# Patient Record
Sex: MALE | Race: OTHER | HISPANIC OR LATINO | Employment: FULL TIME | URBAN - METROPOLITAN AREA
[De-identification: names, ages, dates, MRNs, and addresses within clinical notes are randomized per-mention and may not be internally consistent; named-entity substitution may affect disease eponyms.]

---

## 2022-03-31 ENCOUNTER — HOSPITAL ENCOUNTER (INPATIENT)
Facility: HOSPITAL | Age: 52
LOS: 4 days | Discharge: HOME | DRG: 192 | End: 2022-04-06
Attending: EMERGENCY MEDICINE | Admitting: INTERNAL MEDICINE
Payer: COMMERCIAL

## 2022-03-31 DIAGNOSIS — J90 RECURRENT LEFT PLEURAL EFFUSION: ICD-10-CM

## 2022-03-31 DIAGNOSIS — R42 DIZZINESS: ICD-10-CM

## 2022-03-31 DIAGNOSIS — I50.20 HFREF (HEART FAILURE WITH REDUCED EJECTION FRACTION) (HCC): ICD-10-CM

## 2022-03-31 DIAGNOSIS — R07.9 CHEST PAIN: Primary | ICD-10-CM

## 2022-03-31 DIAGNOSIS — I50.9 ACUTE CHF (CONGESTIVE HEART FAILURE) (HCC): ICD-10-CM

## 2022-03-31 PROCEDURE — 83036 HEMOGLOBIN GLYCOSYLATED A1C: CPT | Performed by: STUDENT IN AN ORGANIZED HEALTH CARE EDUCATION/TRAINING PROGRAM

## 2022-03-31 PROCEDURE — 80053 COMPREHEN METABOLIC PANEL: CPT | Performed by: EMERGENCY MEDICINE

## 2022-03-31 PROCEDURE — 83880 ASSAY OF NATRIURETIC PEPTIDE: CPT | Performed by: EMERGENCY MEDICINE

## 2022-03-31 PROCEDURE — 84484 ASSAY OF TROPONIN QUANT: CPT | Performed by: EMERGENCY MEDICINE

## 2022-03-31 PROCEDURE — 99285 EMERGENCY DEPT VISIT HI MDM: CPT

## 2022-03-31 PROCEDURE — 85025 COMPLETE CBC W/AUTO DIFF WBC: CPT | Performed by: EMERGENCY MEDICINE

## 2022-03-31 PROCEDURE — 36415 COLL VENOUS BLD VENIPUNCTURE: CPT

## 2022-03-31 PROCEDURE — 80061 LIPID PANEL: CPT | Performed by: STUDENT IN AN ORGANIZED HEALTH CARE EDUCATION/TRAINING PROGRAM

## 2022-03-31 PROCEDURE — 93005 ELECTROCARDIOGRAM TRACING: CPT

## 2022-03-31 PROCEDURE — 76775 US EXAM ABDO BACK WALL LIM: CPT | Performed by: EMERGENCY MEDICINE

## 2022-03-31 PROCEDURE — 76604 US EXAM CHEST: CPT | Performed by: EMERGENCY MEDICINE

## 2022-03-31 PROCEDURE — 85379 FIBRIN DEGRADATION QUANT: CPT | Performed by: EMERGENCY MEDICINE

## 2022-03-31 PROCEDURE — 84443 ASSAY THYROID STIM HORMONE: CPT | Performed by: STUDENT IN AN ORGANIZED HEALTH CARE EDUCATION/TRAINING PROGRAM

## 2022-03-31 PROCEDURE — 99285 EMERGENCY DEPT VISIT HI MDM: CPT | Performed by: EMERGENCY MEDICINE

## 2022-03-31 RX ORDER — ACETAMINOPHEN 325 MG/1
975 TABLET ORAL ONCE
Status: COMPLETED | OUTPATIENT
Start: 2022-04-01 | End: 2022-04-01

## 2022-03-31 RX ORDER — KETOROLAC TROMETHAMINE 30 MG/ML
15 INJECTION, SOLUTION INTRAMUSCULAR; INTRAVENOUS ONCE
Status: COMPLETED | OUTPATIENT
Start: 2022-04-01 | End: 2022-04-01

## 2022-04-01 ENCOUNTER — APPOINTMENT (OUTPATIENT)
Dept: NON INVASIVE DIAGNOSTICS | Facility: HOSPITAL | Age: 52
DRG: 192 | End: 2022-04-01
Payer: COMMERCIAL

## 2022-04-01 ENCOUNTER — APPOINTMENT (EMERGENCY)
Dept: RADIOLOGY | Facility: HOSPITAL | Age: 52
DRG: 192 | End: 2022-04-01
Payer: COMMERCIAL

## 2022-04-01 PROBLEM — R06.00 DYSPNEA: Status: ACTIVE | Noted: 2022-04-01

## 2022-04-01 PROBLEM — R35.0 INCREASED URINARY FREQUENCY: Status: ACTIVE | Noted: 2022-04-01

## 2022-04-01 PROBLEM — I10 HYPERTENSION: Status: ACTIVE | Noted: 2022-04-01

## 2022-04-01 PROBLEM — M79.605 LEFT LEG PAIN: Status: ACTIVE | Noted: 2022-04-01

## 2022-04-01 PROBLEM — R07.89 CHEST DISCOMFORT: Status: ACTIVE | Noted: 2022-04-01

## 2022-04-01 PROBLEM — R79.89 ELEVATED SERUM CREATININE: Status: ACTIVE | Noted: 2022-04-01

## 2022-04-01 PROBLEM — J90 PLEURAL EFFUSION, LEFT: Status: ACTIVE | Noted: 2022-04-01

## 2022-04-01 LAB
2HR DELTA HS TROPONIN: -6 NG/L
4HR DELTA HS TROPONIN: 3 NG/L
ALBUMIN SERPL BCP-MCNC: 3.2 G/DL (ref 3.5–5)
ALP SERPL-CCNC: 145 U/L (ref 46–116)
ALT SERPL W P-5'-P-CCNC: 39 U/L (ref 12–78)
ANION GAP SERPL CALCULATED.3IONS-SCNC: 6 MMOL/L (ref 4–13)
ANION GAP SERPL CALCULATED.3IONS-SCNC: 7 MMOL/L (ref 4–13)
AORTIC ROOT: 2.5 CM
APICAL FOUR CHAMBER EJECTION FRACTION: 32 %
ASCENDING AORTA: 2.9 CM (ref 2.16–3.23)
AST SERPL W P-5'-P-CCNC: 28 U/L (ref 5–45)
ATRIAL RATE: 104 BPM
BACTERIA UR QL AUTO: ABNORMAL /HPF
BASOPHILS # BLD AUTO: 0.06 THOUSANDS/ΜL (ref 0–0.1)
BASOPHILS # BLD AUTO: 0.06 THOUSANDS/ΜL (ref 0–0.1)
BASOPHILS NFR BLD AUTO: 1 % (ref 0–1)
BASOPHILS NFR BLD AUTO: 1 % (ref 0–1)
BILIRUB SERPL-MCNC: 0.53 MG/DL (ref 0.2–1)
BILIRUB UR QL STRIP: NEGATIVE
BUN SERPL-MCNC: 18 MG/DL (ref 5–25)
BUN SERPL-MCNC: 19 MG/DL (ref 5–25)
CALCIUM ALBUM COR SERPL-MCNC: 10 MG/DL (ref 8.3–10.1)
CALCIUM SERPL-MCNC: 8.9 MG/DL (ref 8.3–10.1)
CALCIUM SERPL-MCNC: 9.4 MG/DL (ref 8.3–10.1)
CARDIAC TROPONIN I PNL SERPL HS: 19 NG/L
CARDIAC TROPONIN I PNL SERPL HS: 25 NG/L
CARDIAC TROPONIN I PNL SERPL HS: 28 NG/L
CHLORIDE SERPL-SCNC: 107 MMOL/L (ref 100–108)
CHLORIDE SERPL-SCNC: 108 MMOL/L (ref 100–108)
CHOLEST SERPL-MCNC: 132 MG/DL
CLARITY UR: CLEAR
CO2 SERPL-SCNC: 23 MMOL/L (ref 21–32)
CO2 SERPL-SCNC: 25 MMOL/L (ref 21–32)
COLOR UR: ABNORMAL
CREAT SERPL-MCNC: 1.31 MG/DL (ref 0.6–1.3)
CREAT SERPL-MCNC: 1.36 MG/DL (ref 0.6–1.3)
D DIMER PPP FEU-MCNC: 2.55 UG/ML FEU
E WAVE DECELERATION TIME: 86 MS
EOSINOPHIL # BLD AUTO: 0.25 THOUSAND/ΜL (ref 0–0.61)
EOSINOPHIL # BLD AUTO: 0.28 THOUSAND/ΜL (ref 0–0.61)
EOSINOPHIL NFR BLD AUTO: 3 % (ref 0–6)
EOSINOPHIL NFR BLD AUTO: 4 % (ref 0–6)
ERYTHROCYTE [DISTWIDTH] IN BLOOD BY AUTOMATED COUNT: 14.2 % (ref 11.6–15.1)
ERYTHROCYTE [DISTWIDTH] IN BLOOD BY AUTOMATED COUNT: 14.2 % (ref 11.6–15.1)
EST. AVERAGE GLUCOSE BLD GHB EST-MCNC: 108 MG/DL
FLUAV RNA RESP QL NAA+PROBE: NEGATIVE
FLUBV RNA RESP QL NAA+PROBE: NEGATIVE
FRACTIONAL SHORTENING: 13 % (ref 28–44)
GFR SERPL CREATININE-BSD FRML MDRD: 60 ML/MIN/1.73SQ M
GFR SERPL CREATININE-BSD FRML MDRD: 63 ML/MIN/1.73SQ M
GLUCOSE SERPL-MCNC: 102 MG/DL (ref 65–140)
GLUCOSE SERPL-MCNC: 106 MG/DL (ref 65–140)
GLUCOSE UR STRIP-MCNC: NEGATIVE MG/DL
HBA1C MFR BLD: 5.4 %
HCT VFR BLD AUTO: 44.1 % (ref 36.5–49.3)
HCT VFR BLD AUTO: 46.4 % (ref 36.5–49.3)
HDLC SERPL-MCNC: 44 MG/DL
HGB BLD-MCNC: 14.6 G/DL (ref 12–17)
HGB BLD-MCNC: 14.7 G/DL (ref 12–17)
HGB UR QL STRIP.AUTO: NEGATIVE
IMM GRANULOCYTES # BLD AUTO: 0.04 THOUSAND/UL (ref 0–0.2)
IMM GRANULOCYTES # BLD AUTO: 0.04 THOUSAND/UL (ref 0–0.2)
IMM GRANULOCYTES NFR BLD AUTO: 0 % (ref 0–2)
IMM GRANULOCYTES NFR BLD AUTO: 1 % (ref 0–2)
INTERVENTRICULAR SEPTUM IN DIASTOLE (PARASTERNAL SHORT AXIS VIEW): 0.9 CM
INTERVENTRICULAR SEPTUM: 0.9 CM (ref 0.56–1.05)
KETONES UR STRIP-MCNC: NEGATIVE MG/DL
LAAS-AP2: 21.8 CM2
LAAS-AP4: 19.3 CM2
LDLC SERPL CALC-MCNC: 47 MG/DL (ref 0–100)
LEFT ATRIUM AREA SYSTOLE SINGLE PLANE A4C: 21 CM2
LEFT ATRIUM SIZE: 4.3 CM
LEFT INTERNAL DIMENSION IN SYSTOLE: 4.5 CM (ref 4.15–6.28)
LEFT VENTRICLE DIASTOLIC VOLUME (MOD BIPLANE): 121 ML (ref 110.72–249.35)
LEFT VENTRICLE SYSTOLIC VOLUME (MOD BIPLANE): 77 ML
LEFT VENTRICULAR INTERNAL DIMENSION IN DIASTOLE: 5.2 CM (ref 6.91–10.3)
LEFT VENTRICULAR POSTERIOR WALL IN END DIASTOLE: 0.9 CM (ref 0.55–1.04)
LEFT VENTRICULAR STROKE VOLUME: 36 ML
LEUKOCYTE ESTERASE UR QL STRIP: NEGATIVE
LV EF: 36 %
LVSV (TEICH): 36 ML
LYMPHOCYTES # BLD AUTO: 1.59 THOUSANDS/ΜL (ref 0.6–4.47)
LYMPHOCYTES # BLD AUTO: 1.9 THOUSANDS/ΜL (ref 0.6–4.47)
LYMPHOCYTES NFR BLD AUTO: 17 % (ref 14–44)
LYMPHOCYTES NFR BLD AUTO: 25 % (ref 14–44)
MCH RBC QN AUTO: 29.8 PG (ref 26.8–34.3)
MCH RBC QN AUTO: 29.9 PG (ref 26.8–34.3)
MCHC RBC AUTO-ENTMCNC: 31.7 G/DL (ref 31.4–37.4)
MCHC RBC AUTO-ENTMCNC: 33.1 G/DL (ref 31.4–37.4)
MCV RBC AUTO: 90 FL (ref 82–98)
MCV RBC AUTO: 94 FL (ref 82–98)
MONOCYTES # BLD AUTO: 0.49 THOUSAND/ΜL (ref 0.17–1.22)
MONOCYTES # BLD AUTO: 0.65 THOUSAND/ΜL (ref 0.17–1.22)
MONOCYTES NFR BLD AUTO: 6 % (ref 4–12)
MONOCYTES NFR BLD AUTO: 7 % (ref 4–12)
MUCOUS THREADS UR QL AUTO: ABNORMAL
MV E'TISSUE VEL-SEP: 9 CM/S
MV EROA: 0.1 CM2
MV PEAK A VEL: 0.47 M/S
MV PEAK E VEL: 93 CM/S
MV STENOSIS PRESSURE HALF TIME: 25 MS
MV VALVE AREA P 1/2 METHOD: 8.8 CM2
NEUTROPHILS # BLD AUTO: 4.83 THOUSANDS/ΜL (ref 1.85–7.62)
NEUTROPHILS # BLD AUTO: 6.59 THOUSANDS/ΜL (ref 1.85–7.62)
NEUTS SEG NFR BLD AUTO: 63 % (ref 43–75)
NEUTS SEG NFR BLD AUTO: 72 % (ref 43–75)
NITRITE UR QL STRIP: NEGATIVE
NON-SQ EPI CELLS URNS QL MICRO: ABNORMAL /HPF
NRBC BLD AUTO-RTO: 0 /100 WBCS
NRBC BLD AUTO-RTO: 0 /100 WBCS
NT-PROBNP SERPL-MCNC: 3237 PG/ML
P AXIS: 73 DEGREES
PA SYSTOLIC PRESSURE: 51 MMHG
PH UR STRIP.AUTO: 5 [PH]
PISA MRMAX VEL: 0.36 M/S
PISA RADIUS: 0.5 CM
PLATELET # BLD AUTO: 175 THOUSANDS/UL (ref 149–390)
PLATELET # BLD AUTO: 225 THOUSANDS/UL (ref 149–390)
PMV BLD AUTO: 11.5 FL (ref 8.9–12.7)
PMV BLD AUTO: 11.8 FL (ref 8.9–12.7)
POTASSIUM SERPL-SCNC: 4.1 MMOL/L (ref 3.5–5.3)
POTASSIUM SERPL-SCNC: 4.5 MMOL/L (ref 3.5–5.3)
PR INTERVAL: 156 MS
PROT SERPL-MCNC: 6.8 G/DL (ref 6.4–8.2)
PROT UR STRIP-MCNC: NEGATIVE MG/DL
QRS AXIS: 37 DEGREES
QRSD INTERVAL: 74 MS
QT INTERVAL: 330 MS
QTC INTERVAL: 433 MS
RA PRESSURE ESTIMATED: 5 MMHG
RBC # BLD AUTO: 4.88 MILLION/UL (ref 3.88–5.62)
RBC # BLD AUTO: 4.93 MILLION/UL (ref 3.88–5.62)
RBC #/AREA URNS AUTO: ABNORMAL /HPF
RIGHT ATRIUM AREA SYSTOLE A4C: 18.1 CM2
RIGHT VENTRICLE ID DIMENSION: 3.6 CM
RSV RNA RESP QL NAA+PROBE: NEGATIVE
RV PSP: 51 MMHG
SARS-COV-2 RNA RESP QL NAA+PROBE: NEGATIVE
SL CV LEFT ATRIUM LENGTH A2C: 5.6 CM
SL CV LV DIAS VOL ENDO Z SCORE: -1.7
SL CV LV EF: 30
SL CV PED ECHO LEFT VENTRICLE DIASTOLIC VOLUME (MOD BIPLANE) 2D: 130 ML
SL CV PED ECHO LEFT VENTRICLE SYSTOLIC VOLUME (MOD BIPLANE) 2D: 94 ML
SODIUM SERPL-SCNC: 137 MMOL/L (ref 136–145)
SODIUM SERPL-SCNC: 139 MMOL/L (ref 136–145)
SP GR UR STRIP.AUTO: 1.02 (ref 1–1.03)
T WAVE AXIS: 68 DEGREES
TR MAX PG: 46 MMHG
TR PEAK VELOCITY: 3.4 M/S
TRICUSPID VALVE PEAK REGURGITATION VELOCITY: 3.4 M/S
TRIGL SERPL-MCNC: 205 MG/DL
TSH SERPL DL<=0.05 MIU/L-ACNC: 0.73 UIU/ML (ref 0.36–3.74)
UROBILINOGEN UR STRIP-ACNC: <2 MG/DL
VENTRICULAR RATE: 104 BPM
WBC # BLD AUTO: 7.6 THOUSAND/UL (ref 4.31–10.16)
WBC # BLD AUTO: 9.18 THOUSAND/UL (ref 4.31–10.16)
WBC #/AREA URNS AUTO: ABNORMAL /HPF
Z-SCORE OF ASCENDING AORTA: 0.75
Z-SCORE OF INTERVENTRICULAR SEPTUM IN END DIASTOLE: 0.76
Z-SCORE OF LEFT VENTRICULAR DIMENSION IN END DIASTOLE: -4.84
Z-SCORE OF LEFT VENTRICULAR DIMENSION IN END SYSTOLE: -0.99
Z-SCORE OF LEFT VENTRICULAR POSTERIOR WALL IN END DIASTOLE: 0.87

## 2022-04-01 PROCEDURE — 84484 ASSAY OF TROPONIN QUANT: CPT | Performed by: EMERGENCY MEDICINE

## 2022-04-01 PROCEDURE — 93010 ELECTROCARDIOGRAM REPORT: CPT | Performed by: INTERNAL MEDICINE

## 2022-04-01 PROCEDURE — 74174 CTA ABD&PLVS W/CONTRAST: CPT

## 2022-04-01 PROCEDURE — 99245 OFF/OP CONSLTJ NEW/EST HI 55: CPT | Performed by: INTERNAL MEDICINE

## 2022-04-01 PROCEDURE — 96361 HYDRATE IV INFUSION ADD-ON: CPT

## 2022-04-01 PROCEDURE — 99219 PR INITIAL OBSERVATION CARE/DAY 50 MINUTES: CPT | Performed by: INTERNAL MEDICINE

## 2022-04-01 PROCEDURE — 93306 TTE W/DOPPLER COMPLETE: CPT

## 2022-04-01 PROCEDURE — 80048 BASIC METABOLIC PNL TOTAL CA: CPT | Performed by: STUDENT IN AN ORGANIZED HEALTH CARE EDUCATION/TRAINING PROGRAM

## 2022-04-01 PROCEDURE — 93970 EXTREMITY STUDY: CPT | Performed by: SURGERY

## 2022-04-01 PROCEDURE — 96374 THER/PROPH/DIAG INJ IV PUSH: CPT

## 2022-04-01 PROCEDURE — 93970 EXTREMITY STUDY: CPT

## 2022-04-01 PROCEDURE — 0241U HB NFCT DS VIR RESP RNA 4 TRGT: CPT | Performed by: INTERNAL MEDICINE

## 2022-04-01 PROCEDURE — NC001 PR NO CHARGE: Performed by: INTERNAL MEDICINE

## 2022-04-01 PROCEDURE — 84166 PROTEIN E-PHORESIS/URINE/CSF: CPT | Performed by: INTERNAL MEDICINE

## 2022-04-01 PROCEDURE — 85025 COMPLETE CBC W/AUTO DIFF WBC: CPT | Performed by: STUDENT IN AN ORGANIZED HEALTH CARE EDUCATION/TRAINING PROGRAM

## 2022-04-01 PROCEDURE — 81001 URINALYSIS AUTO W/SCOPE: CPT | Performed by: STUDENT IN AN ORGANIZED HEALTH CARE EDUCATION/TRAINING PROGRAM

## 2022-04-01 PROCEDURE — 71275 CT ANGIOGRAPHY CHEST: CPT

## 2022-04-01 PROCEDURE — 36415 COLL VENOUS BLD VENIPUNCTURE: CPT | Performed by: EMERGENCY MEDICINE

## 2022-04-01 PROCEDURE — 93306 TTE W/DOPPLER COMPLETE: CPT | Performed by: INTERNAL MEDICINE

## 2022-04-01 PROCEDURE — G1004 CDSM NDSC: HCPCS

## 2022-04-01 RX ORDER — HEPARIN SODIUM 5000 [USP'U]/ML
5000 INJECTION, SOLUTION INTRAVENOUS; SUBCUTANEOUS EVERY 8 HOURS SCHEDULED
Status: DISCONTINUED | OUTPATIENT
Start: 2022-04-01 | End: 2022-04-06 | Stop reason: HOSPADM

## 2022-04-01 RX ORDER — ASPIRIN 81 MG/1
81 TABLET ORAL DAILY
Status: DISCONTINUED | OUTPATIENT
Start: 2022-04-01 | End: 2022-04-03

## 2022-04-01 RX ORDER — ASPIRIN 81 MG/1
81 TABLET ORAL DAILY
Status: ON HOLD | COMMUNITY
End: 2022-04-06 | Stop reason: SDUPTHER

## 2022-04-01 RX ORDER — FUROSEMIDE 10 MG/ML
40 INJECTION INTRAMUSCULAR; INTRAVENOUS
Status: DISCONTINUED | OUTPATIENT
Start: 2022-04-01 | End: 2022-04-04

## 2022-04-01 RX ORDER — ACETAMINOPHEN 160 MG/5ML
650 SUSPENSION, ORAL (FINAL DOSE FORM) ORAL EVERY 4 HOURS PRN
Status: DISCONTINUED | OUTPATIENT
Start: 2022-04-01 | End: 2022-04-03

## 2022-04-01 RX ORDER — FUROSEMIDE 10 MG/ML
80 INJECTION INTRAMUSCULAR; INTRAVENOUS ONCE
Status: COMPLETED | OUTPATIENT
Start: 2022-04-01 | End: 2022-04-01

## 2022-04-01 RX ORDER — POTASSIUM CHLORIDE 20 MEQ/1
40 TABLET, EXTENDED RELEASE ORAL ONCE
Status: COMPLETED | OUTPATIENT
Start: 2022-04-01 | End: 2022-04-01

## 2022-04-01 RX ADMIN — HEPARIN SODIUM 5000 UNITS: 5000 INJECTION INTRAVENOUS; SUBCUTANEOUS at 21:33

## 2022-04-01 RX ADMIN — Medication 12.5 MG: at 21:33

## 2022-04-01 RX ADMIN — ACETAMINOPHEN 975 MG: 325 TABLET ORAL at 00:37

## 2022-04-01 RX ADMIN — Medication 12.5 MG: at 10:39

## 2022-04-01 RX ADMIN — FUROSEMIDE 40 MG: 10 INJECTION, SOLUTION INTRAMUSCULAR; INTRAVENOUS at 17:20

## 2022-04-01 RX ADMIN — HEPARIN SODIUM 5000 UNITS: 5000 INJECTION INTRAVENOUS; SUBCUTANEOUS at 13:25

## 2022-04-01 RX ADMIN — FUROSEMIDE 80 MG: 10 INJECTION, SOLUTION INTRAVENOUS at 04:53

## 2022-04-01 RX ADMIN — IOHEXOL 100 ML: 350 INJECTION, SOLUTION INTRAVENOUS at 01:27

## 2022-04-01 RX ADMIN — POTASSIUM CHLORIDE 40 MEQ: 20 TABLET, EXTENDED RELEASE ORAL at 10:39

## 2022-04-01 RX ADMIN — ASPIRIN 81 MG: 81 TABLET, COATED ORAL at 10:39

## 2022-04-01 RX ADMIN — KETOROLAC TROMETHAMINE 15 MG: 30 INJECTION, SOLUTION INTRAMUSCULAR at 00:37

## 2022-04-01 RX ADMIN — SODIUM CHLORIDE 1000 ML: 0.9 INJECTION, SOLUTION INTRAVENOUS at 00:37

## 2022-04-01 NOTE — ASSESSMENT & PLAN NOTE
1 day of acute onset left sided chest pain radiating to the back  Not related to exertion  Exacerbated by cough  EKG showing sinus tachy with nonspecific ST changes  NT proBNP 3237  D dimer 2 55  Trop 25 --> 19  CTA dissection protocol showing only left pleural effusion without aneurysm or PE  Low suspicion for ACS given already downtrending trop  Concern is for acute heart failure (1+ BLE edema, JVD, elevated proBNP, left pleural effusion, SOB) +/- PE  Concern for PE given acuity of pain and constellation of symptoms (tachycardia, cough, chest pain, SOB, LLE swelling and pain) and labs (elevated D dimer)  Discussed with on call radiologist that read CTA dissection and he explained that though the study done was not a CTA PE, it would only miss a very small PE  TTE revealed LVEF 30-35%, moderate to severe MR  Patient is likely to acute CHF  Etiology is unclear  Inpatient consult to Cardiology and input appreciated  · Monitor I/O, daily weight,  · Continue Lasix 40 mg IV b i d   · Increased metoprolol to 25 mg p o  B i d   · Start hydralazine 10 mg p o   Q 8h  · Plan for left heart catheterization Monday  · HbA1C WNL, Iron panel unremarkable, TSH within normal range

## 2022-04-01 NOTE — ED ATTENDING ATTESTATION
Final Diagnosis:  1  Chest pain    2  Dizziness    3  Left pleural effusion      ED Course as of 04/01/22 0428   Fri Apr 01, 2022   0029 POCUS Cardiac:  - Done with resident  - echo; transthoracic echocardiogram was performed at bedside    - Images collected were adequate  - This was a technically difficult study due to lung interference  -  Apical, parasternal, subcostal and suprasternal views were obtained  - Findings: There was no obvious pericardial effusion   EF grossly mildly reduced  IVC was 48% CI  LEFT pleural effusion, small  Aorta appears non-aneurysmal     0053 D-Dimer, Quant(!): 2 55       I, Nessa Lam MD, saw and evaluated the patient  All available labs and X-rays were ordered by me or the resident and have been reviewed by myself  I discussed the patient with the resident / non-physician and agree with the resident's / non-physician practitioner's findings and plan as documented in the resident's / non-physician practicitioner's note, except where noted  At this point, I agree with the current assessment done in the ED  I was present during key portions of all procedures performed unless otherwise stated  Chief Complaint   Patient presents with    Chest Pain     Pt reports 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, pain worsens when pt coughs  Pt also reports lower back pain  This is a 50 y o  male presenting for evaluation of 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  +rhinorrhea  +PND  +cough  Has noted LE edema / swelling and started to take a pill to take water off, written by a doctor  Legs feel sore  Also has high blood pressure that he takes a medication for  No recent travel / long drives  PMH:  He has had evaluations for blood clots in his legs but never found anything     has no past medical history on file  PSH:   has no past surgical history on file      Social:  Social History     Substance and Sexual Activity   Alcohol Use Never     Social History     Tobacco Use   Smoking Status Never Smoker   Smokeless Tobacco Never Used     Social History     Substance and Sexual Activity   Drug Use Never     PE:  Vitals:    03/31/22 2338 04/01/22 0145   BP: 130/72 156/82   BP Location:  Right arm   Pulse: (!) 111 96   Resp: 20 22   Temp: (!) 97 2 °F (36 2 °C)    TempSrc: Oral    SpO2: 95% 94%   General: VSS, NAD, awake, alert  Well-nourished, well-developed  Appears stated age  Head: Normocephalic, atraumatic, nontender  Eyes: PERRL, EOM-I  No diplopia  No hyphema  No subconjunctival hemorrhages  Symmetrical lids  ENTAtraumatic external nose and ears  MMM  No stridor  Normal phonation  No drooling  Base of mouth is soft  No mastoid tenderness  Neck: Symmetric, trachea midline  No JVD  CV: Peripheral pulses +2 throughout  Mild LEFT anterior chest wall tenderness  Lungs:   Unlabored   No retractions  No crepitus  No tachypnea  No paradoxical motion  Abd: +BS, soft, minimal LLQ tenderness   Moderate LUQ tenderness  ND    MSK:   FROM   No lower extremity edema  Back:   No CVAT  Skin: Dry, intact  Neuro: AAOx3, GCS 15, CN II-XII grossly intact  Motor grossly intact  Psychiatric/Behavioral: Appropriate mood and affect   Exam: deferred  A:  - cp  P:  - EKG reviewed with non-specific abnormalities of unclear certainty with tachycardia  No old for comaprison  - Given age / RFs, will do cardiac workup  - Delta trop  - likely DC  - toradol for pain  - POCUS for effusion    - 13 point ROS was performed and all are normal unless stated in the history above  - Nursing note reviewed  Vitals reviewed  - Orders placed by myself and/or advanced practitioner / resident     - Previous chart was reviewed  - No language barrier    - History obtained from patient  - There are no limitations to the history obtained  - Critical care time: Not applicable for this patient       Code Status: Level 1 - Full Code  Advance Directive and Living Will:      Power of :    POLST:      Medications   furosemide (LASIX) injection 80 mg (has no administration in time range)   aspirin (ECOTRIN LOW STRENGTH) EC tablet 81 mg (has no administration in time range)   acetaminophen (TYLENOL) oral suspension 650 mg (has no administration in time range)   heparin (porcine) subcutaneous injection 5,000 Units (has no administration in time range)   sodium chloride 0 9 % bolus 1,000 mL (1,000 mL Intravenous New Bag 4/1/22 0037)   ketorolac (TORADOL) injection 15 mg (15 mg Intravenous Given 4/1/22 0037)   acetaminophen (TYLENOL) tablet 975 mg (975 mg Oral Given 4/1/22 0037)   iohexol (OMNIPAQUE) 350 MG/ML injection (SINGLE-DOSE) 100 mL (100 mL Intravenous Given 4/1/22 0127)     CTA dissection protocol chest abdomen pelvis w wo contrast   Final Result      No evidence of aortic aneurysm or dissection  Small left pleural effusion  No evidence of acute pathology throughout the abdomen or pelvis                    Workstation performed: [id number]         VAS lower limb venous duplex study, complete bilateral    (Results Pending)     Orders Placed This Encounter   Procedures    POC AAA US    POC Cardiac US    COVID/FLU/RSV    CTA dissection protocol chest abdomen pelvis w wo contrast    CBC and differential    Comprehensive metabolic panel    HS Troponin 0hr (reflex protocol)    Moira draw    D-Dimer    NT-BNP PRO    HS Troponin I 2hr    HS Troponin I 4hr    Lipid Panel with Direct LDL reflex    Hemoglobin A1C    TSH, 3rd generation    Basic metabolic panel    CBC and differential    Urinalysis with microscopic    Diet Cardiovascular; Cardiac; Fluid Restriction 1800 ML, Sodium 2 GM    Notify Physician: Increase in chest pain, symptomatic hypotension, or change in cardiac rhythm, or O2 less than 90%    Insert peripheral IV    Continuous cardiac monitoring    Continuous pulse oximetry    48 Hour Telemetry Monitoring    Vital Signs per unit routine    Up and OOB as tolerated    Daily weights    I/O    Insert peripheral IV    Maintain IV access    Apply Sequential Compression Device    Activity as tolerated    Level 1-Full Code: all life saving measures are indicated    Inpatient consult to Case Management    OT eval and treat    PT eval and treat    ECG 12 lead    Echo complete w/ contrast if indicated    Place in Observation     Labs Reviewed   COMPREHENSIVE METABOLIC PANEL - Abnormal       Result Value Ref Range Status    Sodium 137  136 - 145 mmol/L Final    Potassium 4 5  3 5 - 5 3 mmol/L Final    Comment: Slightly Hemolyzed; Results May be Affected    Chloride 108  100 - 108 mmol/L Final    CO2 23  21 - 32 mmol/L Final    ANION GAP 6  4 - 13 mmol/L Final    BUN 19  5 - 25 mg/dL Final    Creatinine 1 36 (*) 0 60 - 1 30 mg/dL Final    Comment: Standardized to IDMS reference method    Glucose 102  65 - 140 mg/dL Final    Comment: If the patient is fasting, the ADA then defines impaired fasting glucose as > 100 mg/dL and diabetes as > or equal to 123 mg/dL  Specimen collection should occur prior to Sulfasalazine administration due to the potential for falsely depressed results  Specimen collection should occur prior to Sulfapyridine administration due to the potential for falsely elevated results  Calcium 9 4  8 3 - 10 1 mg/dL Final    Corrected Calcium 10 0  8 3 - 10 1 mg/dL Final    AST 28  5 - 45 U/L Final    Comment: Slightly Hemolyzed; Results May be Affected  Specimen collection should occur prior to Sulfasalazine administration due to the potential for falsely depressed results  ALT 39  12 - 78 U/L Final    Comment: Specimen collection should occur prior to Sulfasalazine and/or Sulfapyridine administration due to the potential for falsely depressed results       Alkaline Phosphatase 145 (*) 46 - 116 U/L Final    Total Protein 6 8  6 4 - 8 2 g/dL Final    Albumin 3 2 (*) 3 5 - 5 0 g/dL Final    Total Bilirubin 0 53  0 20 - 1 00 mg/dL Final    Comment: Use of this assay is not recommended for patients undergoing treatment with eltrombopag due to the potential for falsely elevated results  eGFR 60  ml/min/1 73sq m Final    Narrative:     [place] guidelines for Chronic Kidney Disease (CKD):     Stage 1 with normal or high GFR (GFR > 90 mL/min/1 73 square meters)    Stage 2 Mild CKD (GFR = 60-89 mL/min/1 73 square meters)    Stage 3A Moderate CKD (GFR = 45-59 mL/min/1 73 square meters)    Stage 3B Moderate CKD (GFR = 30-44 mL/min/1 73 square meters)    Stage 4 Severe CKD (GFR = 15-29 mL/min/1 73 square meters)    Stage 5 End Stage CKD (GFR <15 mL/min/1 73 square meters)  Note: GFR calculation is accurate only with a steady state creatinine   D-DIMER, QUANTITATIVE - Abnormal    D-Dimer, Quant 2 55 (*) <0 50 ug/ml FEU Final    Comment: Reference and upper limits to exclude DVT and PE are the same  Do not use to exclude if clinical symptoms are present  Narrative: In the evaluation for possible pulmonary embolism, in the appropriate (Well's Score of 4 or less) patient, the age adjusted d-dimer cutoff for this patient can be calculated as:    Age x 0 01 (in ug/mL) for Age-adjusted D-dimer exclusion threshold for a patient over 50 years  NT-BNP PRO (BRAIN NATRIURETIC PEPTIDE) - Abnormal    NT-proBNP 3,237 (*) <125 pg/mL Final   HS TROPONIN I 0HR - Normal    hs TnI 0hr 25  "Refer to ACS Flowchart"- see link ng/L Final    Comment:                                              Initial (time 0) result  If >=50 ng/L, Myocardial injury suggested ;  Type of myocardial injury and treatment strategy  to be determined  If 5-49 ng/L, a delta result at 2 hours and or 4 hours will be needed to further evaluate  If <4 ng/L, and chest pain has been >3 hours since onset, patient may qualify for discharge based on the HEART score in the ED    If <5 ng/L and <3hours since onset of chest pain, a delta result at 2 hours will be needed to further evaluate  HS Troponin 99th Percentile URL of a Health Population=12 ng/L with a 95% Confidence Interval of 8-18 ng/L  Second Troponin (time 2 hours)  If calculated delta >= 20 ng/L,  Myocardial injury suggested ; Type of myocardial injury and treatment strategy to be determined  If 5-49 ng/L and the calculated delta is 5-19 ng/L, consult medical service for evaluation  Continue evaluation for ischemia on ecg and other possible etiology and repeat hs troponin at 4 hours  If delta is <5 ng/L at 2 hours, consider discharge based on risk stratification via the HEART score (if in ED), or LUCIANA risk score in IP/Observation  HS Troponin 99th Percentile URL of a Health Population=12 ng/L with a 95% Confidence Interval of 8-18 ng/L    HS TROPONIN I 2HR - Normal    hs TnI 2hr 19  "Refer to ACS Flowchart"- see link ng/L Final    Comment:                                              Initial (time 0) result  If >=50 ng/L, Myocardial injury suggested ;  Type of myocardial injury and treatment strategy  to be determined  If 5-49 ng/L, a delta result at 2 hours and or 4 hours will be needed to further evaluate  If <4 ng/L, and chest pain has been >3 hours since onset, patient may qualify for discharge based on the HEART score in the ED  If <5 ng/L and <3hours since onset of chest pain, a delta result at 2 hours will be needed to further evaluate  HS Troponin 99th Percentile URL of a Health Population=12 ng/L with a 95% Confidence Interval of 8-18 ng/L  Second Troponin (time 2 hours)  If calculated delta >= 20 ng/L,  Myocardial injury suggested ; Type of myocardial injury and treatment strategy to be determined  If 5-49 ng/L and the calculated delta is 5-19 ng/L, consult medical service for evaluation  Continue evaluation for ischemia on ecg and other possible etiology and repeat hs troponin at 4 hours    If delta is <5 ng/L at 2 hours, consider discharge based on risk stratification via the HEART score (if in ED), or LUCIANA risk score in IP/Observation  HS Troponin 99th Percentile URL of a Health Population=12 ng/L with a 95% Confidence Interval of 8-18 ng/L  Delta 2hr hsTnI -6  <20 ng/L Final   COVID19, INFLUENZA A/B, RSV PCR, [facility]   CBC AND DIFFERENTIAL    WBC 9 18  4 31 - 10 16 Thousand/uL Final    RBC 4 93  3 88 - 5 62 Million/uL Final    Hemoglobin 14 7  12 0 - 17 0 g/dL Final    Hematocrit 46 4  36 5 - 49 3 % Final    MCV 94  82 - 98 fL Final    MCH 29 8  26 8 - 34 3 pg Final    MCHC 31 7  31 4 - 37 4 g/dL Final    RDW 14 2  11 6 - 15 1 % Final    MPV 11 8  8 9 - 12 7 fL Final    Platelets 947  291 - 390 Thousands/uL Final    nRBC 0  /100 WBCs Final    Neutrophils Relative 72  43 - 75 % Final    Immat GRANS % 0  0 - 2 % Final    Lymphocytes Relative 17  14 - 44 % Final    Monocytes Relative 7  4 - 12 % Final    Eosinophils Relative 3  0 - 6 % Final    Basophils Relative 1  0 - 1 % Final    Neutrophils Absolute 6 59  1 85 - 7 62 Thousands/µL Final    Immature Grans Absolute 0 04  0 00 - 0 20 Thousand/uL Final    Lymphocytes Absolute 1 59  0 60 - 4 47 Thousands/µL Final    Monocytes Absolute 0 65  0 17 - 1 22 Thousand/µL Final    Eosinophils Absolute 0 25  0 00 - 0 61 Thousand/µL Final    Basophils Absolute 0 06  0 00 - 0 10 Thousands/µL Final   RAINBOW DRAW    Narrative: The following orders were created for panel order [place] draw  Procedure                               Abnormality         Status                     ---------                               -----------         ------                     Oscar Banks Top on EB[[id number]]                           Final result                 Please view results for these tests on the individual orders     HS TROPONIN I 4HR   TSH, 3RD GENERATION   BASIC METABOLIC PANEL   CBC AND DIFFERENTIAL   LIPID PANEL WITH DIRECT LDL REFLEX   HEMOGLOBIN A1C   URINALYSIS WITH MICROSCOPIC LIGHT BLUE TOP     Time reflects when diagnosis was documented in both MDM as applicable and the Disposition within this note     Time User Action Codes Description Comment    4/1/2022 12:31 AM Jennifer Hassan Add [R07 9] Chest pain     4/1/2022 12:31 AM Jennifer Hassan Add [R42] Dizziness     4/1/2022 12:31 AM Jennifer Hassan Add [J90] Recurrent left pleural effusion     4/1/2022 12:31 AM Jennifer Hassan Modify [J90] Left pleural effusion       ED Disposition     ED Disposition Condition Date/Time Comment    Admit Stable Fri Apr 1, 2022  3:00 AM Case was discussed with SOD and the patient's admission status was agreed to be Admission Status: observation status to the service of Dr Roque Sanches   Follow-up Information    None       Patient's Medications   Discharge Prescriptions    No medications on file     No discharge procedures on file  Prior to Admission Medications   Prescriptions Last Dose Informant Patient Reported? Taking?   aspirin (ECOTRIN LOW STRENGTH) 81 mg EC tablet 3/31/2022 at Unknown time  Yes Yes   Sig: Take 81 mg by mouth daily      Facility-Administered Medications: None       Portions of the record may have been created with voice recognition software  Occasional wrong word or "sound a like" substitutions may have occurred due to the inherent limitations of voice recognition software  Read the chart carefully and recognize, using context, where substitutions have occurred      Electronically signed by:  Lennox Kearns

## 2022-04-01 NOTE — ED NOTES
Pt sat at 88% on RA, placed on NC at 2L and sat returned up to 92%        Sandra Edwards RN  04/01/22 1489

## 2022-04-01 NOTE — PROGRESS NOTES
INTERNAL MEDICINE RESIDENCY SENIOR ADMISSION NOTE     Name: Chay Gallego   Age & Sex: 50 y o  male   MRN: [id number]  Unit/Bed#: ED 25   Encounter: [id number]  Primary Care Provider: No primary care provider on file  Admit to team: SOD Team B     Patient seen and examined  Reviewed H&P per Dr Sallye Heimlich   Agree with the assessment and plan with any exception/addition as noted below:    Patient is a 26-year-old male with PMH of hypertension hyperlipidemia presented to [facility] ED for acute onset chest pain and shortness of breath for the last week  Symptoms have been progressively worsening with related symptoms of orthopnea, PND, and left lower extremity swelling and pain  Complaint chest pain is a left-sided (not close to the sternum) and radiates to the posterior shoulder  He also has been experiencing dry cough that has persisted since he experienced nasal congestion and postnasal drip her other same time his chest pain started  ED workup significant for sinus tachycardia, elevated creatinine, elevated BNP, elevated D-dimer, nonspecific ECG findings, and small left pleural effusion on CTA  PE: sinus tachy, JVD, B/L rales in lower lung fields; LLE slightly more firm and discolored with + Enrico's      Principal Problem:    Chest discomfort  Active Problems:    Dyspnea    Left leg pain    Pleural effusion, left    Hypertension    Elevated serum creatinine    Increased urinary frequency    · Chest discomfort likely due to undiagnosed CHF exacerbation:  Troponin was still within normal limits and even down trended after 1st reading; ECG revealed nonspecific ST changes   · Sinus tachy with concern of PE: CTA dissection protocol (not PE study) ruled out PE and I confirmed with reading radiologist Dr Deon Bob that the pulmonary arterial tree was adequately perfused with contrast and would be able to  a sub-segmental PE  · No need to do proper CTA PE study  · Dyspnea/MELENDREZ, Orthopnea, PND, cough: elevated BNP (3000s) and left pleural effusion, bedside echo revealed suppressed LV systolic function  · Will get complete echocardiogram and f/u  · He was admitted late in the night, so will start with IV Lasix 80 mg this a m  and monitor response  · Left leg pain in setting of elevated D-dimer and exam tender, slightly bigger size and mild discoloration: Wells Score 2-->will obtain VAS B/L Venous duplex to rule out a clot and start Heparin subq ppx  · Elevated creatinine: 1 31, unknown baseline, so either could be HEIKE or CKD-->will avoid nephrotoxic agents, NSAIDs, antihypertensive meds for now-->monitor I/O and response of Cr to lasix     Code Status: Level 1 - Full Code  Admission Status: OBSERVATION  Disposition: Patient requires Med/Surg with Telemetry  Expected Length of Stay: 1-2 Midnights

## 2022-04-01 NOTE — CONSULTS
Reason for Consult / Principal Problem:heart failure    Physician Requesting Consult:  Kali Reyes MD    Cardiologist: none  Assessment and Plan      Current Problem List   Principal Problem:    Chest discomfort  Active Problems:    Pleural effusion, left    Hypertension    Left leg pain    Elevated serum creatinine    Dyspnea    Increased urinary frequency    Assessment/Plan:    1  Acute systolic congestive heart failure - NYHA class 3 ACC/aha see  Etiology unknown at this time consideration for possible ischemic cardiomyopathy  Did present with chest pain but no evidence of acute coronary syndrome  BNP elevated  EF 35%  RV systolic function normal   Moderate TR  Moderate to severe MR  PA pressure 51  · Neural hormonal blocker:  Metoprolol tartrate 12 5  · ACE/ARB: price check entresto  · Aldosterone antagonist: none currently  · Diuretic: Start IV lasix 40 BID - received 80 this a m  · SGLT2I: none currently - not on formulary  · I and O  · Daily weights  · Salt and fluid restrict  · Plan: IV lasix 40 bid - give 40 this evening received bolus of 1 L overnight then received 80 IV lasix  Will need ischemic evaluation - [facility] on Monday  Send in for secondary work up as well to include Iron panel, HIV, CHACHA, RF, SPEP/UPEP, serum free light chains  Price check entresto  ·       Subjective     CC: heart failure  HPI: This is a 68-year-old male with a past medical history significant for hypertension who presented to stay with [facility] yesterday secondary to chest pain  Patient was at work caring moving a box and he developed sudden onset left-sided chest pain radiated to his back  He says felt very short of breath this pain which improved after use of inhaler  Is also nauseous and diaphoretic at that time  He reportedly does have a history of frequent swelling in his lower extremities was prescribed water pill for this    On presentation to the emergency department patient was tachycardic with a pulse of 111 blood pressure was 905 systolic  Creatinine was 1 36   HS troponin was negative x3  BNP was 3200  TSH was normal   EKG was notable for QS in the anterior precordial leads, a left atrial enlargement and sinus tachycardia  EF is 30-35% with regional variation  There is elevated PA pressure  He is on metoprolol 12 5, ASA, no I and O documented  The patient reports that he has increasing chest pain shortness a bath for the past week  He does have associated orthopnea and proximal nocturnal dyspnea as well as left lower extremity swelling  The pain is described as left-sided radiating to the posterior shoulder  He also experiencing a dry cough associated with this  He has no alcohol use, drug use  He works in a warehouse  He is a nonsmoker  He reports he does follow with a physician that [facility] although we cannot find any records of it in our system  He reports no history of known heart problems  He does report a strong family history of coronary artery disease in both his father and grandfather  No other known familial heart problems  No previous ischemic workup  Telemetry:  No events    Net fluid balance:  Not documented    Weight:  220 lb            Family History: History reviewed  No pertinent family history  Historical Information   History reviewed  No pertinent past medical history  History reviewed  No pertinent surgical history  Social History   Social History     Substance and Sexual Activity   Alcohol Use Never     Social History     Substance and Sexual Activity   Drug Use Never     Social History     Tobacco Use   Smoking Status Never Smoker   Smokeless Tobacco Never Used     Family History: History reviewed  No pertinent family history  Review of Systems:  Review of Systems   Constitutional: Negative for fever  HENT: Negative for congestion  Respiratory: Positive for shortness of breath  Cardiovascular: Positive for chest pain  Gastrointestinal: Negative for abdominal distention  Psychiatric/Behavioral: Negative for agitation  Scheduled Meds:  Current Facility-Administered Medications   Medication Dose Route Frequency Provider Last Rate    acetaminophen  650 mg Oral Q4H PRN Suha Karimi MD      aspirin  81 mg Oral Daily Suha Karimi MD      heparin (porcine)  5,000 Units Subcutaneous [facility] Suha Karimi MD      metoprolol tartrate  12 5 mg Oral Q12H [address] Lexx Jameson DO       Continuous Infusions:   PRN Meds:   acetaminophen  all current active meds have been reviewed, current meds:   Current Facility-Administered Medications   Medication Dose Route Frequency    acetaminophen (TYLENOL) oral suspension 650 mg  650 mg Oral Q4H PRN    aspirin (ECOTRIN LOW STRENGTH) EC tablet 81 mg  81 mg Oral Daily    heparin (porcine) subcutaneous injection 5,000 Units  5,000 Units Subcutaneous Q8H [address]    metoprolol tartrate (LOPRESSOR) partial tablet 12 5 mg  12 5 mg Oral Q12H [address]    and PTA meds:   Prior to Admission Medications   Prescriptions Last Dose Informant Patient Reported?  Taking?   aspirin (ECOTRIN LOW STRENGTH) 81 mg EC tablet 3/31/2022 at Unknown time  Yes Yes   Sig: Take 81 mg by mouth daily      Facility-Administered Medications: None       No Known Allergies    Objective   Vitals: Temp (24hrs), Av 2 °F (36 2 °C), Min:97 2 °F (36 2 °C), Max:97 2 °F (36 2 °C)  Current: Temperature: (!) 97 2 °F (36 2 °C)  Patient Vitals for the past 24 hrs:   BP Temp Temp src Pulse Resp SpO2 Height Weight   22 0715 128/94 -- -- 93 -- -- 6' (1 829 m) 99 8 kg (220 lb)   22 0655 -- -- -- -- 22 92 % -- --   22 0650 -- -- -- -- (!) 28 (!) 88 % -- --   22 0645 128/94 -- -- 94 20 94 % -- --   22 0450 138/91 -- -- -- -- -- -- --   22 0145 156/82 -- -- 96 22 94 % -- --   22 2338 130/72 (!) 97 2 °F (36 2 °C) Oral (!) 111 20 95 % -- --    Body mass index is 29 84 kg/m²  Orthostatic Blood Pressures      Most Recent Value   Blood Pressure 128/94 filed at 04/01/2022 0715   Patient Position - Orthostatic VS Lying filed at 04/01/2022 0645              Invasive Devices  Report    Peripheral Intravenous Line            Peripheral IV 04/01/22 Right Antecubital <1 day                Physical Exam:    General:  AO x3, no acute distress  Cardiac:  S1-S2 normal  No murmurs, rubs or gallops, JVP:elevated  Lungs:  Clear to auscultation bilaterally  Abdomen:  Soft nontender nondistended, positive bowel sounds  Extremities:  Warm, well perfused    Lab Results:   Results from last 7 days   Lab Units 04/01/22 0453 03/31/22  2343 03/31/22  2343   WBC Thousand/uL 7 60  --  9 18   HEMOGLOBIN g/dL 14 6  --  14 7   HEMATOCRIT % 44 1  --  46 4   PLATELETS Thousands/uL 225  --  175   NEUTROS PCT % 63   < > 72   MONOS PCT % 6  --  7    < > = values in this interval not displayed  Results from last 7 days   Lab Units 04/01/22 0453 03/31/22  2343   SODIUM mmol/L 139 137   POTASSIUM mmol/L 4 1 4 5   CHLORIDE mmol/L 107 108   CO2 mmol/L 25 23   BUN mg/dL 18 19   CREATININE mg/dL 1 31* 1 36*   CALCIUM mg/dL 8 9 9 4   ALK PHOS U/L  --  145*   ALT U/L  --  39   AST U/L  --  28   EGFR ml/min/1 73sq m 63 60                 No results found for: PHART, ANA3LOZ, PO2ART, DTO2DWA, G4HSTRVA, BEART, SOURCE  No components found for: HIV1X2  No results found for: HAV, HEPAIGM, HEPBIGM, HEPBCAB, HBEAG, HEPCAB  No results found for: SPEP, UPEP   Lab Results   Component Value Date    HGBA1C 5 4 03/31/2022     No results found for: CHOL   Lab Results   Component Value Date    HDL 44 03/31/2022      Lab Results   Component Value Date    LDLCALC 47 03/31/2022      Lab Results   Component Value Date    TRIG 205 (H) 03/31/2022     No components found for: PROCAL          Imaging: I have personally reviewed pertinent reports

## 2022-04-01 NOTE — ASSESSMENT & PLAN NOTE
Patient reports polyuria and polydipsia  Glucose on initial chemistry panel is 102    · UA negative  · HB A1c within normal range  · Currently on diuretics  · Continue monitor

## 2022-04-01 NOTE — ASSESSMENT & PLAN NOTE
1-1 5 week history of MELENDREZ, progressively worsening  Satting mid 90s on RA  Small left pleural effusion on CTA dissection study  Elevated proBNP and D dimer  Sounds like may have had viral URI vs allergy symptoms when symptoms first started  Concern is for acute HF +/- PE    · See A&P for "Chest discomfort"

## 2022-04-01 NOTE — ASSESSMENT & PLAN NOTE
Patient reports that he is on combination BP med at home but is unsure the name  May be lisinopril-HCTZ? Blood pressure is slightly high  · Hold on ACEi at this time given elevated Cr  · IV Lasix as above  · Start hydralazine 10 mg p o   Q 8h  · Continue to monitor BP

## 2022-04-01 NOTE — ED PROVIDER NOTES
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, pain worsens when pt coughs  Pt also reports lower back pain  56-year-old male with history of hypertension who presents for evaluation of chest pain  Patient reports that he was at work carrying and moving heavy boxes when he developed sudden onset of left-sided chest pain that radiated to his back  Notes that this was worst at onset and gradually improved  He felt very short of breath with this pain which improved after use an inhaler  He was nauseous and diaphoretic at the time as well  Notes that the pain became so severe that he laid down on the ground and had blurring and blackening of his vision  He notes that he nearly passed out but did not actually lose consciousness  He has never experienced symptoms like this before  He notes that he has frequent swelling in his lower extremities for which she was prescribed a water pill " He is experiencing pain in the left lower extremity at this time as well  He reports that he has had a cough for the past week and a half which was initially accompanied by nasal congestion and postnasal drip which have resolved  He continues with the cough which is nonproductive  He denies vomiting, diarrhea, fevers, abdominal pain  Prior to Admission Medications   Prescriptions Last Dose Informant Patient Reported? Taking?   aspirin (ECOTRIN LOW STRENGTH) 81 mg EC tablet 3/31/2022 at Unknown time  Yes Yes   Sig: Take 81 mg by mouth daily      Facility-Administered Medications: None       History reviewed  No pertinent past medical history  History reviewed  No pertinent surgical history  History reviewed  No pertinent family history  I have reviewed and agree with the history as documented      E-Cigarette/Vaping     E-Cigarette/Vaping Substances     Social History     Tobacco Use    Smoking status: Never Smoker  Smokeless tobacco: Never Used   Substance Use Topics    Alcohol use: Never    Drug use: Never        Review of Systems   Constitutional: Positive for diaphoresis  Negative for chills and fever  HENT: Negative for congestion and sore throat  Eyes: Positive for visual disturbance  Respiratory: Positive for cough and shortness of breath  Negative for chest tightness  Cardiovascular: Positive for leg swelling  Negative for chest pain  Gastrointestinal: Positive for nausea  Negative for abdominal distention, abdominal pain, blood in stool, constipation, diarrhea and vomiting  Genitourinary: Negative for flank pain  Musculoskeletal: Negative for back pain and gait problem  Skin: Negative for pallor and rash  Neurological: Positive for light-headedness  Negative for syncope, weakness and headaches  All other systems reviewed and are negative  Physical Exam  ED Triage Vitals   Temperature Pulse Respirations Blood Pressure SpO2   03/31/22 2338 03/31/22 2338 03/31/22 2338 03/31/22 2338 03/31/22 2338   (!) 97 2 °F (36 2 °C) (!) 111 20 130/72 95 %      Temp Source Heart Rate Source Patient Position - Orthostatic VS BP Location FiO2 (%)   03/31/22 2338 03/31/22 2338 04/01/22 0145 04/01/22 0145 --   Oral Monitor Lying Right arm       Pain Score       --                    Orthostatic Vital Signs  Vitals:    04/01/22 0715 04/01/22 1015 04/01/22 1547 04/01/22 2132   BP: 128/94 (!) 156/104 (!) 156/104 (!) 159/109   Pulse: 93 100 98 (!) 108   Patient Position - Orthostatic VS:           Physical Exam  Vitals and nursing note reviewed  Constitutional:       General: He is not in acute distress  HENT:      Head: Normocephalic and atraumatic  Nose: Nose normal       Mouth/Throat:      Mouth: Mucous membranes are moist    Eyes:      Extraocular Movements: Extraocular movements intact  Pupils: Pupils are equal, round, and reactive to light     Cardiovascular:      Rate and Rhythm: Regular rhythm  Tachycardia present  Heart sounds: No murmur heard  No friction rub  No gallop  Pulmonary:      Effort: Pulmonary effort is normal       Breath sounds: Normal breath sounds  No wheezing, rhonchi or rales  Chest:      Chest wall: Tenderness (Left chest wall) present  Abdominal:      General: There is no distension  Palpations: Abdomen is soft  Tenderness: There is abdominal tenderness in the left upper quadrant and left lower quadrant  There is no right CVA tenderness, left CVA tenderness, guarding or rebound  Musculoskeletal:         General: Normal range of motion  Cervical back: Normal range of motion and neck supple  Comments: Trace pitting edema to bilateral lower extremities  Left-sided calf tenderness  Skin:     General: Skin is warm and dry  Findings: No rash  Neurological:      General: No focal deficit present  Mental Status: He is alert and oriented to person, place, and time     Psychiatric:         Behavior: Behavior normal          ED Medications  Medications   aspirin (ECOTRIN LOW STRENGTH) EC tablet 81 mg (81 mg Oral Given 4/1/22 1039)   acetaminophen (TYLENOL) oral suspension 650 mg (has no administration in time range)   heparin (porcine) subcutaneous injection 5,000 Units (5,000 Units Subcutaneous Given 4/1/22 2133)   metoprolol tartrate (LOPRESSOR) partial tablet 12 5 mg (12 5 mg Oral Given 4/1/22 2133)   furosemide (LASIX) injection 40 mg (40 mg Intravenous Given 4/1/22 1720)   sodium chloride 0 9 % bolus 1,000 mL (1,000 mL Intravenous New Bag 4/1/22 0037)   ketorolac (TORADOL) injection 15 mg (15 mg Intravenous Given 4/1/22 0037)   acetaminophen (TYLENOL) tablet 975 mg (975 mg Oral Given 4/1/22 0037)   iohexol (OMNIPAQUE) 350 MG/ML injection (SINGLE-DOSE) 100 mL (100 mL Intravenous Given 4/1/22 0127)   furosemide (LASIX) injection 80 mg (80 mg Intravenous Given 4/1/22 0453)   potassium chloride (K-DUR,KLOR-CON) CR tablet 40 mEq (40 mEq Oral Given 4/1/22 1039)       Diagnostic Studies  Results Reviewed     Procedure Component Value Units Date/Time    Urinalysis with microscopic [[id number]]  (Abnormal) Collected: 04/01/22 1202    Lab Status: Final result Specimen: Urine, Clean Catch Updated: 04/01/22 1315     Color, UA Light Yellow     Clarity, UA Clear     Specific Gravity, UA 1 018     pH, UA 5 0     Leukocytes, UA Negative     Nitrite, UA Negative     Protein, UA Negative mg/dl      Glucose, UA Negative mg/dl      Ketones, UA Negative mg/dl      Urobilinogen, UA <2 0 mg/dl      Bilirubin, UA Negative     Blood, UA Negative     RBC, UA None Seen /hpf      WBC, UA None Seen /hpf      Epithelial Cells None Seen /hpf      Bacteria, UA None Seen /hpf      MUCUS THREADS Occasional    Lipid Panel with Direct LDL reflex [[id number]]  (Abnormal) Collected: 03/31/22 2343    Lab Status: Final result Specimen: Blood from Arm, Left Updated: 04/01/22 0853     Cholesterol 132 mg/dL      Triglycerides 205 mg/dL      HDL, Direct 44 mg/dL      LDL Calculated 47 mg/dL     TSH, 3rd generation [[id number]]  (Normal) Collected: 03/31/22 2343    Lab Status: Final result Specimen: Blood from Arm, Left Updated: 04/01/22 0853     TSH 3RD GENERATON 0 729 uIU/mL     Narrative:      Patients undergoing fluorescein dye angiography may retain small amounts of fluorescein in the body for 48-72 hours post procedure  Samples containing fluorescein can produce falsely depressed TSH values  If the patient had this procedure,a specimen should be resubmitted post fluorescein clearance        Hemoglobin A1C [[id number]] Collected: 03/31/22 2343    Lab Status: Final result Specimen: Blood from Arm, Left Updated: 04/01/22 0819     Hemoglobin A1C 5 4 %       mg/dl     HS Troponin I 4hr [[id number]]  (Normal) Collected: 04/01/22 0453    Lab Status: Final result Specimen: Blood from Hand, Right Updated: 04/01/22 0610     hs TnI 4hr 28 ng/L      Delta 4hr hsTnI 3 ng/L     Basic metabolic panel [[id number]]  (Abnormal) Collected: 04/01/22 0453    Lab Status: Final result Specimen: Blood from Hand, Right Updated: 04/01/22 0546     Sodium 139 mmol/L      Potassium 4 1 mmol/L      Chloride 107 mmol/L      CO2 25 mmol/L      ANION GAP 7 mmol/L      BUN 18 mg/dL      Creatinine 1 31 mg/dL      Glucose 106 mg/dL      Calcium 8 9 mg/dL      eGFR 63 ml/min/1 73sq m     Narrative:      [place] guidelines for Chronic Kidney Disease (CKD):     Stage 1 with normal or high GFR (GFR > 90 mL/min/1 73 square meters)    Stage 2 Mild CKD (GFR = 60-89 mL/min/1 73 square meters)    Stage 3A Moderate CKD (GFR = 45-59 mL/min/1 73 square meters)    Stage 3B Moderate CKD (GFR = 30-44 mL/min/1 73 square meters)    Stage 4 Severe CKD (GFR = 15-29 mL/min/1 73 square meters)    Stage 5 End Stage CKD (GFR <15 mL/min/1 73 square meters)  Note: GFR calculation is accurate only with a steady state creatinine    CBC and differential [[id number]] Collected: 04/01/22 0453    Lab Status: Final result Specimen: Blood from Hand, Right Updated: 04/01/22 0525     WBC 7 60 Thousand/uL      RBC 4 88 Million/uL      Hemoglobin 14 6 g/dL      Hematocrit 44 1 %      MCV 90 fL      MCH 29 9 pg      MCHC 33 1 g/dL      RDW 14 2 %      MPV 11 5 fL      Platelets 791 Thousands/uL      nRBC 0 /100 WBCs      Neutrophils Relative 63 %      Immat GRANS % 1 %      Lymphocytes Relative 25 %      Monocytes Relative 6 %      Eosinophils Relative 4 %      Basophils Relative 1 %      Neutrophils Absolute 4 83 Thousands/µL      Immature Grans Absolute 0 04 Thousand/uL      Lymphocytes Absolute 1 90 Thousands/µL      Monocytes Absolute 0 49 Thousand/µL      Eosinophils Absolute 0 28 Thousand/µL      Basophils Absolute 0 06 Thousands/µL     COVID/FLU/RSV [[id number]]  (Normal) Collected: 04/01/22 0416    Lab Status: Final result Specimen: Nares from Nose Updated: 04/01/22 0523     SARS-CoV-2 Negative     INFLUENZA A PCR Negative INFLUENZA B PCR Negative     RSV PCR Negative    Narrative:      FOR PEDIATRIC PATIENTS - copy/paste COVID Guidelines URL to browser: [email or web address]    SARS-CoV-2 assay is a Nucleic Acid Amplification assay intended for the  qualitative detection of nucleic acid from SARS-CoV-2 in nasopharyngeal  swabs  Results are for the presumptive identification of SARS-CoV-2 RNA  Positive results are indicative of infection with SARS-CoV-2, the virus  causing COVID-19, but do not rule out bacterial infection or co-infection  with other viruses  Laboratories within the [place] and its  territories are required to report all positive results to the appropriate  public health authorities  Negative results do not preclude SARS-CoV-2  infection and should not be used as the sole basis for treatment or other  patient management decisions  Negative results must be combined with  clinical observations, patient history, and epidemiological information  This test has not been FDA cleared or approved  This test has been authorized by FDA under an Emergency Use Authorization  (EUA)  This test is only authorized for the duration of time the  declaration that circumstances exist justifying the authorization of the  emergency use of an in vitro diagnostic tests for detection of SARS-CoV-2  virus and/or diagnosis of COVID-19 infection under section 564(b)(1) of  the Act, 21 U  S C  817OUB-7(S)(1), unless the authorization is terminated  or revoked sooner  The test has been validated but independent review by FDA  and CLIA is pending  Test performed using [employer] Gene[employer]pert: This RT-PCR assay targets N2,  a region unique to SARS-CoV-2  A conserved region in the E-gene was chosen  for pan-Sarbecovirus detection which includes SARS-CoV-2      HS Troponin I 2hr [[id number]]  (Normal) Collected: 04/01/22 0143    Lab Status: Final result Specimen: Blood from Arm, Right Updated: 04/01/22 0229     hs TnI 2hr 19 ng/L      Delta 2hr hsTnI -6 ng/L     NT-BNP PRO [[id number]]  (Abnormal) Collected: 03/31/22 2343    Lab Status: Final result Specimen: Blood from Arm, Left Updated: 04/01/22 0145     NT-proBNP 3,237 pg/mL     [place] draw [[id number]] Collected: 03/31/22 2347    Lab Status: Final result Specimen: Blood from Arm, Right Updated: 04/01/22 0101    Narrative: The following orders were created for panel order [place] draw  Procedure                               Abnormality         Status                     ---------                               -----------         ------                     Xiomara Anchors Top on ZRWQ[[id number]]                           Final result                 Please view results for these tests on the individual orders  HS Troponin 0hr (reflex protocol) [[id number]]  (Normal) Collected: 03/31/22 2343    Lab Status: Final result Specimen: Blood from Arm, Left Updated: 04/01/22 0041     hs TnI 0hr 25 ng/L     D-Dimer [[id number]]  (Abnormal) Collected: 03/31/22 2347    Lab Status: Final result Specimen: Blood from Arm, Right Updated: 04/01/22 0037     D-Dimer, Quant 2 55 ug/ml FEU     Narrative: In the evaluation for possible pulmonary embolism, in the appropriate (Well's Score of 4 or less) patient, the age adjusted d-dimer cutoff for this patient can be calculated as:    Age x 0 01 (in ug/mL) for Age-adjusted D-dimer exclusion threshold for a patient over 50 years      Comprehensive metabolic panel [[id number]]  (Abnormal) Collected: 03/31/22 2343    Lab Status: Final result Specimen: Blood from Arm, Left Updated: 04/01/22 0024     Sodium 137 mmol/L      Potassium 4 5 mmol/L      Chloride 108 mmol/L      CO2 23 mmol/L      ANION GAP 6 mmol/L      BUN 19 mg/dL      Creatinine 1 36 mg/dL      Glucose 102 mg/dL      Calcium 9 4 mg/dL      Corrected Calcium 10 0 mg/dL      AST 28 U/L      ALT 39 U/L      Alkaline Phosphatase 145 U/L      Total Protein 6 8 g/dL Albumin 3 2 g/dL      Total Bilirubin 0 53 mg/dL      eGFR 60 ml/min/1 73sq m     Narrative:      [employer] Kid[employer] guidelines for Chronic Kidney Disease (CKD):     Stage 1 with normal or high GFR (GFR > 90 mL/min/1 73 square meters)    Stage 2 Mild CKD (GFR = 60-89 mL/min/1 73 square meters)    Stage 3A Moderate CKD (GFR = 45-59 mL/min/1 73 square meters)    Stage 3B Moderate CKD (GFR = 30-44 mL/min/1 73 square meters)    Stage 4 Severe CKD (GFR = 15-29 mL/min/1 73 square meters)    Stage 5 End Stage CKD (GFR <15 mL/min/1 73 square meters)  Note: GFR calculation is accurate only with a steady state creatinine    CBC and differential [[id number]] Collected: 03/31/22 234    Lab Status: Final result Specimen: Blood from Arm, Left Updated: 04/01/22 0007     WBC 9 18 Thousand/uL      RBC 4 93 Million/uL      Hemoglobin 14 7 g/dL      Hematocrit 46 4 %      MCV 94 fL      MCH 29 8 pg      MCHC 31 7 g/dL      RDW 14 2 %      MPV 11 8 fL      Platelets 189 Thousands/uL      nRBC 0 /100 WBCs      Neutrophils Relative 72 %      Immat GRANS % 0 %      Lymphocytes Relative 17 %      Monocytes Relative 7 %      Eosinophils Relative 3 %      Basophils Relative 1 %      Neutrophils Absolute 6 59 Thousands/µL      Immature Grans Absolute 0 04 Thousand/uL      Lymphocytes Absolute 1 59 Thousands/µL      Monocytes Absolute 0 65 Thousand/µL      Eosinophils Absolute 0 25 Thousand/µL      Basophils Absolute 0 06 Thousands/µL                  VAS lower limb venous duplex study, complete bilateral   Final Result by Janes Guzman MD (04/01 4084)      CTA dissection protocol chest abdomen pelvis w wo contrast   Final Result by Radha Gustafson MD (04/01 0140)      No evidence of aortic aneurysm or dissection  Small left pleural effusion  No evidence of acute pathology throughout the abdomen or pelvis                    Workstation performed: [id number]               Procedures  POC AAA US    Date/Time: 4/1/2022 12:43 AM  Performed by: Temo Loja MD  Authorized by: Temo Loja MD     Patient location:  ED  Performing Provider:  Resident  Other Assisting Provider: Yes (comment) (Thien Hagan)    Procedure details:     Exam Type:  Diagnostic    Indications: chest pain      Views Obtained:  Transverse proximal, transverse mid view and sagittal (longitudinal) view    Image quality: limited diagnostic      Image availability:  Images available in PACS  Findings:     Abdominal Aorta Findings: normal      Maximal Aorta diameter (cm):  2 28    Intra-abdominal fluid: not identified    Interpretation:     Aortic ultrasound impression: aorta normal    Comments:      Unable to obtain distal aorta secondary to significant bowel gas    POC Cardiac US    Date/Time: 4/1/2022 12:45 AM  Performed by: Temo Loja MD  Authorized by: Temo Loja MD     Patient location:  ED  Other Assisting Provider: Yes (comment) (Thien Hagan)    Procedure details:     Exam Type:  Diagnostic    Indications: chest pain and dyspnea      Assessment / Evaluation for: cardiac function, pericardial effusion, inferior vena cava for fluid responsiveness and right heart strain (suspected pulmonary embolism)      Exam Type: initial exam      Image quality: diagnostic      Image availability:  Images available in PACS  Patient Details:     Cardiac Rhythm:  Regular    Mechanical ventilation: No    Cardiac findings:     Views obtained: parasternal long axis, parasternal short axis, subcostal and apical      Pericardial effusion: absent      Tamponade physiology: absent      Wall motion: hypodynamic      LV systolic function: depressed      RV dilation: none    Pulmonary findings:     Left Lung Findings: left pleural effusion present    IVC findings:     IVC Inspiratory Collapse: normal    Interpretation:     Fluid Status:  Euvolemic          ED Course  ED Course as of 04/01/22 2136 Fri Apr 01, 2022   0028 Procedure Note: EKG  Date/Time: 03/31/22 11:39 PM Interpreted by: Hema Lara   Indications / Diagnosis: CP  ECG reviewed by me, the ED Provider: yes   The EKG demonstrates:  Rhythm: rate 104, sinus tachycardia  Intervals: normal intervals  Axis: normal axis  QRS/Blocks: normal QRS  ST Changes: Non specific ST elevation in V2 and V3, non specific ST depression in V5 and V6      0041 hs TnI 0hr: 25             HEART Risk Score      Most Recent Value   Heart Score Risk Calculator    History 2 Filed at: 04/01/2022 0057   ECG 1 Filed at: 04/01/2022 0057   Age 1 Filed at: 04/01/2022 0057   Risk Factors 2 Filed at: 04/01/2022 0057   Troponin 1 Filed at: 04/01/2022 0057   HEART Score 7 Filed at: 04/01/2022 0057                      SBIRT 20yo+      Most Recent Value   SBIRT (25 yo +)    In order to provide better care to our patients, we are screening all of our patients for alcohol and drug use  Would it be okay to ask you these screening questions? No Filed at: 03/31/2022 2344                MDM  Number of Diagnoses or Management Options  Chest pain: new and requires workup  Dizziness: new and requires workup  Left pleural effusion: new and requires workup  Diagnosis management comments: 56-year-old female who presents for evaluation of chest pain and shortness of breath  Patient tachycardic on arrival, otherwise stable vital signs  Patient overall well-appearing  Bedside ultrasound showing a left-sided pleural effusion, no pericardial effusion, slightly reduced EF, no AAA noted  Will obtain cardiac workup, D-dimer  Troponin elevated at 25, will obtain Q2 hour repeats  EKG with nonspecific ST depressions and elevations  D-dimer significantly elevated  CTA dissection protocol obtained to evaluate for significant pulmonary embolism versus dissection  Imaging significant for small left-sided pleural effusion but otherwise unremarkable  Discussed with S OD and admitted to their service         Amount and/or Complexity of Data Reviewed  Clinical lab tests: ordered and reviewed  Tests in the radiology section of CPT®: ordered and reviewed  Review and summarize past medical records: yes  Discuss the patient with other providers: yes    Risk of Complications, Morbidity, and/or Mortality  Presenting problems: high  Diagnostic procedures: low  Management options: low    Patient Progress  Patient progress: stable      Disposition  Final diagnoses:   Chest pain   Dizziness   Left pleural effusion     Time reflects when diagnosis was documented in both MDM as applicable and the Disposition within this note     Time User Action Codes Description Comment    4/1/2022 12:31 AM Jaylin Taylor Add [R07 9] Chest pain     4/1/2022 12:31 AM Munir Trevino Add [R42] Dizziness     4/1/2022 12:31 AM Jaylin Taylor Add [J90] Recurrent left pleural effusion     4/1/2022 12:31 AM Munir Trevino Modify [J90] Left pleural effusion     4/1/2022  8:47 AM Paul Chu Add [I50 9] Acute CHF (congestive heart failure) [facility])       ED Disposition     ED Disposition Condition Date/Time Comment    Admit Stable Fri Apr 1, 2022  3:00 AM Case was discussed with SOD and the patient's admission status was agreed to be Admission Status: observation status to the service of Dr Paige Waggoner   Follow-up Information    None         Current Discharge Medication List      CONTINUE these medications which have NOT CHANGED    Details   aspirin (ECOTRIN LOW STRENGTH) 81 mg EC tablet Take 81 mg by mouth daily           No discharge procedures on file  PDMP Review     None           ED Provider  Attending physically available and evaluated Iris Meier I managed the patient along with the ED Attending      Electronically Signed by         Eloise Watson MD  04/01/22 1145

## 2022-04-01 NOTE — ASSESSMENT & PLAN NOTE
Left calf pain and swelling > R  Positive Enrico's  Elevated D dimer    · Venous duplex of lower extremity revealed no DVT  · DVT ppx

## 2022-04-01 NOTE — H&P
INTERNAL MEDICINE RESIDENCY ADMISSION H&P     Name: Kush Hu   Age & Sex: 50 y o  male   MRN: [id number]  Unit/Bed#: ED 25   Encounter: [id number]  Primary Care Provider: No primary care provider on file  Code Status: Level 1 - Full Code  Admission Status: OBSERVATION  Disposition: Patient requires Med/Surg with Telemetry    Admit to team: SOD Team B     ASSESSMENT/PLAN     Principal Problem:    Chest discomfort  Active Problems:    Pleural effusion, left    Hypertension    Left leg pain    Elevated serum creatinine    Dyspnea    Increased urinary frequency      * Chest discomfort  Assessment & Plan  1 day of acute onset left sided chest pain radiating to the back  Not related to exertion  Exacerbated by cough  EKG showing sinus tachy with nonspecific ST changes  NT proBNP 3237  D dimer 2 55  Trop 25 --> 19  CTA dissection protocol showing only left pleural effusion without aneurysm or PE  Low suspicion for ACS given already downtrending trop  Concern is for acute heart failure (1+ BLE edema, JVD, elevated proBNP, left pleural effusion, SOB) +/- PE  Concern for PE given acuity of pain and constellation of symptoms (tachycardia, cough, chest pain, SOB, LLE swelling and pain) and labs (elevated D dimer)  · Discussed with on call radiologist that read CTA dissection and he explained that though the study done was not a CTA PE, it would only miss a very small PE  · Will give IV Lasix 80 mg x1 now and assess response  · Monitor I/O, daily weight, volume status, kidney function  · Ordered echocardiogram  · Check TSH for tachycardia, HTN    Increased urinary frequency  Assessment & Plan  Patient reports polyuria and polydipsia  Glucose on initial chemistry panel is 102  · Check UA  · A1c to r/o undiagnosed DM    Dyspnea  Assessment & Plan  1-1 5 week history of MELENDREZ, progressively worsening  Satting mid 90s on RA  Small left pleural effusion on CTA dissection study  Elevated proBNP and D dimer   Sounds like may have had viral URI vs allergy symptoms when symptoms first started  Concern is for acute HF +/- PE  · See A&P for "Chest discomfort"      Elevated serum creatinine  Assessment & Plan  Cr 1 36  Unclear baseline  Suspect in the setting of poor intravascular volume 2/2 volume overload vs chronic pathology  Patient received contrast for CTA and NSAID for pain in the ED  · IV Lasix as above  Trend creatinine  Avoid nephrotoxins  Trend I/Os, daily weights  Avoid hypotension  Renally dose medications    Left leg pain  Assessment & Plan  Left calf pain and swelling > R  Positive Enrico's  Elevated D dimer  · LE duplex to r/o DVT  · DVT ppx    Hypertension  Assessment & Plan  Patient reports that he is on combination BP med at home but is unsure the name  May be lisinopril-HCTZ? 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, pain worsens when pt coughs  Pt also reports lower back pain  HISTORY OF PRESENT ILLNESS     June Pass is a 50year old male with PMH of HTN and hyperlipidemia who presents for acute onset chest pain with SOB x1 week  He is Filipino-speaking,  used  Patient reports that he has had dry cough, congestion, and MELENDREZ for 1-1 5 weeks  SOB has been progressively worsening  He endorses orthopnea and PND  Finally on day of arrival, he was working (works in packaging) and suddenly developed left sided chest pain, radiating to the back  Pain was constant at first but then became more intermittent  Not related to exertion  Chest pain worsened by coughing  He has also been having left calf pain and bilateral leg swelling  When asked about whether he eats a lot of salty foods, he says "I just eat what my wife makes for me " He polyuria and polydipsia  He only takes baby aspirin daily and a blood pressure medication that is a combination pill, though he does not know the name  On arrival, /72, , RR 20, spO2 95%, afebrile  Labs significant for Cr 1 36 (unclear baseline), glucose 102, Alk Phos 145, albumin 3 2  NT-proBNP 3237, D-dimer 2 55  Troponin 25 --> 19  CTA CAP dissection protocol showed small left pleural effusion  Patient given toradol, tylenol, and 1 L NS in the ED  Upon examining patient, his chest pain had resolved but he was still having SOB at rest     REVIEW OF SYSTEMS     Review of Systems   Constitutional: Negative for chills and fever  HENT: Positive for congestion  Negative for sore throat  Eyes: Negative for visual disturbance  Respiratory: Positive for cough and shortness of breath  Cardiovascular: Positive for chest pain, palpitations and leg swelling  Gastrointestinal: Negative for abdominal pain, diarrhea and nausea  Endocrine: Positive for polydipsia and polyuria  Genitourinary: Positive for frequency  Negative for dysuria  Musculoskeletal:        Calf pain   Neurological: Negative for numbness and headaches  Psychiatric/Behavioral: Negative for behavioral problems  OBJECTIVE     Vitals:    22 2338 22 0145 22 0450   BP: 130/72 156/82 138/91   BP Location:  Right arm    Pulse: (!) 111 96    Resp: 20 22    Temp: (!) 97 2 °F (36 2 °C)     TempSrc: Oral     SpO2: 95% 94%       Temperature:   Temp (24hrs), Av 2 °F (36 2 °C), Min:97 2 °F (36 2 °C), Max:97 2 °F (36 2 °C)    Temperature: (!) 97 2 °F (36 2 °C)  Intake & Output:  I/O     None        Weights:         There is no height or weight on file to calculate BMI  Weight (last 2 days)     None        Physical Exam  Constitutional:       Appearance: He is not toxic-appearing  HENT:      Mouth/Throat:      Mouth: Mucous membranes are moist    Eyes:      Extraocular Movements: Extraocular movements intact  Conjunctiva/sclera: Conjunctivae normal    Neck:      Vascular: JVD present  Cardiovascular:      Rate and Rhythm: Regular rhythm  Tachycardia present  Heart sounds: Normal heart sounds  Pulmonary:      Effort: No respiratory distress  Breath sounds: Examination of the right-lower field reveals rales  Examination of the left-lower field reveals rales  Rales present  Abdominal:      General: Bowel sounds are normal  There is no distension  Palpations: Abdomen is soft  Tenderness: There is no abdominal tenderness  Musculoskeletal:      Cervical back: Neck supple  Right lower le+ Edema present  Left lower le+ Edema present  Comments: LLE > RLE swelling, left calf firm, positive Enrico's sign   Skin:     General: Skin is warm and dry  Neurological:      General: No focal deficit present  Mental Status: He is alert  Psychiatric:         Behavior: Behavior normal  Behavior is cooperative  PAST MEDICAL HISTORY   History reviewed  No pertinent past medical history  PAST SURGICAL HISTORY   History reviewed  No pertinent surgical history  SOCIAL & FAMILY HISTORY     Social History     Substance and Sexual Activity   Alcohol Use Never     Substance and Sexual Activity   Alcohol Use Never        Substance and Sexual Activity   Drug Use Never     Social History     Tobacco Use   Smoking Status Never Smoker   Smokeless Tobacco Never Used     History reviewed  No pertinent family history  LABORATORY DATA     Labs: I have personally reviewed pertinent reports      Results from last 7 days   Lab Units 22  0453 22  2343   WBC Thousand/uL 7 60 9 18   HEMOGLOBIN g/dL 14 6 14 7   HEMATOCRIT % 44 1 46 4   PLATELETS Thousands/uL 225 175   NEUTROS PCT % 63 72   MONOS PCT % 6 7      Results from last 7 days   Lab Units 03/31/22  2343   POTASSIUM mmol/L 4 5   CHLORIDE mmol/L 108   CO2 mmol/L 23   BUN mg/dL 19   CREATININE mg/dL 1 36*   CALCIUM mg/dL 9 4   ALK PHOS U/L 145*   ALT U/L 39   AST U/L 28                          Micro:  No results found for: BLOODCX, URINECX, WOUNDCULT, SPUTUMCULTUR  IMAGING & DIAGNOSTIC TESTS     Imaging: I have personally reviewed pertinent reports  CTA dissection protocol chest abdomen pelvis w wo contrast    Result Date: 4/1/2022  Impression: No evidence of aortic aneurysm or dissection  Small left pleural effusion  No evidence of acute pathology throughout the abdomen or pelvis  Workstation performed: [id number]     EKG, Pathology, and Other Studies: I have personally reviewed pertinent reports  ALLERGIES   No Known Allergies  MEDICATIONS PRIOR TO ARRIVAL     Prior to Admission medications    Medication Sig Start Date End Date Taking?  Authorizing Provider   aspirin (ECOTRIN LOW STRENGTH) 81 mg EC tablet Take 81 mg by mouth daily   Yes Historical Provider, MD     MEDICATIONS ADMINISTERED IN LAST 24 HOURS     Medication Administration - last 24 hours from 03/31/2022 0532 to 04/01/2022 0532       Date/Time Order Dose Route Action Action by     04/01/2022 0037 sodium chloride 0 9 % bolus 1,000 mL 1,000 mL Intravenous [facility] Mitul Watts RN     04/01/2022 0037 ketorolac (TORADOL) injection 15 mg 15 mg Intravenous Given Laurita Polanco RN     04/01/2022 0037 acetaminophen (TYLENOL) tablet 975 mg 975 mg Oral Given Laurita Polanco RN     04/01/2022 0127 iohexol (OMNIPAQUE) 350 MG/ML injection (SINGLE-DOSE) 100 mL 100 mL Intravenous Given Cindy Carrera     04/01/2022 0453 furosemide (LASIX) injection 80 mg 80 mg Intravenous Given Lm Piedra RN        CURRENT MEDICATIONS     Current Facility-Administered Medications   Medication Dose Route Frequency Provider Last Rate    acetaminophen  650 mg Oral Q4H PRN Eleanor Mejia MD      aspirin  81 mg Oral Daily Eleanor Mejia MD      heparin (porcine)  5,000 Units Subcutaneous [facility] Eleanor Mejia MD          acetaminophen, 650 mg, Q4H PRN        Admission Time  I spent 45 minutes admitting the patient  This involved direct patient contact where I performed a full history and physical, reviewing previous records, and reviewing laboratory and other diagnostic studies  Portions of the record may have been created with voice recognition software  Occasional wrong word or "sound a like" substitutions may have occurred due to the inherent limitations of voice recognition software    Read the chart carefully and recognize, using context, where substitutions have occurred     ==  Eleanor Mejia, [address]  Internal Medicine Residency PGY-1

## 2022-04-02 PROBLEM — I50.9 ACUTE CHF (CONGESTIVE HEART FAILURE) (HCC): Status: ACTIVE | Noted: 2022-04-01

## 2022-04-02 LAB
ANION GAP SERPL CALCULATED.3IONS-SCNC: 8 MMOL/L (ref 4–13)
BUN SERPL-MCNC: 17 MG/DL (ref 5–25)
CALCIUM SERPL-MCNC: 9 MG/DL (ref 8.3–10.1)
CHLORIDE SERPL-SCNC: 105 MMOL/L (ref 100–108)
CO2 SERPL-SCNC: 26 MMOL/L (ref 21–32)
CREAT SERPL-MCNC: 1.39 MG/DL (ref 0.6–1.3)
ERYTHROCYTE [DISTWIDTH] IN BLOOD BY AUTOMATED COUNT: 14.2 % (ref 11.6–15.1)
FERRITIN SERPL-MCNC: 52 NG/ML (ref 8–388)
GFR SERPL CREATININE-BSD FRML MDRD: 58 ML/MIN/1.73SQ M
GLUCOSE SERPL-MCNC: 100 MG/DL (ref 65–140)
HCT VFR BLD AUTO: 50.1 % (ref 36.5–49.3)
HGB BLD-MCNC: 15.8 G/DL (ref 12–17)
IRON SATN MFR SERPL: 21 % (ref 20–50)
IRON SERPL-MCNC: 86 UG/DL (ref 65–175)
MCH RBC QN AUTO: 29.4 PG (ref 26.8–34.3)
MCHC RBC AUTO-ENTMCNC: 31.5 G/DL (ref 31.4–37.4)
MCV RBC AUTO: 93 FL (ref 82–98)
PLATELET # BLD AUTO: 253 THOUSANDS/UL (ref 149–390)
PMV BLD AUTO: 11.5 FL (ref 8.9–12.7)
POTASSIUM SERPL-SCNC: 4.2 MMOL/L (ref 3.5–5.3)
RBC # BLD AUTO: 5.37 MILLION/UL (ref 3.88–5.62)
SODIUM SERPL-SCNC: 139 MMOL/L (ref 136–145)
TIBC SERPL-MCNC: 403 UG/DL (ref 250–450)
WBC # BLD AUTO: 8.17 THOUSAND/UL (ref 4.31–10.16)

## 2022-04-02 PROCEDURE — 85027 COMPLETE CBC AUTOMATED: CPT | Performed by: INTERNAL MEDICINE

## 2022-04-02 PROCEDURE — 87389 HIV-1 AG W/HIV-1&-2 AB AG IA: CPT | Performed by: INTERNAL MEDICINE

## 2022-04-02 PROCEDURE — 99232 SBSQ HOSP IP/OBS MODERATE 35: CPT | Performed by: INTERNAL MEDICINE

## 2022-04-02 PROCEDURE — 83521 IG LIGHT CHAINS FREE EACH: CPT | Performed by: INTERNAL MEDICINE

## 2022-04-02 PROCEDURE — 84165 PROTEIN E-PHORESIS SERUM: CPT | Performed by: PATHOLOGY

## 2022-04-02 PROCEDURE — 80048 BASIC METABOLIC PNL TOTAL CA: CPT | Performed by: INTERNAL MEDICINE

## 2022-04-02 PROCEDURE — 86038 ANTINUCLEAR ANTIBODIES: CPT | Performed by: INTERNAL MEDICINE

## 2022-04-02 PROCEDURE — 99232 SBSQ HOSP IP/OBS MODERATE 35: CPT | Performed by: PHYSICIAN ASSISTANT

## 2022-04-02 PROCEDURE — 86430 RHEUMATOID FACTOR TEST QUAL: CPT | Performed by: INTERNAL MEDICINE

## 2022-04-02 PROCEDURE — 82728 ASSAY OF FERRITIN: CPT | Performed by: INTERNAL MEDICINE

## 2022-04-02 PROCEDURE — 83550 IRON BINDING TEST: CPT | Performed by: INTERNAL MEDICINE

## 2022-04-02 PROCEDURE — 83540 ASSAY OF IRON: CPT | Performed by: INTERNAL MEDICINE

## 2022-04-02 PROCEDURE — 84166 PROTEIN E-PHORESIS/URINE/CSF: CPT | Performed by: PATHOLOGY

## 2022-04-02 PROCEDURE — 84165 PROTEIN E-PHORESIS SERUM: CPT | Performed by: INTERNAL MEDICINE

## 2022-04-02 RX ORDER — HYDRALAZINE HYDROCHLORIDE 10 MG/1
10 TABLET, FILM COATED ORAL EVERY 8 HOURS SCHEDULED
Status: DISCONTINUED | OUTPATIENT
Start: 2022-04-02 | End: 2022-04-05

## 2022-04-02 RX ADMIN — ASPIRIN 81 MG: 81 TABLET, COATED ORAL at 09:52

## 2022-04-02 RX ADMIN — FUROSEMIDE 40 MG: 10 INJECTION, SOLUTION INTRAMUSCULAR; INTRAVENOUS at 09:52

## 2022-04-02 RX ADMIN — METOPROLOL TARTRATE 25 MG: 25 TABLET, FILM COATED ORAL at 09:52

## 2022-04-02 RX ADMIN — HEPARIN SODIUM 5000 UNITS: 5000 INJECTION INTRAVENOUS; SUBCUTANEOUS at 06:57

## 2022-04-02 RX ADMIN — FUROSEMIDE 40 MG: 10 INJECTION, SOLUTION INTRAMUSCULAR; INTRAVENOUS at 16:14

## 2022-04-02 RX ADMIN — HEPARIN SODIUM 5000 UNITS: 5000 INJECTION INTRAVENOUS; SUBCUTANEOUS at 22:08

## 2022-04-02 RX ADMIN — HEPARIN SODIUM 5000 UNITS: 5000 INJECTION INTRAVENOUS; SUBCUTANEOUS at 16:14

## 2022-04-02 RX ADMIN — HYDRALAZINE HYDROCHLORIDE 10 MG: 10 TABLET ORAL at 16:14

## 2022-04-02 RX ADMIN — HYDRALAZINE HYDROCHLORIDE 10 MG: 10 TABLET ORAL at 09:52

## 2022-04-02 RX ADMIN — HYDRALAZINE HYDROCHLORIDE 10 MG: 10 TABLET ORAL at 22:08

## 2022-04-02 RX ADMIN — METOPROLOL TARTRATE 25 MG: 25 TABLET, FILM COATED ORAL at 22:08

## 2022-04-02 NOTE — PROGRESS NOTES
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   MRN: [id number]  Unit/Bed#: CW2 211-01; Encounter: [id number]    Assessment:  Principal Problem:    Chest discomfort  Active Problems:    Pleural effusion, left    Hypertension    Left leg pain    Elevated serum creatinine    Dyspnea    Increased urinary frequency      Subjective:   Patient seen and examined  No significant events overnight  Continues with some LE swelling and MELENDREZ  Orthopnea improved somewhat; but did get put on supplemental oxygen overnight  Objective: Intake/ Output: Not accurate  Weight: No weight today (220 lbs on 04/01; unspecified scale)  Telemetry: sinus, rates in 90-110s  Today's Plan:   Continue IV Lasix   Increase metoprolol tartrate to 25 mg q12 hours   Start hydralazine 10 mg q8 hours   Plan for [facility] Monday   Tentative plan to add on ACEi/ARB s/p [facility]   CHACHA, RF, SPEP/UPEP, light chains, and HIV in process   Plan to discuss LifeVest before discharge   Consider outpatient PSG once patient insured  Plan:  Newly diagnosed HFrEF; LVEF 30-35%; LVIDd 5 2 cm; NYHA III; ACC/AHA Stage C   Etiology: unclear  TTE 04/01/2022: LVEF 30-35%  LVIDd 5 2 cm  Normal RV  Moderate to severe MR  PASP 51 mmHg  Neurohormonal Blockade:  --Beta Blocker: metoprolol tartrate 25 mg q12 hours  --ARNi / ACEi / ARB: No    --Aldosterone Antagonist: No    --SGLT2 Inhibitor: No    --SVR Reducing Agents: hydralazine 10 mg q8 hours  --Home Diuretic: None  --Inpatient Diuretic: IV Lasix 40 mg BID  Sudden Cardiac Death Risk Reduction:  --LVEF 30-35%  Plan to discuss LifeVest before discharge  --Repeat limited TTE to reassess LVEF in 07/2022  Electrical Resynchronization:  --Candidacy for BiV device: narrow QRS  Advanced Therapies (if appropriate): Will continue to monitor      Hypertension    Vitals:   Blood pressure 134/94, pulse (!) 106, temperature 97 7 °F (36 5 °C), temperature source Oral, resp  rate 17, height 6' (1 829 m), weight 91 2 kg (201 lb), SpO2 96 %  Body mass index is 27 26 kg/m²  I/O last 3 completed shifts:  In: -   Out: 300 [Urine:300]    Wt Readings from Last 10 Encounters:   22 91 2 kg (201 lb)     Vitals:    22 2132 22 0339 22 0650 22 0833   BP: (!) 159/109 134/83 134/94    BP Location:  Right arm Right arm    Pulse: (!) 108 95 (!) 106    Resp:  19 17    Temp:  97 8 °F (36 6 °C) 97 7 °F (36 5 °C)    TempSrc:  Oral Oral    SpO2: 91% 92% 96%    Weight:    91 2 kg (201 lb)   Height:           Physical Exam  Vitals reviewed  Constitutional:       General: He is awake  He is not in acute distress  Appearance: Normal appearance  He is well-developed and overweight  HENT:      Head: Normocephalic  Nose: Nose normal       Mouth/Throat:      Mouth: Mucous membranes are moist    Eyes:      General: No scleral icterus  Conjunctiva/sclera: Conjunctivae normal    Neck:      Vascular: JVD present  Trachea: No tracheal deviation  Cardiovascular:      Rate and Rhythm: Regular rhythm  Tachycardia present  Pulses: Normal pulses  Heart sounds: Murmur heard  Pulmonary:      Effort: Pulmonary effort is normal  No tachypnea, bradypnea or respiratory distress  Breath sounds: Normal air entry  No decreased air movement  No decreased breath sounds, wheezing or rales  Abdominal:      General: Bowel sounds are normal  There is distension  Palpations: Abdomen is soft  Tenderness: There is no abdominal tenderness  Musculoskeletal:      Cervical back: Neck supple  Right lower le+ Pitting Edema present  Left lower le+ Pitting Edema present  Skin:     General: Skin is warm and dry  Coloration: Skin is not jaundiced or pale  Neurological:      General: No focal deficit present  Mental Status: He is alert and oriented to person, place, and time  Psychiatric:         Attention and Perception: Attention normal          Mood and Affect: Mood and affect normal          Speech: Speech normal          Behavior: Behavior normal  Behavior is cooperative  Thought Content:  Thought content normal      Central Line: No    Martinez Catheter: No     Current Facility-Administered Medications:     acetaminophen (TYLENOL) oral suspension 650 mg, 650 mg, Oral, Q4H PRN, Jeancarlos Erickson MD    aspirin (ECOTRIN LOW STRENGTH) EC tablet 81 mg, 81 mg, Oral, Daily, Jeancarlos Erickson MD, 81 mg at 04/01/22 1039    furosemide (LASIX) injection 40 mg, 40 mg, Intravenous, BID (diuretic), Fifi Kathy, DO, 40 mg at 04/01/22 1720    heparin (porcine) subcutaneous injection 5,000 Units, 5,000 Units, Subcutaneous, Q8H [address], Jeancarlos Erickson MD, 5,000 Units at 04/02/22 6553    hydrALAZINE (APRESOLINE) tablet 10 mg, 10 mg, Oral, Q8H [address], Alfred Tate PA-C    metoprolol tartrate (LOPRESSOR) tablet 25 mg, 25 mg, Oral, Q12H [address], Alfred Tate PA-C    Labs & Results:      Results from last 7 days   Lab Units 04/02/22  0503 04/01/22  0453 03/31/22  2343   WBC Thousand/uL 8 17 7 60 9 18   HEMOGLOBIN g/dL 15 8 14 6 14 7   HEMATOCRIT % 50 1* 44 1 46 4   PLATELETS Thousands/uL 253 225 175         Results from last 7 days   Lab Units 04/02/22  0504 04/01/22  0453 03/31/22  2343   POTASSIUM mmol/L 4 2 4 1 4 5   CHLORIDE mmol/L 105 107 108   CO2 mmol/L 26 25 23   BUN mg/dL 17 18 19   CREATININE mg/dL 1 39* 1 31* 1 36*   CALCIUM mg/dL 9 0 8 9 9 4   ALK PHOS U/L  --   --  145*   ALT U/L  --   --  39   AST U/L  --   --  [address] [facility] JOHN Morel

## 2022-04-02 NOTE — UTILIZATION REVIEW
Initial Clinical Review    Admission: Date/Time/Statement:   Admission Orders (From admission, onward)     Ordered        04/01/22 0301  Place in Observation  Once                      Orders Placed This Encounter   Procedures    Place in Observation     Standing Status:   Standing     Number of Occurrences:   1     Order Specific Question:   Level of Care     Answer:   Med Surg [16]     ED Arrival Information     Expected Arrival Acuity    - 3/31/2022 23:35 Urgent         Means of arrival Escorted by Service Admission type    [facility] Urgent         Arrival complaint    Chest pain        Chief Complaint   Patient presents with    Chest Pain     Pt reports 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, pain worsens when pt coughs  Pt also reports lower back pain  Initial Presentation: 50 y o  male to ED presents with acute onset chest pain with progressively worsening SOB, dry cough and congestion x1 week  + Orthopnea and PND  He was working (works in packaging) and suddenly developed left sided chest pain, radiating to the back  Chest pain worsened by coughing  He has also been having left calf pain and bilateral leg swelling  On baby aspirin daily and 1 BP med, name unknown  In ED given toradol, tylenol, and 1 L NS  Also c/o polyuria and polydipsia  PMH for HTN and HLD  Admit Observation level of care for Chest discomfort, Dyspnea, Left Pleural effusion, Increase urinary frequency, Elevated creat and Left leg pain  EKG with sinus tachy with nonspecific ST changes  NT proBNP 3237  D dimer 2 55  Trop 25 --> 19  CTA dissection protocol showing only left pleural effusion without aneurysm or PE  Low suspicion for ACS given already downtrending trop  Concern is for acute heart failure (1+ BLE edema, JVD, elevated proBNP, left pleural effusion, SOB) +/- PE  Iv Laxis 80 mg x1 given now and assess response  Echo  Check TSH for tachycardia  Check UA   Creat 1 36, unclear baseline  Trend creat  Left calf pain and swelling > R  Positive Enrico's  Elevated D dimer  LE duplex to r/o DVT  Hold on ACEi at this time for elevated creat  On exam; chest pain had resolved but he was still having SOB at rest  + Rales  Tachycardia      4/1   Heart Failure/ Cardiology cons; New HFrEF, Stage C, NYHA II/III  Pt unable to lie flat, hold off on LHC, likely Monday  Likely tomorrow increase metoprolol to 25 mg BID and initiate afterload reduction  Continue diuresis  If LHC negative can do CMR but no insurance         ED Triage Vitals   Temperature Pulse Respirations Blood Pressure SpO2   03/31/22 2338 03/31/22 2338 03/31/22 2338 03/31/22 2338 03/31/22 2338   (!) 97 2 °F (36 2 °C) (!) 111 20 130/72 95 %      Temp Source Heart Rate Source Patient Position - Orthostatic VS BP Location FiO2 (%)   03/31/22 2338 03/31/22 2338 04/01/22 0145 04/01/22 0145 --   Oral Monitor Lying Right arm       Pain Score       04/01/22 2100       No Pain          Wt Readings from Last 1 Encounters:   04/01/22 99 8 kg (220 lb)     Additional Vital Signs:   04/02/22 06:50:02 97 7 °F (36 5 °C) 106 Abnormal  17 134/94 107 96 % 28 2 L/min Nasal cannula Lying   04/02/22 03:39:13 97 8 °F (36 6 °C) 95 19 134/83 100 92 % 28 2 L/min Nasal cannula Lying   04/01/22 21:32:45 -- 108   Abnormal  -- 159/109   Abnormal  126 91 % -- -- -- --   04/01/22 15:47:32 98 6 °F (37 °C) 98 -- 156/104   Abnormal  121 95 % -- -- -- --   04/01/22 10:15:12 -- 100 -- 156/104   Abnormal  121 96 % -- -- -- --   04/01/22 0715 97 9 °F (36 6 °C) 93 -- 128/94 -- -- -- -- -- --   04/01/22 0655 -- -- 22 -- -- 92 % 28 2 L/min Nasal cannula --   04/01/22 0650 -- -- 28 Abnormal  -- -- 88 % Abnormal  -- -- None (Room air) --   04/01/22 0645 -- 94 20 128/94 -- 94 % -- -- None (Room air)          Pertinent Labs/Diagnostic Test Results:   VAS lower limb venous duplex study, complete bilateral   Final Result by Dean Euceda MD (04/01 7904)      CTA dissection protocol chest abdomen pelvis w wo contrast   Final Result by Radha Gustafson MD (04/01 0140)      No evidence of aortic aneurysm or dissection  Small left pleural effusion  No evidence of acute pathology throughout the abdomen or pelvis                    Workstation performed: [id number]           3/31  EKG - Sinus tachycardia    Results from last 7 days   Lab Units 04/01/22  0416   SARS-COV-2  Negative     Results from last 7 days   Lab Units 04/02/22  0503 04/01/22  0453 03/31/22  2343   WBC Thousand/uL 8 17 7 60 9 18   HEMOGLOBIN g/dL 15 8 14 6 14 7   HEMATOCRIT % 50 1* 44 1 46 4   PLATELETS Thousands/uL 253 225 175   NEUTROS ABS Thousands/µL  --  4 83 6 59         Results from last 7 days   Lab Units 04/02/22  0504 04/01/22  0453 03/31/22  2343   SODIUM mmol/L 139 139 137   POTASSIUM mmol/L 4 2 4 1 4 5   CHLORIDE mmol/L 105 107 108   CO2 mmol/L 26 25 23   ANION GAP mmol/L 8 7 6   BUN mg/dL 17 18 19   CREATININE mg/dL 1 39* 1 31* 1 36*   EGFR ml/min/1 73sq m 58 63 60   CALCIUM mg/dL 9 0 8 9 9 4     Results from last 7 days   Lab Units 03/31/22  2343   AST U/L 28   ALT U/L 39   ALK PHOS U/L 145*   TOTAL PROTEIN g/dL 6 8   ALBUMIN g/dL 3 2*   TOTAL BILIRUBIN mg/dL 0 53         Results from last 7 days   Lab Units 04/02/22  0504 04/01/22  0453 03/31/22  2343   GLUCOSE RANDOM mg/dL 100 106 102         Results from last 7 days   Lab Units 03/31/22  2343   HEMOGLOBIN A1C % 5 4   EAG mg/dl 108         Results from last 7 days   Lab Units 04/01/22  0453 04/01/22  0143 03/31/22  2343   HS TNI 0HR ng/L  --   --  25   HS TNI 2HR ng/L  --  19  --    HSTNI D2 ng/L  --  -6  --    HS TNI 4HR ng/L 28  --   --    HSTNI D4 ng/L 3  --   --      Results from last 7 days   Lab Units 03/31/22  2347   D-DIMER QUANTITATIVE ug/ml FEU 2 55*         Results from last 7 days   Lab Units 03/31/22  2343   TSH 3RD GENERATON uIU/mL 0 729                     Results from last 7 days   Lab Units 03/31/22  2343   NT-PRO BNP pg/mL 3,237* Results from last 7 days   Lab Units 04/02/22  0504   FERRITIN ng/mL 52                         Results from last 7 days   Lab Units 04/01/22  1202   CLARITY UA  Clear   COLOR UA  Light Yellow   SPEC GRAV UA  1 018   PH UA  5 0   GLUCOSE UA mg/dl Negative   KETONES UA mg/dl Negative   BLOOD UA  Negative   PROTEIN UA mg/dl Negative   NITRITE UA  Negative   BILIRUBIN UA  Negative   UROBILINOGEN UA (BE) mg/dl <2 0   LEUKOCYTES UA  Negative   WBC UA /hpf None Seen   RBC UA /hpf None Seen   BACTERIA UA /hpf None Seen   EPITHELIAL CELLS WET PREP /hpf None Seen   MUCUS THREADS  Occasional*     Results from last 7 days   Lab Units 04/01/22  0416   INFLUENZA A PCR  Negative   INFLUENZA B PCR  Negative   RSV PCR  Negative       ED Treatment:   Medication Administration from 03/31/2022 2334 to 04/01/2022 1507       Date/Time Order Dose Route Action     04/01/2022 0037 sodium chloride 0 9 % bolus 1,000 mL 1,000 mL Intravenous New Bag     04/01/2022 0037 ketorolac (TORADOL) injection 15 mg 15 mg Intravenous Given     04/01/2022 0037 acetaminophen (TYLENOL) tablet 975 mg 975 mg Oral Given     04/01/2022 0127 iohexol (OMNIPAQUE) 350 MG/ML injection (SINGLE-DOSE) 100 mL 100 mL Intravenous Given     04/01/2022 0453 furosemide (LASIX) injection 80 mg 80 mg Intravenous Given        History reviewed  No pertinent past medical history    Present on Admission:  **None**      Admitting Diagnosis: Dizziness [R42]  Chest pain [R07 9]  Acute CHF (congestive heart failure) (HCC) [I50 9]  Recurrent left pleural effusion [J90]  Age/Sex: 50 y o  male     Admission Orders:  Scheduled Medications:  aspirin, 81 mg, Oral, Daily  furosemide, 40 mg, Intravenous, BID (diuretic)  heparin (porcine), 5,000 Units, Subcutaneous, Q8H [facility]  metoprolol tartrate, 12 5 mg, Oral, Q12H [facility]      Continuous IV Infusions:     PRN Meds:  acetaminophen, 650 mg, Oral, Q4H PRN      Echo  Tele monitoring  IP CONSULT TO CASE MANAGEMENT  IP CONSULT TO CARDIOLOGY    Network Utilization Review Department  ATTENTION: Please call with any questions or concerns to [phone] and carefully listen to the prompts so that you are directed to the right person  All voicemails are confidential   Rosy Inggold all requests for admission clinical reviews, approved or denied determinations and any other requests to dedicated fax number below belonging to the campus where the patient is receiving treatment   List of dedicated fax numbers for the Facilities:  [address] DENIALS (Administrative/Medical Necessity) [phone]   [address] (Maternity/NICU/Pediatrics) [phone]   [address]  [phone] [place] [address] [address] Ashlyn [address] [place] [address] [phone]

## 2022-04-02 NOTE — PROGRESS NOTES
[address] [facility]  Progress Note [place] 1970, 50 y o  male MRN: [id number]  Unit/Bed#: 2 211-01 Encounter: [id number]  Primary Care Provider: No primary care provider on file  Date and time admitted to hospital: 3/31/2022 11:38 PM    Increased urinary frequency  Assessment & Plan  Patient reports polyuria and polydipsia  Glucose on initial chemistry panel is 102  · UA negative  · HB A1c within normal range  · Currently on diuretics  · Continue monitor    Dyspnea  Assessment & Plan  1-1 5 week history of MELENDREZ, progressively worsening  Satting mid 90s on RA  Small left pleural effusion on CTA dissection study  Elevated proBNP and D dimer  Sounds like may have had viral URI vs allergy symptoms when symptoms first started  Concern is for acute HF +/- PE  · See A&P for "Chest discomfort"      Elevated serum creatinine  Assessment & Plan  Cr 1 36  Unclear baseline  Suspect in the setting of poor intravascular volume 2/2 volume overload vs chronic pathology  Patient received contrast for CTA and NSAID for pain in the ED  · IV Lasix as above  Trend creatinine  Avoid nephrotoxins  Trend I/Os, daily weights  Avoid hypotension      Left leg pain  Assessment & Plan  Left calf pain and swelling > R  Positive Enrico's  Elevated D dimer  · Venous duplex of lower extremity revealed no DVT  · DVT ppx    Hypertension  Assessment & Plan  Patient reports that he is on combination BP med at home but is unsure the name  May be lisinopril-HCTZ? Blood pressure is slightly high  · Hold on ACEi at this time given elevated Cr  · IV Lasix as above  · Start hydralazine 10 mg p o  Q 8h  · Continue to monitor BP    Pleural effusion, left  Assessment & Plan  Seen on CTA dissection study  Suspect in the setting of acute heart failure  · Continue Lasix    * Acute CHF (congestive heart failure) ([facility])  Assessment & Plan  1 day of acute onset left sided chest pain radiating to the back   Not related to exertion  Exacerbated by cough  EKG showing sinus tachy with nonspecific ST changes  NT proBNP 3237  D dimer 2 55  Trop 25 --> 19  CTA dissection protocol showing only left pleural effusion without aneurysm or PE  Low suspicion for ACS given already downtrending trop  Concern is for acute heart failure (1+ BLE edema, JVD, elevated proBNP, left pleural effusion, SOB) +/- PE  Concern for PE given acuity of pain and constellation of symptoms (tachycardia, cough, chest pain, SOB, LLE swelling and pain) and labs (elevated D dimer)  Discussed with on call radiologist that read CTA dissection and he explained that though the study done was not a CTA PE, it would only miss a very small PE  TTE revealed LVEF 30-35%, moderate to severe MR  Patient is likely to acute CHF  Etiology is unclear  Inpatient consult to Cardiology and input appreciated  · Monitor I/O, daily weight,  · Continue Lasix 40 mg IV b i d   · Increased metoprolol to 25 mg p o  B i d   · Start hydralazine 10 mg p o  Q 8h  · Plan for left heart catheterization Monday  · HbA1C WNL, Iron panel unremarkable, TSH within normal range          VTE Pharmacologic Prophylaxis:   VTE Score: 2 Moderate Risk (Score 3-4) - Pharmacological DVT Prophylaxis Ordered: Heparin  Mechanical VTE Prophylaxis in Place: Yes    Patient Centered Rounds: I have performed bedside rounds with nursing staff today  Discussions with Specialists or Other Care Team Provider:     Education and Discussions with Family / Patient: Patient    Current Length of Stay: 0 day(s)    Current Patient Status: Observation     Discharge Plan / Estimated Discharge Date: To be determined    Code Status: Level 1 - Full Code      Subjective:   Patient states that shortness breath has improved  Still has little left-sided chest pain  Patient complained of left hip pain started last night    No rash    Objective:     Vitals:   Temp (24hrs), Av 8 °F (36 6 °C), Min:97 2 °F (36 2 °C), Max:98 6 °F (37 °C)    Temp:  [97 2 °F (36 2 °C)-98 6 °F (37 °C)] 97 2 °F (36 2 °C)  HR:  [] 86  Resp:  [17-19] 17  BP: (127-159)/() 127/82  SpO2:  [91 %-97 %] 95 %  Body mass index is 27 26 kg/m²  Input and Output Summary (last 24 hours): Intake/Output Summary (Last 24 hours) at 2022 1151  Last data filed at 2022 0900  Gross per 24 hour   Intake 240 ml   Output 700 ml   Net -460 ml       Physical Exam:     Physical Exam  Constitutional:       Appearance: He is not toxic-appearing  HENT:      Mouth/Throat:      Mouth: Mucous membranes are moist    Eyes:      Extraocular Movements: Extraocular movements intact  Conjunctiva/sclera: Conjunctivae normal    Cardiovascular:      Rate and Rhythm: Regular rhythm  Tachycardia present  Heart sounds: Normal heart sounds  Pulmonary:      Effort: No respiratory distress  Breath sounds: No rales  Abdominal:      General: Bowel sounds are normal  There is no distension  Palpations: Abdomen is soft  Tenderness: There is no abdominal tenderness  Musculoskeletal:      Cervical back: Neck supple  Right lower le+ Edema present  Left lower le+ Edema present  Skin:     General: Skin is warm and dry  Neurological:      General: No focal deficit present  Mental Status: He is alert  Psychiatric:         Behavior: Behavior normal  Behavior is cooperative  Additional Data:     Labs:  Results from last 7 days   Lab Units 22  0503 04/01/22  0453 22  0453   WBC Thousand/uL 8 17   < > 7 60   HEMOGLOBIN g/dL 15 8   < > 14 6   HEMATOCRIT % 50 1*   < > 44 1   PLATELETS Thousands/uL 253   < > 225   NEUTROS PCT %  --   --  63   LYMPHS PCT %  --   --  25   MONOS PCT %  --   --  6   EOS PCT %  --   --  4    < > = values in this interval not displayed       Results from last 7 days   Lab Units 22  0504 22  0453 22  2343   SODIUM mmol/L 139   < > 137   POTASSIUM mmol/L 4 2   < > 4 5 CHLORIDE mmol/L 105   < > 108   CO2 mmol/L 26   < > 23   BUN mg/dL 17   < > 19   CREATININE mg/dL 1 39*   < > 1 36*   ANION GAP mmol/L 8   < > 6   CALCIUM mg/dL 9 0   < > 9 4   ALBUMIN g/dL  --   --  3 2*   TOTAL BILIRUBIN mg/dL  --   --  0 53   ALK PHOS U/L  --   --  145*   ALT U/L  --   --  39   AST U/L  --   --  28   GLUCOSE RANDOM mg/dL 100   < > 102    < > = values in this interval not displayed  Results from last 7 days   Lab Units 03/31/22  2343   HEMOGLOBIN A1C % 5 4           Imaging: No pertinent imaging reviewed  Recent Cultures (last 7 days):           Lines/Drains:  Invasive Devices  Report    Peripheral Intravenous Line            Peripheral IV 04/01/22 Right Antecubital 1 day                Telemetry:   Telemetry Orders (From admission, onward)             48 Hour Telemetry Monitoring  Continuous x 48 hours        References:    Telemetry Guidelines   Question:  Reason for 48 Hour Telemetry  Answer:  Acute MI, chest pain - R/O MI, or unstable angina                    Last 24 Hours Medication List:   Current Facility-Administered Medications   Medication Dose Route Frequency Provider Last Rate    acetaminophen  650 mg Oral Q4H PRN Ivan Kelley MD      aspirin  81 mg Oral Daily Ivan Kelley MD      furosemide  40 mg Intravenous BID (diuretic) Pool Gardiner DO      heparin (porcine)  5,000 Units Subcutaneous [facility] 211 Lele Ornelas MD      hydrALAZINE  10 mg Oral [facility] Agnes Durbin PA-C      metoprolol tartrate  25 mg Oral Q12H [facility] Agnes Durbin PA-C          Today, Patient Was Seen By: Lakhwinder Gee MD    ** Please Note: This note has been constructed using a voice recognition system   **

## 2022-04-02 NOTE — PLAN OF CARE
Problem: Potential for Falls  Goal: Patient will remain free of falls  Description: INTERVENTIONS:  - Educate patient/family on patient safety including physical limitations  - Instruct patient to call for assistance with activity   - Consult OT/PT to assist with strengthening/mobility   - Keep Call bell within reach  - Keep bed low and locked with side rails adjusted as appropriate  - Keep care items and personal belongings within reach  - Initiate and maintain comfort rounds  - Make Fall Risk Sign visible to staff  - Offer Toileting every 2  Hours, in advance of need   - Apply yellow socks and bracelet for high fall risk patients  - Consider moving patient to room near nurses station  Outcome: Progressing     Problem: PAIN - ADULT  Goal: Verbalizes/displays adequate comfort level or baseline comfort level  Description: Interventions:  - Encourage patient to monitor pain and request assistance  - Assess pain using appropriate pain scale  - Administer analgesics based on type and severity of pain and evaluate response  - Implement non-pharmacological measures as appropriate and evaluate response  - Consider cultural and social influences on pain and pain management  - Notify physician/advanced practitioner if interventions unsuccessful or patient reports new pain  Outcome: Progressing     Problem: INFECTION - ADULT  Goal: Absence or prevention of progression during hospitalization  Description: INTERVENTIONS:  - Assess and monitor for signs and symptoms of infection  - Monitor lab/diagnostic results  - Monitor all insertion sites, i e  indwelling lines, tubes, and drains  - Monitor endotracheal if appropriate and nasal secretions for changes in amount and color  - [place] appropriate cooling/warming therapies per order  - Administer medications as ordered  - Instruct and encourage patient and family to use good hand hygiene technique  - Identify and instruct in appropriate isolation precautions for identified infection/condition  Outcome: Progressing  Goal: Absence of fever/infection during neutropenic period  Description: INTERVENTIONS:  - Monitor WBC    Outcome: Progressing     Problem: SAFETY ADULT  Goal: Patient will remain free of falls  Description: INTERVENTIONS:  - Educate patient/family on patient safety including physical limitations  - Instruct patient to call for assistance with activity   - Consult OT/PT to assist with strengthening/mobility   - Keep Call bell within reach  - Keep bed low and locked with side rails adjusted as appropriate  - Keep care items and personal belongings within reach  - Initiate and maintain comfort rounds  - Make Fall Risk Sign visible to staff  - Offer Toileting every 2 Hours, in advance of need  - Initiate/Maintain    - Obtain necessary fall risk management equipment:    - Apply yellow socks and bracelet for high fall risk patients  - Consider moving patient to room near nurses station  Outcome: Progressing  Goal: Maintain or return to baseline ADL function  Description: INTERVENTIONS:  -  Assess patient's ability to carry out ADLs; assess patient's baseline for ADL function and identify physical deficits which impact ability to perform ADLs (bathing, care of mouth/teeth, toileting, grooming, dressing, etc )  - Assess/evaluate cause of self-care deficits   - Assess range of motion  - Assess patient's mobility; develop plan if impaired  - Assess patient's need for assistive devices and provide as appropriate  - Encourage maximum independence but intervene and supervise when necessary  - Involve family in performance of ADLs  - Assess for home care needs following discharge   - Consider OT consult to assist with ADL evaluation and planning for discharge  - Provide patient education as appropriate  Outcome: Progressing  Goal: Maintains/Returns to pre admission functional level  Description: INTERVENTIONS:  - Perform BMAT or MOVE assessment daily    - Set and communicate daily mobility goal to care team and patient/family/caregiver  - Collaborate with rehabilitation services on mobility goals if consulted  - Perform Range of Motion 2 times a day  - Reposition patient every 2 hours  - Dangle patient 2  times a day  - Stand patient 2  times a day  - Ambulate patient 2  times a day  - Out of bed to chair 2  times a day   - Out of bed for meals    Out of bed for toileting  - Record patient progress and toleration of activity level   Outcome: Progressing     Problem: Knowledge Deficit  Goal: Patient/family/caregiver demonstrates understanding of disease process, treatment plan, medications, and discharge instructions  Description: Complete learning assessment and assess knowledge base    Interventions:  - Provide teaching at level of understanding  - Provide teaching via preferred learning methods  Outcome: Progressing     Problem: DISCHARGE PLANNING  Goal: Discharge to home or other facility with appropriate resources  Description: INTERVENTIONS:  - Identify barriers to discharge w/patient and caregiver  - Arrange for needed discharge resources and transportation as appropriate  - Identify discharge learning needs (meds, wound care, etc )  - Arrange for interpretive services to assist at discharge as needed  - Refer to Case Management Department for coordinating discharge planning if the patient needs post-hospital services based on physician/advanced practitioner order or complex needs related to functional status, cognitive ability, or social support system  Outcome: Progressing

## 2022-04-02 NOTE — CASE MANAGEMENT
Case Management Assessment & Discharge Planning Note    Patient name Levon Garsia  Location CW2 211/CW2 211- MRN [id number]  : 1970 Date 2022       Current Admission Date: 3/31/2022  Current Admission Diagnosis:Acute CHF (congestive heart failure) [facility])   Patient Active Problem List    Diagnosis Date Noted    Acute CHF (congestive heart failure) ([facility] [address] ) 2022    Pleural effusion, left 2022    Hypertension 2022    Left leg pain 2022    Elevated serum creatinine 2022    Dyspnea 2022    Increased urinary frequency 2022      LOS (days): 0  Geometric Mean LOS (GMLOS) (days):   Days to GMLOS:     OBJECTIVE:              Current admission status: Inpatient       Preferred Pharmacy:   [address] [place] [address]  Phone: [phone] Fax: [phone]    Primary Care Provider: No primary care provider on file  Primary Insurance:   Secondary Insurance:     ASSESSMENT:  [address] Other   Primary Phone: [phone] (Home)               Advance Directives  Does patient have a [address]?: No  Was patient offered paperwork?: Yes (offered and declined)  Does patient have Advance Directives?: No  Was patient offered paperwork?: Yes (offered and declined)  Primary Contact:  [address]         Readmission Root Cause  30 Day Readmission: No    Patient Information  Admitted from[de-identified] Home  Mental Status: Alert  During Assessment patient was accompanied by: Not accompanied during assessment  Assessment information provided by[de-identified] Patient  Primary Caregiver: Self  Support Systems: Self,Spouse/significant other  [place] of Residence: [address] [place] [address] do you live in?: [address] [facility] entry access options   Select all that apply : Stairs  Number of steps to enter home : 4  Do the steps have railings?: Yes  Type of Current Residence: 2 story home  Upon entering residence, is there a bedroom on the main floor (no further steps)?: No  A bedroom is located on the following floor levels of residence (select all that apply):: 2nd Floor  Upon entering residence, is there a bathroom on the main floor (no further steps)?: No  Indicate which floors of current residence have a bathroom (select all the apply):: 2nd Floor  Number of steps to 2nd floor from main floor: One Flight  In the last 12 months, was there a time when you were not able to pay the mortgage or rent on time?: No  In the last 12 months, how many places have you lived?: 1  In the last 12 months, was there a time when you did not have a steady place to sleep or slept in a shelter (including now)?: No  Homeless/housing insecurity resource given?: N/A  Living Arrangements: Lives w/ Spouse/significant other  Is patient a ?: No    Activities of Daily Living Prior to Admission  Functional Status: Independent  Completes ADLs independently?: Yes  Ambulates independently?: Yes  Does patient use assisted devices?: No  Does patient currently own DME?: No  Does patient have a history of Outpatient Therapy (PT/OT)?: No  Does the patient have a history of Short-Term Rehab?: No  Does patient have a history of HHC?: No  Does [place] currently [place] [address]?: No         Patient Information Continued  Income Source: Employed  Does patient have prescription coverage?: No  Within the past 12 months, you worried that your food would run out before you got the money to buy more : Never true  Within the past 12 months, the food you bought just didnt last and you didnt have money to get more : Never true  Food insecurity resource given?: N/A  Does patient receive dialysis treatments?: No  Does patient have a history of substance abuse?: No  Does patient have a history of Mental Health Diagnosis?: No    PHQ 2/9 Screening   Reviewed PHQ 2/9 Depression Screening Score?: No    Means of Transportation  Means of Transport to Appts[de-identified] Drives Self  In the past 12 months, has lack of transportation kept you from medical appointments or from getting medications?: No  In the past 12 months, has lack of transportation kept you from meetings, work, or from getting things needed for daily living?: No  Was application for public transport provided?: N/A        DISCHARGE DETAILS:    Discharge planning discussed with[de-identified] Patient  Freedom of Choice: No     CM contacted family/caregiver?: No- see comments (offered and declined)  Were Treatment Team discharge recommendations reviewed with patient/caregiver?: No (pending recs)  Did patient/caregiver verbalize understanding of patient care needs?: Yes  Were patient/caregiver advised of the risks associated with not following Treatment Team discharge recommendations?: Yes    Contacts  Patient Contacts: EMELYN Shaikh  Relationship to Patient[de-identified] [address]         Is the patient interested in [place] [address] at discharge?: No    DME Referral Provided  Referral made for DME?: No         Would you like to participate in our [address] service program?  : No - Declined    Treatment Team Recommendation: Other (pending recs)  Discharge Destination Plan[de-identified] Other (pending recs)  Transport at Discharge : Family           Additional Comments: Met with the pt at bedside for CM interview  pt lives with EMELYN benz and is independent with ADL's and ambulation and works fulltime  Pt does not have medical insurancem, denies food insecurity or problems paying his rent/mtg  Pt states he had a drug history  but more than 12 years ago  pt denies mental health issues or inpatient psych stays  Pt has not had VNA or been to STR  Pt will have his family/SO drive him home when DC to home

## 2022-04-03 ENCOUNTER — APPOINTMENT (INPATIENT)
Dept: RADIOLOGY | Facility: HOSPITAL | Age: 52
DRG: 192 | End: 2022-04-03
Payer: COMMERCIAL

## 2022-04-03 PROBLEM — J90 PLEURAL EFFUSION, LEFT: Status: RESOLVED | Noted: 2022-04-01 | Resolved: 2022-04-03

## 2022-04-03 PROBLEM — R06.00 DYSPNEA: Status: RESOLVED | Noted: 2022-04-01 | Resolved: 2022-04-03

## 2022-04-03 PROBLEM — R05.9 COUGH: Status: ACTIVE | Noted: 2022-04-03

## 2022-04-03 PROBLEM — R35.0 INCREASED URINARY FREQUENCY: Status: RESOLVED | Noted: 2022-04-01 | Resolved: 2022-04-03

## 2022-04-03 LAB
ANION GAP SERPL CALCULATED.3IONS-SCNC: 9 MMOL/L (ref 4–13)
BUN SERPL-MCNC: 19 MG/DL (ref 5–25)
CALCIUM SERPL-MCNC: 9.2 MG/DL (ref 8.3–10.1)
CHLORIDE SERPL-SCNC: 103 MMOL/L (ref 100–108)
CO2 SERPL-SCNC: 27 MMOL/L (ref 21–32)
CREAT SERPL-MCNC: 1.36 MG/DL (ref 0.6–1.3)
GFR SERPL CREATININE-BSD FRML MDRD: 60 ML/MIN/1.73SQ M
GLUCOSE SERPL-MCNC: 107 MG/DL (ref 65–140)
HIV 1+2 AB+HIV1 P24 AG SERPL QL IA: NORMAL
POTASSIUM SERPL-SCNC: 3.9 MMOL/L (ref 3.5–5.3)
SODIUM SERPL-SCNC: 139 MMOL/L (ref 136–145)

## 2022-04-03 PROCEDURE — 71045 X-RAY EXAM CHEST 1 VIEW: CPT

## 2022-04-03 PROCEDURE — 80048 BASIC METABOLIC PNL TOTAL CA: CPT | Performed by: PHYSICIAN ASSISTANT

## 2022-04-03 PROCEDURE — 99232 SBSQ HOSP IP/OBS MODERATE 35: CPT | Performed by: INTERNAL MEDICINE

## 2022-04-03 PROCEDURE — 99232 SBSQ HOSP IP/OBS MODERATE 35: CPT | Performed by: PHYSICIAN ASSISTANT

## 2022-04-03 RX ORDER — GUAIFENESIN 600 MG
600 TABLET, EXTENDED RELEASE 12 HR ORAL EVERY 12 HOURS SCHEDULED
Status: DISCONTINUED | OUTPATIENT
Start: 2022-04-03 | End: 2022-04-06 | Stop reason: HOSPADM

## 2022-04-03 RX ORDER — ASPIRIN 81 MG/1
81 TABLET ORAL DAILY
Status: DISCONTINUED | OUTPATIENT
Start: 2022-04-05 | End: 2022-04-06 | Stop reason: HOSPADM

## 2022-04-03 RX ORDER — ASPIRIN 81 MG/1
324 TABLET, CHEWABLE ORAL ONCE
Status: COMPLETED | OUTPATIENT
Start: 2022-04-04 | End: 2022-04-04

## 2022-04-03 RX ORDER — ASPIRIN 81 MG/1
81 TABLET ORAL ONCE
Status: COMPLETED | OUTPATIENT
Start: 2022-04-03 | End: 2022-04-03

## 2022-04-03 RX ORDER — ISOSORBIDE DINITRATE 10 MG/1
10 TABLET ORAL EVERY 8 HOURS SCHEDULED
Status: DISCONTINUED | OUTPATIENT
Start: 2022-04-03 | End: 2022-04-05

## 2022-04-03 RX ORDER — ACETAMINOPHEN 325 MG/1
650 TABLET ORAL EVERY 6 HOURS PRN
Status: DISCONTINUED | OUTPATIENT
Start: 2022-04-03 | End: 2022-04-06 | Stop reason: HOSPADM

## 2022-04-03 RX ORDER — METOPROLOL SUCCINATE 25 MG/1
25 TABLET, EXTENDED RELEASE ORAL EVERY 12 HOURS SCHEDULED
Status: DISCONTINUED | OUTPATIENT
Start: 2022-04-03 | End: 2022-04-05

## 2022-04-03 RX ADMIN — GUAIFENESIN 600 MG: 600 TABLET, EXTENDED RELEASE ORAL at 21:47

## 2022-04-03 RX ADMIN — DICLOFENAC SODIUM 2 G: 10 GEL TOPICAL at 19:51

## 2022-04-03 RX ADMIN — HEPARIN SODIUM 5000 UNITS: 5000 INJECTION INTRAVENOUS; SUBCUTANEOUS at 21:47

## 2022-04-03 RX ADMIN — HYDRALAZINE HYDROCHLORIDE 10 MG: 10 TABLET ORAL at 06:12

## 2022-04-03 RX ADMIN — HYDRALAZINE HYDROCHLORIDE 10 MG: 10 TABLET ORAL at 21:46

## 2022-04-03 RX ADMIN — ISOSORBIDE DINITRATE 10 MG: 10 TABLET ORAL at 15:32

## 2022-04-03 RX ADMIN — ISOSORBIDE DINITRATE 10 MG: 10 TABLET ORAL at 21:46

## 2022-04-03 RX ADMIN — HEPARIN SODIUM 5000 UNITS: 5000 INJECTION INTRAVENOUS; SUBCUTANEOUS at 15:33

## 2022-04-03 RX ADMIN — ASPIRIN 81 MG: 81 TABLET, COATED ORAL at 10:48

## 2022-04-03 RX ADMIN — METOPROLOL SUCCINATE 25 MG: 25 TABLET, FILM COATED, EXTENDED RELEASE ORAL at 10:49

## 2022-04-03 RX ADMIN — HEPARIN SODIUM 5000 UNITS: 5000 INJECTION INTRAVENOUS; SUBCUTANEOUS at 06:12

## 2022-04-03 RX ADMIN — METOPROLOL SUCCINATE 25 MG: 25 TABLET, FILM COATED, EXTENDED RELEASE ORAL at 21:47

## 2022-04-03 RX ADMIN — FUROSEMIDE 40 MG: 10 INJECTION, SOLUTION INTRAMUSCULAR; INTRAVENOUS at 10:48

## 2022-04-03 RX ADMIN — HYDRALAZINE HYDROCHLORIDE 10 MG: 10 TABLET ORAL at 15:32

## 2022-04-03 RX ADMIN — GUAIFENESIN 600 MG: 600 TABLET, EXTENDED RELEASE ORAL at 10:49

## 2022-04-03 RX ADMIN — FUROSEMIDE 40 MG: 10 INJECTION, SOLUTION INTRAMUSCULAR; INTRAVENOUS at 15:33

## 2022-04-03 RX ADMIN — ACETAMINOPHEN 325 MG: 325 TABLET ORAL at 10:48

## 2022-04-03 RX ADMIN — DICLOFENAC SODIUM 2 G: 10 GEL TOPICAL at 21:49

## 2022-04-03 NOTE — ASSESSMENT & PLAN NOTE
Left calf pain and swelling > R  Positive Enrico's  Elevated D dimer  Venous duplex of lower extremity revealed no DVT  · Tylenol p r n  for pain    · DVT ppx

## 2022-04-03 NOTE — PLAN OF CARE
Problem: PAIN - ADULT  Goal: Verbalizes/displays adequate comfort level or baseline comfort level  Description: Interventions:  - Encourage patient to monitor pain and request assistance  - Assess pain using appropriate pain scale  - Administer analgesics based on type and severity of pain and evaluate response  - Implement non-pharmacological measures as appropriate and evaluate response  - Consider cultural and social influences on pain and pain management  - Notify physician/advanced practitioner if interventions unsuccessful or patient reports new pain  Outcome: Progressing     Problem: INFECTION - ADULT  Goal: Absence or prevention of progression during hospitalization  Description: INTERVENTIONS:  - Assess and monitor for signs and symptoms of infection  - Monitor lab/diagnostic results  - Monitor all insertion sites, i e  indwelling lines, tubes, and drains  - Monitor endotracheal if appropriate and nasal secretions for changes in amount and color  - Kamas appropriate cooling/warming therapies per order  - Administer medications as ordered  - Instruct and encourage patient and family to use good hand hygiene technique  - Identify and instruct in appropriate isolation precautions for identified infection/condition  Outcome: Progressing  Goal: Absence of fever/infection during neutropenic period  Description: INTERVENTIONS:  - Monitor WBC    Outcome: Progressing     Problem: SAFETY ADULT  Goal: Patient will remain free of falls  Description: INTERVENTIONS:  - Educate patient/family on patient safety including physical limitations  - Instruct patient to call for assistance with activity   - Consult OT/PT to assist with strengthening/mobility   - Keep Call bell within reach  - Keep bed low and locked with side rails adjusted as appropriate  - Keep care items and personal belongings within reach  - Initiate and maintain comfort rounds  - Make Fall Risk Sign visible to staff  - Offer Toileting every 3  Hours, in advance of need  - Initiate/Maintain    - Obtain necessary fall risk management equipment:   - Apply yellow socks and bracelet for high fall risk patients  - Consider moving patient to room near nurses station  Outcome: Progressing  Goal: Maintain or return to baseline ADL function  Description: INTERVENTIONS:  -  Assess patient's ability to carry out ADLs; assess patient's baseline for ADL function and identify physical deficits which impact ability to perform ADLs (bathing, care of mouth/teeth, toileting, grooming, dressing, etc )  - Assess/evaluate cause of self-care deficits   - Assess range of motion  - Assess patient's mobility; develop plan if impaired  - Assess patient's need for assistive devices and provide as appropriate  - Encourage maximum independence but intervene and supervise when necessary  - Involve family in performance of ADLs  - Assess for home care needs following discharge   - Consider OT consult to assist with ADL evaluation and planning for discharge  - Provide patient education as appropriate  Outcome: Progressing  Goal: Maintains/Returns to pre admission functional level  Description: INTERVENTIONS:  - Perform BMAT or MOVE assessment daily    - Set and communicate daily mobility goal to care team and patient/family/caregiver  - Collaborate with rehabilitation services on mobility goals if consulted  - Perform Range of Motion 3  times a day  - Reposition patient every 3 hours    - Dangle patient 3 times a day  - Stand patient  3 times a day  - Ambulate patient  3 times a day  - Out of bed to chair 3  times a day   - Out of bed for meals  3  times a day  - Out of bed for toileting  - Record patient progress and toleration of activity level   Outcome: Progressing     Problem: DISCHARGE PLANNING  Goal: Discharge to home or other facility with appropriate resources  Description: INTERVENTIONS:  - Identify barriers to discharge w/patient and caregiver  - Arrange for needed discharge resources and transportation as appropriate  - Identify discharge learning needs (meds, wound care, etc )  - Arrange for interpretive services to assist at discharge as needed  - Refer to Case Management Department for coordinating discharge planning if the patient needs post-hospital services based on physician/advanced practitioner order or complex needs related to functional status, cognitive ability, or social support system  Outcome: Progressing

## 2022-04-03 NOTE — UTILIZATION REVIEW
Continued Stay Review    Observation 4/1 @ 0301 and changed to Inpatient on 4/2 @ Olga 52  Pt requiring continued stay for New Acute CHF on Iv Diuretics    Date: 4/2                         Current Patient Class: Inpatient  Current Level of Care: Med Surg    HPI:52 y o  male initially admitted on 4/2    Assessment/Plan:   Per Heart Filure; Continue IV Lasix  Increase metoprolol tartrate to 25 mg q12 hours  Start hydralazine 10 mg q8 hours  Plan for [facility] Monday  Tentative plan to add on ACEi/ARB s/p [facility]  CHACHA, RF, SPEP/UPEP, light chains, and HIV in process  Plan to discuss LifeVest before discharge  Progress notes; Continues on Iv diuretics  Cr 1 36  Unclear baseline  Suspect in the setting of poor intravascular volume 2/2 volume overload vs chronic pathology  Patient received contrast for CTA and NSAID for pain in the ED  Trend creat  Plan [facility] on Monday      Vital Signs:   04/02/22 11:39:15 97 2 °F (36 2 °C) Abnormal  86 17 127/82 97 95 % -- -- -- --   04/02/22 0945 -- 92 -- 133/94 107 97 % -- -- None (Room air) --   04/02/22 0800 -- -- -- -- -- 94 % -- -- None (Room air) --   04/02/22 06:50:02 97 7 °F (36 5 °C) 106   Abnormal  17 134/94 107 96 % 28 2 L/min Nasal cannula Lying   04/02/22 03:39:13 97 8 °F (36 6 °C) 95 19 134/83 100 92 % 28 2 L/min Nasal cannula Lying   04/01/22 21:32:45 -- 108   Abnormal  -- 159/109   Abnormal  126 91 % -- -- -- --   04/01/22 15:47:32 98 6 °F (37 °C) 98 -- 156/104   Abnormal  121 95 % -- -- --        Pertinent Labs/Diagnostic Results:   Results from last 7 days   Lab Units 04/01/22  0416   SARS-COV-2  Negative     Results from last 7 days   Lab Units 04/02/22  0503 04/01/22  0453 03/31/22  2343   WBC Thousand/uL 8 17 7 60 9 18   HEMOGLOBIN g/dL 15 8 14 6 14 7   HEMATOCRIT % 50 1* 44 1 46 4   PLATELETS Thousands/uL 253 225 175   NEUTROS ABS Thousands/µL  --  4 83 6 59         Results from last 7 days   Lab Units 04/03/22  0448 04/02/22  0504 04/01/22  0453 03/31/22  2343   SODIUM mmol/L 139 139 139 137   POTASSIUM mmol/L 3 9 4 2 4 1 4 5   CHLORIDE mmol/L 103 105 107 108   CO2 mmol/L 27 26 25 23   ANION GAP mmol/L 9 8 7 6   BUN mg/dL 19 17 18 19   CREATININE mg/dL 1 36* 1 39* 1 31* 1 36*   EGFR ml/min/1 73sq m 60 58 63 60   CALCIUM mg/dL 9 2 9 0 8 9 9 4     Results from last 7 days   Lab Units 03/31/22  2343   AST U/L 28   ALT U/L 39   ALK PHOS U/L 145*   TOTAL PROTEIN g/dL 6 8   ALBUMIN g/dL 3 2*   TOTAL BILIRUBIN mg/dL 0 53         Results from last 7 days   Lab Units 04/03/22  0448 04/02/22  0504 04/01/22  0453 03/31/22  2343   GLUCOSE RANDOM mg/dL 107 100 106 102         Results from last 7 days   Lab Units 03/31/22  2343   HEMOGLOBIN A1C % 5 4   EAG mg/dl 108       Results from last 7 days   Lab Units 04/01/22  0453 04/01/22  0143 03/31/22  2343   HS TNI 0HR ng/L  --   --  25   HS TNI 2HR ng/L  --  19  --    HSTNI D2 ng/L  --  -6  --    HS TNI 4HR ng/L 28  --   --    HSTNI D4 ng/L 3  --   --      Results from last 7 days   Lab Units 03/31/22  2347   D-DIMER QUANTITATIVE ug/ml FEU 2 55*         Results from last 7 days   Lab Units 03/31/22  2343   TSH 3RD GENERATON uIU/mL 0 729         Results from last 7 days   Lab Units 03/31/22  2343   NT-PRO BNP pg/mL 3,237*     Results from last 7 days   Lab Units 04/02/22  0504   FERRITIN ng/mL 52       Results from last 7 days   Lab Units 04/01/22  1202   CLARITY UA  Clear   COLOR UA  Light Yellow   SPEC GRAV UA  1 018   PH UA  5 0   GLUCOSE UA mg/dl Negative   KETONES UA mg/dl Negative   BLOOD UA  Negative   PROTEIN UA mg/dl Negative   NITRITE UA  Negative   BILIRUBIN UA  Negative   UROBILINOGEN UA (BE) mg/dl <2 0   LEUKOCYTES UA  Negative   WBC UA /hpf None Seen   RBC UA /hpf None Seen   BACTERIA UA /hpf None Seen   EPITHELIAL CELLS WET PREP /hpf None Seen   MUCUS THREADS  Occasional*     Results from last 7 days   Lab Units 04/01/22  0416   INFLUENZA A PCR  Negative   INFLUENZA B PCR  Negative   RSV PCR  Negative       Medications:   Scheduled Medications:  [START ON 4/5/2022] aspirin, 81 mg, Oral, Daily  [START ON 4/4/2022] aspirin, 324 mg, Oral, Once  furosemide, 40 mg, Intravenous, BID (diuretic)  guaiFENesin, 600 mg, Oral, Q12H JONAS  heparin (porcine), 5,000 Units, Subcutaneous, Q8H JONAS  hydrALAZINE, 10 mg, Oral, Q8H JONAS  isosorbide dinitrate, 10 mg, Oral, Q8H JONAS  metoprolol succinate, 25 mg, Oral, Q12H [address]      Continuous IV Infusions:     PRN Meds:  acetaminophen, 650 mg, Oral, Q6H PRN        Discharge Plan: TBD    Network Utilization Review Department  ATTENTION: Please call with any questions or concerns to [phone] and carefully listen to the prompts so that you are directed to the right person  All voicemails are confidential   Katja Saucedo all requests for admission clinical reviews, approved or denied determinations and any other requests to dedicated fax number below belonging to the campus where the patient is receiving treatment   List of dedicated fax numbers for the Facilities:  [address] DENIALS (Administrative/Medical Necessity) [phone]   [address] [place] [address] (Maternity/NICU/Pediatrics) [phone]   [address]  [address] Medical [place] [address] [address] [place] [address] Lacy Mooney [address] [facility] [address] [facility] [phone]

## 2022-04-03 NOTE — PROGRESS NOTES
Advanced Heart Failure / Pulmonary Hypertension Service Progress Note    Shyann Pierson 50 y o  male   MRN: [id number]  Unit/Bed#: CW2 211-01; Encounter: [id number]    Assessment:  Principal Problem:    Acute CHF (congestive heart failure) ([facility])  Active Problems:    Pleural effusion, left    Hypertension    Left leg pain    Elevated serum creatinine    Dyspnea    Increased urinary frequency      Subjective:   Patient seen and examined  No significant events overnight  Primary complaint today of cough  Does report one episode of nausea yesterday  Objective: Intake/ Output: 950 mL / 950 mL (net zero)  Accurate? Weight: 199 lbs (201 lbs on 04/02)  Telemetry: sinus, rates in 90-110s  Today's Plan:   Continue IV Lasix   Switch to succinate   Start isosorbide dinitrate 10 mg q8 hours   Plan for [facility] tomorrow  Orders placed   Tentative plan to add on ACEi/ARB (and d/c iso/hydral) s/p [facility]   CHACHA, RF, SPEP/UPEP, light chains, and HIV in process   Plan to discuss LifeVest before discharge   Consider outpatient PSG once patient insured  Plan:  Newly diagnosed HFrEF; LVEF 30-35%; LVIDd 5 2 cm; NYHA III; ACC/AHA Stage C   Etiology: unclear  TTE 04/01/2022: LVEF 30-35%  LVIDd 5 2 cm  Normal RV  Moderate to severe MR  PASP 51 mmHg  Neurohormonal Blockade:  --Beta Blocker: metoprolol succinate 25 mg q12 hours  --ARNi / ACEi / ARB: No    --Aldosterone Antagonist: No    --SGLT2 Inhibitor: No    --SVR Reducing Agents: hydralazine 10 mg q8 hours; isosorbide dinitrate 10 mg q8 hours  --Home Diuretic: None  --Inpatient Diuretic: IV Lasix 40 mg BID  Sudden Cardiac Death Risk Reduction:  --LVEF 30-35%  Plan to discuss LifeVest before discharge  --Repeat limited TTE to reassess LVEF in 07/2022  Electrical Resynchronization:  --Candidacy for BiV device: narrow QRS  Advanced Therapies (if appropriate): Will continue to monitor      Hypertension    Vitals:   Blood pressure 129/87, pulse 91, temperature 98 2 °F (36 8 °C), resp  rate 18, height 6' (1 829 m), weight 90 3 kg (199 lb), SpO2 95 %  Body mass index is 26 99 kg/m²  I/O last 3 completed shifts: In: 950 [P O :950]  Out: 950 [Urine:950]    Wt Readings from Last 10 Encounters:   22 90 3 kg (199 lb)     Vitals:    22 0308 22 0600 22 0611 22 0611   BP: 128/88  129/87 129/87   BP Location:       Pulse: 87  91 91   Resp: 18      Temp:       TempSrc:       SpO2: 96%  95% 95%   Weight:  91 7 kg (202 lb 1 oz)  90 3 kg (199 lb)   Height:           Physical Exam  Vitals reviewed  Constitutional:       General: He is awake  He is not in acute distress  Appearance: Normal appearance  He is well-developed and overweight  HENT:      Head: Normocephalic  Nose: Nose normal       Mouth/Throat:      Mouth: Mucous membranes are moist    Eyes:      General: No scleral icterus  Conjunctiva/sclera: Conjunctivae normal    Neck:      Vascular: JVD present  Trachea: No tracheal deviation  Cardiovascular:      Rate and Rhythm: Regular rhythm  Tachycardia present  No extrasystoles are present  Pulses: Normal pulses  Heart sounds: Murmur heard  Pulmonary:      Effort: Pulmonary effort is normal  No tachypnea, bradypnea or respiratory distress  Breath sounds: Normal air entry  No decreased air movement  No decreased breath sounds or rhonchi  Abdominal:      General: Bowel sounds are normal  There is distension (mild)  Palpations: Abdomen is soft  Tenderness: There is no abdominal tenderness  Musculoskeletal:      Cervical back: Neck supple  Right lower le+ Edema present  Left lower le+ Edema present  Skin:     General: Skin is warm and dry  Coloration: Skin is not jaundiced or pale  Neurological:      General: No focal deficit present  Mental Status: He is alert and oriented to person, place, and time     Psychiatric: Attention and Perception: Attention normal          Mood and Affect: Mood and affect normal          Speech: Speech normal          Behavior: Behavior normal  Behavior is cooperative  Thought Content:  Thought content normal      Central Line: No    Martinez Catheter: No     Current Facility-Administered Medications:     acetaminophen (TYLENOL) oral suspension 650 mg, 650 mg, Oral, Q4H PRN, MD Radha May  [START ON 4/5/2022] aspirin (ECOTRIN LOW STRENGTH) EC tablet 81 mg, 81 mg, Oral, Daily, Carmelita Gonsales PA-C    aspirin (ECOTRIN LOW STRENGTH) EC tablet 81 mg, 81 mg, Oral, Once, Carmelita Gonsales PA-C    [START ON 4/4/2022] aspirin chewable tablet 324 mg, 324 mg, Oral, Once, Carmelita Gonsales PA-C    furosemide (LASIX) injection 40 mg, 40 mg, Intravenous, BID (diuretic), Titi Bolton DO, 40 mg at 04/02/22 1614    guaiFENesin (MUCINEX) 12 hr tablet 600 mg, 600 mg, Oral, Q12H [facility], Lexx Jameson DO    heparin (porcine) subcutaneous injection 5,000 Units, 5,000 Units, Subcutaneous, Q8H [facility], Melo Badillo MD, 5,000 Units at 04/03/22 0612    hydrALAZINE (APRESOLINE) tablet 10 mg, 10 mg, Oral, Q8H [facility], Carmelita Gonsales PA-C, 10 mg at 04/03/22 6494    isosorbide dinitrate (ISORDIL) tablet 10 mg, 10 mg, Oral, Q8H [facility], Carmelita Gonsales PA-C    metoprolol succinate (TOPROL-XL) 24 hr tablet 25 mg, 25 mg, Oral, Q12H [facility], Carmelita Gonsales PA-C    Labs & Results:      Results from last 7 days   Lab Units 04/02/22  0503 04/01/22  0453 03/31/22  2343   WBC Thousand/uL 8 17 7 60 9 18   HEMOGLOBIN g/dL 15 8 14 6 14 7   HEMATOCRIT % 50 1* 44 1 46 4   PLATELETS Thousands/uL 253 225 175         Results from last 7 days   Lab Units 04/03/22  0448 04/02/22  0504 04/01/22  0453 03/31/22  2343 03/31/22  2343   POTASSIUM mmol/L 3 9 4 2 4 1   < > 4 5   CHLORIDE mmol/L 103 105 107   < > 108   CO2 mmol/L 27 26 25   < > 23   BUN mg/dL 19 17 18   < > 19   CREATININE mg/dL 1 36* 1 39* 1 31*   < > 1 36*   CALCIUM mg/dL 9 2 9 0 8 9   < > 9 4   ALK PHOS U/L  --   --   --   --  145*   ALT U/L  --   --   --   --  39   AST U/L  --   --   --   --  28    < > = values in this interval not displayed           Srini Mccarthy PA-C

## 2022-04-03 NOTE — ASSESSMENT & PLAN NOTE
Cr 1 36  Unclear baseline  Suspect in the setting of poor intravascular volume 2/2 volume overload vs chronic pathology  Patient received contrast for CTA and NSAID for pain in the ED    Creatinine improved to 1 44   · IV Lasix as above  Trend creatinine  Avoid nephrotoxins  Trend I/Os, daily weights  Avoid hypotension

## 2022-04-03 NOTE — ASSESSMENT & PLAN NOTE
Patient reports that he is on combination BP med at home but is unsure the name  May be lisinopril-HCTZ? Blood pressure currently controlled  · Hold on ACEi at this time given elevated Cr  · IV Lasix as above  · Continue hydralazine 10 mg p o   Q 8h  · Continue to monitor BP

## 2022-04-03 NOTE — ASSESSMENT & PLAN NOTE
1 day of acute onset left sided chest pain radiating to the back  Not related to exertion  Exacerbated by cough  EKG showing sinus tachy with nonspecific ST changes  NT proBNP 3237  D dimer 2 55  Trop 25 --> 19  CTA dissection protocol showing only left pleural effusion without aneurysm or PE  Low suspicion for ACS given already downtrending trop  Concern is for acute heart failure (1+ BLE edema, JVD, elevated proBNP, left pleural effusion, SOB) +/- PE  Concern for PE given acuity of pain and constellation of symptoms (tachycardia, cough, chest pain, SOB, LLE swelling and pain) and labs (elevated D dimer)  Discussed with on call radiologist that read CTA dissection and he explained that though the study done was not a CTA PE, it would only miss a very small PE  TTE revealed LVEF 30-35%, moderate to severe MR  Patient is likely to acute CHF  Etiology is unclear  Inpatient consult to Cardiology and input appreciated  Appears euvolemic  · Cardiology consulted, appreciated  · Monitor I/O, daily weight  · Continue Lasix 40 mg IV b i d   · Continue metoprolol to 25 mg p o  B i d   · Continue hydralazine 10 mg p o  Q 8h  · Plan for left heart catheterization Monday, NPO at midnight    · HbA1C WNL, Iron panel unremarkable, TSH within normal range

## 2022-04-03 NOTE — PROGRESS NOTES
INTERNAL MEDICINE RESIDENCY PROGRESS NOTE     Name: Chris Geronimo   Age & Sex: 50 y o  male   MRN: [id number]  Unit/Bed#: CW2 211-01   Encounter: [id number]  Team: SOD Team B     PATIENT INFORMATION     Name: Chris Geronimo   Age & Sex: 50 y o  male   MRN: [id number]  Hospital Stay Days: 1    ASSESSMENT/PLAN     Principal Problem:    Acute CHF (congestive heart failure) ([facility] [address] )  Active Problems:    Hypertension    Left leg pain    Elevated serum creatinine    Cough      Cough  Assessment & Plan  Worsening cough since yesterday  Productive with yellow clear sputum  · Check stat chest x-ray  · Mucinex ordered  · Continue to monitor  Elevated serum creatinine  Assessment & Plan  Cr 1 36  Unclear baseline  Suspect in the setting of poor intravascular volume 2/2 volume overload vs chronic pathology  Patient received contrast for CTA and NSAID for pain in the ED  Creatinine improved to 1 44   · IV Lasix as above  Trend creatinine  Avoid nephrotoxins  Trend I/Os, daily weights  Avoid hypotension      Left leg pain  Assessment & Plan  Left calf pain and swelling > R  Positive Enrico's  Elevated D dimer  Venous duplex of lower extremity revealed no DVT  · Tylenol p r n  for pain  · DVT ppx    Hypertension  Assessment & Plan  Patient reports that he is on combination BP med at home but is unsure the name  May be lisinopril-HCTZ? Blood pressure currently controlled  · Hold on ACEi at this time given elevated Cr  · IV Lasix as above  · Continue hydralazine 10 mg p o  Q 8h  · Continue to monitor BP    * Acute CHF (congestive heart failure) ([facility])  Assessment & Plan  1 day of acute onset left sided chest pain radiating to the back  Not related to exertion  Exacerbated by cough  EKG showing sinus tachy with nonspecific ST changes  NT proBNP 3237  D dimer 2 55  Trop 25 --> 19  CTA dissection protocol showing only left pleural effusion without aneurysm or PE  Low suspicion for ACS given already downtrending trop  Concern is for acute heart failure (1+ BLE edema, JVD, elevated proBNP, left pleural effusion, SOB) +/- PE  Concern for PE given acuity of pain and constellation of symptoms (tachycardia, cough, chest pain, SOB, LLE swelling and pain) and labs (elevated D dimer)  Discussed with on call radiologist that read CTA dissection and he explained that though the study done was not a CTA PE, it would only miss a very small PE  TTE revealed LVEF 30-35%, moderate to severe MR  Patient is likely to acute CHF  Etiology is unclear  Inpatient consult to Cardiology and input appreciated  Appears euvolemic  · Cardiology consulted, appreciated  · Monitor I/O, daily weight  · Continue Lasix 40 mg IV b i d   · Continue metoprolol to 25 mg p o  B i d   · Continue hydralazine 10 mg p o  Q 8h  · Plan for left heart catheterization Monday, NPO at midnight  · HbA1C WNL, Iron panel unremarkable, TSH within normal range      Increased urinary frequency-resolved as of 4/3/2022  Assessment & Plan  Patient reports polyuria and polydipsia  Glucose on initial chemistry panel is 102  · UA negative  · HB A1c within normal range  · Currently on diuretics  · Continue monitor    Dyspnea-resolved as of 4/3/2022  Assessment & Plan  1-1 5 week history of MELENDREZ, progressively worsening  Satting mid 90s on RA  Small left pleural effusion on CTA dissection study  Elevated proBNP and D dimer  Sounds like may have had viral URI vs allergy symptoms when symptoms first started  Concern is for acute HF +/- PE  · See A&P for "Chest discomfort"      Pleural effusion, left-resolved as of 4/3/2022  Assessment & Plan  Seen on CTA dissection study  Suspect in the setting of acute heart failure  · Continue Lasix        Disposition:  Patient to undergo cardiac catheterization tomorrow  Disposition pending results  SUBJECTIVE     Patient seen and examined  No acute events overnight  Continues to cough that is productive with yellow clear sputum    Denies any fever, chills, chest pain, shortness of breath  Also continues to have left lower extremity pain  Tolerating p o  Diet  Rest of ROS was negative  OBJECTIVE     Vitals:    22 0308 22 0600 22 0611 22 0611   BP: 128/88  129/87 129/87   BP Location:       Pulse: 87  91 91   Resp: 18      Temp:       TempSrc:       SpO2: 96%  95% 95%   Weight:  91 7 kg (202 lb 1 oz)  90 3 kg (199 lb)   Height:          Temperature:   Temp (24hrs), Av 7 °F (36 5 °C), Min:97 2 °F (36 2 °C), Max:98 2 °F (36 8 °C)    Temperature: 98 2 °F (36 8 °C)  Intake & Output:  I/O        0701   0700  0701   07 0701   0700    P  O   950     Total Intake(mL/kg)  950 (10 5)     Urine (mL/kg/hr) 300 (0 1) 950 (0 4)     Stool  0     Total Output 300 950     Net -300 0            Unmeasured Stool Occurrence  1 x         Weights:   IBW (Ideal Body Weight): 77 6 kg    Body mass index is 26 99 kg/m²  Weight (last 2 days)     Date/Time Weight    22 06:11:52 90 3 (199)    22 0600 91 7 (202 06)    22 0833 91 2 (201)     22 0715 99 8 (220)    Comments:   Weight: after breakfast at 22 3013       Physical Exam  Vitals and nursing note reviewed  Constitutional:       General: He is not in acute distress  Appearance: Normal appearance  He is well-developed  He is not ill-appearing  HENT:      Head: Normocephalic and atraumatic  Mouth/Throat:      Mouth: Mucous membranes are moist    Eyes:      Extraocular Movements: Extraocular movements intact  Conjunctiva/sclera: Conjunctivae normal       Pupils: Pupils are equal, round, and reactive to light  Cardiovascular:      Rate and Rhythm: Normal rate and regular rhythm  Pulses: Normal pulses  Heart sounds: Normal heart sounds  No murmur heard  No friction rub  No gallop  Pulmonary:      Effort: Pulmonary effort is normal  No respiratory distress  Breath sounds: Normal breath sounds   No wheezing or rales  Abdominal:      General: Abdomen is flat  Bowel sounds are normal  There is no distension  Palpations: Abdomen is soft  Tenderness: There is no abdominal tenderness  There is no guarding  Musculoskeletal:      Cervical back: Neck supple  Right lower leg: No edema  Left lower leg: No edema  Skin:     General: Skin is warm and dry  Neurological:      General: No focal deficit present  Mental Status: He is alert and oriented to person, place, and time  Psychiatric:         Mood and Affect: Mood normal          Behavior: Behavior normal        LABORATORY DATA     Labs: I have personally reviewed pertinent reports  Results from last 7 days   Lab Units 04/02/22 0503 04/01/22 0453 03/31/22  2343   WBC Thousand/uL 8 17 7 60 9 18   HEMOGLOBIN g/dL 15 8 14 6 14 7   HEMATOCRIT % 50 1* 44 1 46 4   PLATELETS Thousands/uL 253 225 175   NEUTROS PCT %  --  63 72   MONOS PCT %  --  6 7      Results from last 7 days   Lab Units 04/03/22 0448 04/02/22  0504 04/01/22 0453 03/31/22  2343 03/31/22  2343   POTASSIUM mmol/L 3 9 4 2 4 1   < > 4 5   CHLORIDE mmol/L 103 105 107   < > 108   CO2 mmol/L 27 26 25   < > 23   BUN mg/dL 19 17 18   < > 19   CREATININE mg/dL 1 36* 1 39* 1 31*   < > 1 36*   CALCIUM mg/dL 9 2 9 0 8 9   < > 9 4   ALK PHOS U/L  --   --   --   --  145*   ALT U/L  --   --   --   --  39   AST U/L  --   --   --   --  28    < > = values in this interval not displayed  IMAGING & DIAGNOSTIC TESTING     Radiology Results: I have personally reviewed pertinent reports  XR chest portable    Result Date: 4/3/2022  Impression: No acute cardiopulmonary disease  Workstation performed: [id number]     CTA dissection protocol chest abdomen pelvis w wo contrast    Result Date: 4/1/2022  Impression: No evidence of aortic aneurysm or dissection  Small left pleural effusion  No evidence of acute pathology throughout the abdomen or pelvis   Workstation performed: [id number]     Other Diagnostic Testing: I have personally reviewed pertinent reports  ACTIVE MEDICATIONS     Current Facility-Administered Medications   Medication Dose Route Frequency    acetaminophen (TYLENOL) tablet 650 mg  650 mg Oral Q6H PRN    [START ON 4/5/2022] aspirin (ECOTRIN LOW STRENGTH) EC tablet 81 mg  81 mg Oral Daily    aspirin (ECOTRIN LOW STRENGTH) EC tablet 81 mg  81 mg Oral Once    [START ON 4/4/2022] aspirin chewable tablet 324 mg  324 mg Oral Once    furosemide (LASIX) injection 40 mg  40 mg Intravenous BID (diuretic)    guaiFENesin (MUCINEX) 12 hr tablet 600 mg  600 mg Oral Q12H JONAS    heparin (porcine) subcutaneous injection 5,000 Units  5,000 Units Subcutaneous Q8H [address]    hydrALAZINE (APRESOLINE) tablet 10 mg  10 mg Oral Q8H [address]    isosorbide dinitrate (ISORDIL) tablet 10 mg  10 mg Oral Q8H [address]    metoprolol succinate (TOPROL-XL) 24 hr tablet 25 mg  25 mg Oral Q12H [address]       VTE Pharmacologic Prophylaxis: Heparin  VTE Mechanical Prophylaxis: sequential compression device    Portions of the record may have been created with voice recognition software  Occasional wrong word or "sound a like" substitutions may have occurred due to the inherent limitations of voice recognition software    Read the chart carefully and recognize, using context, where substitutions have occurred   ==  Early Slight, [facility]  Internal Medicine Residency PGY-3

## 2022-04-03 NOTE — ASSESSMENT & PLAN NOTE
Worsening cough since yesterday  Productive with yellow clear sputum  · Check stat chest x-ray  · Mucinex ordered  · Continue to monitor

## 2022-04-04 ENCOUNTER — APPOINTMENT (INPATIENT)
Dept: RADIOLOGY | Facility: HOSPITAL | Age: 52
DRG: 192 | End: 2022-04-04
Payer: COMMERCIAL

## 2022-04-04 LAB
ALBUMIN SERPL BCP-MCNC: 3.6 G/DL (ref 3.5–5)
ALBUMIN SERPL ELPH-MCNC: 3.71 G/DL (ref 3.5–5)
ALBUMIN SERPL ELPH-MCNC: 56.2 % (ref 52–65)
ALBUMIN UR ELPH-MCNC: 100 %
ALP SERPL-CCNC: 135 U/L (ref 46–116)
ALPHA1 GLOB MFR UR ELPH: 0 %
ALPHA1 GLOB SERPL ELPH-MCNC: 0.37 G/DL (ref 0.1–0.4)
ALPHA1 GLOB SERPL ELPH-MCNC: 5.6 % (ref 2.5–5)
ALPHA2 GLOB MFR UR ELPH: 0 %
ALPHA2 GLOB SERPL ELPH-MCNC: 0.8 G/DL (ref 0.4–1.2)
ALPHA2 GLOB SERPL ELPH-MCNC: 12.1 % (ref 7–13)
ALT SERPL W P-5'-P-CCNC: 33 U/L (ref 12–78)
ANION GAP SERPL CALCULATED.3IONS-SCNC: 7 MMOL/L (ref 4–13)
AST SERPL W P-5'-P-CCNC: 24 U/L (ref 5–45)
B-GLOBULIN MFR UR ELPH: 0 %
BASOPHILS # BLD AUTO: 0.06 THOUSANDS/ΜL (ref 0–0.1)
BASOPHILS NFR BLD AUTO: 1 % (ref 0–1)
BETA GLOB ABNORMAL SERPL ELPH-MCNC: 0.48 G/DL (ref 0.4–0.8)
BETA1 GLOB SERPL ELPH-MCNC: 7.2 % (ref 5–13)
BETA2 GLOB SERPL ELPH-MCNC: 6.4 % (ref 2–8)
BETA2+GAMMA GLOB SERPL ELPH-MCNC: 0.42 G/DL (ref 0.2–0.5)
BILIRUB SERPL-MCNC: 1.01 MG/DL (ref 0.2–1)
BUN SERPL-MCNC: 21 MG/DL (ref 5–25)
CALCIUM SERPL-MCNC: 9.1 MG/DL (ref 8.3–10.1)
CHLORIDE SERPL-SCNC: 105 MMOL/L (ref 100–108)
CO2 SERPL-SCNC: 26 MMOL/L (ref 21–32)
CREAT SERPL-MCNC: 1.48 MG/DL (ref 0.6–1.3)
EOSINOPHIL # BLD AUTO: 0.3 THOUSAND/ΜL (ref 0–0.61)
EOSINOPHIL NFR BLD AUTO: 5 % (ref 0–6)
ERYTHROCYTE [DISTWIDTH] IN BLOOD BY AUTOMATED COUNT: 13.9 % (ref 11.6–15.1)
GAMMA GLOB ABNORMAL SERPL ELPH-MCNC: 0.83 G/DL (ref 0.5–1.6)
GAMMA GLOB MFR UR ELPH: 0 %
GAMMA GLOB SERPL ELPH-MCNC: 12.5 % (ref 12–22)
GFR SERPL CREATININE-BSD FRML MDRD: 54 ML/MIN/1.73SQ M
GLUCOSE SERPL-MCNC: 101 MG/DL (ref 65–140)
HCT VFR BLD AUTO: 50.6 % (ref 36.5–49.3)
HGB BLD-MCNC: 16.6 G/DL (ref 12–17)
IGG/ALB SER: 1.28 {RATIO} (ref 1.1–1.8)
IMM GRANULOCYTES # BLD AUTO: 0.03 THOUSAND/UL (ref 0–0.2)
IMM GRANULOCYTES NFR BLD AUTO: 1 % (ref 0–2)
LYMPHOCYTES # BLD AUTO: 1.9 THOUSANDS/ΜL (ref 0.6–4.47)
LYMPHOCYTES NFR BLD AUTO: 30 % (ref 14–44)
MCH RBC QN AUTO: 29.6 PG (ref 26.8–34.3)
MCHC RBC AUTO-ENTMCNC: 32.8 G/DL (ref 31.4–37.4)
MCV RBC AUTO: 90 FL (ref 82–98)
MONOCYTES # BLD AUTO: 0.62 THOUSAND/ΜL (ref 0.17–1.22)
MONOCYTES NFR BLD AUTO: 10 % (ref 4–12)
NEUTROPHILS # BLD AUTO: 3.48 THOUSANDS/ΜL (ref 1.85–7.62)
NEUTS SEG NFR BLD AUTO: 53 % (ref 43–75)
NRBC BLD AUTO-RTO: 0 /100 WBCS
PLATELET # BLD AUTO: 256 THOUSANDS/UL (ref 149–390)
PMV BLD AUTO: 11 FL (ref 8.9–12.7)
POTASSIUM SERPL-SCNC: 4.2 MMOL/L (ref 3.5–5.3)
PROT PATTERN SERPL ELPH-IMP: ABNORMAL
PROT PATTERN UR ELPH-IMP: NORMAL
PROT SERPL-MCNC: 6.6 G/DL (ref 6.4–8.2)
PROT SERPL-MCNC: 7.6 G/DL (ref 6.4–8.2)
PROT UR-MCNC: 6 MG/DL
RBC # BLD AUTO: 5.61 MILLION/UL (ref 3.88–5.62)
RHEUMATOID FACT SER QL LA: NEGATIVE
RYE IGE QN: NEGATIVE
SODIUM SERPL-SCNC: 138 MMOL/L (ref 136–145)
WBC # BLD AUTO: 6.39 THOUSAND/UL (ref 4.31–10.16)

## 2022-04-04 PROCEDURE — 99232 SBSQ HOSP IP/OBS MODERATE 35: CPT | Performed by: INTERNAL MEDICINE

## 2022-04-04 PROCEDURE — 93458 L HRT ARTERY/VENTRICLE ANGIO: CPT | Performed by: INTERNAL MEDICINE

## 2022-04-04 PROCEDURE — G1004 CDSM NDSC: HCPCS

## 2022-04-04 PROCEDURE — 85025 COMPLETE CBC W/AUTO DIFF WBC: CPT | Performed by: PHYSICIAN ASSISTANT

## 2022-04-04 PROCEDURE — 4A023N7 MEASUREMENT OF CARDIAC SAMPLING AND PRESSURE, LEFT HEART, PERCUTANEOUS APPROACH: ICD-10-PCS | Performed by: INTERNAL MEDICINE

## 2022-04-04 PROCEDURE — 80053 COMPREHEN METABOLIC PANEL: CPT | Performed by: PHYSICIAN ASSISTANT

## 2022-04-04 PROCEDURE — NC001 PR NO CHARGE: Performed by: INTERNAL MEDICINE

## 2022-04-04 PROCEDURE — B2111ZZ FLUOROSCOPY OF MULTIPLE CORONARY ARTERIES USING LOW OSMOLAR CONTRAST: ICD-10-PCS | Performed by: INTERNAL MEDICINE

## 2022-04-04 PROCEDURE — 99153 MOD SED SAME PHYS/QHP EA: CPT | Performed by: INTERNAL MEDICINE

## 2022-04-04 PROCEDURE — 75561 CARDIAC MRI FOR MORPH W/DYE: CPT

## 2022-04-04 PROCEDURE — 99152 MOD SED SAME PHYS/QHP 5/>YRS: CPT | Performed by: INTERNAL MEDICINE

## 2022-04-04 PROCEDURE — C1769 GUIDE WIRE: HCPCS | Performed by: INTERNAL MEDICINE

## 2022-04-04 PROCEDURE — C1894 INTRO/SHEATH, NON-LASER: HCPCS | Performed by: INTERNAL MEDICINE

## 2022-04-04 PROCEDURE — A9585 GADOBUTROL INJECTION: HCPCS | Performed by: INTERNAL MEDICINE

## 2022-04-04 RX ORDER — SODIUM CHLORIDE 9 MG/ML
50 INJECTION, SOLUTION INTRAVENOUS CONTINUOUS
Status: DISCONTINUED | OUTPATIENT
Start: 2022-04-04 | End: 2022-04-04

## 2022-04-04 RX ORDER — FUROSEMIDE 40 MG/1
40 TABLET ORAL DAILY
Status: DISCONTINUED | OUTPATIENT
Start: 2022-04-05 | End: 2022-04-06 | Stop reason: HOSPADM

## 2022-04-04 RX ORDER — VERAPAMIL HCL 2.5 MG/ML
AMPUL (ML) INTRAVENOUS AS NEEDED
Status: DISCONTINUED | OUTPATIENT
Start: 2022-04-04 | End: 2022-04-04 | Stop reason: HOSPADM

## 2022-04-04 RX ORDER — MIDAZOLAM HYDROCHLORIDE 2 MG/2ML
INJECTION, SOLUTION INTRAMUSCULAR; INTRAVENOUS AS NEEDED
Status: DISCONTINUED | OUTPATIENT
Start: 2022-04-04 | End: 2022-04-04 | Stop reason: HOSPADM

## 2022-04-04 RX ORDER — HEPARIN SODIUM 1000 [USP'U]/ML
INJECTION, SOLUTION INTRAVENOUS; SUBCUTANEOUS AS NEEDED
Status: DISCONTINUED | OUTPATIENT
Start: 2022-04-04 | End: 2022-04-04 | Stop reason: HOSPADM

## 2022-04-04 RX ORDER — ASPIRIN 81 MG/1
81 TABLET, CHEWABLE ORAL DAILY
Status: CANCELLED | OUTPATIENT
Start: 2022-04-04

## 2022-04-04 RX ORDER — LIDOCAINE HYDROCHLORIDE 10 MG/ML
INJECTION, SOLUTION EPIDURAL; INFILTRATION; INTRACAUDAL; PERINEURAL AS NEEDED
Status: DISCONTINUED | OUTPATIENT
Start: 2022-04-04 | End: 2022-04-04 | Stop reason: HOSPADM

## 2022-04-04 RX ORDER — ATORVASTATIN CALCIUM 40 MG/1
40 TABLET, FILM COATED ORAL
Status: DISCONTINUED | OUTPATIENT
Start: 2022-04-04 | End: 2022-04-06 | Stop reason: HOSPADM

## 2022-04-04 RX ORDER — FENTANYL CITRATE 50 UG/ML
INJECTION, SOLUTION INTRAMUSCULAR; INTRAVENOUS AS NEEDED
Status: DISCONTINUED | OUTPATIENT
Start: 2022-04-04 | End: 2022-04-04 | Stop reason: HOSPADM

## 2022-04-04 RX ORDER — NITROGLYCERIN 20 MG/100ML
INJECTION INTRAVENOUS AS NEEDED
Status: DISCONTINUED | OUTPATIENT
Start: 2022-04-04 | End: 2022-04-04 | Stop reason: HOSPADM

## 2022-04-04 RX ADMIN — DICLOFENAC SODIUM 2 G: 10 GEL TOPICAL at 22:50

## 2022-04-04 RX ADMIN — ISOSORBIDE DINITRATE 10 MG: 10 TABLET ORAL at 22:54

## 2022-04-04 RX ADMIN — METOPROLOL SUCCINATE 25 MG: 25 TABLET, FILM COATED, EXTENDED RELEASE ORAL at 22:54

## 2022-04-04 RX ADMIN — HEPARIN SODIUM 5000 UNITS: 5000 INJECTION INTRAVENOUS; SUBCUTANEOUS at 22:51

## 2022-04-04 RX ADMIN — HEPARIN SODIUM 5000 UNITS: 5000 INJECTION INTRAVENOUS; SUBCUTANEOUS at 06:17

## 2022-04-04 RX ADMIN — HYDRALAZINE HYDROCHLORIDE 10 MG: 10 TABLET ORAL at 22:54

## 2022-04-04 RX ADMIN — GUAIFENESIN 600 MG: 600 TABLET, EXTENDED RELEASE ORAL at 22:51

## 2022-04-04 RX ADMIN — ASPIRIN 81 MG CHEWABLE TABLET 324 MG: 81 TABLET CHEWABLE at 08:15

## 2022-04-04 RX ADMIN — HYDRALAZINE HYDROCHLORIDE 10 MG: 10 TABLET ORAL at 06:17

## 2022-04-04 RX ADMIN — DICLOFENAC SODIUM 2 G: 10 GEL TOPICAL at 18:51

## 2022-04-04 RX ADMIN — SODIUM CHLORIDE 50 ML/HR: 0.9 INJECTION, SOLUTION INTRAVENOUS at 10:00

## 2022-04-04 RX ADMIN — ISOSORBIDE DINITRATE 10 MG: 10 TABLET ORAL at 15:42

## 2022-04-04 RX ADMIN — HEPARIN SODIUM 5000 UNITS: 5000 INJECTION INTRAVENOUS; SUBCUTANEOUS at 15:42

## 2022-04-04 RX ADMIN — METOPROLOL SUCCINATE 25 MG: 25 TABLET, FILM COATED, EXTENDED RELEASE ORAL at 08:14

## 2022-04-04 RX ADMIN — GADOBUTROL 18 ML: 604.72 INJECTION INTRAVENOUS at 22:48

## 2022-04-04 RX ADMIN — ATORVASTATIN CALCIUM 40 MG: 40 TABLET, FILM COATED ORAL at 15:42

## 2022-04-04 RX ADMIN — ISOSORBIDE DINITRATE 10 MG: 10 TABLET ORAL at 06:17

## 2022-04-04 RX ADMIN — HYDRALAZINE HYDROCHLORIDE 10 MG: 10 TABLET ORAL at 15:42

## 2022-04-04 RX ADMIN — DICLOFENAC SODIUM 2 G: 10 GEL TOPICAL at 12:00

## 2022-04-04 RX ADMIN — GUAIFENESIN 600 MG: 600 TABLET, EXTENDED RELEASE ORAL at 08:15

## 2022-04-04 NOTE — CASE MANAGEMENT
Case Management Progress Note    Patient name Yulia Dixon  Location CL7 244/LP9 211- MRN [id number]  : 1970 Date 2022       LOS (days): 2  Geometric Mean LOS (GMLOS) (days):   Days to GMLOS:        OBJECTIVE:        Current admission status: Inpatient  Preferred Pharmacy:   [address] [place] [address] [facility] [address] [id number]  Phone: [phone] Fax: [phone]    Primary Care Provider: No primary care provider on file  Primary Insurance: PARVIN CABALLERO PENDING  Secondary Insurance:     PROGRESS NOTE:    CM continues to follow this patient; Per provider, pt continues to require an IP hospital stay due to ongoing cardiac interventions and possible Life Vest order upon discharge  Per Catherine Jenkins, OP HF CM, a PATHS referral has been made to assist with PARVIN CABALLERO application  CM will follow for PT/OT recommendations upon medical stability  Pt lacks insurance and will need assistance paying for Life Vest  CM will continue to follow for CM needs

## 2022-04-04 NOTE — PROGRESS NOTES
[facility]  Progress Note Mary Grove 1970, 50 y o  male MRN: [id number]  Unit/Bed#: 2 211-01 Encounter: [id number]  Primary Care Provider: No primary care provider on file  Date and time admitted to hospital: 3/31/2022 11:38 PM    Cough  Assessment & Plan  Worsening cough since yesterday  Productive with yellow clear sputum  · Check stat chest x-ray  · Mucinex ordered  · Continue to monitor  · Improved    Elevated serum creatinine  Assessment & Plan  Cr 1 36  Unclear baseline  Suspect in the setting of poor intravascular volume 2/2 volume overload vs chronic pathology  Patient received contrast for CTA and NSAID for pain in the ED  Creatinine improved to 1 48   · IV Lasix as above, hold this morning  Trend creatinine  Avoid nephrotoxins  Trend I/Os, daily weights  Avoid hypotension      Hypertension  Assessment & Plan  Patient reports that he is on combination BP med at home but is unsure the name  May be lisinopril-HCTZ? Blood pressure currently controlled  · Hold on ACEi at this time given elevated Cr  · IV Lasix as above  · Continue hydralazine 10 mg p o  Q 8h  · Continue isordil 10mg q8H  · Continue to monitor BP    * Acute CHF (congestive heart failure) (HCC)  Assessment & Plan  1 day of acute onset left sided chest pain radiating to the back  Not related to exertion  Exacerbated by cough  EKG showing sinus tachy with nonspecific ST changes  NT proBNP 3237  D dimer 2 55  Trop 25 --> 19  CTA dissection protocol showing only left pleural effusion without aneurysm or PE  Low suspicion for ACS given already downtrending trop  Concern is for acute heart failure (1+ BLE edema, JVD, elevated proBNP, left pleural effusion, SOB) +/- PE  Concern for PE given acuity of pain and constellation of symptoms (tachycardia, cough, chest pain, SOB, LLE swelling and pain) and labs (elevated D dimer)    Discussed with on call radiologist that read CTA dissection and he explained that though the study done was not a CTA PE, it would only miss a very small PE  TTE revealed LVEF 30-35%, moderate to severe MR  Patient is likely to acute CHF  CHACHA, RF, HIV negative  HbA1C WNL, Iron panel unremarkable, TSH within normal range  LHC negative for CAD   Etiology is unclear  Inpatient consult to Cardiology and input appreciated  Appears euvolemic  · Cardiology consulted, appreciated  · Monitor I/O, daily weight  · Continue Lasix 40 mg IV b i d , hold lasix this morning due to slightly elevated Cr and undergo LHC  · Continue metoprolol to 25 mg p o  B i d   · Continue hydralazine 10 mg p o  Q 8h  · Plan for cardiac MRI            VTE Pharmacologic Prophylaxis:   VTE Score: 2 Moderate Risk (Score 3-4) - Pharmacological DVT Prophylaxis Ordered: Heparin  Mechanical VTE Prophylaxis in Place: Yes    Patient Centered Rounds: I have performed bedside rounds with nursing staff today  Current Length of Stay: 2 day(s)    Current Patient Status: Inpatient     Discharge Plan / Estimated Discharge Date: To be determined    Code Status: Level 1 - Full Code      Subjective:   Patient feels okay  No acute complaints    Objective:     Vitals:   Temp (24hrs), Av °F (36 7 °C), Min:97 4 °F (36 3 °C), Max:98 3 °F (36 8 °C)    Temp:  [97 4 °F (36 3 °C)-98 3 °F (36 8 °C)] 98 °F (36 7 °C)  HR:  [78-95] 80  Resp:  [17-18] 18  BP: (107-125)/(64-84) 115/64  SpO2:  [91 %-97 %] 95 %  Body mass index is 26 88 kg/m²  Input and Output Summary (last 24 hours): Intake/Output Summary (Last 24 hours) at 2022 1113  Last data filed at 2022 0630  Gross per 24 hour   Intake 600 ml   Output 1100 ml   Net -500 ml       Physical Exam:     Physical Exam  Constitutional:       Appearance: He is not toxic-appearing  HENT:      Mouth/Throat:      Mouth: Mucous membranes are moist    Eyes:      Extraocular Movements: Extraocular movements intact        Conjunctiva/sclera: Conjunctivae normal  Cardiovascular:      Rate and Rhythm: Normal rate and regular rhythm  Heart sounds: Normal heart sounds  Pulmonary:      Effort: No respiratory distress  Breath sounds: No rales  Abdominal:      General: Bowel sounds are normal  There is no distension  Palpations: Abdomen is soft  Tenderness: There is no abdominal tenderness  Musculoskeletal:      Cervical back: Neck supple  Right lower le+ Edema present  Left lower le+ Edema present  Skin:     General: Skin is warm and dry  Neurological:      General: No focal deficit present  Mental Status: He is alert  Psychiatric:         Behavior: Behavior normal  Behavior is cooperative  Additional Data:     Labs:  Results from last 7 days   Lab Units 22  0619   WBC Thousand/uL 6 39   HEMOGLOBIN g/dL 16 6   HEMATOCRIT % 50 6*   PLATELETS Thousands/uL 256   NEUTROS PCT % 53   LYMPHS PCT % 30   MONOS PCT % 10   EOS PCT % 5     Results from last 7 days   Lab Units 22  0619   SODIUM mmol/L 138   POTASSIUM mmol/L 4 2   CHLORIDE mmol/L 105   CO2 mmol/L 26   BUN mg/dL 21   CREATININE mg/dL 1 48*   ANION GAP mmol/L 7   CALCIUM mg/dL 9 1   ALBUMIN g/dL 3 6   TOTAL BILIRUBIN mg/dL 1 01*   ALK PHOS U/L 135*   ALT U/L 33   AST U/L 24   GLUCOSE RANDOM mg/dL 101             Results from last 7 days   Lab Units 22  2343   HEMOGLOBIN A1C % 5 4           Imaging: Reviewed radiology reports from this admission including: chest xray  No pertinent imaging reviewed      Recent Cultures (last 7 days):           Lines/Drains:  Invasive Devices  Report    Peripheral Intravenous Line            Peripheral IV 22 Right Antecubital 3 days                Telemetry:   Telemetry Orders (From admission, onward)             48 Hour Telemetry Monitoring  Continuous x 48 hours        References:    Telemetry Guidelines   Question:  Reason for 48 Hour Telemetry  Answer:  Acute Decompensated CHF (continuous diuretic infusion or total diuretic dose > 200 mg daily, associated electrolyte derangement, ionotropic drip, history of ventricular arrhythmia, or new EF <35%)                    Last 24 Hours Medication List:   Current Facility-Administered Medications   Medication Dose Route Frequency Provider Last Rate    acetaminophen  650 mg Oral Q6H PRN Lexx Jameson DO      [START ON 4/5/2022] aspirin  81 mg Oral Daily Traci Franklin PA-C      atorvastatin  40 mg Oral Daily With Mc Woodson MD      Diclofenac Sodium  2 g Topical 4x Daily Bro Lemus MD      furosemide  40 mg Intravenous BID (diuretic) Susy Freitas DO      guaiFENesin  600 mg Oral Q12H [facility] Lexx Jameson DO      heparin (porcine)  5,000 Units Subcutaneous [facility] Julisa Holland MD      hydrALAZINE  10 mg Oral [facility] Traci Franklin PA-C      isosorbide dinitrate  10 mg Oral Q8H [facility] Traci Franklin PA-C      metoprolol succinate  25 mg Oral Q12H [facility] Traci Franklin PA-C      sodium chloride  50 mL/hr Intravenous Continuous Malcom Pass, CRNP 50 mL/hr (04/04/22 1000)        Today, Patient Was Seen By: Bronson Han MD    ** Please Note: This note has been constructed using a voice recognition system   **

## 2022-04-04 NOTE — PROGRESS NOTES
Care transferred to [address] [place] [address] at this time  Right Wrist site assessed at the bedside  Site stable, no bleeding or swelling  Patient aware of their bed rest and restrictions  The patient is alert and oriented, denies pain at the moment  Call bell within reach and bed in low position

## 2022-04-04 NOTE — PROGRESS NOTES
Heart Failure/Pulmonary Hypertension Progress Note   Denver Hew 50 y o  male MRN: [id number]  Unit/Bed#: BE CATH LAB ROOM Encounter: [id number]    Hospital Course/Assessment  54yo man with HTN who presented with typical chest pain and SOB for 1 week found to have new HFrEF (30-35%) with RWMA and mod/sev MR and mod TR    Subjective: Today denies CP  For [facility]  Tele unremarkable    Cardiac Imaging  - LHC (4/4/22): widely patent coronary arteries  LVEDP 14  - TTE (4/1/22): EF 30-35%, G2DD, RV normal, mildly dilated LA, mod/sev MR, mod TR with EPASP 51 mmHg  LVIDd 4 5cm    Plan  1  New onset acute HFrEF (30-35%) likely due to ICM  - Entire anterolateral wall hypokinesis on echo  [id number] inferior wall akinetic  - [facility] today  - Aspirin 81mg daily  - Lasix 40mg IV BID  Diuretic was held this morning given Cr slightly elevated 1 48 from 1 36 from 1 39 from 1 31 from 1 36  Seems to be diuresing given his weights  Net negative 580mL past 24 hours  Only 720mL intake recorded  Net neg 880mL this hospitalization recorded  Weight down on standing scale 88 9 from 91 2kg   - SVR reducing agents: hydral 10mg q8H, isordil 10mg q8H  - Metop XL 25mg daily  HR maintaining around 90bpm  Can titrate up his dosage  - Starting high-intensity atorvastatin today given likely has underlying CAD even tho LDL is in good range  TG slightly elevated as well at 205 and benefit of statin far outweighs risks  LFTs normal with mildly elevated     2  Mod/sev MR likely due to restricted motion of the posterior mitral valve leaflet due to ischemia and RWMA  - Central to mildly eccentric jet directed towards the lateral wall consistent with posterior leaflet restricted motion and hypokinesis of the anteriolateral wall    3  Acute kidney injury  - Cr 1 4 slightly up but around what its been since admission  - Ensure adequate I/O recorded please    4  VTE ppx: HSC  Transition to Lovenox 40mg daily    Plan  1  Cardiac MRI ordered  2  Starting atorvastatin 40mg daily  3  Plan to titrate up beta blocker  4  Will ensure I/O were accurate  LVEDP was elevated in cath lab and on echo  Needs adequate diuresis    Basil Pacheco MD  - PGY-6 Cardiology Fellow    ======================================================  Objective  VS: Blood pressure 119/74, pulse 89, temperature 98 °F (36 7 °C), resp  rate 18, height 6' (1 829 m), weight 89 9 kg (198 lb 3 2 oz), SpO2 96 %  Gen: well appearing  Psych: AOx3  Skin: intact  Cardiac: S1, S2, regular rate, no S3 or S4 appreciated  No murmurs  +2 PT, radial pulses  No peripheral edema No carotid bruits  Resp: CTABL  No crackles  MSK: 5/5 strength throughout muscle groups  Neuro: CN grossly intact  Sensory to light touch, pain, proprioception intact BL LE, UE  LN: no cervical LAD  Rheum: no joint deformities in UE or LE  ======================================================  TREADMILL STRESS  No results found for this or any previous visit      ----------------------------------------------------------------------------------------------  NUCLEAR STRESS TEST: No results found for this or any previous visit  No results found for this or any previous visit       --------------------------------------------------------------------------------  CATH:  No results found for this or any previous visit     --------------------------------------------------------------------------------  ECHO:   No results found for this or any previous visit  No results found for this or any previous visit     --------------------------------------------------------------------------------  HOLTER  No results found for this or any previous visit     --------------------------------------------------------------------------------  CAROTIDS  No results found for this or any previous visit         [unfilled]   =====================================================    Active Meds    Current Facility-Administered Medications:    acetaminophen (TYLENOL) tablet 650 mg, 650 mg, Oral, Q6H PRN, Lexx G Gisell, , 325 mg at 04/03/22 1048    [START ON 4/5/2022] aspirin (ECOTRIN LOW STRENGTH) EC tablet 81 mg, 81 mg, Oral, Daily, Karlos Mcleod PA-C    Diclofenac Sodium (VOLTAREN) 1 % topical gel 2 g, 2 g, Topical, 4x Daily, [address], [place], 2 g at 04/03/22 2149    fentanyl citrate (PF) 100 MCG/2ML, , , PRN, Carlo Salter MD, 50 mcg at 04/04/22 0930    furosemide (LASIX) injection 40 mg, 40 mg, Intravenous, BID (diuretic), Kash Navarro DO, 40 mg at 04/03/22 1533    guaiFENesin (MUCINEX) 12 hr tablet 600 mg, 600 mg, Oral, Q12H [address], Lexx G DO Gisell, 600 mg at 04/04/22 0815    heparin (porcine) injection, , , PRN, Carlo Salter MD, 4,000 Units at 04/04/22 0938    heparin (porcine) subcutaneous injection 5,000 Units, 5,000 Units, Subcutaneous, Q8H [address], Vicente Henson MD, 5,000 Units at 04/04/22 0617    hydrALAZINE (APRESOLINE) tablet 10 mg, 10 mg, Oral, Q8H [address], Mary Sharpe PA-C, 10 mg at 04/04/22 0617    isosorbide dinitrate (ISORDIL) tablet 10 mg, 10 mg, Oral, Q8H [address], Mary Sharpe PA-C, 10 mg at 04/04/22 0617    lidocaine (PF) (XYLOCAINE-MPF) 1 % injection, , , PRN, Carlo Salter MD, 1 mL at 04/04/22 0934    metoprolol succinate (TOPROL-XL) 24 hr tablet 25 mg, 25 mg, Oral, Q12H [address], Karlos Mcleod PA-C, 25 mg at 04/04/22 4853    midazolam (VERSED) injection, , , PRN, Carlo Salter MD, 2 mg at 04/04/22 0930    Labs & Results  No results found for: CKTOTAL, CKMB, CKMBINDEX, TROPONINI  Lab Results   Component Value Date    CALCIUM 9 1 04/04/2022    K 4 2 04/04/2022    CO2 26 04/04/2022     04/04/2022    BUN 21 04/04/2022    CREATININE 1 48 (H) 04/04/2022     Lab Results   Component Value Date    WBC 6 39 04/04/2022    HGB 16 6 04/04/2022    HCT 50 6 (H) 04/04/2022    MCV 90 04/04/2022     04/04/2022         No results found for: CHOL  Lab Results   Component Value Date    HDL 44 03/31/2022     Lab Results   Component Value Date    LDLCALC 47 03/31/2022     Lab Results   Component Value Date    TRIG 205 (H) 03/31/2022     Lab Results   Component Value Date    ALT 33 04/04/2022    AST 24 04/04/2022

## 2022-04-04 NOTE — NURSING NOTE
Made Dr CABALLERO aware that patient hct 50, creatine slightly elevated and potassium 4 2  patient continues with dry np cough  mucinex given  Patient now on call to cath  Lasix held per dr CABALLERO  Made cath lab aware of patient labs this am and patient is ready for cath lab

## 2022-04-04 NOTE — CASE MANAGEMENT
Outpatient HF LCSW spoke with Ramona Hammans at [facility] and they have not yet received referral for No Insurance  LCSW spoke with Stacey Appiah in Financial Counseling and she is making referral to [facility]  Spoke with pt during rounds with HF Team  Informed pt that PATHS would see him to assist with application for health insurance  Pt stated that he is from [facility] and works at [employer]  Pt said that he stopped drinking alcohol 12 years ago and used to drink about a case and 1/2 per day  LCSW will be available for emotional support and outpatient resources

## 2022-04-04 NOTE — PLAN OF CARE
Problem: Potential for Falls  Goal: Patient will remain free of falls  Description: INTERVENTIONS:  - Educate patient/family on patient safety including physical limitations  - Instruct patient to call for assistance with activity   - Consult OT/PT to assist with strengthening/mobility   - Keep Call bell within reach  - Keep bed low and locked with side rails adjusted as appropriate  - Keep care items and personal belongings within reach  - Initiate and maintain comfort rounds  - Make Fall Risk Sign visible to staff  - Offer Toileting every 2 Hours, in advance of need  - Initiate/Maintain alarm  - Obtain necessary fall risk management equipment  - Apply yellow socks and bracelet for high fall risk patients  - Consider moving patient to room near nurses station  Outcome: Progressing     Problem: PAIN - ADULT  Goal: Verbalizes/displays adequate comfort level or baseline comfort level  Description: Interventions:  - Encourage patient to monitor pain and request assistance  - Assess pain using appropriate pain scale  - Administer analgesics based on type and severity of pain and evaluate response  - Implement non-pharmacological measures as appropriate and evaluate response  - Consider cultural and social influences on pain and pain management  - Notify physician/advanced practitioner if interventions unsuccessful or patient reports new pain  Outcome: Progressing     Problem: INFECTION - ADULT  Goal: Absence or prevention of progression during hospitalization  Description: INTERVENTIONS:  - Assess and monitor for signs and symptoms of infection  - Monitor lab/diagnostic results  - Monitor all insertion sites, i e  indwelling lines, tubes, and drains  - Monitor endotracheal if appropriate and nasal secretions for changes in amount and color  - Schriever appropriate cooling/warming therapies per order  - Administer medications as ordered  - Instruct and encourage patient and family to use good hand hygiene technique  - Identify and instruct in appropriate isolation precautions for identified infection/condition  Outcome: Progressing  Goal: Absence of fever/infection during neutropenic period  Description: INTERVENTIONS:  - Monitor WBC    Outcome: Progressing     Problem: SAFETY ADULT  Goal: Patient will remain free of falls  Description: INTERVENTIONS:  - Educate patient/family on patient safety including physical limitations  - Instruct patient to call for assistance with activity   - Consult OT/PT to assist with strengthening/mobility   - Keep Call bell within reach  - Keep bed low and locked with side rails adjusted as appropriate  - Keep care items and personal belongings within reach  - Initiate and maintain comfort rounds  - Make Fall Risk Sign visible to staff  - Offer Toileting every 2 Hours, in advance of need  - Initiate/Maintain alarm  - Obtain necessary fall risk management equipment  - Apply yellow socks and bracelet for high fall risk patients  - Consider moving patient to room near nurses station  Outcome: Progressing  Goal: Maintain or return to baseline ADL function  Description: INTERVENTIONS:  -  Assess patient's ability to carry out ADLs; assess patient's baseline for ADL function and identify physical deficits which impact ability to perform ADLs (bathing, care of mouth/teeth, toileting, grooming, dressing, etc )  - Assess/evaluate cause of self-care deficits   - Assess range of motion  - Assess patient's mobility; develop plan if impaired  - Assess patient's need for assistive devices and provide as appropriate  - Encourage maximum independence but intervene and supervise when necessary  - Involve family in performance of ADLs  - Assess for home care needs following discharge   - Consider OT consult to assist with ADL evaluation and planning for discharge  - Provide patient education as appropriate  Outcome: Progressing  Goal: Maintains/Returns to pre admission functional level  Description: INTERVENTIONS:  - Perform BMAT or MOVE assessment daily    - Set and communicate daily mobility goal to care team and patient/family/caregiver  - Collaborate with rehabilitation services on mobility goals if consulted  - Perform Range of Motion 3 times a day  - Reposition patient every 2 hours  - Dangle patient 3 times a day  - Stand patient 3 times a day  - Ambulate patient 3 times a day  - Out of bed to chair 3 times a day   - Out of bed for meals 3 times a day  - Out of bed for toileting  - Record patient progress and toleration of activity level   Outcome: Progressing     Problem: DISCHARGE PLANNING  Goal: Discharge to home or other facility with appropriate resources  Description: INTERVENTIONS:  - Identify barriers to discharge w/patient and caregiver  - Arrange for needed discharge resources and transportation as appropriate  - Identify discharge learning needs (meds, wound care, etc )  - Arrange for interpretive services to assist at discharge as needed  - Refer to Case Management Department for coordinating discharge planning if the patient needs post-hospital services based on physician/advanced practitioner order or complex needs related to functional status, cognitive ability, or social support system  Outcome: Progressing     Problem: Knowledge Deficit  Goal: Patient/family/caregiver demonstrates understanding of disease process, treatment plan, medications, and discharge instructions  Description: Complete learning assessment and assess knowledge base    Interventions:  - Provide teaching at level of understanding  - Provide teaching via preferred learning methods  Outcome: Progressing

## 2022-04-04 NOTE — OCCUPATIONAL THERAPY NOTE
OT CANCEL NOTE:    Orders for OT evaluation received  Pt is currently off the floor at cardiac cath lab  Will continue to follow and complete OT evaluation as appropriate

## 2022-04-04 NOTE — PHYSICAL THERAPY NOTE
Physical Therapy Cancellation Note      PT consult received  Chart reviewed  Patient is currently off floor at cardiac cath lab  PT will continue to follow on caseload and perform initial evaluation as medically appropriate        AFTAB SRINIVASAN PT, DPT

## 2022-04-04 NOTE — ASSESSMENT & PLAN NOTE
Worsening cough since yesterday  Productive with yellow clear sputum  · Check stat chest x-ray  · Mucinex ordered  · Continue to monitor    · Improved

## 2022-04-04 NOTE — ASSESSMENT & PLAN NOTE
1 day of acute onset left sided chest pain radiating to the back  Not related to exertion  Exacerbated by cough  EKG showing sinus tachy with nonspecific ST changes  NT proBNP 3237  D dimer 2 55  Trop 25 --> 19  CTA dissection protocol showing only left pleural effusion without aneurysm or PE  Low suspicion for ACS given already downtrending trop  Concern is for acute heart failure (1+ BLE edema, JVD, elevated proBNP, left pleural effusion, SOB) +/- PE  Concern for PE given acuity of pain and constellation of symptoms (tachycardia, cough, chest pain, SOB, LLE swelling and pain) and labs (elevated D dimer)  Discussed with on call radiologist that read CTA dissection and he explained that though the study done was not a CTA PE, it would only miss a very small PE  TTE revealed LVEF 30-35%, moderate to severe MR  Patient is likely to acute CHF  CHACHA, RF, HIV negative  HbA1C WNL, Iron panel unremarkable, TSH within normal range  LHC negative for CAD   Etiology is unclear  Inpatient consult to Cardiology and input appreciated  Appears euvolemic  · Cardiology consulted, appreciated  · Monitor I/O, daily weight  · Continue Lasix 40 mg IV b i d , hold lasix this morning due to slightly elevated Cr and undergo LHC  · Continue metoprolol to 25 mg p o  B i d   · Continue hydralazine 10 mg p o   Q 8h  · Plan for cardiac MRI

## 2022-04-04 NOTE — ASSESSMENT & PLAN NOTE
Patient reports that he is on combination BP med at home but is unsure the name  May be lisinopril-HCTZ? Blood pressure currently controlled  · Hold on ACEi at this time given elevated Cr  · IV Lasix as above  · Continue hydralazine 10 mg p o   Q 8h  · Continue isordil 10mg q8H  · Continue to monitor BP

## 2022-04-05 LAB
ANION GAP SERPL CALCULATED.3IONS-SCNC: 3 MMOL/L (ref 4–13)
BUN SERPL-MCNC: 22 MG/DL (ref 5–25)
CALCIUM SERPL-MCNC: 8.6 MG/DL (ref 8.3–10.1)
CHLORIDE SERPL-SCNC: 108 MMOL/L (ref 100–108)
CO2 SERPL-SCNC: 26 MMOL/L (ref 21–32)
CREAT SERPL-MCNC: 1.38 MG/DL (ref 0.6–1.3)
ERYTHROCYTE [DISTWIDTH] IN BLOOD BY AUTOMATED COUNT: 14 % (ref 11.6–15.1)
GFR SERPL CREATININE-BSD FRML MDRD: 59 ML/MIN/1.73SQ M
GLUCOSE SERPL-MCNC: 114 MG/DL (ref 65–140)
HCT VFR BLD AUTO: 47.8 % (ref 36.5–49.3)
HGB BLD-MCNC: 15.1 G/DL (ref 12–17)
KAPPA LC FREE SER-MCNC: 23 MG/L (ref 3.3–19.4)
KAPPA LC FREE/LAMBDA FREE SER: 1.12 {RATIO} (ref 0.26–1.65)
LAMBDA LC FREE SERPL-MCNC: 20.5 MG/L (ref 5.7–26.3)
MCH RBC QN AUTO: 29.7 PG (ref 26.8–34.3)
MCHC RBC AUTO-ENTMCNC: 31.6 G/DL (ref 31.4–37.4)
MCV RBC AUTO: 94 FL (ref 82–98)
PLATELET # BLD AUTO: 225 THOUSANDS/UL (ref 149–390)
PMV BLD AUTO: 11.1 FL (ref 8.9–12.7)
POTASSIUM SERPL-SCNC: 4.3 MMOL/L (ref 3.5–5.3)
RBC # BLD AUTO: 5.09 MILLION/UL (ref 3.88–5.62)
SODIUM SERPL-SCNC: 137 MMOL/L (ref 136–145)
WBC # BLD AUTO: 6.71 THOUSAND/UL (ref 4.31–10.16)

## 2022-04-05 PROCEDURE — 99232 SBSQ HOSP IP/OBS MODERATE 35: CPT | Performed by: INTERNAL MEDICINE

## 2022-04-05 PROCEDURE — 97165 OT EVAL LOW COMPLEX 30 MIN: CPT

## 2022-04-05 PROCEDURE — 97162 PT EVAL MOD COMPLEX 30 MIN: CPT

## 2022-04-05 PROCEDURE — 80048 BASIC METABOLIC PNL TOTAL CA: CPT | Performed by: INTERNAL MEDICINE

## 2022-04-05 PROCEDURE — 85027 COMPLETE CBC AUTOMATED: CPT | Performed by: INTERNAL MEDICINE

## 2022-04-05 RX ORDER — LOSARTAN POTASSIUM 25 MG/1
25 TABLET ORAL DAILY
Status: DISCONTINUED | OUTPATIENT
Start: 2022-04-05 | End: 2022-04-06 | Stop reason: HOSPADM

## 2022-04-05 RX ORDER — METOPROLOL SUCCINATE 50 MG/1
50 TABLET, EXTENDED RELEASE ORAL EVERY 12 HOURS SCHEDULED
Status: DISCONTINUED | OUTPATIENT
Start: 2022-04-05 | End: 2022-04-06 | Stop reason: HOSPADM

## 2022-04-05 RX ADMIN — GUAIFENESIN 600 MG: 600 TABLET, EXTENDED RELEASE ORAL at 21:51

## 2022-04-05 RX ADMIN — HYDRALAZINE HYDROCHLORIDE 10 MG: 10 TABLET ORAL at 05:46

## 2022-04-05 RX ADMIN — HEPARIN SODIUM 5000 UNITS: 5000 INJECTION INTRAVENOUS; SUBCUTANEOUS at 21:51

## 2022-04-05 RX ADMIN — METOPROLOL SUCCINATE 50 MG: 50 TABLET, EXTENDED RELEASE ORAL at 21:52

## 2022-04-05 RX ADMIN — HEPARIN SODIUM 5000 UNITS: 5000 INJECTION INTRAVENOUS; SUBCUTANEOUS at 05:46

## 2022-04-05 RX ADMIN — ASPIRIN 81 MG: 81 TABLET, COATED ORAL at 08:44

## 2022-04-05 RX ADMIN — GUAIFENESIN 600 MG: 600 TABLET, EXTENDED RELEASE ORAL at 08:42

## 2022-04-05 RX ADMIN — ATORVASTATIN CALCIUM 40 MG: 40 TABLET, FILM COATED ORAL at 17:44

## 2022-04-05 RX ADMIN — METOPROLOL SUCCINATE 50 MG: 50 TABLET, EXTENDED RELEASE ORAL at 08:42

## 2022-04-05 RX ADMIN — FUROSEMIDE 40 MG: 40 TABLET ORAL at 08:42

## 2022-04-05 RX ADMIN — LOSARTAN POTASSIUM 25 MG: 25 TABLET, FILM COATED ORAL at 12:40

## 2022-04-05 RX ADMIN — ISOSORBIDE DINITRATE 10 MG: 10 TABLET ORAL at 05:46

## 2022-04-05 NOTE — ASSESSMENT & PLAN NOTE
Cr 1 36  Unclear baseline  Suspect in the setting of poor intravascular volume 2/2 volume overload vs chronic pathology  Patient received contrast for CTA and NSAID for pain in the ED  Creatinine improved   · Transition to Lasix 40 mg p o   Daily  Trend creatinine  Avoid nephrotoxins  Trend I/Os, daily weights  Avoid hypotension

## 2022-04-05 NOTE — ASSESSMENT & PLAN NOTE
Patient reports that he is on combination BP med at home but is unsure the name  May be lisinopril-HCTZ? Blood pressure currently controlled  · Continue Lasix and metoprolol  · Continue hydralazine 10 mg p o   Q 8h  · Continue to monitor BP

## 2022-04-05 NOTE — PHYSICAL THERAPY NOTE
PHYSICAL THERAPY EVALUATION     04/05/22 1510   Note Type   Note type Evaluation   Pain Assessment   Pain Assessment Tool 0-10   Pain Score No Pain   Restrictions/Precautions   Weight Bearing Precautions Per Order No   Other Precautions Telemetry   Home Living   Type of [facility] Two level;Bed/bath upstairs  (4 MURRAY)   Home Equipment   (Pt denies any)   Additional Comments Obtained information from CM/OT  Prior Function   Level of [place] Independent with ADLs and functional mobility   Lives With Spouse;Son;Daughter   Receives Help From Family   ADL Assistance Independent   IADLs Independent   Falls in the last 6 months 0   Vocational Full time employment   Comments Obtained information from CM/OT  Cognition   Overall Cognitive Status WFL   Attention Within functional limits   Orientation Level Oriented X4   Memory Within functional limits   Following Commands Follows all commands and directions without difficulty   present throughout entire session for increased communication  Subjective   Subjective "I'm ready to go"   RLE Assessment   RLE Assessment WNL   LLE Assessment   LLE Assessment WNL   Bed Mobility   Additional Comments Unable to assess  Pt sitting EOB and eating upon arrival to session and returning to EOB at end of session      Transfers   Sit to Stand 7  Independent   Stand to Sit 7  Independent   Ambulation/Elevation   Gait pattern WNL   Gait Assistance 7  Independent   Additional items Verbal cues  (To let therapist know if he feels SOB)   Assistive Device None   Distance 80'x2 (stairs between)   Stair Management Assistance 6  Modified independent   Stair Management Technique One rail L;Foreward;Reciprocal   Number of Stairs 10   Ambulation/Elevation Additional Comments SOB with longer distance and stairs   Balance   Static Sitting Good   Dynamic Sitting Good   Static Standing Good   Dynamic Standing Good   Ambulatory Good   Endurance Deficit   Endurance Deficit No Activity Tolerance   Activity Tolerance Patient tolerated treatment well   Medical Staff Made Aware Yes   Nurse Made Aware RN cleared pt for therapy   Assessment   Prognosis Excellent   Problem List   (SOB, decreased activity tolerance)   Assessment Pt is a 50y o  year old male admitted to [facility] with chest pain, SOB, and near syncope due to Acute CHF (congestive heart failure) ([facility] [address] ) on 3/31/2022  PT consulted to assess pt's functional mobility and D/C needs  Order placed for PT eval and tx, w/ up w/ A order  Prior to hospitalization, pt was independent in ADL, iADL, and ambulation without an AD  He was getting SOB with home tasks  Currently pt is limited by decreased activity tolerance, decreased functional mobility tolerance and SOB upon exertion  Upon evaluation, pt demonstrating independence with transfers, ambulation, and stair negotiation  Noted SOB with long distance ambulation and stair negotiation  Pt confirming no questions or concerns upon returning home stating "I'm ready to go"  The following objective measures performed on IE also reveal limitations: The patient's AM-PAC Basic Mobility Inpatient Short Form Raw Score is 24  A standardized score greater than 16 suggests the patient may benefit from discharge to home  Please also refer to the recommendation of the Physical Therapist for safe D/C planning  Pt demonstrating evolving clinical presentation due to SOB with minimal activity, decreased activity/mobility tolerance, and preparation for life-vest receival  Pt would benefit from continued mobility with staff but no longer requiring skilled IP PT  Please re-consult if functional decline occurs  From PT/mobility standpoint, recommendation at time of D/C would be no rehabilitation needs pending progress in order to facilitate return to OF      Barriers to Discharge None   Goals   Patient Goals To go home   Recommendation   PT Discharge Recommendation No rehabilitation needs   AM-PAC Basic Mobility Inpatient   Turning in Bed Without Bedrails 4   Lying on Back to Sitting on Edge of Flat Bed 4   Moving Bed to Chair 4   Standing Up From Chair 4   Walk in Room 4   Climb 3-5 Stairs 4   Basic Mobility Inpatient Raw Score 24   Basic Mobility Standardized Score 57 68   Highest Level Of Mobility   [facility] Goal 8: Walk 250 feet or more   End of Consult   Patient Position at End of Consult Seated edge of bed; All needs within reach  (Returning to position he was in at beginning of session )     Sukhwinder Bernabe, SPT

## 2022-04-05 NOTE — OCCUPATIONAL THERAPY NOTE
Occupational Therapy Evaluation      [place]    4/5/2022    Principal Problem:    Acute CHF (congestive heart failure) ([facility])  Active Problems:    Hypertension    Left leg pain    Elevated serum creatinine    Cough      Past Medical History:   Diagnosis Date    Chronic HFrEF (heart failure with reduced ejection fraction) ([facility] [address] ) 04/01/2022       History reviewed  No pertinent surgical history       04/05/22 0907   OT Last Visit   OT Visit Date 04/05/22   Note Type   Note type Evaluation   Restrictions/Precautions   Weight Bearing Precautions Per Order No   Other Precautions Telemetry   Pain Assessment   Pain Assessment Tool 0-10   Pain Score No Pain   Home Living   Type of Home House   Home Layout Two level;Bed/bath upstairs   Additional Comments denies use of DME at baseline   Prior Function   Level of [place] Independent with ADLs and functional mobility   Lives With Spouse;Son;Daughter   ADL Assistance Independent   IADLs Independent   Falls in the last 6 months 0   Vocational Full time employment   Comments +   Lifestyle   Autonomy fully indepenent and normally active   Reciprocal Relationships supportive family   Psychosocial   Psychosocial (WDL) WDL   Subjective   Subjective "I am fine"   ADL   Eating Assistance 7  Independent   Grooming Assistance 7  Independent   UB Bathing Assistance 7  Independent   LB Bathing Assistance 7  Independent   UB Dressing Assistance 7  Independent   LB Dressing Assistance 7  Independent   Toileting Assistance  7  Independent   Bed Mobility   Rolling R 7  Independent   Rolling L 7  Independent   Supine to Sit 7  Independent   Sit to Supine 7  Independent   Transfers   Sit to Stand 7  Independent   Stand to Sit 7  Independent   Stand pivot 7  Independent   Functional Mobility   Functional Mobility 7  Independent   Balance   Static Sitting Normal   Dynamic Sitting Normal   Static Standing Good   Dynamic Standing Good   Activity Tolerance   Activity Tolerance Patient tolerated treatment well   RUE Assessment   RUE Assessment WFL   LUE Assessment   LUE Assessment WFL   Hand Function   Gross Motor Coordination Functional   Fine Motor Coordination Functional   Sensation   Light Touch No apparent deficits   Cognition   Overall Cognitive Status WFL   Arousal/Participation Alert; Cooperative   Attention Within functional limits   Orientation Level Oriented X4   Memory Within functional limits   Following Commands Follows all commands and directions without difficulty   Assessment   Assessment Pt is a 50 y o  male seen for OT evaluation s/p admit to [facility] on 3/31/2022 w/ Acute CHF (congestive heart failure) ([address] )  Comorbidities affecting pt's functional performance at time of assessment include: HTN and CHF  Personal factors affecting pt at time of IE include:steps to enter environment  Prior to admission, pt was living w his wife, son and daughter in a 2 SH  Pt reports being fully independent w self care, mobility, driving etc  Upon evaluation: Pt demonstrates ability to complete self care by himself, walks in room and hallway self, no loss of balance noted  Educated on simple breathing techniques with good understanding  Based on findings from OT evaluation and functional performance deficits, pt has been identified as a  low complexity evaluation  The patient's raw score on the AM-PAC Daily Activity inpatient short form is 24, standardized score is 57 54, greater than 39 4  Patients at this level are likely to benefit from discharge to home  From OT standpoint, recommendation at time of d/c would be home with no ongoing skilled OT needs  DC OT at this time      Goals   Patient Goals to go home   Plan   OT Frequency Eval only   Recommendation   OT Discharge Recommendation No rehabilitation needs   OT - OK to Discharge Yes   AM-PAC Daily Activity Inpatient   Lower Body Dressing 4   Bathing 4   Toileting 4   Upper Body Dressing 4   Grooming 4   Eating 4   Daily Activity Raw Score 24   Daily Activity Standardized Score (Calc for Raw Score >=11) 57 54

## 2022-04-05 NOTE — PROGRESS NOTES
[facility]  Progress Note Gabby Grove 1970, 50 y o  male MRN: [id number]  Unit/Bed#: 2 211-01 Encounter: [id number]  Primary Care Provider: No primary care provider on file  Date and time admitted to hospital: 3/31/2022 11:38 PM    Elevated serum creatinine  Assessment & Plan  Cr 1 36  Unclear baseline  Suspect in the setting of poor intravascular volume 2/2 volume overload vs chronic pathology  Patient received contrast for CTA and NSAID for pain in the ED  Creatinine improved   · Transition to Lasix 40 mg p o  Daily  Trend creatinine  Avoid nephrotoxins  Trend I/Os, daily weights  Avoid hypotension      Left leg pain  Assessment & Plan  Left calf pain and swelling > R  Positive Enrico's  Elevated D dimer  Venous duplex of lower extremity revealed no DVT  · Tylenol p r n  for pain  · DVT ppx    Hypertension  Assessment & Plan  Patient reports that he is on combination BP med at home but is unsure the name  May be lisinopril-HCTZ? Blood pressure currently controlled  · Continue Lasix and metoprolol  · Continue hydralazine 10 mg p o  Q 8h  · Continue to monitor BP    * Acute CHF (congestive heart failure) (HCC)  Assessment & Plan  1 day of acute onset left sided chest pain radiating to the back  Not related to exertion  Exacerbated by cough  EKG showing sinus tachy with nonspecific ST changes  NT proBNP 3237  D dimer 2 55  Trop 25 --> 19  CTA dissection protocol showing only left pleural effusion without aneurysm or PE  Low suspicion for ACS given already downtrending trop  Concern is for acute heart failure (1+ BLE edema, JVD, elevated proBNP, left pleural effusion, SOB) +/- PE  Concern for PE given acuity of pain and constellation of symptoms (tachycardia, cough, chest pain, SOB, LLE swelling and pain) and labs (elevated D dimer)    Discussed with on call radiologist that read CTA dissection and he explained that though the study done was not a CTA PE, it would only miss a very small PE  TTE revealed LVEF 30-35%, moderate to severe MR  Patient is likely to acute CHF  CHACHA, RF, HIV negative  HbA1C WNL, Iron panel unremarkable, TSH within normal range  LHC negative for CAD  Cardiac MRI revealed Mildly dilated left ventricle with moderately reduced left ventricular systolic function (LVEF 83%)  No evidence of myocardial scarring, inflammation, or infiltrative disease  These findings are most suggestive of an idiopathic non-ischemic cardiomyopathy   Etiology is unclear  Inpatient consult to Cardiology and input appreciated  Appears euvolemic  · Cardiology consulted, appreciated  · Monitor I/O, daily weight  · Transition to lasix 40 mg po qd  · Increased to metoprolol to 50 mg p o  B i d   · Continue hydralazine 10 mg p o  Q 8h  · Continue atorvastatin 40 mg p o  Daily  · Will change patient to ACEI/ARB per cardiology  · Pending insurance application, patient needs LifeVest on discharge          VTE Pharmacologic Prophylaxis:   VTE Score: 2 Moderate Risk (Score 3-4) - Pharmacological DVT Prophylaxis Ordered: Heparin  Mechanical VTE Prophylaxis in Place: Yes    Patient Centered Rounds: I have performed bedside rounds with nursing staff today  Discussions with Specialists or Other Care Team Provider:  Cardiology      Current Length of Stay: 3 day(s)    Current Patient Status: Inpatient     Discharge Plan / Estimated Discharge Date: Pending insurance and life vest    Code Status: Level 1 - Full Code      Subjective:   Patient feels okay, no acute complaints  Cough has improved  Objective:     Vitals:   Temp (24hrs), Av °F (36 7 °C), Min:97 5 °F (36 4 °C), Max:98 3 °F (36 8 °C)    Temp:  [97 5 °F (36 4 °C)-98 3 °F (36 8 °C)] 97 5 °F (36 4 °C)  HR:  [86-98] 89  Resp:  [18] 18  BP: (108-126)/(68-88) 108/68  SpO2:  [95 %-97 %] 95 %  Body mass index is 26 88 kg/m²  Input and Output Summary (last 24 hours):        Intake/Output Summary (Last 24 hours) at 4/5/2022 1122  Last data filed at 4/5/2022 0747  Gross per 24 hour   Intake 1260 ml   Output 1325 ml   Net -65 ml       Physical Exam:     Physical Exam  Constitutional:       Appearance: He is not toxic-appearing  HENT:      Mouth/Throat:      Mouth: Mucous membranes are moist    Eyes:      Extraocular Movements: Extraocular movements intact  Conjunctiva/sclera: Conjunctivae normal    Cardiovascular:      Rate and Rhythm: Normal rate and regular rhythm  Heart sounds: Normal heart sounds  Pulmonary:      Effort: No respiratory distress  Breath sounds: No rales  Abdominal:      General: Bowel sounds are normal  There is no distension  Palpations: Abdomen is soft  Tenderness: There is no abdominal tenderness  Musculoskeletal:      Cervical back: Neck supple  Skin:     General: Skin is warm and dry  Neurological:      General: No focal deficit present  Mental Status: He is alert  Psychiatric:         Behavior: Behavior normal  Behavior is cooperative  Additional Data:     Labs:  Results from last 7 days   Lab Units 04/05/22 0433 04/04/22 0619 04/04/22  0619   WBC Thousand/uL 6 71   < > 6 39   HEMOGLOBIN g/dL 15 1   < > 16 6   HEMATOCRIT % 47 8   < > 50 6*   PLATELETS Thousands/uL 225   < > 256   NEUTROS PCT %  --   --  53   LYMPHS PCT %  --   --  30   MONOS PCT %  --   --  10   EOS PCT %  --   --  5    < > = values in this interval not displayed       Results from last 7 days   Lab Units 04/05/22 0433 04/04/22 0619 04/04/22 0619   SODIUM mmol/L 137   < > 138   POTASSIUM mmol/L 4 3   < > 4 2   CHLORIDE mmol/L 108   < > 105   CO2 mmol/L 26   < > 26   BUN mg/dL 22   < > 21   CREATININE mg/dL 1 38*   < > 1 48*   ANION GAP mmol/L 3*   < > 7   CALCIUM mg/dL 8 6   < > 9 1   ALBUMIN g/dL  --   --  3 6   TOTAL BILIRUBIN mg/dL  --   --  1 01*   ALK PHOS U/L  --   --  135*   ALT U/L  --   --  33   AST U/L  --   --  24   GLUCOSE RANDOM mg/dL 114   < > 101    < > = values in this interval not displayed  Results from last 7 days   Lab Units 22  2343   HEMOGLOBIN A1C % 5 4           Imaging: Reviewed radiology reports from this admission including: Cardiac MRI    Recent Cultures (last 7 days):           Lines/Drains:  Invasive Devices  Report    Peripheral Intravenous Line            Peripheral IV 22 Left;Ventral (anterior) Forearm <1 day                Telemetry:   Telemetry Orders (From admission, onward)             48 Hour Telemetry Monitoring  Continuous x 48 hours           References:    Telemetry Guidelines   Question:  Reason for 48 Hour Telemetry  Answer:  Acute Decompensated CHF (continuous diuretic infusion or total diuretic dose > 200 mg daily, associated electrolyte derangement, ionotropic drip, history of ventricular arrhythmia, or new EF <35%)                    Last 24 Hours Medication List:   Current Facility-Administered Medications   Medication Dose Route Frequency Provider Last Rate    acetaminophen  650 mg Oral Q6H PRN Lexx G Gisell, DO      aspirin  81 mg Oral Daily Richar Vaughan PA-C      atorvastatin  40 mg Oral Daily With Kian Hammond MD      Diclofenac Sodium  2 g Topical 4x Daily Trev Nascimento MD      furosemide  40 mg Oral Daily Sarkis Edmondson MD      guaiFENesin  600 mg Oral Q12H [facility] Lexx G DO Gisell      heparin (porcine)  5,000 Units Subcutaneous [facility] Isaak Ornelas MD      hydrALAZINE  10 mg Oral [facility] Richar Vaughan PA-C      isosorbide dinitrate  10 mg Oral Q8H [facility] Richar Vaughan PA-C      metoprolol succinate  50 mg Oral Q12H Candice Ambrosio MD          Today, Patient Was Seen By: Becky Butts MD    ** Please Note: This note has been constructed using a voice recognition system   **

## 2022-04-05 NOTE — PROGRESS NOTES
Heart Failure/Pulmonary Hypertension Progress Note   Rex Poon 50 y o  male MRN: [id number]  Unit/Bed#: CW2 211-01 Encounter: [id number]    Hospital Course/Assessment  56yo [place] man with HTN who presented with typical chest pain and SOB for 1 week found to have new HFrEF (30-35%) with RWMA and mod/sev MR and mod TR  LHC was normal      Subjective:   Underwent cardiac MR yesterday  In my read appears like some intramyocardial fibrosis (?)  Will discuss with advanced imaging  Discussed with patient about LifeVest and no insurance so can cover for 1 month but has to get insurance in that time period  He is open to wearing one  Final MRI showed EF 34% with idiopathic NICM    Cardiac Imaging  - LHC (4/4/22): widely patent coronary arteries  LVEDP 14  - TTE (4/1/22): EF 30-35%, G2DD, RV normal, mildly dilated LA, mod/sev MR, mod TR with EPASP 51 mmHg  LVIDd 4 5cm    Plan  1  New onset acute HFrEF (34%), idiopathic NICM  - In my read entire anterolateral wall hypokinesis on echo  Focyp-xi-tdj inferior wall akinetic  Cardiac MR with possible intramyocardial fibrosis but will discuss with imaging attending    - Lasix held yesterday and Cr improved  Net negative 425mL (900 in, 1 325L out)  Standing scale 89 9 yesetrday from 91 2kg  No weight today yet  Starting PO Lasix daily today  - Aspirin 81mg, atorvastatin 80mg daily for primary prevention  - /68, 126/88 and HR 80-90s on Metop XL 25mg BID and hydral/isordil 10mg q8H  Increasing Metop XL to 50mg BID  Not much BP room to go up on SVR reducing agents  - Patient is candidate for LifeVest  Filling out paperwork    2  Mod/sev MR    3  Moderate TR    4  HEIKE on CKD: Cr seems to be at baseline of 1 36-1  38  Would benefit from cardiovascular and renal protective perspective with ACE/ARB  Will initiate Losartan 25mg daily and transition off of isordil/hydral    5  VTE ppx: HSC  Transition to Lovenox 40mg daily    Plan  1   Will review cardiac MR with advanced imaging   2  I increased Metop XL 25mg BID to 50mg BID  3  Lasix 40mg PO daily started today  4  Cr seems at baseline  Would like to transition to ACE/ARB/ARNI for mortality benefit over hydral/isordil now that Cr has chi Franklin MD  - PGY-6 Cardiology Fellow    ======================================================  Objective  VS: Blood pressure 108/68, pulse 89, temperature 97 5 °F (36 4 °C), resp  rate 18, height 6' (1 829 m), weight 89 9 kg (198 lb 3 2 oz), SpO2 95 %  Gen: well appearing  Psych: AOx3  Skin: intact  Cardiac: S1, S2, regular rate, no S3 or S4 appreciated  No murmurs  +2 PT, radial pulses  No peripheral edema No carotid bruits  Resp: CTABL  No crackles  MSK: 5/5 strength throughout muscle groups  Neuro: CN grossly intact  Sensory to light touch, pain, proprioception intact BL LE, UE  LN: no cervical LAD  Rheum: no joint deformities in UE or LE  ======================================================  TREADMILL STRESS  No results found for this or any previous visit      ----------------------------------------------------------------------------------------------  NUCLEAR STRESS TEST: No results found for this or any previous visit  No results found for this or any previous visit       --------------------------------------------------------------------------------  CATH:  No results found for this or any previous visit     --------------------------------------------------------------------------------  ECHO:   No results found for this or any previous visit  No results found for this or any previous visit     --------------------------------------------------------------------------------  HOLTER  No results found for this or any previous visit     --------------------------------------------------------------------------------  CAROTIDS  No results found for this or any previous visit         [unfilled]   =====================================================    Active Meds    Current Facility-Administered Medications:     acetaminophen (TYLENOL) tablet 650 mg, 650 mg, Oral, Q6H PRN, Lexx G Sandraayatonny, DO, 325 mg at 04/03/22 1048    aspirin (ECOTRIN LOW STRENGTH) EC tablet 81 mg, 81 mg, Oral, Daily, Diane Moody PA-C    atorvastatin (LIPITOR) tablet 40 mg, 40 mg, Oral, Daily With Tyesha Coleman MD, 40 mg at 04/04/22 1542    Diclofenac Sodium (VOLTAREN) 1 % topical gel 2 g, 2 g, Topical, 4x Daily, Orlando Lechuga MD, 2 g at 04/04/22 2250    furosemide (LASIX) tablet 40 mg, 40 mg, Oral, Daily, Mary Watson MD    [facility]) 12 hr tablet 600 mg, 600 mg, Oral, Q12H [facility], Lexx G DO Gisell, 600 mg at 04/04/22 2251    heparin (porcine) subcutaneous injection 5,000 Units, 5,000 Units, Subcutaneous, Q8H [facility], Ashley Reardon MD, 5,000 Units at 04/05/22 0546    hydrALAZINE (APRESOLINE) tablet 10 mg, 10 mg, Oral, Q8H [facility], Mary Sharpe PA-C, 10 mg at 04/05/22 0546    isosorbide dinitrate (ISORDIL) tablet 10 mg, 10 mg, Oral, Q8H [facility], Mary Sharpe PA-C, 10 mg at 04/05/22 0546    metoprolol succinate (TOPROL-XL) 24 hr tablet 50 mg, 50 mg, Oral, Q12H [facility], Mary Watson MD    Labs & Results  No results found for: CKTOTAL, CKMB, CKMBINDEX, TROPONINI  Lab Results   Component Value Date    CALCIUM 8 6 04/05/2022    K 4 3 04/05/2022    CO2 26 04/05/2022     04/05/2022    BUN 22 04/05/2022    CREATININE 1 38 (H) 04/05/2022     Lab Results   Component Value Date    WBC 6 71 04/05/2022    HGB 15 1 04/05/2022    HCT 47 8 04/05/2022    MCV 94 04/05/2022     04/05/2022         No results found for: CHOL  Lab Results   Component Value Date    HDL 44 03/31/2022     Lab Results   Component Value Date    LDLCALC 47 03/31/2022     Lab Results   Component Value Date    TRIG 205 (H) 03/31/2022     Lab Results   Component Value Date    ALT 33 04/04/2022    AST 24 04/04/2022

## 2022-04-05 NOTE — ASSESSMENT & PLAN NOTE
1 day of acute onset left sided chest pain radiating to the back  Not related to exertion  Exacerbated by cough  EKG showing sinus tachy with nonspecific ST changes  NT proBNP 3237  D dimer 2 55  Trop 25 --> 19  CTA dissection protocol showing only left pleural effusion without aneurysm or PE  Low suspicion for ACS given already downtrending trop  Concern is for acute heart failure (1+ BLE edema, JVD, elevated proBNP, left pleural effusion, SOB) +/- PE  Concern for PE given acuity of pain and constellation of symptoms (tachycardia, cough, chest pain, SOB, LLE swelling and pain) and labs (elevated D dimer)  Discussed with on call radiologist that read CTA dissection and he explained that though the study done was not a CTA PE, it would only miss a very small PE  TTE revealed LVEF 30-35%, moderate to severe MR  Patient is likely to acute CHF  CHACHA, RF, HIV negative  HbA1C WNL, Iron panel unremarkable, TSH within normal range  LHC negative for CAD  Cardiac MRI revealed Mildly dilated left ventricle with moderately reduced left ventricular systolic function (LVEF 82%)  No evidence of myocardial scarring, inflammation, or infiltrative disease  These findings are most suggestive of an idiopathic non-ischemic cardiomyopathy   Etiology is unclear  Inpatient consult to Cardiology and input appreciated  Appears euvolemic  · Cardiology consulted, appreciated  · Monitor I/O, daily weight  · Transition to lasix 40 mg po qd  · Increased to metoprolol to 50 mg p o  B i d   · Continue hydralazine 10 mg p o  Q 8h  · Continue atorvastatin 40 mg p o   Daily  · Will change patient to ACEI/ARB per cardiology  · Pending insurance application, patient needs LifeVest on discharge

## 2022-04-05 NOTE — CASE MANAGEMENT
Case Management Discharge Planning Note    Patient name Denver He  Location 2 /CW2 - MRN [id number]  : 1970 Date 2022       Current Admission Date: 3/31/2022  Current Admission Diagnosis:Acute CHF (congestive heart failure) [facility])   Patient Active Problem List    Diagnosis Date Noted    Cough 2022    Acute CHF (congestive heart failure) ([address] ) 2022    Hypertension 2022    Left leg pain 2022    Elevated serum creatinine 2022      LOS (days): 3  Geometric Mean LOS (GMLOS) (days):   Days to GMLOS:     OBJECTIVE:  Risk of Unplanned Readmission Score: 9         Current admission status: Inpatient   Preferred Pharmacy:   [address] [place] [address]  Phone: [phone] Fax: [phone]    Primary Care Provider: No primary care provider on file  Primary Insurance: PARVIN CABALLERO PENDING  Secondary Insurance:     DISCHARGE DETAILS:    Other Referral/Resources/Interventions Provided:  Interventions: LifeVest  Referral Comments: CM advised patient will need a [employer] Life Vest ordered upon discharge  [employer] form received and all documentation faxed to supplier  Lease form completed by CM Manager CESILIA (3,500 dollars)  YESSICA informed by Flora Penn  @ [employer] that pt will be fitted tomorrow AM  Pt may discharge thereafter

## 2022-04-06 VITALS
RESPIRATION RATE: 18 BRPM | DIASTOLIC BLOOD PRESSURE: 71 MMHG | SYSTOLIC BLOOD PRESSURE: 112 MMHG | BODY MASS INDEX: 26.83 KG/M2 | HEART RATE: 89 BPM | TEMPERATURE: 98 F | OXYGEN SATURATION: 96 % | WEIGHT: 198.1 LBS | HEIGHT: 72 IN

## 2022-04-06 LAB
ANION GAP SERPL CALCULATED.3IONS-SCNC: 6 MMOL/L (ref 4–13)
BUN SERPL-MCNC: 22 MG/DL (ref 5–25)
CALCIUM SERPL-MCNC: 9 MG/DL (ref 8.3–10.1)
CHLORIDE SERPL-SCNC: 108 MMOL/L (ref 100–108)
CO2 SERPL-SCNC: 24 MMOL/L (ref 21–32)
CREAT SERPL-MCNC: 1.23 MG/DL (ref 0.6–1.3)
ERYTHROCYTE [DISTWIDTH] IN BLOOD BY AUTOMATED COUNT: 13.8 % (ref 11.6–15.1)
GFR SERPL CREATININE-BSD FRML MDRD: 68 ML/MIN/1.73SQ M
GLUCOSE SERPL-MCNC: 106 MG/DL (ref 65–140)
HCT VFR BLD AUTO: 48.9 % (ref 36.5–49.3)
HGB BLD-MCNC: 15.4 G/DL (ref 12–17)
MCH RBC QN AUTO: 29.7 PG (ref 26.8–34.3)
MCHC RBC AUTO-ENTMCNC: 31.5 G/DL (ref 31.4–37.4)
MCV RBC AUTO: 94 FL (ref 82–98)
PLATELET # BLD AUTO: 238 THOUSANDS/UL (ref 149–390)
PMV BLD AUTO: 11 FL (ref 8.9–12.7)
POTASSIUM SERPL-SCNC: 4.2 MMOL/L (ref 3.5–5.3)
RBC # BLD AUTO: 5.19 MILLION/UL (ref 3.88–5.62)
SODIUM SERPL-SCNC: 138 MMOL/L (ref 136–145)
WBC # BLD AUTO: 6.15 THOUSAND/UL (ref 4.31–10.16)

## 2022-04-06 PROCEDURE — 85027 COMPLETE CBC AUTOMATED: CPT | Performed by: INTERNAL MEDICINE

## 2022-04-06 PROCEDURE — 99238 HOSP IP/OBS DSCHRG MGMT 30/<: CPT | Performed by: INTERNAL MEDICINE

## 2022-04-06 PROCEDURE — 80048 BASIC METABOLIC PNL TOTAL CA: CPT | Performed by: INTERNAL MEDICINE

## 2022-04-06 PROCEDURE — 99231 SBSQ HOSP IP/OBS SF/LOW 25: CPT | Performed by: INTERNAL MEDICINE

## 2022-04-06 RX ORDER — FUROSEMIDE 40 MG/1
40 TABLET ORAL DAILY
Qty: 30 TABLET | Refills: 0 | Status: SHIPPED | OUTPATIENT
Start: 2022-04-07 | End: 2022-04-27

## 2022-04-06 RX ORDER — LOSARTAN POTASSIUM 25 MG/1
25 TABLET ORAL DAILY
Qty: 30 TABLET | Refills: 0 | Status: SHIPPED | OUTPATIENT
Start: 2022-04-07 | End: 2022-04-27 | Stop reason: SDUPTHER

## 2022-04-06 RX ORDER — METOPROLOL SUCCINATE 50 MG/1
50 TABLET, EXTENDED RELEASE ORAL EVERY 12 HOURS SCHEDULED
Qty: 60 TABLET | Refills: 0 | Status: SHIPPED | OUTPATIENT
Start: 2022-04-06 | End: 2022-04-27 | Stop reason: SDUPTHER

## 2022-04-06 RX ORDER — ATORVASTATIN CALCIUM 40 MG/1
40 TABLET, FILM COATED ORAL
Qty: 30 TABLET | Refills: 0 | Status: SHIPPED | OUTPATIENT
Start: 2022-04-06 | End: 2022-04-27 | Stop reason: SDUPTHER

## 2022-04-06 RX ORDER — ASPIRIN 81 MG/1
81 TABLET ORAL DAILY
Qty: 30 TABLET | Refills: 0 | Status: SHIPPED | OUTPATIENT
Start: 2022-04-06 | End: 2022-05-19

## 2022-04-06 RX ADMIN — GUAIFENESIN 600 MG: 600 TABLET, EXTENDED RELEASE ORAL at 08:49

## 2022-04-06 RX ADMIN — LOSARTAN POTASSIUM 25 MG: 25 TABLET, FILM COATED ORAL at 08:49

## 2022-04-06 RX ADMIN — HEPARIN SODIUM 5000 UNITS: 5000 INJECTION INTRAVENOUS; SUBCUTANEOUS at 06:41

## 2022-04-06 RX ADMIN — ASPIRIN 81 MG: 81 TABLET, COATED ORAL at 08:49

## 2022-04-06 RX ADMIN — FUROSEMIDE 40 MG: 40 TABLET ORAL at 08:49

## 2022-04-06 RX ADMIN — METOPROLOL SUCCINATE 50 MG: 50 TABLET, EXTENDED RELEASE ORAL at 08:49

## 2022-04-06 NOTE — DISCHARGE SUMMARY
[facility]  Discharge- Read [place] 1970, 50 y o  male MRN: [id number]  Unit/Bed#: CW2 211-01 Encounter: [id number]  Primary Care Provider: No primary care provider on file  Date and time admitted to hospital: 3/31/2022 11:38 PM    Cough  Assessment & Plan  Worsening cough since yesterday  Productive with yellow clear sputum  · Check stat chest x-ray  · Mucinex ordered  · Continue to monitor  · Resolved    Elevated serum creatinine  Assessment & Plan  Cr 1 36  Unclear baseline  Suspect in the setting of poor intravascular volume 2/2 volume overload vs chronic pathology  Patient received contrast for CTA and NSAID for pain in the ED  Creatinine improved to 1 2  · Continue Lasix 40 mg p o  Daily  Trend creatinine  Avoid nephrotoxins  Trend I/Os, daily weights  Avoid hypotension      Left leg pain  Assessment & Plan  Left calf pain and swelling > R  Positive Enrico's  Elevated D dimer  Venous duplex of lower extremity revealed no DVT  · Tylenol p r n  for pain  · DVT ppx    Hypertension  Assessment & Plan  Patient reports that he is on combination BP med at home but is unsure the name  May be lisinopril-HCTZ? Blood pressure currently controlled  · Continue Lasix and metoprolol  · Continue Losartan 25 mg po qd  · Continue to monitor BP  · Outpatient follow up with cardiology    * Acute CHF (congestive heart failure) ([facility] [address] )  Assessment & Plan  1 day of acute onset left sided chest pain radiating to the back  Not related to exertion  Exacerbated by cough  EKG showing sinus tachy with nonspecific ST changes  NT proBNP 3237  D dimer 2 55  Trop 25 --> 19  CTA dissection protocol showing only left pleural effusion without aneurysm or PE  Low suspicion for ACS given already downtrending trop  Concern is for acute heart failure (1+ BLE edema, JVD, elevated proBNP, left pleural effusion, SOB) +/- PE   Concern for PE given acuity of pain and constellation of symptoms (tachycardia, cough, chest pain, SOB, LLE swelling and pain) and labs (elevated D dimer)  Discussed with on call radiologist that read CTA dissection and he explained that though the study done was not a CTA PE, it would only miss a very small PE  TTE revealed LVEF 30-35%, moderate to severe MR  Patient is likely to acute CHF  CHACHA, RF, HIV negative  HbA1C WNL, Iron panel unremarkable, TSH within normal range  LHC negative for CAD  Cardiac MRI revealed Mildly dilated left ventricle with moderately reduced left ventricular systolic function (LVEF 33%)  No evidence of myocardial scarring, inflammation, or infiltrative disease  These findings are most suggestive of an idiopathic non-ischemic cardiomyopathy   Etiology is unclear  Inpatient consult to Cardiology and input appreciated  Appears euvolemic  · Cardiology consulted, appreciated  · Monitor I/O, daily weight  · Continue lasix 40 mg po qd  · Continue metoprolol to 50 mg p o  B i d   · Continue atorvastatin 40 mg p o  Daily  · Transition to losartan 25 mg p qd yesterday  Cr stable   ·  LifeVest on discharge  · Outpatient follow up with cardiology          Discharging Resident Physician: Joe Diallo MD  Attending: Peg King MD  PCP: No primary care provider on file  Admission Date: 3/31/2022  Discharge Date: 04/06/22    Disposition:     Home    Reason for Admission:  Chest pain and shortness of breath    Consultations During Hospital Stay:  · Cardiology    Procedures Performed:     · Cardiac catheterization    Significant Findings / Test Results:     MRI cardiac  w wo contrast   Final Result by Nina Garcia MD (04/05 8691)   Impression:   1  Mildly dilated left ventricle with moderately reduced left ventricular systolic function  2  Normal right ventricular size and systolic function  3  No significant valvular abnormalities  4  No evidence of myocardial scarring, inflammation, or infiltrative disease    These findings are most suggestive of an idiopathic non-ischemic cardiomyopathy  Workstation performed: [id number]         XR chest portable   Final Result by Cassie Good MD (04/03 1044)      No acute cardiopulmonary disease  Workstation performed: [id number]         VAS lower limb venous duplex study, complete bilateral   Final Result by Laurita Anand MD (04/01 6274)      CTA dissection protocol chest abdomen pelvis w wo contrast   Final Result by Deon Bob MD (04/01 0140)      No evidence of aortic aneurysm or dissection  Small left pleural effusion  No evidence of acute pathology throughout the abdomen or pelvis  Workstation performed: [id number]         ·   Cardiac catheterization revealed widely potent coronary arteries    TTE:  LVEF 30-35%  Systolic function is severely reduced  There is severe global hypokinesis with regional variation  Moderate to severe mitral valve regurgitation    Cardiac MRI: Mildly dilated left ventricle with moderately reduced left ventricular systolic function  LVEF 34%  No evidence of myocardial scarring, inflammation, or infiltrative disease  These findings are most suggestive of an idiopathic non-ischemic cardiomyopathy    Venous duplex of lower extremities:  Negative for DVT    Incidental Findings:   · None    Test Results Pending at Discharge (will require follow up): · None     Outpatient Tests Requested:  · BMP    Complications:  None    Hospital Course: Chay Gallego is a 50 y o  male patient who originally presented to the hospital on 3/31/2022 due to shortness of breath and chest pain  Troponin was negative on admission  Echo reviewed LVEF 35 %, moderate to severe MR  Patient was found to have acute CHF  Cardiology was consulted and was given IV diuretics  Cardiac catheterization was performed and reviewed negative for CAD  SPEP and UPEP were unremarkable  HIV was negative  Iron profile was okay    Cardiac MRI revealed No evidence of myocardial scarring, inflammation, or infiltrative disease  These findings are most suggestive of an idiopathic non-ischemic cardiomyopathy, other patient has a remote history of alcohol use  Currently patient seems euvolemic  LifeVest has been fit  Patient is stable for discharge  Advised patient to outpatient follow-up with PCP and Cardiology  Advised patient to get insurance  Will discharge patient on aspirin, atorvastatin, metoprolol succinate, losartan and Lasix 40 mg p o  Daily  Condition at Discharge: good     Discharge Day Visit / Exam:     Subjective:  Patient feels okay, no acute complaints  Vitals: Blood Pressure: 112/71 (04/06/22 0842)  Pulse: 89 (04/06/22 0842)  Temperature: 98 °F (36 7 °C) (04/06/22 0842)  Temp Source: Oral (04/03/22 2145)  Respirations: 18 (04/05/22 2340)  Height: 6' (182 9 cm) (04/01/22 0715)  Weight - Scale: 89 9 kg (198 lb 1 6 oz) (04/06/22 0600)  SpO2: 96 % (04/06/22 0842)  Exam:   Physical Exam  Constitutional:       General: He is not in acute distress  Appearance: He is well-developed  He is not diaphoretic  HENT:      Head: Normocephalic  Mouth/Throat:      Pharynx: No oropharyngeal exudate  Eyes:      Pupils: Pupils are equal, round, and reactive to light  Cardiovascular:      Rate and Rhythm: Normal rate and regular rhythm  Heart sounds: No murmur heard  Pulmonary:      Effort: Pulmonary effort is normal  No respiratory distress  Breath sounds: No wheezing or rales  Abdominal:      General: Bowel sounds are normal       Palpations: Abdomen is soft  Tenderness: There is no abdominal tenderness  Musculoskeletal:         General: No tenderness  Normal range of motion  Cervical back: Normal range of motion  Skin:     General: Skin is warm  Neurological:      Mental Status: He is alert and oriented to person, place, and time           Discussion with Family:  With patient    Discharge instructions/Information to patient and family:   See after visit summary for information provided to patient and family  Provisions for Follow-Up Care:  See after visit summary for information related to follow-up care and any pertinent home health orders  Planned Readmission:  No     Discharge Medications:  See after visit summary for reconciled discharge medications provided to patient and family        ** Please Note: This note has been constructed using a voice recognition system **

## 2022-04-06 NOTE — ASSESSMENT & PLAN NOTE
Patient reports that he is on combination BP med at home but is unsure the name  May be lisinopril-HCTZ? Blood pressure currently controlled    · Continue Lasix and metoprolol  · Continue Losartan 25 mg po qd  · Continue to monitor BP  · Outpatient follow up with cardiology

## 2022-04-06 NOTE — ASSESSMENT & PLAN NOTE
Cr 1 36  Unclear baseline  Suspect in the setting of poor intravascular volume 2/2 volume overload vs chronic pathology  Patient received contrast for CTA and NSAID for pain in the ED  Creatinine improved to 1 2  · Continue Lasix 40 mg p o   Daily  Trend creatinine  Avoid nephrotoxins  Trend I/Os, daily weights  Avoid hypotension

## 2022-04-06 NOTE — PROGRESS NOTES
Heart Failure/Pulmonary Hypertension Progress Note   Germania Baldwin 50 y o  male MRN: [id number]  Unit/Bed#: CW2 211-01 Encounter: [id number]    Hospital Course/Assessment  54yo [place] man with HTN who presented with typical chest pain and SOB for 1 week found to have new HFrEF (30-35%) with RWMA and mod/sev MR and mod TR  LHC was normal  CMR showing idiopathic NICM    Subjective: Will get LifeVest today  Has for 1 month and during time period reiterated need to obtain insurance to allow for affordable cost following first month  Cardiac Imaging  - CMR (4/4/22): LVEDd 61mm, LVESd 54mm, EF 35%, aortic root 26mm, LA 50mm, no evidence of myocardial scarring, inflammation, or infiltrative disease  Findings suggestive of idiopathic NICM  - LHC (4/4/22): widely patent coronary arteries  LVEDP 14  - TTE (4/1/22): EF 30-35%, G2DD, RV normal, mildly dilated LA, mod/sev MR, mod TR with EPASP 51 mmHg  LVIDd 4 5cm    Plan  1  New onset acute HFrEF (34%), idiopathic NICM  - LifeVest then can DC  - Cr stable and best since admission at 1 23  - Continue Lasix 40mg daily, aspirin 81mg, atorvastatin 40mg, losartan 25mg Metop XL 50mg BID  - HR likely can tolerate further increasing dose of metoprolol  Can continue to slowly titrate outpatient  - Please ensure patient has meds to bed before discharge  1 week BMP  Will make follow up with cardiology    2  Mod/sev MR    3  Moderate TR    4  HEIKE on CKD, improving    5  VTE ppx: HSC  Transition to Lovenox 40mg daily    Plan  1  Meds to bed before discharge if possible  2  LifeVest then can DC  3  Will make follow up with cardiology and 1 week DIAZ Puente MD  - PGY-6 Cardiology Fellow    ======================================================  Objective  VS: Blood pressure 110/71, pulse 83, temperature 97 6 °F (36 4 °C), resp  rate 18, height 6' (1 829 m), weight 89 9 kg (198 lb 1 6 oz), SpO2 97 %    Gen: well appearing  Psych: AOx3  Skin: intact  Cardiac: S1, S2, regular rate, no S3 or S4 appreciated  No murmurs  +2 PT, radial pulses  No peripheral edema No carotid bruits  Resp: CTABL  No crackles  MSK: 5/5 strength throughout muscle groups  Neuro: CN grossly intact  Sensory to light touch, pain, proprioception intact BL LE, UE  LN: no cervical LAD  Rheum: no joint deformities in UE or LE  ======================================================  TREADMILL STRESS  No results found for this or any previous visit      ----------------------------------------------------------------------------------------------  NUCLEAR STRESS TEST: No results found for this or any previous visit  No results found for this or any previous visit       --------------------------------------------------------------------------------  CATH:  No results found for this or any previous visit     --------------------------------------------------------------------------------  ECHO:   No results found for this or any previous visit  No results found for this or any previous visit     --------------------------------------------------------------------------------  HOLTER  No results found for this or any previous visit     --------------------------------------------------------------------------------  CAROTIDS  No results found for this or any previous visit         [unfilled]   =====================================================    Active Meds    Current Facility-Administered Medications:     acetaminophen (TYLENOL) tablet 650 mg, 650 mg, Oral, Q6H PRN, Lexx Jameson DO, 325 mg at 04/03/22 1048    aspirin (ECOTRIN LOW STRENGTH) EC tablet 81 mg, 81 mg, Oral, Daily, Diane Moody PA-C, 81 mg at 04/05/22 0844    atorvastatin (LIPITOR) tablet 40 mg, 40 mg, Oral, Daily With Tyesha Coleman MD, 40 mg at 04/05/22 1744    Diclofenac Sodium (VOLTAREN) 1 % topical gel 2 g, 2 g, Topical, 4x Daily, Orlando Lechuga MD, 2 g at 04/04/22 2250    furosemide (LASIX) tablet 40 mg, 40 mg, Oral, Daily, David Jefferson MD, 40 mg at 04/05/22 0842    guaiFENesin (MUCINEX) 12 hr tablet 600 mg, 600 mg, Oral, Q12H [facility], Lexx MedellintonnyDO, 600 mg at 04/05/22 2151    heparin (porcine) subcutaneous injection 5,000 Units, 5,000 Units, Subcutaneous, Q8H [facility], Alanna Klinefelter, MD, 5,000 Units at 04/06/22 0641    losartan (COZAAR) tablet 25 mg, 25 mg, Oral, Daily, David Jefferson MD, 25 mg at 04/05/22 1240    metoprolol succinate (TOPROL-XL) 24 hr tablet 50 mg, 50 mg, Oral, Q12H [facility], David Jefferson MD, 50 mg at 04/05/22 2152    Labs & Results  No results found for: CKTOTAL, CKMB, CKMBINDEX, TROPONINI  Lab Results   Component Value Date    CALCIUM 9 0 04/06/2022    K 4 2 04/06/2022    CO2 24 04/06/2022     04/06/2022    BUN 22 04/06/2022    CREATININE 1 23 04/06/2022     Lab Results   Component Value Date    WBC 6 15 04/06/2022    HGB 15 4 04/06/2022    HCT 48 9 04/06/2022    MCV 94 04/06/2022     04/06/2022         No results found for: CHOL  Lab Results   Component Value Date    HDL 44 03/31/2022     Lab Results   Component Value Date    LDLCALC 47 03/31/2022     Lab Results   Component Value Date    TRIG 205 (H) 03/31/2022     Lab Results   Component Value Date    ALT 33 04/04/2022    AST 24 04/04/2022

## 2022-04-06 NOTE — CASE MANAGEMENT
Case Management Discharge Planning Note    Patient name Michele Villagomez  Location CW2 /CW2 - MRN [id number]  : 1970 Date 2022       Current Admission Date: 3/31/2022  Current Admission Diagnosis:Acute CHF (congestive heart failure) [facility])   Patient Active Problem List    Diagnosis Date Noted    Cough 2022    Acute CHF (congestive heart failure) ([address] ) 2022    Hypertension 2022    Left leg pain 2022    Elevated serum creatinine 2022      LOS (days): 4  Geometric Mean LOS (GMLOS) (days):   Days to GMLOS:     OBJECTIVE:  Risk of Unplanned Readmission Score: 8         Current admission status: Inpatient   Preferred Pharmacy:   [address] [place] [address], [address] [place] [address]  [address] [place] [address]  Phone: [phone] Fax: [phone]  [facility], [facility] [address] [facility] [address] [facility] [address] [address]  Phone: [phone] Fax: [phone]    Primary Care Provider: No primary care provider on file  Primary Insurance: PARVIN CABALLERO PENDING  Secondary Insurance:     DISCHARGE DETAILS:       Other Referral/Resources/Interventions Provided:  Interventions: LifeVest,Prescription Price Check  Referral Comments: CM advised by [employer] that pt is being fitted for his Life Vest this morning  Per provider, no VNA is needed for Life Vest follow-up  Pt has a PCP via [facility], per provider  [place] is [place] -- Pt aware he must follow-up on application to ensure his Life Vest remains covered  Pt's medications total 48 72 dollars at [address] -- Medications covered via case management to prevent readmission as pt states he is unable to afford them today  Additional Comments: No further CM needs  CM will continue to follow to d/c

## 2022-04-06 NOTE — DISCHARGE INSTR - AVS FIRST PAGE
Please outpatient follow-up with your primary care doctor or use info link phone number to find a new primary care doctor  Please outpatient follow-up with Cardiology  Please stop taking your home meds for high blood pressure    Please start taking the meds prescribed during this hospitalization

## 2022-04-06 NOTE — DISCHARGE INSTRUCTIONS
Insuficiencia cardíaca   LO QUE NECESITA SABER:   La insuficiencia cardíaca es magali condición que no permite que ordoñez corazón se llene o bombee correctamente  No hay suficiente oxígeno en la breezy que llega a stepan órganos y tejidos  Es posible que el líquido no se mueva adecuadamente a través del cuerpo  El líquido se acumula y causa hinchazón y dificultad para respirar  [place] se conoce marilyn insuficiencia cardíaca congestiva  La insuficiencia cardíaca puede comenzar en el ventrículo merissa o derecho  La insuficiencia cardíaca a menudo es causada por daños o lesiones en el corazón  El daño puede ser causado por otros problemas del corazón, diabetes o presión arterial mateo  El daño también puede magan sido causado por magali infección  La insuficiencia cardíaca es magali afección a jen plazo que tiende a empeorar con el transcurso del [place]  Es importante controlar ordoñez grisel para mejorar ordoñez calidad de atif  INSTRUCCIONES SOBRE EL MATEO HOSPITALARIA:   Llame al número de emergencias local (911 en los [place]) si:  · Tiene alguno de los siguientes signos de un ataque cardíaco:      ? Estrujamiento, presión o tensión en ordoñez pecho    ? Usted también podría presentar alguno de los siguientes:     § Malestar o dolor en ordoñez espalda, tyshawn, mandíbula, abdomen, o brazo    § Falta de [employer] o vómitos    § Desvanecimiento o sudor frío repentino      Llame a ordoñez médico si:  · Ordoñez latido cardíaco es acelerado, lento o irregular todo el tiempo  · Usted tiene síntomas de que la insuficiencia cardíaca está empeorando:     ? Falta de Ash Villarreal en reposo o terrence la noche, o que está empeorando de cualquier forma  ? Usted aumenta más de 3 libras (1 4 kg) en un día, o más de lo que ordoñez médico le indica que podría aumentar    ? Inflamación en exceso en stepan piernas o tobillos    ? Dolor o inflamación abdominal    ? Aumento de la tos    ? Pérdida del apetito    ?  Sensación de cansancio todo el tiempo    · Usted se siente desesperado o deprimido, o ha perdido el interés en las cosas que antes disfrutaba  · Usted se siente preocupado o tiene miedo con frecuencia  · Usted tiene preguntas o inquietudes acerca de ordoñez condición o cuidado  Medicamentos:  · Los medicamentos pueden ser necesarios para ayudar a regular ordoñez ritmo cardíaco  Puede que también necesite medicamentos para bajar la presión arterial y para disminuir el exceso de líquidos  · [place] stepan medicamentos marilyn se le haya indicado  Consulte con ordoñez médico si usted génesis que ordoñez medicamento no le está ayudando o si presenta efectos secundarios  Infórmele si es alérgico a algún medicamento  Mantenga magali lista actualizada de los [employer], las vitaminas y los productos herbales que julio c  Incluya los siguientes datos de los medicamentos: cantidad, frecuencia y motivo de administración  Traiga con usted la lista o los envases de las píldoras a stepan citas de seguimiento  Lleve la lista de los medicamentos con usted en kathryn de magali emergencia  Acuda a la rehabilitación cardíaca según le indicaron: La rehabilitación cardíaca es un programa dirigido por especialistas que le ayudan a fortalecer ordoñez corazón de forma romero  El programa incluye ejercicios, relajación, manejo del estrés y nutrición para un corazón saludable  Controle la hinchazón causada por el líquido acumulado:  · Eleve las piernas por encima del nivel de ordoñez corazón  [place] ayuda con el líquido que se acumula en las piernas o los tobillos  Eleve las piernas lo más frecuente posible terrence el día  Coloque stepan piernas sobre almohadas o cobijas para mantenerlas elevadas cómodamente  Trate de no permanecer de pie terrence largos períodos de [employer]  Muévase para mantener la circulación de la breezy  · Limite el consumo de sodio (sal)  Pregunte cuánto sodio puede consumir cada día  Ordoñez médico podría indicarle un límite, marilyn 2,300 miligramos (mg) al día   Ordoñez médico o un dietista pueden mostrarle cómo leer las etiquetas de los alimentos para determinar la cantidad de mg en un alimento  También puede ayudarlo a encontrar maneras de consumir menos sal  Por ejemplo, si claudio la comida mientras cocina, no añada más en la trujillo  · [address] se le haya indicado  Es posible que usted necesite limitar la cantidad de líquidos que julio c en 24 horas  Ordoñez médico le indicará cuánto líquido debe beber y Camila Leap líquidos son los mejores para usted  Podría indicarle que limite los líquidos a 1 5 a 2 litros al día  Derenda Edith con qué frecuencia beber líquidos terrence el día  · Pésese todas las mañanas  Use la misma báscula en el mismo sitio  Etelvina esto después de ir al baño shruthi antes de consumir cualquier alimento o bebida  Use el mismo tipo de ropa cada vez  Anote ordoñez peso y llame a ordoñez médico si tiene un aumento de peso repentino  Calleen Alger y el aumento de peso son signos de acumulación de líquidos  Controle la insuficiencia cardíaca: Ordoñez calidad de atif puede mejorar con el tratamiento y con lo siguiente:  · No fume  La nicotina y otros químicos contenidos en los cigarrillos y cigarros pueden causar daño a stepan pulmones y el corazón  Pida información a ordoñez médico si usted actualmente fuma y necesita ayuda para dejar de fumar  Los cigarrillos electrónicos o el tabaco sin humo igualmente contienen nicotina  Consulte con ordoñez médico antes de [employer]  · No consuma alcohol ni drogas ilegales  El alcohol y las drogas pueden aumentar ordoñez riesgo de hipertensión, diabetes y arteriopatías coronaria  · Consuma alimentos saludables para el corazón  Los alimentos saludables para el corazón incluyen frutas, verduras, carne magra (marilyn res, joan o cerdo) y productos lácteos bajos en grasa  También el pescado, marilyn el salmón y el atún son saludables para el corazón  Otros alimentos saludables para el corazón incluyen nueces, panes integrales, frijoles y frijoles cocidos  Reemplace la mantequilla y la margarina con aceites saludables para el corazón marilyn el aceite de guidry o el aceite de canola  Ordoñez médico o ordoñez dietista pueden ayudarlo a crear planes de comidas saludables  · Controle cualquier afección médica que tenga  Estas incluyen presión arterial narda, diabetes, obesidad, colesterol alto, síndrome metabólico y EPOC  Tendrá menos síntomas si controla estas afecciones de [place]  Siga las recomendaciones de ordoñez médico y realice un seguimiento de forma regular  · Mantenga un peso saludable  Al tener sobrepeso usted corre un mayor riesgo de sufrir de hipertensión, diabetes y [place] enfermedad de las arterias coronarías  Estas condiciones pueden empeorar stepan síntomas  Consulte con ordoñez médico cuánto debería pesar  [place] que lo ayude a crear un plan para bajar de peso si tiene sobrepeso  · Manténgase activo  La actividad puede ayudarlo a prevenir el empeoramiento de los síntomas  La caminata es un tipo de actividad física que ayuda a mantener ordoñez fortaleza y mejorar ordoñez estado de ánimo  La actividad física también ayuda a controlar ordoñez peso corporal  Colabore con ordoñez médico para desarrollar un plan de ejercicios que sea adecuado para usted  · Vaya a que lo vacunen marilyn se le indique  La gripe y la neumonía pueden ser [facility] condición grave para magali persona que tiene insuficiencia cardíaca  Las vacunas lo protegen de estas infecciones  Vacúnese contra la gripe todos los años tan pronto marilyn se recomiende, generalmente en septiembre u octubre  Es posible que también necesite magali vacuna contra la neumonía  Ordoñez médico puede indicarle si necesita otras vacunas, y cuándo aplicárselas  Acuda a magali consulta de control con ordoñez médico dentro de los 7 días o según le hayan indicaron  Es posible que necesite regresar para que le georgie otros exámenes  Puede que necesite atención médica domiciliaria   Un médico controlará stepan signos vitales y ordoñez peso y se asegurará de que los medicamentos están funcionando  Anote stepan preguntas para que se acuerde de hacerlas terrence stepan visitas  Únase a un jose de apoyo: La insuficiencia cardíaca puede ser difícil de manejar  Puede ser de ayuda hablar con otras personas que tienen insuficiencia cardíaca  Usted podría aprender a controlar mejor ordoñez condición o recibir apoyo emocional  Para más información:  · [address]   Phone: [phone]  Web Address: https://[employer]  [address] [facility] [place] 2022 Information is for End User's use only and may not be sold, redistributed or otherwise used for commercial purposes  All illustrations and images included in CareNotes® are the copyrighted property of [employer] [facility]  or [address] [place] [address] es sólo para uso en educación  Ordoñez intención no es darle un consejo médico sobre enfermedades o tratamientos  Colsulte con ordoñez Zoë Kluver farmacéutico antes de seguir cualquier régimen médico para saber si es seguro y efectivo para usted

## 2022-04-06 NOTE — ASSESSMENT & PLAN NOTE
1 day of acute onset left sided chest pain radiating to the back  Not related to exertion  Exacerbated by cough  EKG showing sinus tachy with nonspecific ST changes  NT proBNP 3237  D dimer 2 55  Trop 25 --> 19  CTA dissection protocol showing only left pleural effusion without aneurysm or PE  Low suspicion for ACS given already downtrending trop  Concern is for acute heart failure (1+ BLE edema, JVD, elevated proBNP, left pleural effusion, SOB) +/- PE  Concern for PE given acuity of pain and constellation of symptoms (tachycardia, cough, chest pain, SOB, LLE swelling and pain) and labs (elevated D dimer)  Discussed with on call radiologist that read CTA dissection and he explained that though the study done was not a CTA PE, it would only miss a very small PE  TTE revealed LVEF 30-35%, moderate to severe MR  Patient is likely to acute CHF  CHACHA, RF, HIV negative  HbA1C WNL, Iron panel unremarkable, TSH within normal range  LHC negative for CAD  Cardiac MRI revealed Mildly dilated left ventricle with moderately reduced left ventricular systolic function (LVEF 23%)  No evidence of myocardial scarring, inflammation, or infiltrative disease  These findings are most suggestive of an idiopathic non-ischemic cardiomyopathy   Etiology is unclear  Inpatient consult to Cardiology and input appreciated  Appears euvolemic  · Cardiology consulted, appreciated  · Monitor I/O, daily weight  · Continue lasix 40 mg po qd  · Continue metoprolol to 50 mg p o  B i d   · Continue atorvastatin 40 mg p o  Daily  · Transition to losartan 25 mg p qd yesterday   Cr stable   ·  LifeVest on discharge  · Outpatient follow up with cardiology

## 2022-04-06 NOTE — ASSESSMENT & PLAN NOTE
Worsening cough since yesterday  Productive with yellow clear sputum  · Check stat chest x-ray  · Mucinex ordered  · Continue to monitor    · Resolved

## 2022-04-12 NOTE — PROGRESS NOTES
Advanced Heart Failure / Pulmonary Hypertension Outpatient Progress Note    Yossi Dominguez 50 y o  male   MRN: [id number]  Encounter: [id number]    Assessment:  Patient Active Problem List    Diagnosis Date Noted    Cough 04/03/2022    Acute CHF (congestive heart failure) ([facility] [address] ) 04/01/2022    Hypertension 04/01/2022    Left leg pain 04/01/2022    Elevated serum creatinine 04/01/2022       Today's Plan:   Increase Lasix to 40 mg BID for 5-7 days   To complete blood work this week   Advised to begin daily weights   Work note provided in office today   Plan to repeat limited TTE to reassess LVEF in 07/2022  [employer] application in process  Plan:  Chronic HFrEF; LVEF 30-35%; LVIDd 5 2 cm; NYHA III; ACC/AHA Stage C              Etiology: nonischemic  TTE 04/01/2022: LVEF 30-35%  LVIDd 5 2 cm  Normal RV  Moderate to severe MR  PASP 51 mmHg  LHC 04/04/2022: normal coronaries  LVEDP 14 mmHg  cMRI 04/04/2022: LVEF 34%  LVIDD 6 1 cm  LV CO 6 5L/min  "Normal RV size and systolic function  No evidence of myocardial scarring, inflammation, or infiltrative disease "   Weight of 198 lbs on 04/06 (day of discharge)  Today, weighs 210 lbs  Most recent BMP from 04/06/2022: sodium 138; potassium 4 2; BUN 22; creatinine 1 23; eGFR 68  Neurohormonal Blockade:  --Beta Blocker: metoprolol succinate 50 mg q12 hours  --ARNi / ACEi / ARB: losartan 25 mg daily  --Aldosterone Antagonist: No    --SGLT2 Inhibitor: No    --Diuretic: Lasix 40 mg daily       Sudden Cardiac Death Risk Reduction:  --LVEF 30-35%  Wearing LifeVest   --Plan to repeat limited TTE to reassess LVEF in 07/2022      Electrical Resynchronization:  --Candidacy for BiV device: narrow QRS     Advanced Therapies (if appropriate): Will continue to monitor      Hypertension   BP of 108/68 mmHg in office today  Continue on medications as above       Hyperlipidemia   Most recent lipid panel from 03/31/2022: cholesterol 132; ; HDL 44; calculated LDL 47    10-year ASCVD risk score of 2%  Continue on atorvastatin 40 mg daily  HPI:   Rex Poon is a 74-year-old man with a PMH as above who presents to the office for hospital follow-up  Admitted to [facility] from 03/31 to 04/06/2022 after presenting with SOB and chest discomfort  TTE with newly reduced LVEF of 35%  Started on IV Lasix  LHC completed on 04/04; normal coronaries  BB and ARB added to regimen  Lost 3 lbs and net negative 1 7 L this admission  04/13/2022: Patient presents for hospital follow-up  Sana Gonsales (IK#[id number]) assisted with interpretation during entire encounter  Patient up 12 lbs today (when compared to hospital discharge weight)  Does endorse MELENDREZ and worsening LE swelling since discharge  Denies PND and orthopnea  Has been taking medications as prescribed; wife assisting with organizing medications  Has not been completing daily weights; does have scale at home  Drinking about 40-50 oz water daily  Wearing LifeVest today; denies any alarms or shocks  In process of obtaining insurance  Was told by employer that he will not be cleared to [facility] with large machinery while wearing LifeVest  Patient 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  Reports mild to moderate left neck pain; states he slept in odd position recently  Pain improved with Tyenol/Advil  Past Medical History:   Diagnosis Date    Chronic HFrEF (heart failure with reduced ejection fraction) ([facility] [address] ) 04/01/2022       Review of Systems   Constitutional: Negative for activity change, appetite change, fatigue and fever  HENT: Negative for congestion, postnasal drip, rhinorrhea, sneezing, sore throat and trouble swallowing  Eyes: Negative  Respiratory: Positive for shortness of breath (with heavier exertion)  Negative for cough and chest tightness  Cardiovascular: Positive for leg swelling   Negative for chest pain and palpitations  Gastrointestinal: Negative for abdominal pain, diarrhea, nausea and vomiting  Endocrine: Negative  Genitourinary: Negative for decreased urine volume, difficulty urinating, dysuria and urgency  Musculoskeletal: Negative  Skin: Negative  Allergic/Immunologic: Negative  Neurological: Negative for dizziness, tremors, syncope, weakness, light-headedness and headaches  Hematological: Negative  Psychiatric/Behavioral: Negative for agitation, confusion and sleep disturbance  The patient is not nervous/anxious  14-point ROS completed and negative except as stated above and/or in the HPI      No Known Allergies    Current Outpatient Medications:     aspirin (ECOTRIN LOW STRENGTH) 81 mg EC tablet, Take 1 tablet (81 mg total) by mouth daily, Disp: 30 tablet, Rfl: 0    atorvastatin (LIPITOR) 40 mg tablet, Take 1 tablet (40 mg total) by mouth daily with dinner, Disp: 30 tablet, Rfl: 0    furosemide (LASIX) 40 mg tablet, Take 1 tablet (40 mg total) by mouth daily, Disp: 30 tablet, Rfl: 0    losartan (COZAAR) 25 mg tablet, Take 1 tablet (25 mg total) by mouth daily, Disp: 30 tablet, Rfl: 0    metoprolol succinate (TOPROL-XL) 50 mg 24 hr tablet, Take 1 tablet (50 mg total) by mouth every 12 (twelve) hours, Disp: 60 tablet, Rfl: 0    Social History     Socioeconomic History    Marital status: Single     Spouse name: Not on file    Number of children: Not on file    Years of education: Not on file    Highest education level: Not on file   Occupational History    Not on file   Tobacco Use    Smoking status: Never Smoker    Smokeless tobacco: Never Used   Vaping Use    Vaping Use: Never used   Substance and Sexual Activity    Alcohol use: Never    Drug use: Never    Sexual activity: Not on file   Other Topics Concern    Not on file   Social History Narrative    Not on file     Social Determinants of Health     Financial Resource Strain: Not on file   Food Insecurity: No Food Insecurity    Worried About Running Out of Food in the Last Year: Never true    Liang of Food in the Last Year: Never true   Transportation Needs: No Transportation Needs    Lack of Transportation (Medical): No    Lack of Transportation (Non-Medical): No   Physical Activity: Not on file   Stress: Not on file   Social Connections: Not on file   Intimate Partner Violence: Not on file   Housing Stability: Low Risk     Unable to Pay for Housing in the Last Year: No    Number of Places Lived in the Last Year: 1    Unstable Housing in the Last Year: No     History reviewed  No pertinent family history  Vitals:   Blood pressure 108/68, pulse 76, resp  rate 20, height 6' (1 829 m), weight 95 6 kg (210 lb 11 2 oz), SpO2 95 %  Body mass index is 28 58 kg/m²  Wt Readings from Last 10 Encounters:   04/13/22 95 6 kg (210 lb 11 2 oz)   04/06/22 89 9 kg (198 lb 1 6 oz)     Vitals:    04/13/22 1040   BP: 108/68   BP Location: Right arm   Patient Position: Sitting   Cuff Size: Large   Pulse: 76   Resp: 20   SpO2: 95%   Weight: 95 6 kg (210 lb 11 2 oz)   Height: 6' (1 829 m)       Physical Exam  Vitals reviewed  Constitutional:       General: He is awake  He is not in acute distress  Appearance: Normal appearance  He is well-developed and overweight  HENT:      Head: Normocephalic  Nose: Nose normal       Mouth/Throat:      Mouth: Mucous membranes are moist    Eyes:      General: No scleral icterus  Conjunctiva/sclera: Conjunctivae normal    Neck:      Vascular: JVD present  Trachea: No tracheal deviation  Cardiovascular:      Rate and Rhythm: Normal rate and regular rhythm  No extrasystoles are present  Pulses: Normal pulses  Heart sounds: Murmur heard  Pulmonary:      Effort: Pulmonary effort is normal  No tachypnea, bradypnea or respiratory distress  Breath sounds: Normal air entry  No decreased air movement  No decreased breath sounds, wheezing or rales     Abdominal: General: Bowel sounds are normal  There is distension  Palpations: Abdomen is soft  Tenderness: There is no abdominal tenderness  Musculoskeletal:      Cervical back: Neck supple  Right lower le+ Pitting Edema present  Left lower le+ Pitting Edema present  Skin:     General: Skin is warm and dry  Coloration: Skin is not jaundiced or pale  Neurological:      General: No focal deficit present  Mental Status: He is alert and oriented to person, place, and time  Psychiatric:         Attention and Perception: Attention normal          Mood and Affect: Mood and affect normal          Speech: Speech normal          Behavior: Behavior normal  Behavior is cooperative  Thought Content:  Thought content normal      Labs & Results:  Lab Results   Component Value Date    WBC 6 15 2022    HGB 15 4 2022    HCT 48 9 2022    MCV 94 2022     2022     Lab Results   Component Value Date    SODIUM 138 2022    K 4 2 2022     2022    CO2 24 2022    BUN 22 2022    CREATININE 1 23 2022    GLUC 106 2022    CALCIUM 9 0 2022     No results found for: INR, PROTIME     Lab Results   Component Value Date    NTBNP 3,237 (H) 2022      Oly Coates PA-C

## 2022-04-13 ENCOUNTER — OFFICE VISIT (OUTPATIENT)
Dept: CARDIOLOGY CLINIC | Facility: CLINIC | Age: 52
End: 2022-04-13

## 2022-04-13 VITALS
OXYGEN SATURATION: 95 % | WEIGHT: 210.7 LBS | HEIGHT: 72 IN | BODY MASS INDEX: 28.54 KG/M2 | RESPIRATION RATE: 20 BRPM | DIASTOLIC BLOOD PRESSURE: 68 MMHG | SYSTOLIC BLOOD PRESSURE: 108 MMHG | HEART RATE: 76 BPM

## 2022-04-13 DIAGNOSIS — I10 HYPERTENSION, UNSPECIFIED TYPE: ICD-10-CM

## 2022-04-13 DIAGNOSIS — I50.22 CHRONIC HFREF (HEART FAILURE WITH REDUCED EJECTION FRACTION) (HCC): Primary | ICD-10-CM

## 2022-04-13 DIAGNOSIS — Z09 HOSPITAL DISCHARGE FOLLOW-UP: ICD-10-CM

## 2022-04-13 DIAGNOSIS — E78.2 MIXED HYPERLIPIDEMIA: ICD-10-CM

## 2022-04-13 DIAGNOSIS — I42.8 NONISCHEMIC CARDIOMYOPATHY (HCC): ICD-10-CM

## 2022-04-13 PROCEDURE — 99214 OFFICE O/P EST MOD 30 MIN: CPT | Performed by: INTERNAL MEDICINE

## 2022-04-13 NOTE — PATIENT INSTRUCTIONS
Análisis de breezy completo en 1-2 semanas  No es necesario ayunar para esto  Aumente Lasix a 40 mg dos veces al día terrence los próximos 5 a 7 días  Está purvi para la segunda dosis temprano en la tarde, alrededor de las 3 o 4 de la tarde  Después de estos 5 a 7 días, reduzca la cantidad de Lasix a 40 mg diarios  Pésese todos los días y [place] un registro detallado de Filemon Robbins  Comuníquese con el programa de insuficiencia cardíaca al [phone] si aumenta 3 libras terrence la noche o 5 libras en 5 a 7 días  Limite la ingesta diaria de sodio/sal a [phone] mg diarios para evitar la retención de líquidos  Evite los alimentos enlatados, la comida rápida/comida [place] y las jessie procesadas (perritos calientes, embutidos, salchichas, etc )  Limite la ingesta de líquidos a 2000 ml o 2 l (alrededor de 60 a 65 onzas) al día  Lleve la lista completa de medicamentos y el registro de pesos diarios a ordoñez lisette de seguimiento

## 2022-04-13 NOTE — LETTER
April 13, 2022     Patient: Essie Fair  YOB: 1970  Date of Visit: 4/13/2022      To Whom it May Concern:    Essie Fair is under my professional care  Analilia Gilman was seen in my office on 4/13/2022  Analilia Gilman may return to work with limitations   Given cardiac diagnoses and ongoing medication adjustments, patient okay to return to work with limitations and breaks as needed (if and when symptomatic)  If you have any questions or concerns, please don't hesitate to call        Sincerely,          Carmelita Gonsales PA-C      CC: No Recipients

## 2022-04-25 NOTE — PROGRESS NOTES
Heart Failure Outpatient Progress Note - Analilia Grove 50 y o  male MRN: [id number]    @ Encounter: [id number]      Assessment/Plan:    Patient Active Problem List    Diagnosis Date Noted    Cough 04/03/2022    Acute CHF (congestive heart failure) ([facility] [address] ) 04/01/2022    Hypertension 04/01/2022    Left leg pain 04/01/2022    Elevated serum creatinine 04/01/2022     Plan:  Acute on chronic HFrEF; LVEF 30-35%; LVIDd 5 2 cm; NYHA III; ACC/AHA Stage C Etiology: nonischemic  History reviewed in detail today  Admits to daily cocaine for many years, quit 2011, daily EtOH use, ie 2 cases of beer and up to 1/5 of rum, quit 1 year ago today  No fam hx of CHF or SCD  Lasix increased to 40 mg BID x 5-7 days at last visit  Still hypervolemic with weight gain  Admits to excessive fluid intake  Did not get lab work due for today  Will continue on BID dosing of Lasix  Not sure if he can afford lab work as he remains uninsured but will check on cost and go if able to afford  Follow up one week for possible IV Lasix if warranted  Needs to reduce fluid intake significantly and monitor Na  Will reach out to our LSW for guidance with insurance/medical assistance issues  Lab Results   Component Value Date    NTBNP 3,237 (H) 03/31/2022      Weight of 198 lbs on 04/06 (day of discharge)  210 lbs, today, 213 lbs     Serology:TSH normal, CHACHA/RF negative, SPEP no monoclonal  bands, iron panel normal, HIV negative, light chains pending             TTE 04/01/2022: LVEF 30-35%  LVIDd 5 2 cm  Normal RV  Moderate to severe MR  PASP 51 mmHg  LHC 04/04/2022: normal coronaries  LVEDP 14 mmHg  cMRI 04/04/2022: LVEF 34%  LVIDD 6 1 cm  LV CO 6 5L/min  "Normal RV size and systolic function  No evidence of myocardial scarring, inflammation, or infiltrative disease "                               Neurohormonal Blockade:  --Beta Blocker: metoprolol succinate 50 mg q12 hours     --ARNi / ACEi / ARB: losartan 25 mg daily    --Aldosterone Antagonist: No    --SGLT2 Inhibitor: No    --Diuretic: Lasix 40 mg twice daily     Sudden Cardiac Death Risk Reduction:  --LVEF 30-35%  Wearing LifeVest   --Plan to repeat limited TTE to reassess LVEF in 07/2022      Electrical Resynchronization:  --Candidacy for BiV device: narrow QRS     Advanced Therapies (if appropriate): Will continue to monitor      Hypertension  Hyperlipidemia Most recent lipid panel from 03/31/2022: cholesterol 132; ; HDL 44; calculated LDL 47  Continue on atorvastatin 40 mg daily       HPI:   Chris Geronimo is a 61-year-old man with a PMH as above who presents to the office for hospital follow-up       Admitted to [facility] from 03/31 to 04/06/2022 after presenting with SOB and chest discomfort  TTE with newly reduced LVEF of 35%  Started on IV Lasix  LHC completed on 04/04; normal coronaries  BB and ARB added to regimen  Lost 3 lbs and net negative 1 7 L this admission       04/13/2022: Patient presents for hospital follow-up  Salud Lazcano (FU#[id number]) assisted with interpretation during entire encounter  Patient up 12 lbs today (when compared to hospital discharge weight)  Does endorse MELENDREZ and worsening LE swelling since discharge  Denies PND and orthopnea  Has been taking medications as prescribed; wife assisting with organizing medications  Has not been completing daily weights; does have scale at home  Drinking about 40-50 oz water daily  Wearing LifeVest today; denies any alarms or shocks  In process of obtaining insurance  Was told by employer that he will not be cleared to [facility] with large machinery while wearing LifeVest  Patient 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  Reports mild to moderate left neck pain; states he slept in odd position recently  Pain improved with Tyenol/Advil  4/27/22  Presents for follow up   assisted with visit   At last appointment, lasix increased from 40 mg daily to BID  Was up 12 lbs  He continues with symptoms of leg swelling, orthopnea, SOB, and weight gain despite increased Lasix dosing  Feels he is urinating a lot but is also consuming well over 70-80 oz of fluid daily  We discussed PMH again and today admits to heavy cocaine and ETOH use in the past  Continues to abstain from both  Tells me he is still uninsured and having difficulty affording things  Worried about co-pays and paying out of pocket for labs  Past Medical History:   Diagnosis Date    Chronic HFrEF (heart failure with reduced ejection fraction) ([facility] [address] ) 04/01/2022       12 point ROS negative other than that stated in HPI    No Known Allergies        Current Outpatient Medications:     aspirin (ECOTRIN LOW STRENGTH) 81 mg EC tablet, Take 1 tablet (81 mg total) by mouth daily, Disp: 30 tablet, Rfl: 0    atorvastatin (LIPITOR) 40 mg tablet, Take 1 tablet (40 mg total) by mouth daily with dinner, Disp: 30 tablet, Rfl: 0    furosemide (LASIX) 40 mg tablet, Take 1 tablet (40 mg total) by mouth daily, Disp: 30 tablet, Rfl: 0    losartan (COZAAR) 25 mg tablet, Take 1 tablet (25 mg total) by mouth daily, Disp: 30 tablet, Rfl: 0    metoprolol succinate (TOPROL-XL) 50 mg 24 hr tablet, Take 1 tablet (50 mg total) by mouth every 12 (twelve) hours, Disp: 60 tablet, Rfl: 0    Social History     Socioeconomic History    Marital status: Single     Spouse name: Not on file    Number of children: Not on file    Years of education: Not on file    Highest education level: Not on file   Occupational History    Not on file   Tobacco Use    Smoking status: Never Smoker    Smokeless tobacco: Never Used   Vaping Use    Vaping Use: Never used   Substance and Sexual Activity    Alcohol use: Never    Drug use: Never    Sexual activity: Not on file   Other Topics Concern    Not on file   Social History Narrative    Not on file     Social Determinants of Health     Financial Resource Strain: Not on file Food Insecurity: No Food Insecurity    Worried About Running Out of Food in the Last Year: Never true    Liang of Food in the Last Year: Never true   Transportation Needs: No Transportation Needs    Lack of Transportation (Medical): No    Lack of Transportation (Non-Medical): No   Physical Activity: Not on file   Stress: Not on file   Social Connections: Not on file   Intimate Partner Violence: Not on file   Housing Stability: Low Risk     Unable to Pay for Housing in the Last Year: No    Number of Places Lived in the Last Year: 1    Unstable Housing in the Last Year: No       No family history on file      Physical Exam:    Vitals: /64 (BP Location: Right arm, Patient Position: Sitting, Cuff Size: Large)   Pulse 73   Ht 6' (1 829 m)   Wt 96 7 kg (213 lb 3 2 oz)   SpO2 96%   BMI 28 92 kg/m²     Wt Readings from Last 3 Encounters:   04/13/22 95 6 kg (210 lb 11 2 oz)   04/06/22 89 9 kg (198 lb 1 6 oz)       GEN: Chay Gallego appears well, alert and oriented x 3, pleasant and cooperative   HEENT: pupils equal, round, and reactive to light; extraocular muscles intact  NECK: supple, no carotid bruits   HEART: regular rhythm, normal S1 and S2, no murmurs, clicks, gallops or rubs, JVP is  up  LUNGS: clear to auscultation bilaterally; no wheezes, rales, or rhonchi   ABDOMEN: normal bowel sounds, firm, no tenderness, + distention  EXTREMITIES: peripheral pulses normal; no clubbing, cyanosis, +1 BLLE edema  NEURO: no focal findings   SKIN: normal without suspicious lesions on exposed skin    Labs & Results:    Chemistry        Component Value Date/Time    K 4 2 04/06/2022 0430     04/06/2022 0430    CO2 24 04/06/2022 0430    BUN 22 04/06/2022 0430    CREATININE 1 23 04/06/2022 0430        Component Value Date/Time    CALCIUM 9 0 04/06/2022 0430    ALKPHOS 135 (H) 04/04/2022 0619    AST 24 04/04/2022 0619    ALT 33 04/04/2022 0619        Lab Results   Component Value Date    WBC 6 15 04/06/2022 HGB 15 4 04/06/2022    HCT 48 9 04/06/2022    MCV 94 04/06/2022     04/06/2022     Lab Results   Component Value Date    NTBNP 3,237 (H) 03/31/2022      Lab Results   Component Value Date    LDLCALC 47 03/31/2022         EKG personally reviewed by TARA Aguero  No results found for this visit on 04/27/22  Counseling / Coordination of Care  Total floor / unit time spent today 40 minutes  Greater than 50% of total time was spent with the patient and / or family counseling and / or coordination of care  A description of the counseling / coordination of care: 20  Thank you for the opportunity to participate in the care of this patient      Chandu Price

## 2022-04-27 ENCOUNTER — TELEPHONE (OUTPATIENT)
Dept: CARDIOLOGY CLINIC | Facility: CLINIC | Age: 52
End: 2022-04-27

## 2022-04-27 ENCOUNTER — OFFICE VISIT (OUTPATIENT)
Dept: CARDIOLOGY CLINIC | Facility: CLINIC | Age: 52
End: 2022-04-27

## 2022-04-27 VITALS
WEIGHT: 213.2 LBS | DIASTOLIC BLOOD PRESSURE: 64 MMHG | HEART RATE: 73 BPM | BODY MASS INDEX: 28.88 KG/M2 | OXYGEN SATURATION: 96 % | HEIGHT: 72 IN | SYSTOLIC BLOOD PRESSURE: 112 MMHG

## 2022-04-27 DIAGNOSIS — I50.9 ACUTE CHF (CONGESTIVE HEART FAILURE) (HCC): ICD-10-CM

## 2022-04-27 PROCEDURE — 99215 OFFICE O/P EST HI 40 MIN: CPT | Performed by: NURSE PRACTITIONER

## 2022-04-27 RX ORDER — FUROSEMIDE 40 MG/1
40 TABLET ORAL 2 TIMES DAILY
Qty: 60 TABLET | Refills: 3 | Status: SHIPPED | OUTPATIENT
Start: 2022-04-27 | End: 2022-05-19

## 2022-04-27 RX ORDER — ATORVASTATIN CALCIUM 40 MG/1
40 TABLET, FILM COATED ORAL
Qty: 30 TABLET | Refills: 3 | Status: SHIPPED | OUTPATIENT
Start: 2022-04-27 | End: 2022-05-27

## 2022-04-27 RX ORDER — LOSARTAN POTASSIUM 25 MG/1
25 TABLET ORAL DAILY
Qty: 30 TABLET | Refills: 3 | Status: SHIPPED | OUTPATIENT
Start: 2022-04-27 | End: 2022-05-27

## 2022-04-27 RX ORDER — METOPROLOL SUCCINATE 50 MG/1
50 TABLET, EXTENDED RELEASE ORAL EVERY 12 HOURS SCHEDULED
Qty: 60 TABLET | Refills: 3 | Status: SHIPPED | OUTPATIENT
Start: 2022-04-27 | End: 2022-05-27

## 2022-04-27 NOTE — PATIENT INSTRUCTIONS
Weight yourself daily  If you gain 3 lbs in one day or 5 lbs in one week, please call the office at [phone] and ask for a nurse or the heart failure nurse  Keep your sodium intake to <2 grams, (2000 mg) per day, and fluids <2 Liters (2000 ml) per day  This is around 6-7, 8 oz glasses of fluid per day    Please get lab work done today  Continue to take Lasix twice daily  Please limit your fluid intake to 64 oz daily  Schedule another appointment for one week

## 2022-04-27 NOTE — TELEPHONE ENCOUNTER
Outpatient HF LCSW received referral from ALESSIA Tavarez that pt was charged $109 for OP CARD appt today and pt said that he cannot afford to get labs  LCSW spoke with Maria Del Rosario Britton at [facility] who reported that pt's PA MA application was submitted on 4/19/22 and they await determination  LCSW spoke with [id number] Counselor and she is mailing pt the magen application in case PA MA does not get approved  She is sending pt the application in both [place] and [address] [place] [address] Officer provided instructions that pt should inform lab that he is PA MA Pending so they do not charge him  LCSW left message for pt to go ahead and get lab work at [facility] but tell them that he is PA MA Pending  Await return call  Updated Dolores Spencer

## 2022-05-05 NOTE — PROGRESS NOTES
Heart Failure Outpatient Progress Note - Zofia Grove 50 y o  male MRN: [id number]    @ Encounter: [id number]      Assessment/Plan:    Patient Active Problem List    Diagnosis Date Noted    Cough 04/03/2022    Acute CHF (congestive heart failure) ([facility] [address] ) 04/01/2022    Hypertension 04/01/2022    Left leg pain 04/01/2022    Elevated serum creatinine 04/01/2022     Plan:  Acute on chronic HFrEF; LVEF 30-35%; LVIDd 5 2 cm; NYHA III; ACC/AHA Stage C Etiology: nonischemic  Admits to daily cocaine for many years, quit 2011, daily EtOH use, ie 2 cases of beer and up to 1/5 of rum, quit 1 year ago today  No fam hx of CHF or SCD  Still on BID Lasix but has not had repeat lab work  Weight is still up  Has JVD and c/o orthopnea    Admits to ongoing excessive fluid intake due to thirst   Will continue on BID dosing of Lasix with further instructions to follow after lab work today  Geovany MELENDREZ MA currently pending per LSW  Geovany Louise Needs to reduce fluid intake significantly and monitor Na  Follow in 2 weeks again for volume check, +/- IV Lasix  Lab Results   Component Value Date    NTBNP 3,237 (H) 03/31/2022      Weight of 198 lbs on 04/06 (day of discharge)  210 lbs, 213, today 212       Serology:TSH normal, CHACHA/RF negative, SPEP no monoclonal  bands, iron panel normal, HIV negative, light chains pending             TTE 04/01/2022: LVEF 30-35%  LVIDd 5 2 cm  Normal RV  Moderate to severe MR  PASP 51 mmHg  LHC 04/04/2022: normal coronaries  LVEDP 14 mmHg  cMRI 04/04/2022: LVEF 34%  LVIDD 6 1 cm  LV CO 6 5L/min  "Normal RV size and systolic function  No evidence of myocardial scarring, inflammation, or infiltrative disease "                               Neurohormonal Blockade:  --Beta Blocker: metoprolol succinate 50 mg q12 hours  --ARNi / ACEi / ARB: losartan 25 mg daily     --Aldosterone Antagonist: No    --SGLT2 Inhibitor: No    --Diuretic: Lasix 40 mg twice daily     Sudden Cardiac Death Risk Reduction:  --LVEF 30-35%  Wearing LifeVest   --Plan to repeat limited TTE to reassess LVEF in 07/2022      Electrical Resynchronization:  --Candidacy for BiV device: narrow QRS     Advanced Therapies (if appropriate): Will continue to monitor      Hypertension  Hyperlipidemia Most recent lipid panel from 03/31/2022: cholesterol 132; ; HDL 44; calculated LDL 47  Continue on atorvastatin 40 mg daily       HPI:   Crystal Pan is a 61-year-old man with a PMH as above who presents to the office for hospital follow-up       Admitted to [facility] from 03/31 to 04/06/2022 after presenting with SOB and chest discomfort  TTE with newly reduced LVEF of 35%  Started on IV Lasix  LHC completed on 04/04; normal coronaries  BB and ARB added to regimen  Lost 3 lbs and net negative 1 7 L this admission       04/13/2022: Patient presents for hospital follow-up  Moy Lucia (#[id number]) assisted with interpretation during entire encounter  Patient up 12 lbs today (when compared to hospital discharge weight)  Does endorse MELENDREZ and worsening LE swelling since discharge  Denies PND and orthopnea  Has been taking medications as prescribed; wife assisting with organizing medications  Has not been completing daily weights; does have scale at home  Drinking about 40-50 oz water daily  Wearing LifeVest today; denies any alarms or shocks  In process of obtaining insurance  Was told by employer that he will not be cleared to [facility] with large machinery while wearing LifeVest  Patient 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  Reports mild to moderate left neck pain; states he slept in odd position recently  Pain improved with Tyenol/Advil  4/27/22  Presents for follow up   assisted with visit  At last appointment, lasix increased from 40 mg daily to BID  Was up 12 lbs   He continues with symptoms of leg swelling, orthopnea, SOB, and weight gain despite increased Lasix dosing  Feels he is urinating a lot but is also consuming well over 70-80 oz of fluid daily  We discussed PMH again and today admits to heavy cocaine and ETOH use in the past  Continues to abstain from both  Tells me he is still uninsured and having difficulty affording things  Worried about co-pays and paying out of pocket for labs  5/6/22  Presents for follow up  Still has not gotten lab work drawn  His PA MA is pending  Continues with orthopnea  Still exceeding fluid intake due to thirst  Our LSW reached out to him after last visit to assist with insurance/MA  This is currently pending approval        Past Medical History:   Diagnosis Date    Chronic HFrEF (heart failure with reduced ejection fraction) ([facility] [address] ) 04/01/2022       12 point ROS negative other than that stated in HPI    No Known Allergies        Current Outpatient Medications:     aspirin (ECOTRIN LOW STRENGTH) 81 mg EC tablet, Take 1 tablet (81 mg total) by mouth daily, Disp: 30 tablet, Rfl: 0    atorvastatin (LIPITOR) 40 mg tablet, Take 1 tablet (40 mg total) by mouth daily with dinner, Disp: 30 tablet, Rfl: 3    furosemide (LASIX) 40 mg tablet, Take 1 tablet (40 mg total) by mouth 2 (two) times a day, Disp: 60 tablet, Rfl: 3    losartan (COZAAR) 25 mg tablet, Take 1 tablet (25 mg total) by mouth daily, Disp: 30 tablet, Rfl: 3    metoprolol succinate (TOPROL-XL) 50 mg 24 hr tablet, Take 1 tablet (50 mg total) by mouth every 12 (twelve) hours, Disp: 60 tablet, Rfl: 3    Social History     Socioeconomic History    Marital status: Single     Spouse name: Not on file    Number of children: Not on file    Years of education: Not on file    Highest education level: Not on file   Occupational History    Not on file   Tobacco Use    Smoking status: Never Smoker    Smokeless tobacco: Never Used   Vaping Use    Vaping Use: Never used   Substance and Sexual Activity    Alcohol use: Never    Drug use: Never    Sexual activity: Not on file   Other Topics Concern    Not on file   Social History Narrative    Not on file     Social Determinants of Health     Financial Resource Strain: Not on file   Food Insecurity: No Food Insecurity    Worried About Running Out of Food in the Last Year: Never true    Liang of Food in the Last Year: Never true   Transportation Needs: No Transportation Needs    Lack of Transportation (Medical): No    Lack of Transportation (Non-Medical): No   Physical Activity: Not on file   Stress: Not on file   Social Connections: Not on file   Intimate Partner Violence: Not on file   Housing Stability: Low Risk     Unable to Pay for Housing in the Last Year: No    Number of Places Lived in the Last Year: 1    Unstable Housing in the Last Year: No       No family history on file      Physical Exam:    Vitals: /68 (BP Location: Right arm, Patient Position: Sitting, Cuff Size: Large)   Pulse 90   Ht 6' (1 829 m)   Wt 96 2 kg (212 lb)   SpO2 94%   BMI 28 75 kg/m²       Wt Readings from Last 3 Encounters:   04/27/22 96 7 kg (213 lb 3 2 oz)   04/13/22 95 6 kg (210 lb 11 2 oz)   04/06/22 89 9 kg (198 lb 1 6 oz)       GEN: Michele Villagomez appears well, alert and oriented x 3, pleasant and cooperative   HEENT: pupils equal, round, and reactive to light; extraocular muscles intact  NECK: supple, no carotid bruits   HEART: regular rhythm, normal S1 and S2, no murmurs, clicks, gallops or rubs, JVP is  up  LUNGS: clear to auscultation bilaterally; no wheezes, rales, or rhonchi   ABDOMEN: normal bowel sounds, firm, no tenderness, + distention  EXTREMITIES: peripheral pulses normal; no clubbing, cyanosis, trace-+1 BLLE edema  NEURO: no focal findings   SKIN: normal without suspicious lesions on exposed skin    Labs & Results:    Chemistry        Component Value Date/Time    K 4 2 04/06/2022 0430     04/06/2022 0430    CO2 24 04/06/2022 0430    BUN 22 04/06/2022 0430    CREATININE 1 23 04/06/2022 0430 Component Value Date/Time    CALCIUM 9 0 04/06/2022 0430    ALKPHOS 135 (H) 04/04/2022 0619    AST 24 04/04/2022 0619    ALT 33 04/04/2022 0619        Lab Results   Component Value Date    WBC 6 15 04/06/2022    HGB 15 4 04/06/2022    HCT 48 9 04/06/2022    MCV 94 04/06/2022     04/06/2022     Lab Results   Component Value Date    NTBNP 3,237 (H) 03/31/2022      Lab Results   Component Value Date    LDLCALC 47 03/31/2022         EKG personally reviewed by TARA White  No results found for this visit on 05/06/22  Counseling / Coordination of Care  Total floor / unit time spent today 40 minutes  Greater than 50% of total time was spent with the patient and / or family counseling and / or coordination of care  A description of the counseling / coordination of care: 20  Thank you for the opportunity to participate in the care of this patient      Sony Rai

## 2022-05-06 ENCOUNTER — OFFICE VISIT (OUTPATIENT)
Dept: CARDIOLOGY CLINIC | Facility: CLINIC | Age: 52
End: 2022-05-06

## 2022-05-06 ENCOUNTER — APPOINTMENT (OUTPATIENT)
Dept: LAB | Facility: CLINIC | Age: 52
End: 2022-05-06

## 2022-05-06 VITALS
SYSTOLIC BLOOD PRESSURE: 118 MMHG | HEIGHT: 72 IN | WEIGHT: 212 LBS | DIASTOLIC BLOOD PRESSURE: 68 MMHG | OXYGEN SATURATION: 94 % | HEART RATE: 90 BPM | BODY MASS INDEX: 28.71 KG/M2

## 2022-05-06 DIAGNOSIS — I42.9 CARDIOMYOPATHY, UNSPECIFIED TYPE (HCC): ICD-10-CM

## 2022-05-06 DIAGNOSIS — I50.20 SYSTOLIC CONGESTIVE HEART FAILURE, UNSPECIFIED HF CHRONICITY (HCC): Primary | ICD-10-CM

## 2022-05-06 DIAGNOSIS — I42.9 CARDIOMYOPATHY, UNSPECIFIED TYPE (HCC): Primary | ICD-10-CM

## 2022-05-06 LAB
ANION GAP SERPL CALCULATED.3IONS-SCNC: 7 MMOL/L (ref 4–13)
BUN SERPL-MCNC: 16 MG/DL (ref 5–25)
CALCIUM SERPL-MCNC: 9.4 MG/DL (ref 8.3–10.1)
CHLORIDE SERPL-SCNC: 107 MMOL/L (ref 100–108)
CO2 SERPL-SCNC: 27 MMOL/L (ref 21–32)
CREAT SERPL-MCNC: 1.35 MG/DL (ref 0.6–1.3)
GFR SERPL CREATININE-BSD FRML MDRD: 60 ML/MIN/1.73SQ M
GLUCOSE SERPL-MCNC: 82 MG/DL (ref 65–140)
NT-PROBNP SERPL-MCNC: 677 PG/ML
POTASSIUM SERPL-SCNC: 4 MMOL/L (ref 3.5–5.3)
SODIUM SERPL-SCNC: 141 MMOL/L (ref 136–145)

## 2022-05-06 PROCEDURE — 80048 BASIC METABOLIC PNL TOTAL CA: CPT | Performed by: NURSE PRACTITIONER

## 2022-05-06 PROCEDURE — 36415 COLL VENOUS BLD VENIPUNCTURE: CPT

## 2022-05-06 PROCEDURE — 99215 OFFICE O/P EST HI 40 MIN: CPT | Performed by: NURSE PRACTITIONER

## 2022-05-06 PROCEDURE — 83880 ASSAY OF NATRIURETIC PEPTIDE: CPT

## 2022-05-06 NOTE — PATIENT INSTRUCTIONS
Weight yourself daily  If you gain 3 lbs in one day or 5 lbs in one week, please call the office at [phone] and ask for a nurse or the heart failure nurse  Keep your sodium intake to <2 grams, (2000 mg) per day, and fluids <2 Liters (2000 ml) per day   This is around 6-7, 8 oz glasses of fluid per day    Get lab work today

## 2022-05-18 NOTE — PROGRESS NOTES
Advanced Heart Failure / Pulmonary Hypertension Outpatient Progress Note    Chris Dawson 50 y o  male   MRN: [id number]  Encounter: [id number]    Assessment:  Patient Active Problem List    Diagnosis Date Noted    Cough 04/03/2022    Acute CHF (congestive heart failure) ([facility] [address] ) 04/01/2022    Hypertension 04/01/2022    Left leg pain 04/01/2022    Elevated serum creatinine 04/01/2022       Today's Plan:   Start spironolactone 25 mg daily  Repeat BMP in 1-2 weeks   Decrease Lasix to 20 mg BID   Plan to repeat limited TTE to reassess LVEF in 07/2022  [employer] Insurance application, pending approval     Plan:  Chronic HFrEF; LVEF 30-35%; LVIDd 5 2 cm; NYHA II; ACC/AHA Stage B              Etiology: nonischemic  TTE 04/01/2022: LVEF 30-35%  LVIDd 5 2 cm  Normal RV  Moderate to severe MR  PASP 51 mmHg  LHC 04/04/2022: normal coronaries  LVEDP 14 mmHg  cMRI 04/04/2022: LVEF 34%  LVIDD 6 1 cm  LV CO 6 5L/min  "Normal RV size and systolic function  No evidence of myocardial scarring, inflammation, or infiltrative disease "   Weight of 212 lbs on 05/06  Today, weighs 216 lbs  Most recent BMP from 05/06/2022: sodium 141; potassium 4 0; BUN 16; creatinine 1 35; eGFR 60  Neurohormonal Blockade:  --Beta Blocker: metoprolol succinate 50 mg q12 hours  --ARNi / ACEi / ARB: losartan 25 mg daily  --Aldosterone Antagonist: spironolactone 25 mg daily  --SGLT2 Inhibitor: No    --Diuretic: Lasix 20 mg BID       Sudden Cardiac Death Risk Reduction:  --LVEF 30-35%  Wearing LifeVest   --Plan to repeat limited TTE to reassess LVEF in 07/2022      Electrical Resynchronization:  --Candidacy for BiV device: narrow QRS     Advanced Therapies (if appropriate): Will continue to monitor      Hypertension   BP of 120/78 mmHg in office today  Continue on medications as above  Hyperlipidemia   Most recent lipid panel from 03/31/2022: cholesterol 132; ; HDL 44; calculated LDL 47     Continue on atorvastatin 40 mg daily  HPI:   Shyann Pierson is a 72-year-old man with a PMH as above who presents to the office for hospital follow-up  Admitted to [facility] from 03/31 to 04/06/2022 after presenting with SOB and chest discomfort  TTE with newly reduced LVEF of 35%  Started on IV Lasix  LHC completed on 04/04; normal coronaries  BB and ARB added to regimen  Lost 3 lbs and net negative 1 7 L this admission  04/13/2022: Patient presents for hospital follow-up  Kyle Slade (UQ#[id number]) assisted with interpretation during entire encounter  Patient up 12 lbs today (when compared to hospital discharge weight)  Does endorse MELENDREZ and worsening LE swelling since discharge  Denies PND and orthopnea  Has been taking medications as prescribed; wife assisting with organizing medications  Has not been completing daily weights; does have scale at home  Drinking about 40-50 oz water daily  Wearing LifeVest today; denies any alarms or shocks  In process of obtaining insurance  Was told by employer that he will not be cleared to [facility] with large machinery while wearing LifeVest  Patient 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  Reports mild to moderate left neck pain; states he slept in odd position recently  Pain improved with Tyenol/Advil      04/27/2022 with TH: "Presents for follow up   assisted with visit  At last appointment, lasix increased from 40 mg daily to BID  Was up 12 lbs  He continues with symptoms of leg swelling, orthopnea, SOB, and weight gain despite increased Lasix dosing  Feels he is urinating a lot but is also consuming well over 70-80 oz of fluid daily  We discussed PMH again and today admits to heavy cocaine and ETOH use in the past  Continues to abstain from both  Tells me he is still uninsured and having difficulty affording things  Worried about co-pays and paying out of pocket for labs " Lasix increased to 40 mg BID  05/06/2022 with TH: "Presents for follow up  Still has not gotten lab work drawn  His PA MA is pending  Continues with orthopnea  Still exceeding fluid intake due to thirst  Our LSW reached out to him after last visit to assist with insurance/MA  This is currently pending approval  "    05/19/2022: Patient presents for follow-up  Celeste Amor (ID# [id number]) assisted with interpretation during entire encounter  Up 4 lbs since last visit  Continues with mild MELENDREZ with stairs; denies PND and orthopnea  Is completing daily weights; noticed ~3 lbs gain over last 2 weeks  Was drinking about 5 bottles water; now 2-3 bottles + glass of apple juice  Wearing LifeVest; denies any alarms or shocks  Concerned about income and is looking into donating plasma  Past Medical History:   Diagnosis Date    Chronic HFrEF (heart failure with reduced ejection fraction) ([facility] [address] ) 04/01/2022       Review of Systems   Constitutional: Negative for activity change, appetite change, fatigue, fever and unexpected weight change  HENT: Negative for congestion, postnasal drip, rhinorrhea, sneezing, sore throat and trouble swallowing  Eyes: Negative  Respiratory: Positive for shortness of breath (with exertion)  Negative for cough and chest tightness  Cardiovascular: Positive for leg swelling  Negative for chest pain and palpitations  Gastrointestinal: Negative for abdominal distention, abdominal pain, diarrhea, nausea and vomiting  Endocrine: Negative  Genitourinary: Negative for decreased urine volume, difficulty urinating, dysuria and urgency  Musculoskeletal: Negative  Skin: Negative  Allergic/Immunologic: Negative  Neurological: Negative for dizziness, tremors, syncope, weakness, light-headedness and headaches  Hematological: Negative  Psychiatric/Behavioral: Negative for agitation, confusion and sleep disturbance  The patient is not nervous/anxious      14-point ROS completed and negative except as stated above and/or in the HPI  No Known Allergies    Current Outpatient Medications:     aspirin (ECOTRIN LOW STRENGTH) 81 mg EC tablet, Take 1 tablet (81 mg total) by mouth daily, Disp: 30 tablet, Rfl: 0    atorvastatin (LIPITOR) 40 mg tablet, Take 1 tablet (40 mg total) by mouth daily with dinner, Disp: 30 tablet, Rfl: 3    furosemide (LASIX) 40 mg tablet, Take 0 5 tablets (20 mg total) by mouth in the morning and 0 5 tablets (20 mg total) in the evening , Disp: 60 tablet, Rfl: 3    losartan (COZAAR) 25 mg tablet, Take 1 tablet (25 mg total) by mouth daily, Disp: 30 tablet, Rfl: 3    metoprolol succinate (TOPROL-XL) 50 mg 24 hr tablet, Take 1 tablet (50 mg total) by mouth every 12 (twelve) hours, Disp: 60 tablet, Rfl: 3    spironolactone (ALDACTONE) 25 mg tablet, Take 1 tablet (25 mg total) by mouth in the morning , Disp: 30 tablet, Rfl: 1    Social History     Socioeconomic History    Marital status: Single     Spouse name: Not on file    Number of children: Not on file    Years of education: Not on file    Highest education level: Not on file   Occupational History    Not on file   Tobacco Use    Smoking status: Never Smoker    Smokeless tobacco: Never Used   Vaping Use    Vaping Use: Never used   Substance and Sexual Activity    Alcohol use: Never    Drug use: Never    Sexual activity: Not on file   Other Topics Concern    Not on file   Social History Narrative    Not on file     Social Determinants of Health     Financial Resource Strain: Not on file   Food Insecurity: No Food Insecurity    Worried About Running Out of Food in the Last Year: Never true    Liang of Food in the Last Year: Never true   Transportation Needs: No Transportation Needs    Lack of Transportation (Medical): No    Lack of Transportation (Non-Medical):  No   Physical Activity: Not on file   Stress: Not on file   Social Connections: Not on file   Intimate Partner Violence: Not on file   Housing Stability: Low Risk     Unable to Pay for Housing in the Last Year: No    Number of Places Lived in the Last Year: 1    Unstable Housing in the Last Year: No     History reviewed  No pertinent family history  Vitals:   Blood pressure 120/78, pulse 88, weight 98 kg (216 lb), SpO2 96 %  Body mass index is 29 29 kg/m²  Wt Readings from Last 10 Encounters:   22 98 kg (216 lb)   22 96 2 kg (212 lb)   22 96 7 kg (213 lb 3 2 oz)   22 95 6 kg (210 lb 11 2 oz)   22 89 9 kg (198 lb 1 6 oz)     Vitals:    22 1048   BP: 120/78   BP Location: Right arm   Patient Position: Sitting   Cuff Size: Standard   Pulse: 88   SpO2: 96%   Weight: 98 kg (216 lb)       Physical Exam  Vitals reviewed  Constitutional:       General: He is awake  He is not in acute distress  Appearance: Normal appearance  He is well-developed and overweight  HENT:      Head: Normocephalic  Nose: Nose normal       Mouth/Throat:      Mouth: Mucous membranes are moist    Eyes:      General: No scleral icterus  Conjunctiva/sclera: Conjunctivae normal    Neck:      Vascular: JVD (mild) present  Trachea: No tracheal deviation  Cardiovascular:      Rate and Rhythm: Normal rate and regular rhythm  No extrasystoles are present  Pulses: Normal pulses  Heart sounds: No murmur heard  Comments: Wearing LifeVest  Pulmonary:      Effort: Pulmonary effort is normal  No tachypnea, bradypnea or respiratory distress  Breath sounds: Normal air entry  No decreased breath sounds, wheezing or rales  Abdominal:      General: Bowel sounds are normal  There is distension  Palpations: Abdomen is soft  Tenderness: There is no abdominal tenderness  Musculoskeletal:      Cervical back: Neck supple  Right lower le+ Edema present  Left lower le+ Edema present  Skin:     General: Skin is dry  Coloration: Skin is not jaundiced or pale  Neurological:      General: No focal deficit present  Mental Status: He is alert and oriented to person, place, and time  Psychiatric:         Attention and Perception: Attention normal          Mood and Affect: Mood and affect normal          Speech: Speech normal          Behavior: Behavior normal  Behavior is cooperative  Thought Content:  Thought content normal      Labs & Results:  Lab Results   Component Value Date    WBC 6 15 04/06/2022    HGB 15 4 04/06/2022    HCT 48 9 04/06/2022    MCV 94 04/06/2022     04/06/2022     Lab Results   Component Value Date    SODIUM 141 05/06/2022    K 4 0 05/06/2022     05/06/2022    CO2 27 05/06/2022    BUN 16 05/06/2022    CREATININE 1 35 (H) 05/06/2022    GLUC 82 05/06/2022    CALCIUM 9 4 05/06/2022     No results found for: INR, PROTIME     Lab Results   Component Value Date    NTBNP 677 (H) 05/06/2022      Lis Mejia PA-C

## 2022-05-19 ENCOUNTER — TELEPHONE (OUTPATIENT)
Dept: CARDIOLOGY CLINIC | Facility: CLINIC | Age: 52
End: 2022-05-19

## 2022-05-19 ENCOUNTER — OFFICE VISIT (OUTPATIENT)
Dept: CARDIOLOGY CLINIC | Facility: CLINIC | Age: 52
End: 2022-05-19

## 2022-05-19 VITALS
SYSTOLIC BLOOD PRESSURE: 120 MMHG | HEART RATE: 88 BPM | OXYGEN SATURATION: 96 % | WEIGHT: 216 LBS | DIASTOLIC BLOOD PRESSURE: 78 MMHG | BODY MASS INDEX: 29.29 KG/M2

## 2022-05-19 DIAGNOSIS — E78.2 MIXED HYPERLIPIDEMIA: ICD-10-CM

## 2022-05-19 DIAGNOSIS — I42.8 NONISCHEMIC CARDIOMYOPATHY (HCC): ICD-10-CM

## 2022-05-19 DIAGNOSIS — I50.22 CHRONIC HFREF (HEART FAILURE WITH REDUCED EJECTION FRACTION) (HCC): Primary | ICD-10-CM

## 2022-05-19 DIAGNOSIS — I10 HYPERTENSION, UNSPECIFIED TYPE: ICD-10-CM

## 2022-05-19 PROCEDURE — 99214 OFFICE O/P EST MOD 30 MIN: CPT | Performed by: PHYSICIAN ASSISTANT

## 2022-05-19 RX ORDER — SPIRONOLACTONE 25 MG/1
25 TABLET ORAL DAILY
Qty: 30 TABLET | Refills: 1 | Status: SHIPPED | OUTPATIENT
Start: 2022-05-19

## 2022-05-19 RX ORDER — FUROSEMIDE 40 MG/1
20 TABLET ORAL 2 TIMES DAILY
Qty: 60 TABLET | Refills: 3
Start: 2022-05-19

## 2022-05-19 NOTE — PATIENT INSTRUCTIONS
Comience a kike 25 mg de spironolactone maglai vez al día  Disminuya Lasix a 20 mg (1/2 tableta) dos veces al día  Análisis de breezy completo en 2 semanas  Pésese todos los días y [place] un registro detallado de Filemon Robbins  Comuníquese con el programa de insuficiencia cardíaca al [phone] si aumenta 3 libras terrence la noche o 5 libras en 5 a 7 días  Limite la ingesta diaria de sodio/sal a [phone] mg diarios para evitar la retención de líquidos  Evite los alimentos enlatados, la comida rápida/comida [place] y las jessie procesadas (perritos calientes, embutidos, salchichas, etc )  Limite la ingesta de líquidos a 2000 mL o 2 L (alrededor de 60 a 65 onzas) al día  Lleve la lista completa de medicamentos y el registro de pesos diarios a orodñez lisette de seguimiento

## 2022-05-20 ENCOUNTER — TELEPHONE (OUTPATIENT)
Dept: CARDIOLOGY CLINIC | Facility: CLINIC | Age: 52
End: 2022-05-20

## 2022-05-20 NOTE — TELEPHONE ENCOUNTER
Sushma Garcia from [facility] reported that they sent an email [place] to patient yesterday explaining that they still need him to send April bank statement  Outpatient HF LCSW left message for patient to request that he please check his email for message about insurance  Patient does understand some [place]  Provided LCSW contact info on message

## 2022-10-11 PROBLEM — R05.9 COUGH: Status: RESOLVED | Noted: 2022-04-03 | Resolved: 2022-10-11

## 2023-01-01 NOTE — ASSESSMENT & PLAN NOTE
Left calf pain and swelling > R  Positive Enrico's  Elevated D dimer  Venous duplex of lower extremity revealed no DVT  · Tylenol p r n  for pain    · DVT ppx (E4) spontaneous

## 2023-08-05 ENCOUNTER — APPOINTMENT (EMERGENCY)
Dept: CT IMAGING | Facility: HOSPITAL | Age: 53
End: 2023-08-05
Payer: COMMERCIAL

## 2023-08-05 ENCOUNTER — HOSPITAL ENCOUNTER (INPATIENT)
Facility: HOSPITAL | Age: 53
LOS: 3 days | Discharge: HOME | End: 2023-08-08
Attending: EMERGENCY MEDICINE | Admitting: INTERNAL MEDICINE
Payer: COMMERCIAL

## 2023-08-05 ENCOUNTER — APPOINTMENT (EMERGENCY)
Dept: RADIOLOGY | Facility: HOSPITAL | Age: 53
End: 2023-08-05
Payer: COMMERCIAL

## 2023-08-05 DIAGNOSIS — R07.9 CHEST PAIN: ICD-10-CM

## 2023-08-05 DIAGNOSIS — K21.9 GERD (GASTROESOPHAGEAL REFLUX DISEASE): ICD-10-CM

## 2023-08-05 DIAGNOSIS — I50.9 CHF EXACERBATION (HCC): Primary | ICD-10-CM

## 2023-08-05 DIAGNOSIS — I50.9 ACUTE CHF (CONGESTIVE HEART FAILURE) (HCC): ICD-10-CM

## 2023-08-05 PROBLEM — R79.89 ELEVATED D-DIMER: Status: ACTIVE | Noted: 2023-08-05

## 2023-08-05 PROBLEM — R93.89 ABNORMAL CT SCAN: Status: ACTIVE | Noted: 2023-08-05

## 2023-08-05 LAB
2HR DELTA HS TROPONIN: 0 NG/L
4HR DELTA HS TROPONIN: 1 NG/L
ALBUMIN SERPL BCP-MCNC: 4 G/DL (ref 3.5–5)
ALP SERPL-CCNC: 98 U/L (ref 34–104)
ALT SERPL W P-5'-P-CCNC: 23 U/L (ref 7–52)
ANION GAP SERPL CALCULATED.3IONS-SCNC: 10 MMOL/L
AST SERPL W P-5'-P-CCNC: 15 U/L (ref 13–39)
ATRIAL RATE: 91 BPM
BASOPHILS # BLD AUTO: 0.05 THOUSANDS/ÂΜL (ref 0–0.1)
BASOPHILS NFR BLD AUTO: 1 % (ref 0–1)
BILIRUB SERPL-MCNC: 0.56 MG/DL (ref 0.2–1)
BNP SERPL-MCNC: 244 PG/ML (ref 0–100)
BUN SERPL-MCNC: 14 MG/DL (ref 5–25)
CALCIUM SERPL-MCNC: 9.1 MG/DL (ref 8.4–10.2)
CARDIAC TROPONIN I PNL SERPL HS: 16 NG/L
CARDIAC TROPONIN I PNL SERPL HS: 16 NG/L
CARDIAC TROPONIN I PNL SERPL HS: 17 NG/L
CHLORIDE SERPL-SCNC: 107 MMOL/L (ref 96–108)
CO2 SERPL-SCNC: 26 MMOL/L (ref 21–32)
CREAT SERPL-MCNC: 1.2 MG/DL (ref 0.6–1.3)
D DIMER PPP FEU-MCNC: 0.91 UG/ML FEU
EOSINOPHIL # BLD AUTO: 0.59 THOUSAND/ÂΜL (ref 0–0.61)
EOSINOPHIL NFR BLD AUTO: 8 % (ref 0–6)
ERYTHROCYTE [DISTWIDTH] IN BLOOD BY AUTOMATED COUNT: 13.2 % (ref 11.6–15.1)
GFR SERPL CREATININE-BSD FRML MDRD: 69 ML/MIN/1.73SQ M
GLUCOSE SERPL-MCNC: 146 MG/DL (ref 65–140)
HCT VFR BLD AUTO: 45.3 % (ref 36.5–49.3)
HGB BLD-MCNC: 14.8 G/DL (ref 12–17)
IMM GRANULOCYTES # BLD AUTO: 0.03 THOUSAND/UL (ref 0–0.2)
IMM GRANULOCYTES NFR BLD AUTO: 0 % (ref 0–2)
LYMPHOCYTES # BLD AUTO: 0.92 THOUSANDS/ÂΜL (ref 0.6–4.47)
LYMPHOCYTES NFR BLD AUTO: 12 % (ref 14–44)
MCH RBC QN AUTO: 31.1 PG (ref 26.8–34.3)
MCHC RBC AUTO-ENTMCNC: 32.7 G/DL (ref 31.4–37.4)
MCV RBC AUTO: 95 FL (ref 82–98)
MONOCYTES # BLD AUTO: 0.42 THOUSAND/ÂΜL (ref 0.17–1.22)
MONOCYTES NFR BLD AUTO: 6 % (ref 4–12)
NEUTROPHILS # BLD AUTO: 5.5 THOUSANDS/ÂΜL (ref 1.85–7.62)
NEUTS SEG NFR BLD AUTO: 73 % (ref 43–75)
NRBC BLD AUTO-RTO: 0 /100 WBCS
P AXIS: 65 DEGREES
PLATELET # BLD AUTO: 232 THOUSANDS/UL (ref 149–390)
PMV BLD AUTO: 11.2 FL (ref 8.9–12.7)
POTASSIUM SERPL-SCNC: 4.5 MMOL/L (ref 3.5–5.3)
PR INTERVAL: 142 MS
PROT SERPL-MCNC: 6.6 G/DL (ref 6.4–8.4)
QRS AXIS: -28 DEGREES
QRSD INTERVAL: 78 MS
QT INTERVAL: 364 MS
QTC INTERVAL: 447 MS
RBC # BLD AUTO: 4.76 MILLION/UL (ref 3.88–5.62)
SODIUM SERPL-SCNC: 143 MMOL/L (ref 135–147)
T WAVE AXIS: 73 DEGREES
VENTRICULAR RATE: 91 BPM
WBC # BLD AUTO: 7.51 THOUSAND/UL (ref 4.31–10.16)

## 2023-08-05 PROCEDURE — G1004 CDSM NDSC: HCPCS

## 2023-08-05 PROCEDURE — 85025 COMPLETE CBC W/AUTO DIFF WBC: CPT | Performed by: EMERGENCY MEDICINE

## 2023-08-05 PROCEDURE — 99222 1ST HOSP IP/OBS MODERATE 55: CPT | Performed by: INTERNAL MEDICINE

## 2023-08-05 PROCEDURE — 93005 ELECTROCARDIOGRAM TRACING: CPT

## 2023-08-05 PROCEDURE — 36415 COLL VENOUS BLD VENIPUNCTURE: CPT | Performed by: EMERGENCY MEDICINE

## 2023-08-05 PROCEDURE — 99285 EMERGENCY DEPT VISIT HI MDM: CPT

## 2023-08-05 PROCEDURE — 86140 C-REACTIVE PROTEIN: CPT | Performed by: INTERNAL MEDICINE

## 2023-08-05 PROCEDURE — 99285 EMERGENCY DEPT VISIT HI MDM: CPT | Performed by: EMERGENCY MEDICINE

## 2023-08-05 PROCEDURE — 71275 CT ANGIOGRAPHY CHEST: CPT

## 2023-08-05 PROCEDURE — 93010 ELECTROCARDIOGRAM REPORT: CPT | Performed by: INTERNAL MEDICINE

## 2023-08-05 PROCEDURE — 80053 COMPREHEN METABOLIC PANEL: CPT | Performed by: EMERGENCY MEDICINE

## 2023-08-05 PROCEDURE — 84484 ASSAY OF TROPONIN QUANT: CPT | Performed by: EMERGENCY MEDICINE

## 2023-08-05 PROCEDURE — 0241U HB NFCT DS VIR RESP RNA 4 TRGT: CPT | Performed by: INTERNAL MEDICINE

## 2023-08-05 PROCEDURE — 96374 THER/PROPH/DIAG INJ IV PUSH: CPT

## 2023-08-05 PROCEDURE — 85379 FIBRIN DEGRADATION QUANT: CPT | Performed by: EMERGENCY MEDICINE

## 2023-08-05 PROCEDURE — 83880 ASSAY OF NATRIURETIC PEPTIDE: CPT | Performed by: EMERGENCY MEDICINE

## 2023-08-05 PROCEDURE — 99223 1ST HOSP IP/OBS HIGH 75: CPT | Performed by: INTERNAL MEDICINE

## 2023-08-05 PROCEDURE — 71045 X-RAY EXAM CHEST 1 VIEW: CPT

## 2023-08-05 RX ORDER — ONDANSETRON 2 MG/ML
4 INJECTION INTRAMUSCULAR; INTRAVENOUS EVERY 6 HOURS PRN
Status: DISCONTINUED | OUTPATIENT
Start: 2023-08-05 | End: 2023-08-08 | Stop reason: HOSPADM

## 2023-08-05 RX ORDER — POLYETHYLENE GLYCOL 3350 17 G/17G
17 POWDER, FOR SOLUTION ORAL DAILY PRN
Status: DISCONTINUED | OUTPATIENT
Start: 2023-08-05 | End: 2023-08-08 | Stop reason: HOSPADM

## 2023-08-05 RX ORDER — HEPARIN SODIUM 5000 [USP'U]/ML
5000 INJECTION, SOLUTION INTRAVENOUS; SUBCUTANEOUS EVERY 8 HOURS SCHEDULED
Status: DISCONTINUED | OUTPATIENT
Start: 2023-08-05 | End: 2023-08-05

## 2023-08-05 RX ORDER — LOSARTAN POTASSIUM 50 MG/1
50 TABLET ORAL DAILY
Status: DISCONTINUED | OUTPATIENT
Start: 2023-08-06 | End: 2023-08-08 | Stop reason: HOSPADM

## 2023-08-05 RX ORDER — ASPIRIN 81 MG/1
324 TABLET, CHEWABLE ORAL ONCE
Status: COMPLETED | OUTPATIENT
Start: 2023-08-05 | End: 2023-08-05

## 2023-08-05 RX ORDER — SIMETHICONE 80 MG
80 TABLET,CHEWABLE ORAL 4 TIMES DAILY PRN
Status: DISCONTINUED | OUTPATIENT
Start: 2023-08-05 | End: 2023-08-08 | Stop reason: HOSPADM

## 2023-08-05 RX ORDER — FUROSEMIDE 10 MG/ML
40 INJECTION INTRAMUSCULAR; INTRAVENOUS 2 TIMES DAILY
Status: DISCONTINUED | OUTPATIENT
Start: 2023-08-06 | End: 2023-08-06

## 2023-08-05 RX ORDER — ACETAMINOPHEN 325 MG/1
650 TABLET ORAL EVERY 6 HOURS PRN
Status: DISCONTINUED | OUTPATIENT
Start: 2023-08-05 | End: 2023-08-08 | Stop reason: HOSPADM

## 2023-08-05 RX ORDER — HEPARIN SODIUM 5000 [USP'U]/ML
5000 INJECTION, SOLUTION INTRAVENOUS; SUBCUTANEOUS EVERY 8 HOURS SCHEDULED
Status: DISCONTINUED | OUTPATIENT
Start: 2023-08-05 | End: 2023-08-08 | Stop reason: HOSPADM

## 2023-08-05 RX ORDER — METOPROLOL SUCCINATE 50 MG/1
50 TABLET, EXTENDED RELEASE ORAL EVERY 12 HOURS SCHEDULED
Status: DISCONTINUED | OUTPATIENT
Start: 2023-08-05 | End: 2023-08-08 | Stop reason: HOSPADM

## 2023-08-05 RX ORDER — FUROSEMIDE 10 MG/ML
20 INJECTION INTRAMUSCULAR; INTRAVENOUS ONCE
Status: COMPLETED | OUTPATIENT
Start: 2023-08-05 | End: 2023-08-05

## 2023-08-05 RX ORDER — PANTOPRAZOLE SODIUM 40 MG/1
40 TABLET, DELAYED RELEASE ORAL
Status: DISCONTINUED | OUTPATIENT
Start: 2023-08-06 | End: 2023-08-08 | Stop reason: HOSPADM

## 2023-08-05 RX ORDER — SPIRONOLACTONE 25 MG/1
25 TABLET ORAL DAILY
Status: DISCONTINUED | OUTPATIENT
Start: 2023-08-06 | End: 2023-08-08 | Stop reason: HOSPADM

## 2023-08-05 RX ORDER — ATORVASTATIN CALCIUM 20 MG/1
40 TABLET, FILM COATED ORAL
Status: DISCONTINUED | OUTPATIENT
Start: 2023-08-05 | End: 2023-08-08 | Stop reason: HOSPADM

## 2023-08-05 RX ORDER — LOSARTAN POTASSIUM 25 MG/1
25 TABLET ORAL DAILY
Status: DISCONTINUED | OUTPATIENT
Start: 2023-08-06 | End: 2023-08-05

## 2023-08-05 RX ORDER — NITROGLYCERIN 0.4 MG/1
0.4 TABLET SUBLINGUAL
Status: DISCONTINUED | OUTPATIENT
Start: 2023-08-05 | End: 2023-08-08 | Stop reason: HOSPADM

## 2023-08-05 RX ORDER — FAMOTIDINE 20 MG/1
20 TABLET, FILM COATED ORAL DAILY
Status: DISCONTINUED | OUTPATIENT
Start: 2023-08-05 | End: 2023-08-06

## 2023-08-05 RX ADMIN — FAMOTIDINE 20 MG: 20 TABLET ORAL at 20:48

## 2023-08-05 RX ADMIN — ATORVASTATIN CALCIUM 40 MG: 20 TABLET, FILM COATED ORAL at 18:10

## 2023-08-05 RX ADMIN — NITROGLYCERIN 0.4 MG: 0.4 TABLET SUBLINGUAL at 15:29

## 2023-08-05 RX ADMIN — IOHEXOL 85 ML: 350 INJECTION, SOLUTION INTRAVENOUS at 16:18

## 2023-08-05 RX ADMIN — HEPARIN SODIUM 5000 UNITS: 5000 INJECTION INTRAVENOUS; SUBCUTANEOUS at 21:02

## 2023-08-05 RX ADMIN — ASPIRIN 81 MG 324 MG: 81 TABLET ORAL at 15:28

## 2023-08-05 RX ADMIN — FUROSEMIDE 20 MG: 10 INJECTION, SOLUTION INTRAMUSCULAR; INTRAVENOUS at 16:05

## 2023-08-05 RX ADMIN — METOPROLOL SUCCINATE 50 MG: 50 TABLET, EXTENDED RELEASE ORAL at 20:51

## 2023-08-05 NOTE — ASSESSMENT & PLAN NOTE
· BP elevated on admission  · Continue prehospital losartan, spironolactone, Toprol  · Currently on IV Lasix 40 mg twice daily

## 2023-08-05 NOTE — ASSESSMENT & PLAN NOTE
Wt Readings from Last 3 Encounters:   08/05/23 96.5 kg (212 lb 12.8 oz)   05/19/22 98 kg (216 lb)   05/06/22 96.2 kg (212 lb)       Presents to ED with shortness of breath, bilateral lower extremity swelling  • ED diuresed with IV Lasix 40 mg once  • Take p.o.  Lasix 20 mg twice daily for diuresis as outpatient   • CXR possible pulmonary congestion, official report pending  •   • EKG ordered  • ECHO revealed LVEF 30% in 2022  o Repeat ECHO ordered  • Continue diuresis with IV Lasix 40 mg twice daily per CHF protocol algorithm   • Daily weights, Strict I & Os  • Cardiac diet, low sodium <2g, fluid restriction <1500

## 2023-08-05 NOTE — ED PROVIDER NOTES
History  Chief Complaint   Patient presents with   • Chest Pain     Chest pain that started 1 and a half hours ago. Worse when he takes a deep breath. Patient also states he is dizzy. 61-year-old male with history of CHF, coronary artery disease presenting the emergency department with chest pain onset 1/2-hour before presentation. Notes worse with deep inspiration. Notes difficulty breathing on exertion as well. Compliant with his medications. Last EF 30% in 2022. Has not seen cardiologist since April 2022. Notes pressure-like chest pain substernally as well as sharp chest pain bilaterally with deep inspiration. Notes lower extremity edema worsened since yesterday. Shortness of breath since last night. Prior to Admission Medications   Prescriptions Last Dose Informant Patient Reported? Taking?   aspirin (ECOTRIN LOW STRENGTH) 81 mg EC tablet 8/5/2023 Self No Yes   Sig: Take 1 tablet (81 mg total) by mouth daily   atorvastatin (LIPITOR) 40 mg tablet Not Taking Self No No   Sig: Take 1 tablet (40 mg total) by mouth daily with dinner   Patient not taking: Reported on 8/5/2023   furosemide (LASIX) 40 mg tablet 8/5/2023  No Yes   Sig: Take 0.5 tablets (20 mg total) by mouth in the morning and 0.5 tablets (20 mg total) in the evening. losartan (COZAAR) 25 mg tablet 8/5/2023 Self No Yes   Sig: Take 1 tablet (25 mg total) by mouth daily   metoprolol succinate (TOPROL-XL) 50 mg 24 hr tablet  Self No No   Sig: Take 1 tablet (50 mg total) by mouth every 12 (twelve) hours   spironolactone (ALDACTONE) 25 mg tablet 8/5/2023  No Yes   Sig: Take 1 tablet (25 mg total) by mouth in the morning.       Facility-Administered Medications: None       Past Medical History:   Diagnosis Date   • Chronic HFrEF (heart failure with reduced ejection fraction) ([address]) 04/01/2022       Past Surgical History:   Procedure Laterality Date   • CARDIAC CATHETERIZATION N/A 4/4/2022    Procedure: CARDIAC CORONARY ANGIOGRAM; Surgeon: Ildefonso Matthew MD;  Location: [facility]; Service: Cardiology   • CARDIAC CATHETERIZATION Left 4/4/2022    Procedure: Cardiac Left Heart Cath;  Surgeon: Ildefonso Matthew MD;  Location: [facility]; Service: Cardiology       History reviewed. No pertinent family history. I have reviewed and agree with the history as documented. E-Cigarette/Vaping   • E-Cigarette Use Never User      E-Cigarette/Vaping Substances   • Nicotine No    • THC No    • CBD No    • Flavoring No    • Other No    • Unknown No      Social History     Tobacco Use   • Smoking status: Never   • Smokeless tobacco: Never   Vaping Use   • Vaping Use: Never used   Substance Use Topics   • Alcohol use: Never   • Drug use: Never       Review of Systems   Constitutional: Negative for chills and fever. HENT: Negative for ear pain and sore throat. Eyes: Negative for pain and visual disturbance. Respiratory: Positive for shortness of breath. Negative for cough. Cardiovascular: Positive for chest pain and leg swelling. Negative for palpitations. Gastrointestinal: Negative for abdominal pain and vomiting. Genitourinary: Negative for dysuria and hematuria. Musculoskeletal: Negative for arthralgias and back pain. Skin: Negative for color change and rash. Neurological: Negative for seizures and syncope. All other systems reviewed and are negative. Physical Exam  Physical Exam  Vitals and nursing note reviewed. Constitutional:       General: He is not in acute distress. Appearance: He is well-developed. HENT:      Head: Normocephalic and atraumatic. Nose: Nose normal.      Mouth/Throat:      Mouth: Mucous membranes are moist.   Eyes:      Conjunctiva/sclera: Conjunctivae normal.   Cardiovascular:      Rate and Rhythm: Normal rate and regular rhythm. Heart sounds: No murmur heard. Pulmonary:      Effort: Pulmonary effort is normal. No respiratory distress.       Breath sounds: Normal breath sounds. Abdominal:      Palpations: Abdomen is soft. Tenderness: There is no abdominal tenderness. Musculoskeletal:         General: No swelling. Cervical back: Neck supple. Right lower leg: No tenderness. Edema present. Left lower leg: No tenderness. Edema present. Skin:     General: Skin is warm and dry. Capillary Refill: Capillary refill takes less than 2 seconds. Neurological:      Mental Status: He is alert.    Psychiatric:         Mood and Affect: Mood normal.         Vital Signs  ED Triage Vitals   Temperature Pulse Respirations Blood Pressure SpO2   08/05/23 1515 08/05/23 1515 08/05/23 1515 08/05/23 1515 08/05/23 1515   99.2 °F (37.3 °C) 96 22 146/83 96 %      Temp Source Heart Rate Source Patient Position - Orthostatic VS BP Location FiO2 (%)   08/05/23 1515 08/05/23 1515 08/05/23 1515 08/05/23 1515 --   Tympanic Monitor Sitting Left arm       Pain Score       08/05/23 1837       5           Vitals:    08/05/23 1600 08/05/23 1750 08/05/23 1823 08/05/23 2050   BP: 152/88 (!) 158/101 (!) 159/106 147/96   Pulse: 88 90 92 96   Patient Position - Orthostatic VS: Lying Lying Lying Lying         Visual Acuity      ED Medications  Medications   nitroglycerin (NITROSTAT) SL tablet 0.4 mg (0.4 mg Sublingual Given 8/5/23 1529)   acetaminophen (TYLENOL) tablet 650 mg (has no administration in time range)   polyethylene glycol (MIRALAX) packet 17 g (has no administration in time range)   ondansetron (ZOFRAN) injection 4 mg (has no administration in time range)   simethicone (MYLICON) chewable tablet 80 mg (has no administration in time range)   heparin (porcine) subcutaneous injection 5,000 Units (5,000 Units Subcutaneous Given 8/5/23 2102)   furosemide (LASIX) injection 40 mg (has no administration in time range)   atorvastatin (LIPITOR) tablet 40 mg (40 mg Oral Given 8/5/23 1810)   metoprolol succinate (TOPROL-XL) 24 hr tablet 50 mg (50 mg Oral Given 8/5/23 2051)   spironolactone (ALDACTONE) tablet 25 mg (has no administration in time range)   pantoprazole (PROTONIX) EC tablet 40 mg (has no administration in time range)   famotidine (PEPCID) tablet 20 mg (20 mg Oral Given 8/5/23 2048)   losartan (COZAAR) tablet 50 mg (has no administration in time range)   aspirin chewable tablet 324 mg (324 mg Oral Given 8/5/23 1528)   furosemide (LASIX) injection 20 mg (20 mg Intravenous Given 8/5/23 1605)   iohexol (OMNIPAQUE) 350 MG/ML injection (SINGLE-DOSE) 85 mL (85 mL Intravenous Given 8/5/23 1618)       Diagnostic Studies  Results Reviewed     Procedure Component Value Units Date/Time    HS Troponin I 4hr [[id number]]  (Normal) Collected: 08/05/23 1924    Lab Status: Final result Specimen: Blood from Arm, Left Updated: 08/05/23 2001     hs TnI 4hr 17 ng/L      Delta 4hr hsTnI 1 ng/L     HS Troponin I 2hr [[id number]]  (Normal) Collected: 08/05/23 1748    Lab Status: Final result Specimen: Blood from Arm, Right Updated: 08/05/23 1817     hs TnI 2hr 16 ng/L      Delta 2hr hsTnI 0 ng/L     B-Type Natriuretic Peptide(BNP) [[id number]]  (Abnormal) Collected: 08/05/23 1523    Lab Status: Final result Specimen: Blood from Arm, Right Updated: 08/05/23 1606      pg/mL     HS Troponin 0hr (reflex protocol) [[id number]]  (Normal) Collected: 08/05/23 1523    Lab Status: Final result Specimen: Blood from Arm, Right Updated: 08/05/23 1558     hs TnI 0hr 16 ng/L     Comprehensive metabolic panel [[id number]]  (Abnormal) Collected: 08/05/23 1523    Lab Status: Final result Specimen: Blood from Arm, Right Updated: 08/05/23 1553     Sodium 143 mmol/L      Potassium 4.5 mmol/L      Chloride 107 mmol/L      CO2 26 mmol/L      ANION GAP 10 mmol/L      BUN 14 mg/dL      Creatinine 1.20 mg/dL      Glucose 146 mg/dL      Calcium 9.1 mg/dL      AST 15 U/L      ALT 23 U/L      Alkaline Phosphatase 98 U/L      Total Protein 6.6 g/dL      Albumin 4.0 g/dL      Total Bilirubin 0.56 mg/dL      eGFR 69 ml/min/1.73sq m Narrative:      [employer] guidelines for Chronic Kidney Disease (CKD):   •  Stage 1 with normal or high GFR (GFR > 90 mL/min/1.73 square meters)  •  Stage 2 Mild CKD (GFR = 60-89 mL/min/1.73 square meters)  •  Stage 3A Moderate CKD (GFR = 45-59 mL/min/1.73 square meters)  •  Stage 3B Moderate CKD (GFR = 30-44 mL/min/1.73 square meters)  •  Stage 4 Severe CKD (GFR = 15-29 mL/min/1.73 square meters)  •  Stage 5 End Stage CKD (GFR <15 mL/min/1.73 square meters)  Note: GFR calculation is accurate only with a steady state creatinine    D-dimer, quantitative [[id number]]  (Abnormal) Collected: 08/05/23 1523    Lab Status: Final result Specimen: Blood from Arm, Right Updated: 08/05/23 1551     D-Dimer, Quant 0.91 ug/ml FEU     Narrative: In the evaluation for possible pulmonary embolism, in the appropriate (Well's Score of 4 or less) patient, the age adjusted d-dimer cutoff for this patient can be calculated as:    Age x 0.01 (in ug/mL) for Age-adjusted D-dimer exclusion threshold for a patient over 50 years.     CBC and differential [[id number]]  (Abnormal) Collected: 08/05/23 1523    Lab Status: Final result Specimen: Blood from Arm, Right Updated: 08/05/23 1534     WBC 7.51 Thousand/uL      RBC 4.76 Million/uL      Hemoglobin 14.8 g/dL      Hematocrit 45.3 %      MCV 95 fL      MCH 31.1 pg      MCHC 32.7 g/dL      RDW 13.2 %      MPV 11.2 fL      Platelets 933 Thousands/uL      nRBC 0 /100 WBCs      Neutrophils Relative 73 %      Immat GRANS % 0 %      Lymphocytes Relative 12 %      Monocytes Relative 6 %      Eosinophils Relative 8 %      Basophils Relative 1 %      Neutrophils Absolute 5.50 Thousands/µL      Immature Grans Absolute 0.03 Thousand/uL      Lymphocytes Absolute 0.92 Thousands/µL      Monocytes Absolute 0.42 Thousand/µL      Eosinophils Absolute 0.59 Thousand/µL      Basophils Absolute 0.05 Thousands/µL                  CTA ED chest PE Study   Final Result by Marisa Garcia MD (08/05 1725)      No CT evidence of pulmonary embolism. Small left pleural effusion again seen. Hazy groundglass densities in the dependent portions of the lower lobes, nonspecific but could be infectious or inflammatory in etiology. Tiny hiatal hernia with mild wall thickening of the mid to distal esophagus. Correlation for mild esophagitis advised. Workstation performed: [id number]         X-ray chest 1 view portable   ED Interpretation by James Buenrostro MD (08/05 1543)   Pulmonary edema                 Procedures  Procedures         ED Course                               SBIRT 20yo+    Flowsheet Row Most Recent Value   Initial Alcohol Screen: US AUDIT-C     1. How often do you have a drink containing alcohol? 0 Filed at: 08/05/2023 1510   2. How many drinks containing alcohol do you have on a typical day you are drinking? 0 Filed at: 08/05/2023 1510   3a. Male UNDER 65: How often do you have five or more drinks on one occasion? 0 Filed at: 08/05/2023 1510   3b. FEMALE Any Age, or MALE 65+: How often do you have 4 or more drinks on one occassion? 0 Filed at: 08/05/2023 1510   Audit-C Score 0 Filed at: 08/05/2023 1510   JOHNNIE: How many times in the past year have you. .. Used an illegal drug or used a prescription medication for non-medical reasons?  Never Filed at: 08/05/2023 1510        LUCIANA Risk Score    Flowsheet Row Most Recent Value   Age >= 72 0 Filed at: 08/05/2023 1749   Known CAD (stenosis >= 50%) 0 Filed at: 08/05/2023 1749   Recent (<=24 hrs) Service Angina 1 Filed at: 08/05/2023 1749   ST Deviation >= 0.5 mm 0 Filed at: 08/05/2023 1749   3+ CAD Risk Factors (FHx, HTN, HLP, DM, Smoker) 0 Filed at: 08/05/2023 1749   Aspirin Use Past 7 Days 1 Filed at: 08/05/2023 1749   Elevated Cardiac Markers 1 Filed at: 08/05/2023 1749   LUCIANA Risk Score (Calculated) 3 Filed at: 08/05/2023 1749                  Medical Decision Making  Old male presenting emerged department with chest pain and shortness of breath. Bilateral lower extremity edema. Differential diagnosis includes ACS, CHF exacerbation, kidney failure. Laboratory evaluation shows elevated BNP with chest x-ray showing pulmonary edema. CT angio of the chest performed to rule out PE as D-dimer is elevated, no PE found. Patient admitted to medicine for acute CHF exacerbation. Amount and/or Complexity of Data Reviewed  Labs: ordered. Radiology: ordered and independent interpretation performed. Risk  OTC drugs. Prescription drug management. Decision regarding hospitalization. Disposition  Final diagnoses:   CHF exacerbation ([address])     Time reflects when diagnosis was documented in both MDM as applicable and the Disposition within this note     Time User Action Codes Description Comment    8/5/2023  5:39 PM Lisandra Kaleigh [I50.9] CHF exacerbation ([address])     8/5/2023  5:48 PM Elizabethprem Matti Robbins [R07.9] Chest pain       ED Disposition     ED Disposition   Admit    Condition   Stable    Date/Time   Sat Aug 5, 2023  5:39 PM    Comment   Case was discussed with Dr. Finesse Villegas and the patient's admission status was agreed to be Admission Status: inpatient status to the service of Dr. Finesse Villegas . Follow-up Information    None         Current Discharge Medication List      CONTINUE these medications which have NOT CHANGED    Details   aspirin (ECOTRIN LOW STRENGTH) 81 mg EC tablet Take 1 tablet (81 mg total) by mouth daily  Qty: 30 tablet, Refills: 0    Associated Diagnoses: Acute CHF (congestive heart failure) ([facility])      furosemide (LASIX) 40 mg tablet Take 0.5 tablets (20 mg total) by mouth in the morning and 0.5 tablets (20 mg total) in the evening.   Qty: 60 tablet, Refills: 3    Associated Diagnoses: Chronic HFrEF (heart failure with reduced ejection fraction) ([facility])      losartan (COZAAR) 25 mg tablet Take 1 tablet (25 mg total) by mouth daily  Qty: 30 tablet, Refills: 3    Associated Diagnoses: Acute CHF (congestive heart failure) ([facility])      spironolactone (ALDACTONE) 25 mg tablet Take 1 tablet (25 mg total) by mouth in the morning. Qty: 30 tablet, Refills: 1    Associated Diagnoses: Chronic HFrEF (heart failure with reduced ejection fraction) ([address]); Nonischemic cardiomyopathy ([facility])      atorvastatin (LIPITOR) 40 mg tablet Take 1 tablet (40 mg total) by mouth daily with dinner  Qty: 30 tablet, Refills: 3    Associated Diagnoses: Acute CHF (congestive heart failure) ([facility])      metoprolol succinate (TOPROL-XL) 50 mg 24 hr tablet Take 1 tablet (50 mg total) by mouth every 12 (twelve) hours  Qty: 60 tablet, Refills: 3    Associated Diagnoses: Acute CHF (congestive heart failure) ([address])             No discharge procedures on file.     PDMP Review     None          ED Provider  Electronically Signed by           Марина Gamboa MD  08/05/23 4758

## 2023-08-05 NOTE — PLAN OF CARE
Problem: PAIN - ADULT  Goal: Verbalizes/displays adequate comfort level or baseline comfort level  Description: Interventions:  - Encourage patient to monitor pain and request assistance  - Assess pain using appropriate pain scale  - Administer analgesics based on type and severity of pain and evaluate response  - Implement non-pharmacological measures as appropriate and evaluate response  - Consider cultural and social influences on pain and pain management  - Notify physician/advanced practitioner if interventions unsuccessful or patient reports new pain  Outcome: Progressing     Problem: SAFETY ADULT  Goal: Patient will remain free of falls  Description: INTERVENTIONS:  - Educate patient/family on patient safety including physical limitations  - Instruct patient to call for assistance with activity   - Keep Call bell within reach  - Keep bed low and locked with side rails adjusted as appropriate  - Keep care items and personal belongings within reach  - Initiate and maintain comfort rounds  - Make Fall Risk Sign visible to staff  - Apply yellow socks and bracelet for high fall risk patients  - Consider moving patient to room near nurses station  Outcome: Progressing     Problem: Knowledge Deficit  Goal: Patient/family/caregiver demonstrates understanding of disease process, treatment plan, medications, and discharge instructions  Description: Complete learning assessment and assess knowledge base.   Interventions:  - Provide teaching at level of understanding  - Provide teaching via preferred learning methods  Outcome: Progressing

## 2023-08-05 NOTE — ASSESSMENT & PLAN NOTE
· CTA PE study showed hazy groundglass densities in the dependent portions of the lower lobes, nonspecific but could be infectious or inflammatory in etiology. · Currently patient is afebrile, no leukocytosis hold off antibiotic for now  · Tiny hiatal hernia with mild wall thickening of the mid to distal esophagus. · Patient currently denies mild esophagitis symptoms.   Started on Protonix empirically

## 2023-08-05 NOTE — ASSESSMENT & PLAN NOTE
· CTA PE study showed small left pleural effusion  · Patient is saturating well on room air  · Currently on IV Lasix 40 mg twice daily  · Continue to monitor

## 2023-08-05 NOTE — CONSULTS
ENCOUNTER DATE: 08/05/23 7:40 PM  PATIENT NAME: Gisela Patino   1970    [id number]  Age: 48 y.o. Sex: male  [facility]  INPATIENT ATTENDING PHYSICIAN: Bharat Panda MD; [facility] PHYSICIAN: No primary care provider on file. DATE OF ADMISSION: 8/5/2023  3:04 PM; LENGTH OF STAY: 0 days  *-*-*-*-*-*-*-*-*-*-*-*-*-*-*-*-*-*-*-*-*-*-*-*-*-*-*-*-*-*-*-*-*-*-*-*-*-*-*-*-*-*-*-*-*-*-*-*-*-*-*-*-*-*-   REASON FOR CONSULTATION:   Decompensated acute on chronic failure    *-*-*-*-*-*-*-*-*-*-*-*-*-*-*-*-*-*-*-*-*-*-*-*-*-*-*-*-*-*-*-*-*-*-*-*-*-*-*-*-*-*-*-*-*-*-*-*-*-*-*-*-*-*-  CARDIAC ASSESSMENT:     1. Chest pain, noncardiac chest pain  2. Suspected chronic heart failure, heart failure with reduced ejection fraction  3. Nonischemic cardiomyopathy  4. Esophagitis       Patient Active Problem List   Diagnosis   • Acute CHF (congestive heart failure) (HCC)   • Pleural effusion, left   • Hypertension   • Left leg pain   • Elevated serum creatinine   • Chest pain, unspecified   • Elevated d-dimer   • Abnormal CT scan        Description of symptoms suggest noncardiac chest pain. Possible related to esophagitis as suggested by esophageal thickening and small hiatal hernia on CAT scan. Cannot definitively exclude pleuritis/pericarditis. Blood pressure noted to be elevated. CARDIAC PLAN:     --Can transition to furosemide 40 mg p.o. once daily tomorrow. -- Increasing the dose of losartan to 50 mg once daily. -- Giving a trial of Pepcid with first dose today. Consider adding sucralfate if symptoms persist.  If inflammatory markers are elevated  if symptoms persist despite these measures will consider adding colchicine plus minus NSAIDs  -- Added CR level to the blood drawn earlier today to assess for inflammation. -- Discontinued aspirin therapy for now. -- We will continue other heart failure medications.   -- If blood pressure is noted to be elevated  Isosorbide dinitrate with hydralazine 10 mg each 3 times daily can be added. -- Request echocardiogram on Monday. -- Will review echocardiogram and provide further recommendations. Please reach out to cardiology team if there is any change in clinical status or if there are any questions or concerns. *-*-*-*-*-*-*-*-*-*-*-*-*-*-*-*-*-*-*-*-*-*-*-*-*-*-*-*-*-*-*-*-*-*-*-*-*-*-*-*-*-*-*-*-*-*-*-*-*-*-*-*-*-*-  HISTORY OF PRESENT ILLNESS     Patient is a 79-year-old gentleman with medical history significant for:  1. Nonischemic cardiomyopathy, initially diagnosed April 2022, most recent EF of 30 to 35%, LVIDD 5.2 cm, AHA stage C heart failure, NYHA class II symptoms  2. Primary hypertension  3. Dyslipidemia  4. Stage III chronic kidney disease    Patient follows with heart failure team at [facility] and was most recently seen by advanced practitioner Escobar boss PA-C on 5/19/2023. His home cardiac regimen included metoprolol succinate 50 mg twice daily losartan 25 mg daily Aldactone 25 mg daily furosemide 20 mg twice daily. At last visit plan was for him to have a repeat echocardiogram to reassess need for ICD. He had been on LifeVest therapy. Patient presented to emergency room this afternoon with complaint of chest pain on taking deep breaths and feeling lightheaded. Patient's symptoms started this afternoon while he was sitting. Recently he has also noticed lower extremity swelling and shortness of breath which became worse last night. He reports being compliant with medications. He does not smoke or drink alcohol. 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 He does report recent increasing shortness of breath with activity. He denies any alcohol use or smoking. He    In the emergency room patient was noted to have slightly elevated blood pressures and he was slightly tachypneic.   His laboratory data indicated stable renal function and electrolytes, negative high-sensitivity troponins x2, elevated BNP level of 244, stable hemoglobin and hematocrit. PE protocol. There was no pulmonary embolism. There was suggestion of small left-sided pleural effusion. There was small hiatal hernia. There was no significant pulmonary vascular congestion reported. There was possible mild esophagitis noted. His ECG showed sinus rhythm with possible biatrial enlargement with nonspecific ST-T wave abnormalities. Subsequent ECG did not identify biatrial enlargement. Currently he reports feeling better. Denies chest pain shortness of breath or dizziness or palpitations. His last echocardiogram was in April 2022. His EF was 30 to 35% with severe global hypokinesis, he had grade 2 diastolic dysfunction, normal RV size and function, moderate to severe mitral valve regurgitation and moderate tricuspid valve regurgitation and moderate pulmonary hypertension. His cardiac catheterization at that time showed angiographically normal coronary arteries. *-*-*-*-*-*-*-*-*-*-*-*-*-*-*-*-*-*-*-*-*-*-*-*-*-*-*-*-*-*-*-*-*-*-*-*-*-*-*-*-*-*-*-*-*-*-*-*-*-*-*-*-*-*  PAST MEDICAL HISTORY:     Past Medical History:   Diagnosis Date   • Chronic HFrEF (heart failure with reduced ejection fraction) ([address]) 04/01/2022    PAST SURGICAL HISTORY     Past Surgical History:   Procedure Laterality Date   • CARDIAC CATHETERIZATION N/A 4/4/2022    Procedure: CARDIAC CORONARY ANGIOGRAM;  Surgeon: Ed Yadav MD;  [facility]; Service: Cardiology   • CARDIAC CATHETERIZATION Left 4/4/2022    Procedure: Cardiac Left Heart Cath;  Surgeon: Ed Yadav MD;  [facility]; Service: Cardiology          FAMILY HISTORY     History reviewed. No pertinent family history.   SOCIAL HISTORY     Social History     Tobacco Use   Smoking Status Never   Smokeless Tobacco Never      Social History Substance and Sexual Activity   Alcohol Use Never     Social History     Substance and Sexual Activity   Drug Use Never    [unfilled]     *-*-*-*-*-*-*-*-*-*-*-*-*-*-*-*-*-*-*-*-*-*-*-*-*-*-*-*-*-*-*-*-*-*-*-*-*-*-*-*-*-*-*-*-*-*-*-*-*-*-*-*-*-*  ALLERGIES     No Known Allergies  CURRENT SCHEDULED MEDICATIONS       Current Facility-Administered Medications:   •  acetaminophen (TYLENOL) tablet 650 mg, 650 mg, Oral, Q6H PRN, Jeanna Guzman MD  •  [START ON 8/6/2023] aspirin (ECOTRIN LOW STRENGTH) EC tablet 81 mg, 81 mg, Oral, Daily, Jeanna Guzman MD  •  atorvastatin (LIPITOR) tablet 40 mg, 40 mg, Oral, Daily With Mary Beauchamp MD, 40 mg at 08/05/23 1810  •  [START ON 8/6/2023] furosemide (LASIX) injection 40 mg, 40 mg, Intravenous, BID, Jeanna Guzman MD  •  heparin (porcine) subcutaneous injection 5,000 Units, 5,000 Units, Subcutaneous, Q8H [address], Jeanna Guzman MD  •  [START ON 8/6/2023] losartan (COZAAR) tablet 25 mg, 25 mg, Oral, Daily, Jeanna Guzman MD  •  metoprolol succinate (TOPROL-XL) 24 hr tablet 50 mg, 50 mg, Oral, Q12H [address], Jeanna Guzman MD  •  nitroglycerin (NITROSTAT) SL tablet 0.4 mg, 0.4 mg, Sublingual, Q5 Min PRN, Deuce Miles MD, 0.4 mg at 08/05/23 1529  •  ondansetron (ZOFRAN) injection 4 mg, 4 mg, Intravenous, Q6H PRN, Jeanna Guzman MD  •  [START ON 8/6/2023] pantoprazole (PROTONIX) EC tablet 40 mg, 40 mg, Oral, Early Morning, Jeanna Guzman MD  •  polyethylene glycol (MIRALAX) packet 17 g, 17 g, Oral, Daily PRN, Jeanna Guzman MD  •  simethicone (MYLICON) chewable tablet 80 mg, 80 mg, Oral, 4x Daily PRN, Jeanna Guzman MD  •  [START ON 8/6/2023] spironolactone (ALDACTONE) tablet 25 mg, 25 mg, Oral, Daily, Jeanna Guzman MD     *-*-*-*-*-*-*-*-*-*-*-*-*-*-*-*-*-*-*-*-*-*-*-*-*-*-*-*-*-*-*-*-*-*-*-*-*-*-*-*-*-*-*-*-*-*-*-*-*-*-*-*-*-*   REVIEW OF SYSTEMS     Positive for: As noted above HPI  Negative for: All remaining as reviewed below and in HPI.  SYSTEM SYMPTOMS REVIEWED:  General--weight change, fever, night sweats  Respiratoryl-- Wheezing, shortness of breath, cough, URI symptoms, sputum, blood  Cardiovascular--chest pain, syncope, dyspnea on exertion, edema, decline in exercise tolerance, claudication   Gastrointestinal--persistent vomiting, diarrhea, abdominal distention, blood in stool   Muscular or skeletal--joint pain or swelling   Neurologic--headaches, syncope, abnormal movement  Hematologic--history of easy bruising and bleeding   Endocrine--thyroid enlargement, heat or cold intolerance, polyuria   Psychiatric--anxiety, depression      *-*-*-*-*-*-*-*-*-*-*-*-*-*-*-*-*-*-*-*-*-*-*-*-*-*-*-*-*-*-*-*-*-*-*-*-*-*-*-*-*-*-*-*-*-*-*-*-*-*-*-*-*-*-   VITAL SIGNS       Vitals:    23 1515 23 1600 23 1750 23 1823   BP: 146/83 152/88 (!) 158/101 (!) 159/106   BP Location: Left arm Left arm Left arm Left arm   Pulse: 96 88 90 92   Resp: 22 (!) 24 20 20   Temp: 99.2 °F (37.3 °C)   (!) 96.8 °F (36 °C)   TempSrc: Tympanic   Temporal   SpO2: 96% 95% 96% 95%   Weight: 96.5 kg (212 lb 12.8 oz)   94 kg (207 lb 3.7 oz)   Height:    6' (1.829 m)     TEMPERATURE HISTORY 24H: Temp (24hrs), Av °F (36.7 °C), Min:96.8 °F (36 °C), Max:99.2 °F (37.3 °C)   . BLOOD PRESSURE HISTORY: Systolic (73AUI), OIK:438 , Min:146 , WBS:577    Diastolic (83LXA), WFN:21, Min:83, Max:106    Weight    23 1515 23 1823   Weight: 96.5 kg (212 lb 12.8 oz) 94 kg (207 lb 3.7 oz)      Body mass index is 28.11 kg/m².  Wt Readings from Last 10 Encounters:   23 94 kg (207 lb 3.7 oz)   22 98 kg (216 lb)   22 96.2 kg (212 lb)   22 96.7 kg (213 lb 3.2 oz)   22 95.6 kg (210 lb 11.2 oz)   22 89.9 kg (198 lb 1.6 oz)    No intake or output data in the 24 hours ending 23 1940     PREVIOUS WEIGHTS:   Wt Readings from Last 25 Encounters:   23 94 kg (207 lb 3.7 oz)   22 98 kg (216 lb)   22 96.2 kg (212 lb)   22 96.7 kg (213 lb 3.2 oz)   22 95.6 kg (210 lb 11.2 oz) 04/06/22 89.9 kg (198 lb 1.6 oz)      I&O: No intake or output data in the 24 hours ending 08/05/23 1958     *-*-*-*-*-*-*-*-*-*-*-*-*-*-*-*-*-*-*-*-*-*-*-*-*-*-*-*-*-*-*-*-*-*-*-*-*-*-*-*-*-*-*-*-*-*-*-*-*-*-*-*-*-*-   PHYSICAL EXAMINATION:     General Appearance:    Alert, cooperative, no distress, appears stated age, slightly overweight   Head, Eyes, ENT:    No obvious abnormality, moist mucous mebranes. Neck:   Supple, no carotid bruit or JVD   Back:     Symmetric, no curvature. Lungs:     Respirations unlabored. Clear to auscultation bilaterally,    Chest wall:    No tenderness or deformity   Heart:    Regular rate and rhythm, S1 and S2 normal, no murmur, rub  or gallop. Abdomen:     Soft, non-tender, No obvious masses, or organomegaly   Extremities:   Extremities warm, no cyanosis, 1+ lower extremity edema   Skin:   Skin color, texture, turgor normal, no rashes or lesions     *-*-*-*-*-*-*-*-*-*-*-*-*-*-*-*-*-*-*-*-*-*-*-*-*-*-*-*-*-*-*-*-*-*-*-*-*-*-*-*-*-*-*-*-*-*-*-*-*-*-*-*-*-*-   LABORATORY DATA:   I have personally reviewed pertinent labs. CMP:  Results from last 7 days   Lab Units 08/05/23  1523   SODIUM mmol/L 143   POTASSIUM mmol/L 4.5   CHLORIDE mmol/L 107   CO2 mmol/L 26   BUN mg/dL 14   CREATININE mg/dL 1.20   CALCIUM mg/dL 9.1   ALK PHOS U/L 98   ALT U/L 23   AST U/L 15               Cardiac Profile:   Results from last 7 days   Lab Units 08/05/23  1748 08/05/23  1523   HS TNI 0HR ng/L  --  16   HS TNI 2HR ng/L 16  --    D-DIMER QUANTITATIVE ug/ml FEU  --  0.91*                    Blood Gas Analysis:             CBC:   Results from last 7 days   Lab Units 08/05/23  1523   WBC Thousand/uL 7.51   HEMOGLOBIN g/dL 14.8   HEMATOCRIT % 45.3   PLATELETS Thousands/uL 232     PT/INR: No results found for: "PT", "INR", Microbiology:            *-*-*-*-*-*-*-*-*-*-*-*-*-*-*-*-*-*-*-*-*-*-*-*-*-*-*-*-*-*-*-*-*-*-*-*-*-*-*-*-*-*-*-*-*-*-*-*-*-*-*-*-*-*-  TELEMETRY, LAST ECG:  Telemetry reviewed. ... Sinus rhythm, possible atrial enlargement, no ectopy     Results for orders placed or performed during the hospital encounter of 08/05/23   ECG 12 lead   Result Value    Ventricular Rate 91    Atrial Rate 91    CA Interval 142    QRSD Interval 78    QT Interval 364    QTC Interval 447    P Axis 65    QRS Axis -28    T Wave Axis 73    Narrative    Normal sinus rhythm  Possible Left atrial enlargement  Nonspecific T wave abnormality  Abnormal ECG  When compared with ECG of 31-MAR-2022 23:39,  QRS axis Shifted left  Nonspecific T wave abnormality, worse in Lateral leads  Confirmed by Danielle Law ([id number]) on 8/5/2023 6:23:22 PM        *-*-*-*-*-*-*-*-*-*-*-*-*-*-*-*-*-*-*-*-*-*-*-*-*-*-*-*-*-*-*-*-*-*-*-*-*-*-*-*-*-*-*-*-*-*-*-*-*-*-*-*-*-*-  IMAGING STUDIES REPORTS: Imaging studies results reviewed. ... No valid procedures specified. XR chest pa & lateral    Result Date: 10/19/2022  Impression IMPRESSION: Normal chest examination. [id number]      *-*-*-*-*-*-*-*-*-*-*-*-*-*-*-*-*-*-*-*-*-*-*-*-*-*-*-*-*-*-*-*-*-*-*-*-*-*-*-*-*-*-*-*-*-*-*-*-*-*-*-*-*-*-  AVAILABLE OLD CARDIAC TESTS REPORTS:   No results found for this or any previous visit. No results found for this or any previous visit. No results found for this or any previous visit. No results found for this or any previous visit. *-*-*-*-*-*-*-*-*-*-*-*-*-*-*-*-*-*-*-*-*-*-*-*-*-*-*-*-*-*-*-*-*-*-*-*-*-*-*-*-*-*-*-*-*-*-*-*-*-*-*-*-*-*-      *-*-*-*-*-*-*-*-*-*-*-*-*-*-*-*-*-*-*-*-*-*-*-*-*-*-*-*-*-*-*-*-*-*-*-*-*-*-*-*-*-*-*-*-*-*-*-*-*-*-*-*-*-*-  SIGNATURES:   @REW@   Abdirizak Villa MD     CC: No primary care provider on file. No ref.  provider found

## 2023-08-05 NOTE — ASSESSMENT & PLAN NOTE
Presents to ED with left-sided sharp stabbing chest pain, need to rule out ACS  Follow EKG to rule out pericarditis  Cardiac cath in 4/22 was normal.  EKG ordered  CXR showed pulmonary congestion, official report pending  LUCIANA score = 3  Troponin 0Hr 16; continue to trend with serial EKGs  Lipid panel,TSH, & Hgb A1c ordered  ASA 325mg on admission; 81mg daily after  Statin 40 mg daily  Continue prehospital beta-blocker  Hold NSAIDS  Pain management with nitroglycerin  Cardiology consult; recommendations appreciated  Telemetry  Cardiac diet; low sodium

## 2023-08-05 NOTE — H&P
[facility]  H&P  Name: Pearl Aguirre 48 y.o. male I MRN: [id number]  Unit/Bed#: RUPERTO I Date of Admission: 8/5/2023   Date of Service: 8/5/2023 I Hospital Day: 0      Assessment/Plan   * Chest pain, unspecified  Assessment & Plan  Presents to ED with left-sided sharp stabbing chest pain, need to rule out ACS  Follow EKG to rule out pericarditis  Cardiac cath in 4/22 was normal.  EKG ordered  CXR showed pulmonary congestion, official report pending  LUCIANA score = 3  Troponin 0Hr 16; continue to trend with serial EKGs  Lipid panel,TSH, & Hgb A1c ordered  ASA 325mg on admission; 81mg daily after  Statin 40 mg daily  Continue prehospital beta-blocker  Hold NSAIDS  Pain management with nitroglycerin  Cardiology consult; recommendations appreciated  Telemetry  Cardiac diet; low sodium         Abnormal CT scan  Assessment & Plan  · CTA PE study showed hazy groundglass densities in the dependent portions of the lower lobes, nonspecific but could be infectious or inflammatory in etiology. · Currently patient is afebrile, no leukocytosis hold off antibiotic for now  · Tiny hiatal hernia with mild wall thickening of the mid to distal esophagus. · Patient currently denies mild esophagitis symptoms.   Started on Protonix empirically    Elevated d-dimer  Assessment & Plan  · CTA PE study was negative for acute PE    Elevated serum creatinine  Assessment & Plan  · Creatinine seems to be around his baseline  · Avoid nephrotoxic agent, hypotension  · Follow BMP    Hypertension  Assessment & Plan  · BP elevated on admission  · Continue prehospital losartan, spironolactone, Toprol  · Currently on IV Lasix 40 mg twice daily    Pleural effusion, left  Assessment & Plan  · CTA PE study showed small left pleural effusion  · Patient is saturating well on room air  · Currently on IV Lasix 40 mg twice daily  · Continue to monitor    Acute CHF (congestive heart failure) ([address])  Assessment & Plan  Wt Readings from Last 3 Encounters:   08/05/23 96.5 kg (212 lb 12.8 oz)   05/19/22 98 kg (216 lb)   05/06/22 96.2 kg (212 lb)       Presents to ED with shortness of breath, bilateral lower extremity swelling  • ED diuresed with IV Lasix 40 mg once  • Take p.o. Lasix 20 mg twice daily for diuresis as outpatient   • CXR possible pulmonary congestion, official report pending  •   • EKG ordered  • ECHO revealed LVEF 30% in 2022  o Repeat ECHO ordered  • Continue diuresis with IV Lasix 40 mg twice daily per CHF protocol algorithm   • Daily weights, Strict I & Os  • Cardiac diet, low sodium <2g, fluid restriction <1500             VTE Prophylaxis: Heparin  / sequential compression device   Code Status: Full code  POLST: There is no POLST form on file for this patient (pre-hospital)  Discussion with family: Discussed with patient. Patient said he will call his wife    Anticipated Length of Stay:  Patient will be admitted on an Inpatient basis with an anticipated length of stay of more than 2 midnights. Justification for Hospital Stay: Chest pain need to rule out ACS, CHF    Total Time for Visit, including Counseling / Coordination of Care: 45 minutes. Greater than 50% of this total time spent on direct patient counseling and coordination of care. Chief Complaint:   Chest pain    History of Present Illness:    Carlo Baird is a 48 y.o. male with PMH of HFrEF 30%, HTN who presents with left-sided chest pain. Patient stated chest pain started 30 minutes before coming to admission. He described the chest pain as sharp stabbing radiating down bilaterally to rib cages. Pain was exacerbated by deep breathing. He also had shortness of breath since last night and difficulty in laying flat. He noticed swelling in his bilateral feet. He was diagnosed with CHF in 2022 and he has been compliant with this medication. Review of Systems:    Review of Systems Patient was seen and examined at bedside.  The patient has shortness of breath, bilateral lower extremity swelling, chest pain but denies any fever, chills, palpitation, N/V, abd pain. Past Medical and Surgical History:     Past Medical History:   Diagnosis Date   • Chronic HFrEF (heart failure with reduced ejection fraction) ([address]) 04/01/2022       Past Surgical History:   Procedure Laterality Date   • CARDIAC CATHETERIZATION N/A 4/4/2022    Procedure: CARDIAC CORONARY ANGIOGRAM;  Surgeon: Ed Yadav MD;  [facility]; Service: Cardiology   • CARDIAC CATHETERIZATION Left 4/4/2022    Procedure: Cardiac Left Heart Cath;  Surgeon: Ed Yadav MD;  [facility]; Service: Cardiology       Meds/Allergies:    Prior to Admission medications    Medication Sig Start Date End Date Taking? Authorizing Provider   aspirin (ECOTRIN LOW STRENGTH) 81 mg EC tablet Take 1 tablet (81 mg total) by mouth daily 4/6/22 5/19/22  Roopa Sim MD   atorvastatin (LIPITOR) 40 mg tablet Take 1 tablet (40 mg total) by mouth daily with dinner 4/27/22 5/27/22  TARA Merrill   furosemide (LASIX) 40 mg tablet Take 0.5 tablets (20 mg total) by mouth in the morning and 0.5 tablets (20 mg total) in the evening. 5/19/22   Braxton Amzecua PA-C   losartan (COZAAR) 25 mg tablet Take 1 tablet (25 mg total) by mouth daily 4/27/22 5/27/22  TARA Case   metoprolol succinate (TOPROL-XL) 50 mg 24 hr tablet Take 1 tablet (50 mg total) by mouth every 12 (twelve) hours 4/27/22 5/27/22  TARA Case   spironolactone (ALDACTONE) 25 mg tablet Take 1 tablet (25 mg total) by mouth in the morning. 5/19/22   Braxton Amezcua PA-C     I have reviewed home medications with patient personally.     Allergies: No Known Allergies    Social History:     Marital Status: Single   Patient Pre-hospital Living Situation: Lives with wife  Patient Pre-hospital Level of Mobility: Independent  Patient Pre-hospital Diet Restrictions: No restrictions  Substance Use History:   Social History     Substance and Sexual Activity   Alcohol Use Never     Social History     Tobacco Use   Smoking Status Never   Smokeless Tobacco Never     Social History     Substance and Sexual Activity   Drug Use Never       Family History:    History reviewed. No pertinent family history. Physical Exam:     Vitals:   Blood Pressure: (!) 158/101 (08/05/23 1750)  Pulse: 90 (08/05/23 1750)  Temperature: 99.2 °F (37.3 °C) (08/05/23 1515)  Temp Source: Tympanic (08/05/23 1515)  Respirations: 20 (08/05/23 1750)  Weight - Scale: 96.5 kg (212 lb 12.8 oz) (08/05/23 1515)  SpO2: 96 % (08/05/23 1750)    Physical Exam  General: no acute distress  HEENT: NC/AT, PERRL, EOM - normal  Neck: Supple  Pulm/Chest: Normal chest wall expansion, clear breath sounds on both side, mild bibasilar crackles  CVS: RRR, normal S1&S2, capillary refill <2s  Abd: soft, non tender, non distended, bowel sounds +  MSK: move all 4 extremities spontaneously, 2+ bilateral pedal edema  Skin: warm  CNS: no acute focal neuro deficit    Additional Data:     Lab Results: I have personally reviewed pertinent reports. Results from last 7 days   Lab Units 08/05/23  1523   WBC Thousand/uL 7.51   HEMOGLOBIN g/dL 14.8   HEMATOCRIT % 45.3   PLATELETS Thousands/uL 232   NEUTROS PCT % 73   LYMPHS PCT % 12*   MONOS PCT % 6   EOS PCT % 8*     Results from last 7 days   Lab Units 08/05/23  1523   SODIUM mmol/L 143   POTASSIUM mmol/L 4.5   CHLORIDE mmol/L 107   CO2 mmol/L 26   BUN mg/dL 14   CREATININE mg/dL 1.20   ANION GAP mmol/L 10   CALCIUM mg/dL 9.1   ALBUMIN g/dL 4.0   TOTAL BILIRUBIN mg/dL 0.56   ALK PHOS U/L 98   ALT U/L 23   AST U/L 15   GLUCOSE RANDOM mg/dL 146*                       Imaging: I have personally reviewed pertinent reports. CTA ED chest PE Study   Final Result by Andrew Pierre MD (08/05 1725)      No CT evidence of pulmonary embolism. Small left pleural effusion again seen.       Hazy groundglass densities in the dependent portions of the lower lobes, nonspecific but could be infectious or inflammatory in etiology. Tiny hiatal hernia with mild wall thickening of the mid to distal esophagus. Correlation for mild esophagitis advised. Workstation performed: [id number]         X-ray chest 1 view portable   ED Interpretation by Tony Feliz MD (08/05 3213)   Pulmonary edema          EKG, Pathology, and Other Studies Reviewed on Admission:   · EKG: Ordered    Allscripts / Epic Records Reviewed: Yes     ** Please Note: This note has been constructed using a voice recognition system.  **

## 2023-08-06 LAB
ALBUMIN SERPL BCP-MCNC: 4.3 G/DL (ref 3.5–5)
ALP SERPL-CCNC: 100 U/L (ref 34–104)
ALT SERPL W P-5'-P-CCNC: 22 U/L (ref 7–52)
ANION GAP SERPL CALCULATED.3IONS-SCNC: 9 MMOL/L
AST SERPL W P-5'-P-CCNC: 15 U/L (ref 13–39)
ATRIAL RATE: 80 BPM
BILIRUB SERPL-MCNC: 0.6 MG/DL (ref 0.2–1)
BUN SERPL-MCNC: 19 MG/DL (ref 5–25)
CALCIUM SERPL-MCNC: 9.2 MG/DL (ref 8.4–10.2)
CHLORIDE SERPL-SCNC: 105 MMOL/L (ref 96–108)
CO2 SERPL-SCNC: 27 MMOL/L (ref 21–32)
CREAT SERPL-MCNC: 1.33 MG/DL (ref 0.6–1.3)
CRP SERPL QL: 11.9 MG/L
ERYTHROCYTE [DISTWIDTH] IN BLOOD BY AUTOMATED COUNT: 13.3 % (ref 11.6–15.1)
FLUAV RNA RESP QL NAA+PROBE: NEGATIVE
FLUBV RNA RESP QL NAA+PROBE: NEGATIVE
GFR SERPL CREATININE-BSD FRML MDRD: 61 ML/MIN/1.73SQ M
GLUCOSE SERPL-MCNC: 126 MG/DL (ref 65–140)
HCT VFR BLD AUTO: 51.8 % (ref 36.5–49.3)
HGB BLD-MCNC: 16.5 G/DL (ref 12–17)
MCH RBC QN AUTO: 30.8 PG (ref 26.8–34.3)
MCHC RBC AUTO-ENTMCNC: 31.9 G/DL (ref 31.4–37.4)
MCV RBC AUTO: 97 FL (ref 82–98)
P AXIS: 40 DEGREES
PLATELET # BLD AUTO: 271 THOUSANDS/UL (ref 149–390)
PMV BLD AUTO: 11.4 FL (ref 8.9–12.7)
POTASSIUM SERPL-SCNC: 4.3 MMOL/L (ref 3.5–5.3)
PR INTERVAL: 144 MS
PROT SERPL-MCNC: 7.1 G/DL (ref 6.4–8.4)
QRS AXIS: -27 DEGREES
QRSD INTERVAL: 76 MS
QT INTERVAL: 380 MS
QTC INTERVAL: 438 MS
RBC # BLD AUTO: 5.36 MILLION/UL (ref 3.88–5.62)
RSV RNA RESP QL NAA+PROBE: NEGATIVE
SARS-COV-2 RNA RESP QL NAA+PROBE: NEGATIVE
SODIUM SERPL-SCNC: 141 MMOL/L (ref 135–147)
T WAVE AXIS: 35 DEGREES
VENTRICULAR RATE: 80 BPM
WBC # BLD AUTO: 11.5 THOUSAND/UL (ref 4.31–10.16)

## 2023-08-06 PROCEDURE — 93010 ELECTROCARDIOGRAM REPORT: CPT | Performed by: INTERNAL MEDICINE

## 2023-08-06 PROCEDURE — 99232 SBSQ HOSP IP/OBS MODERATE 35: CPT | Performed by: INTERNAL MEDICINE

## 2023-08-06 PROCEDURE — 80053 COMPREHEN METABOLIC PANEL: CPT | Performed by: INTERNAL MEDICINE

## 2023-08-06 PROCEDURE — 85027 COMPLETE CBC AUTOMATED: CPT | Performed by: INTERNAL MEDICINE

## 2023-08-06 RX ORDER — SUCRALFATE 1 G/1
1 TABLET ORAL
Status: DISCONTINUED | OUTPATIENT
Start: 2023-08-06 | End: 2023-08-08 | Stop reason: HOSPADM

## 2023-08-06 RX ORDER — FUROSEMIDE 40 MG/1
40 TABLET ORAL DAILY
Status: DISCONTINUED | OUTPATIENT
Start: 2023-08-06 | End: 2023-08-08 | Stop reason: HOSPADM

## 2023-08-06 RX ORDER — HYDRALAZINE HYDROCHLORIDE 25 MG/1
25 TABLET, FILM COATED ORAL EVERY 8 HOURS SCHEDULED
Status: DISCONTINUED | OUTPATIENT
Start: 2023-08-06 | End: 2023-08-08 | Stop reason: HOSPADM

## 2023-08-06 RX ORDER — ISOSORBIDE DINITRATE 10 MG/1
10 TABLET ORAL
Status: DISCONTINUED | OUTPATIENT
Start: 2023-08-06 | End: 2023-08-08 | Stop reason: HOSPADM

## 2023-08-06 RX ADMIN — HEPARIN SODIUM 5000 UNITS: 5000 INJECTION INTRAVENOUS; SUBCUTANEOUS at 05:26

## 2023-08-06 RX ADMIN — SUCRALFATE 1 G: 1 TABLET ORAL at 10:46

## 2023-08-06 RX ADMIN — SPIRONOLACTONE 25 MG: 25 TABLET, FILM COATED ORAL at 08:38

## 2023-08-06 RX ADMIN — ISOSORBIDE DINITRATE 10 MG: 10 TABLET ORAL at 16:45

## 2023-08-06 RX ADMIN — HYDRALAZINE HYDROCHLORIDE 25 MG: 25 TABLET, FILM COATED ORAL at 13:49

## 2023-08-06 RX ADMIN — SUCRALFATE 1 G: 1 TABLET ORAL at 22:06

## 2023-08-06 RX ADMIN — ISOSORBIDE DINITRATE 10 MG: 10 TABLET ORAL at 12:00

## 2023-08-06 RX ADMIN — HYDRALAZINE HYDROCHLORIDE 25 MG: 25 TABLET, FILM COATED ORAL at 22:06

## 2023-08-06 RX ADMIN — SUCRALFATE 1 G: 1 TABLET ORAL at 16:23

## 2023-08-06 RX ADMIN — LOSARTAN POTASSIUM 50 MG: 50 TABLET, FILM COATED ORAL at 08:37

## 2023-08-06 RX ADMIN — FUROSEMIDE 40 MG: 40 TABLET ORAL at 08:38

## 2023-08-06 RX ADMIN — METOPROLOL SUCCINATE 50 MG: 50 TABLET, EXTENDED RELEASE ORAL at 22:06

## 2023-08-06 RX ADMIN — FAMOTIDINE 20 MG: 20 TABLET ORAL at 08:38

## 2023-08-06 RX ADMIN — ATORVASTATIN CALCIUM 40 MG: 20 TABLET, FILM COATED ORAL at 16:23

## 2023-08-06 RX ADMIN — PANTOPRAZOLE SODIUM 40 MG: 40 TABLET, DELAYED RELEASE ORAL at 05:26

## 2023-08-06 RX ADMIN — METOPROLOL SUCCINATE 50 MG: 50 TABLET, EXTENDED RELEASE ORAL at 08:38

## 2023-08-06 RX ADMIN — HEPARIN SODIUM 5000 UNITS: 5000 INJECTION INTRAVENOUS; SUBCUTANEOUS at 22:06

## 2023-08-06 RX ADMIN — HEPARIN SODIUM 5000 UNITS: 5000 INJECTION INTRAVENOUS; SUBCUTANEOUS at 13:49

## 2023-08-06 NOTE — ASSESSMENT & PLAN NOTE
· CTA PE study showed small left pleural effusion  · Patient is currently on 1 L oxygen with SPO2 94%  · Currently p.o.  Lasix daily  · Continue to monitor

## 2023-08-06 NOTE — ASSESSMENT & PLAN NOTE
Presents to ED with left-sided sharp stabbing chest pain, most likely due to esophagitis  Currently patient is chest pain-free  EKG show NSR, no acute ST-T changes, no evidence of pericarditis, troponin flat  Cardiac cath in 4/22 was normal.  CXR showed pulmonary edema   LUCIANA score = 3  Cardiologist on board -no aspirin for now  Statin 40 mg daily  Continue prehospital beta-blocker  cardiology consult; recommendations appreciated  Telemetry  Cardiac diet; low sodium   Currently on IV PPI and Carafate for esophagitis

## 2023-08-06 NOTE — PROGRESS NOTES
[facility]  Progress Note  Name: Lindsay Valadez  MRN: [id number]  Unit/Bed#: 7T [facility] 706-01 I Date of Admission: 8/5/2023   Date of Service: 8/6/2023 I Hospital Day: 1    Assessment/Plan   * Chest pain, unspecified  Assessment & Plan  Presents to ED with left-sided sharp stabbing chest pain, most likely due to esophagitis  Currently patient is chest pain-free  EKG show NSR, no acute ST-T changes, no evidence of pericarditis, troponin flat  Cardiac cath in 4/22 was normal.  CXR showed pulmonary edema   LUCIANA score = 3  Cardiologist on board -no aspirin for now  Statin 40 mg daily  Continue prehospital beta-blocker  cardiology consult; recommendations appreciated  Telemetry  Cardiac diet; low sodium   Currently on IV PPI and Carafate for esophagitis        Elevated d-dimer  Assessment & Plan  · CTA PE study was negative for acute PE    Elevated serum creatinine  Assessment & Plan  · Creatinine seems to be around his baseline  · Avoid nephrotoxic agent, hypotension  · Follow BMP    Hypertension  Assessment & Plan  · BP continues to be elevated, added isosorbide dinitrate and hydralazine 10 mg 3 times a day  · Continue prehospital losartan, spironolactone, Toprol  · Currently on p.o. Lasix 40 mg daily    Pleural effusion, left  Assessment & Plan  · CTA PE study showed small left pleural effusion  · Patient is currently on 1 L oxygen with SPO2 94%  · Currently p.o. Lasix daily  · Continue to monitor    Acute CHF (congestive heart failure) (HCC)  Assessment & Plan  Wt Readings from Last 3 Encounters:   08/06/23 92.6 kg (204 lb 2.3 oz)   05/19/22 98 kg (216 lb)   05/06/22 96.2 kg (212 lb)       Presents to ED with shortness of breath, bilateral lower extremity swelling  • ED diuresed with IV Lasix 40 mg once  • Take p.o.  Lasix 20 mg twice daily for diuresis as outpatient   • CXR possible pulmonary edema  •   • EKG showed NSR, no acute ST-T changes  • ECHO revealed LVEF 30% in   o Repeat ECHO ordered  • Transition to p.o. Lasix 40 mg daily. Increase losartan to 50 mg daily. Discontinue aspirin for now. • Daily weights, Strict I & Os  • Cardiac diet, low sodium <2g, fluid restriction <1500                 VTE Pharmacologic Prophylaxis:   Pharmacologic: Heparin  Mechanical VTE Prophylaxis in Place: Yes    Patient Centered Rounds: I have performed bedside rounds with nursing staff today. Discussions with Specialists or Other Care Team Provider: Discussed with RN    Education and Discussions with Family / Patient: Discussed with patient. Patient declined calling a family member. Time Spent 35 minutes Care: 35 minutes. This time was spent on one or more of the following: performing physical exam; counseling and coordination of care; obtaining or reviewing history; documenting in the medical record; reviewing/ordering tests, medications or procedures; communicating with other healthcare professionals and discussing with patient's family/caregivers. Current Length of Stay: 1 day(s)    Current Patient Status: Inpatient   Certification Statement: The patient will continue to require additional inpatient hospital stay due to Acute CHF exacerbation, chest pain    Discharge Plan / Estimated Discharge Date: Pending clinical course      Code Status: Level 1 - Full Code      Subjective:   Patient was seen and examined at bedside. The patient reported he feels better than last night but has mild shortness of breath. Patient was put on 1 L oxygen via NC. He denies any chest pain, palpitation, N/V, abd pain. Objective:     Vitals:   Temp (24hrs), Av.7 °F (36.5 °C), Min:96.3 °F (35.7 °C), Max:99.2 °F (37.3 °C)    Temp:  [96.3 °F (35.7 °C)-99.2 °F (37.3 °C)] 97.8 °F (36.6 °C)  HR:  [82-96] 82  Resp:  [18-24] 18  BP: (146-159)/() 151/107  SpO2:  [92 %-97 %] 97 %  Body mass index is 27.69 kg/m². Input and Output Summary (last 24 hours):        Intake/Output Summary (Last 24 hours) at 8/6/2023 1047  Last data filed at 8/6/2023 0900  Gross per 24 hour   Intake 240 ml   Output --   Net 240 ml       Physical Exam:     Physical Exam  General: no acute distress, on 1 L oxygen  HEENT: NC/AT, PERRL, EOM - normal  Neck: Supple  Pulm/Chest: Normal chest wall expansion, decreased breath sounds on both side, no crackles  CVS: RRR, normal S1&S2, capillary refill <2s  Abd: soft, non tender, non distended, bowel sounds +  MSK: move all 4 extremities spontaneously  Skin: warm  CNS: no acute focal neuro deficit    Additional Data:     Labs:    Results from last 7 days   Lab Units 08/06/23  0612 08/05/23  1523   WBC Thousand/uL 11.50* 7.51   HEMOGLOBIN g/dL 16.5 14.8   HEMATOCRIT % 51.8* 45.3   PLATELETS Thousands/uL 271 232   NEUTROS PCT %  --  73   LYMPHS PCT %  --  12*   MONOS PCT %  --  6   EOS PCT %  --  8*     Results from last 7 days   Lab Units 08/06/23  0612   POTASSIUM mmol/L 4.3   CHLORIDE mmol/L 105   CO2 mmol/L 27   BUN mg/dL 19   CREATININE mg/dL 1.33*   CALCIUM mg/dL 9.2   ALK PHOS U/L 100   ALT U/L 22   AST U/L 15           * I Have Reviewed All Lab Data Listed Above. * Additional Pertinent Lab Tests Reviewed: [address] Admission Reviewed    Imaging:      I have reviewed pertinent imaging.       Recent Cultures (last 7 days):           Last 24 Hours Medication List:   Current Facility-Administered Medications   Medication Dose Route Frequency Provider Last Rate   • acetaminophen  650 mg Oral Q6H PRN Cristine Iyer MD     • atorvastatin  40 mg Oral Daily With Alexander Goodson MD     • furosemide  40 mg Oral Daily Cristine Iyer MD     • heparin (porcine)  5,000 Units Subcutaneous [facility] Cristine Iyer MD     • hydrALAZINE  25 mg Oral [facility] Cristine Iyer MD     • isosorbide dinitrate  10 mg Oral TID after meals Cristine Iyer MD     • losartan  50 mg Oral Daily Danielle Law MD     • metoprolol succinate  50 mg Oral Q12H [address] Kohli MD     • nitroglycerin  0.4 mg Sublingual Q5 Min PRN Ramona Saenz MD     • ondansetron  4 mg Intravenous Q6H PRN Alexa Berrios MD     • pantoprazole  40 mg Oral Early Morning Aleax Berrios MD     • polyethylene glycol  17 g Oral Daily PRN Alexa Berrios MD     • simethicone  80 mg Oral 4x Daily PRN Alexa Berrios MD     • spironolactone  25 mg Oral Daily Alexa Berrios MD     • sucralfate  1 g Oral 4x Daily ([facility]) Alexa Berrios MD          Today, Patient Was Seen By: Alexa Berrios MD    ** Please Note: Dragon 360 Dictation voice to text software may have been used in the creation of this document.  **

## 2023-08-06 NOTE — PLAN OF CARE
Problem: PAIN - ADULT  Goal: Verbalizes/displays adequate comfort level or baseline comfort level  Description: Interventions:  - Encourage patient to monitor pain and request assistance  - Assess pain using appropriate pain scale  - Administer analgesics based on type and severity of pain and evaluate response  - Implement non-pharmacological measures as appropriate and evaluate response  - Consider cultural and social influences on pain and pain management  - Notify physician/advanced practitioner if interventions unsuccessful or patient reports new pain  Outcome: Progressing     Problem: SAFETY ADULT  Goal: Patient will remain free of falls  Description: INTERVENTIONS:  - Educate patient/family on patient safety including physical limitations  - Instruct patient to call for assistance with activity   - Keep Call bell within reach  - Keep bed low and locked with side rails adjusted as appropriate  - Keep care items and personal belongings within reach  - Initiate and maintain comfort rounds  - Make Fall Risk Sign visible to staff  - Apply yellow socks and bracelet for high fall risk patients  - Consider moving patient to room near nurses station  Outcome: Progressing     Problem: Knowledge Deficit  Goal: Patient/family/caregiver demonstrates understanding of disease process, treatment plan, medications, and discharge instructions  Description: Complete learning assessment and assess knowledge base.   Interventions:  - Provide teaching at level of understanding  - Provide teaching via preferred learning methods  Outcome: Progressing     Problem: DISCHARGE PLANNING  Goal: Discharge to home or other facility with appropriate resources  Description: INTERVENTIONS:  - Identify barriers to discharge w/patient and caregiver  - Arrange for needed discharge resources and transportation as appropriate  - Identify discharge learning needs (meds, wound care, etc.)  - Arrange for interpretive services to assist at discharge as needed  - Refer to Case Management Department for coordinating discharge planning if the patient needs post-hospital services based on physician/advanced practitioner order or complex needs related to functional status, cognitive ability, or social support system  Outcome: Progressing

## 2023-08-06 NOTE — ASSESSMENT & PLAN NOTE
Wt Readings from Last 3 Encounters:   08/06/23 92.6 kg (204 lb 2.3 oz)   05/19/22 98 kg (216 lb)   05/06/22 96.2 kg (212 lb)       Presents to ED with shortness of breath, bilateral lower extremity swelling  • ED diuresed with IV Lasix 40 mg once  • Take p.o. Lasix 20 mg twice daily for diuresis as outpatient   • CXR possible pulmonary edema  •   • EKG showed NSR, no acute ST-T changes  • ECHO revealed LVEF 30% in 2022  o Repeat ECHO ordered  • Transition to p.o. Lasix 40 mg daily. Increase losartan to 50 mg daily. Discontinue aspirin for now.   • Daily weights, Strict I & Os  • Cardiac diet, low sodium <2g, fluid restriction <1500

## 2023-08-06 NOTE — ASSESSMENT & PLAN NOTE
· BP continues to be elevated, added isosorbide dinitrate and hydralazine 10 mg 3 times a day  · Continue prehospital losartan, spironolactone, Toprol  · Currently on p.o.  Lasix 40 mg daily

## 2023-08-07 ENCOUNTER — APPOINTMENT (INPATIENT)
Dept: NON INVASIVE DIAGNOSTICS | Facility: HOSPITAL | Age: 53
End: 2023-08-07
Payer: COMMERCIAL

## 2023-08-07 LAB
ANION GAP SERPL CALCULATED.3IONS-SCNC: 10 MMOL/L
AORTIC ROOT: 2.3 CM
APICAL FOUR CHAMBER EJECTION FRACTION: 38 %
ASCENDING AORTA: 3.1 CM
BASOPHILS # BLD AUTO: 0.08 THOUSANDS/ÂΜL (ref 0–0.1)
BASOPHILS NFR BLD AUTO: 1 % (ref 0–1)
BUN SERPL-MCNC: 18 MG/DL (ref 5–25)
CALCIUM SERPL-MCNC: 8.9 MG/DL (ref 8.4–10.2)
CHLORIDE SERPL-SCNC: 103 MMOL/L (ref 96–108)
CO2 SERPL-SCNC: 27 MMOL/L (ref 21–32)
CREAT SERPL-MCNC: 1.25 MG/DL (ref 0.6–1.3)
E WAVE DECELERATION TIME: 118 MS
EOSINOPHIL # BLD AUTO: 0.86 THOUSAND/ÂΜL (ref 0–0.61)
EOSINOPHIL NFR BLD AUTO: 10 % (ref 0–6)
ERYTHROCYTE [DISTWIDTH] IN BLOOD BY AUTOMATED COUNT: 13.2 % (ref 11.6–15.1)
FRACTIONAL SHORTENING: 18 (ref 28–44)
GFR SERPL CREATININE-BSD FRML MDRD: 66 ML/MIN/1.73SQ M
GLUCOSE SERPL-MCNC: 91 MG/DL (ref 65–140)
HCT VFR BLD AUTO: 48.1 % (ref 36.5–49.3)
HGB BLD-MCNC: 15.7 G/DL (ref 12–17)
IMM GRANULOCYTES # BLD AUTO: 0.05 THOUSAND/UL (ref 0–0.2)
IMM GRANULOCYTES NFR BLD AUTO: 1 % (ref 0–2)
INTERVENTRICULAR SEPTUM IN DIASTOLE (PARASTERNAL SHORT AXIS VIEW): 1 CM
INTERVENTRICULAR SEPTUM: 1 CM (ref 0.6–1.1)
LAAS-AP2: 26.5 CM2
LAAS-AP4: 22.1 CM2
LEFT ATRIUM SIZE: 4.8 CM
LEFT ATRIUM VOLUME (MOD BIPLANE): 83 ML
LEFT INTERNAL DIMENSION IN SYSTOLE: 4.5 CM (ref 2.1–4)
LEFT VENTRICLE DIASTOLIC VOLUME (MOD BIPLANE): 114 ML
LEFT VENTRICLE SYSTOLIC VOLUME (MOD BIPLANE): 68 ML
LEFT VENTRICULAR INTERNAL DIMENSION IN DIASTOLE: 5.5 CM (ref 3.5–6)
LEFT VENTRICULAR POSTERIOR WALL IN END DIASTOLE: 1.1 CM
LEFT VENTRICULAR STROKE VOLUME: 54 ML
LV EF: 41 %
LVSV (TEICH): 54 ML
LYMPHOCYTES # BLD AUTO: 1.92 THOUSANDS/ÂΜL (ref 0.6–4.47)
LYMPHOCYTES NFR BLD AUTO: 21 % (ref 14–44)
MCH RBC QN AUTO: 31 PG (ref 26.8–34.3)
MCHC RBC AUTO-ENTMCNC: 32.6 G/DL (ref 31.4–37.4)
MCV RBC AUTO: 95 FL (ref 82–98)
MITRAL REGURGITATION PEAK VELOCITY: 4.87 M/S
MITRAL VALVE MEAN INFLOW VELOCITY: 3.81 M/S
MITRAL VALVE REGURGITANT PEAK GRADIENT: 95 MMHG
MONOCYTES # BLD AUTO: 0.6 THOUSAND/ÂΜL (ref 0.17–1.22)
MONOCYTES NFR BLD AUTO: 7 % (ref 4–12)
MV E'TISSUE VEL-SEP: 8 CM/S
MV PEAK A VEL: 0.31 M/S
MV PEAK E VEL: 70 CM/S
MV STENOSIS PRESSURE HALF TIME: 34 MS
MV VALVE AREA P 1/2 METHOD: 6.47
NEUTROPHILS # BLD AUTO: 5.45 THOUSANDS/ÂΜL (ref 1.85–7.62)
NEUTS SEG NFR BLD AUTO: 60 % (ref 43–75)
NRBC BLD AUTO-RTO: 0 /100 WBCS
PLATELET # BLD AUTO: 258 THOUSANDS/UL (ref 149–390)
PMV BLD AUTO: 12.2 FL (ref 8.9–12.7)
POTASSIUM SERPL-SCNC: 3.9 MMOL/L (ref 3.5–5.3)
RA PRESSURE ESTIMATED: 3 MMHG
RBC # BLD AUTO: 5.07 MILLION/UL (ref 3.88–5.62)
RIGHT ATRIUM AREA SYSTOLE A4C: 19.4 CM2
RIGHT VENTRICLE ID DIMENSION: 4.5 CM
RV PSP: 35 MMHG
SL CV DOP CALC MV VTI RETROGRADE: 159.4 CM
SL CV LEFT ATRIUM LENGTH A2C: 6.1 CM
SL CV LV EF: 40
SL CV MV MEAN GRADIENT RETROGRADE: 63 MMHG
SL CV PED ECHO LEFT VENTRICLE DIASTOLIC VOLUME (MOD BIPLANE) 2D: 149 ML
SL CV PED ECHO LEFT VENTRICLE SYSTOLIC VOLUME (MOD BIPLANE) 2D: 95 ML
SODIUM SERPL-SCNC: 140 MMOL/L (ref 135–147)
TR MAX PG: 32 MMHG
TR PEAK VELOCITY: 2.8 M/S
TRICUSPID ANNULAR PLANE SYSTOLIC EXCURSION: 2 CM
TRICUSPID VALVE PEAK REGURGITATION VELOCITY: 2.82 M/S
WBC # BLD AUTO: 8.96 THOUSAND/UL (ref 4.31–10.16)

## 2023-08-07 PROCEDURE — 85025 COMPLETE CBC W/AUTO DIFF WBC: CPT | Performed by: INTERNAL MEDICINE

## 2023-08-07 PROCEDURE — 99232 SBSQ HOSP IP/OBS MODERATE 35: CPT | Performed by: INTERNAL MEDICINE

## 2023-08-07 PROCEDURE — 80048 BASIC METABOLIC PNL TOTAL CA: CPT | Performed by: INTERNAL MEDICINE

## 2023-08-07 PROCEDURE — 93306 TTE W/DOPPLER COMPLETE: CPT

## 2023-08-07 RX ORDER — COLCHICINE 0.6 MG/1
0.6 TABLET ORAL DAILY
Status: DISCONTINUED | OUTPATIENT
Start: 2023-08-08 | End: 2023-08-07

## 2023-08-07 RX ORDER — IBUPROFEN 600 MG/1
600 TABLET ORAL EVERY 8 HOURS SCHEDULED
Status: DISCONTINUED | OUTPATIENT
Start: 2023-08-07 | End: 2023-08-07

## 2023-08-07 RX ORDER — COLCHICINE 0.6 MG/1
0.6 TABLET ORAL 2 TIMES DAILY
Status: DISCONTINUED | OUTPATIENT
Start: 2023-08-07 | End: 2023-08-07

## 2023-08-07 RX ADMIN — METOPROLOL SUCCINATE 50 MG: 50 TABLET, EXTENDED RELEASE ORAL at 08:01

## 2023-08-07 RX ADMIN — SUCRALFATE 1 G: 1 TABLET ORAL at 15:45

## 2023-08-07 RX ADMIN — FUROSEMIDE 40 MG: 40 TABLET ORAL at 08:01

## 2023-08-07 RX ADMIN — METOPROLOL SUCCINATE 50 MG: 50 TABLET, EXTENDED RELEASE ORAL at 21:12

## 2023-08-07 RX ADMIN — ISOSORBIDE DINITRATE 10 MG: 10 TABLET ORAL at 17:31

## 2023-08-07 RX ADMIN — ACETAMINOPHEN 650 MG: 325 TABLET ORAL at 08:03

## 2023-08-07 RX ADMIN — SUCRALFATE 1 G: 1 TABLET ORAL at 12:08

## 2023-08-07 RX ADMIN — SPIRONOLACTONE 25 MG: 25 TABLET, FILM COATED ORAL at 08:01

## 2023-08-07 RX ADMIN — SUCRALFATE 1 G: 1 TABLET ORAL at 06:54

## 2023-08-07 RX ADMIN — HYDRALAZINE HYDROCHLORIDE 25 MG: 25 TABLET, FILM COATED ORAL at 06:54

## 2023-08-07 RX ADMIN — ATORVASTATIN CALCIUM 40 MG: 20 TABLET, FILM COATED ORAL at 15:45

## 2023-08-07 RX ADMIN — HYDRALAZINE HYDROCHLORIDE 25 MG: 25 TABLET, FILM COATED ORAL at 14:19

## 2023-08-07 RX ADMIN — LOSARTAN POTASSIUM 50 MG: 50 TABLET, FILM COATED ORAL at 08:01

## 2023-08-07 RX ADMIN — HEPARIN SODIUM 5000 UNITS: 5000 INJECTION INTRAVENOUS; SUBCUTANEOUS at 14:19

## 2023-08-07 RX ADMIN — HEPARIN SODIUM 5000 UNITS: 5000 INJECTION INTRAVENOUS; SUBCUTANEOUS at 21:13

## 2023-08-07 RX ADMIN — PANTOPRAZOLE SODIUM 40 MG: 40 TABLET, DELAYED RELEASE ORAL at 06:54

## 2023-08-07 RX ADMIN — SUCRALFATE 1 G: 1 TABLET ORAL at 21:12

## 2023-08-07 RX ADMIN — HEPARIN SODIUM 5000 UNITS: 5000 INJECTION INTRAVENOUS; SUBCUTANEOUS at 06:54

## 2023-08-07 RX ADMIN — ISOSORBIDE DINITRATE 10 MG: 10 TABLET ORAL at 08:01

## 2023-08-07 RX ADMIN — HYDRALAZINE HYDROCHLORIDE 25 MG: 25 TABLET, FILM COATED ORAL at 21:12

## 2023-08-07 NOTE — ASSESSMENT & PLAN NOTE
Presents to ED with left-sided sharp stabbing chest pain, most likely due to esophagitis  Currently patient is chest pain-free but complaining of discomfort around left rib cage area  On CT scan there is no evidence of rib fracture.   EKG show NSR, no acute ST-T changes, no evidence of pericarditis, troponin flat  Cardiac cath in 4/22 was normal.  CXR showed pulmonary edema   LUCIANA score = 3  Cardiologist on board -no aspirin for now  Statin 40 mg daily  Continue prehospital beta-blocker  cardiology consult; recommendations appreciated  Cardiac diet; low sodium   Currently on IV PPI and Carafate for esophagitis

## 2023-08-07 NOTE — ASSESSMENT & PLAN NOTE
· CTA PE study showed hazy groundglass densities in the dependent portions of the lower lobes, nonspecific but could be infectious or inflammatory in etiology. · Currently patient is afebrile, no leukocytosis hold off antibiotic for now  · Tiny hiatal hernia with mild wall thickening of the mid to distal esophagus. · Patient currently has chest discomfort but no difficulty with swallowing or eating. Felipe Fontana on Protonix empirically

## 2023-08-07 NOTE — CASE MANAGEMENT
Case Management Assessment & Discharge Planning Note    Patient name Loise Mohs  Location [facility] 706-01 MRN [id number]  : 1970 Date 2023       Current Admission Date: 2023  Current Admission Diagnosis:Chest pain, unspecified   Patient Active Problem List    Diagnosis Date Noted   • Chest pain, unspecified 2023   • Elevated d-dimer 2023   • Abnormal CT scan 2023   • Acute CHF (congestive heart failure) ([address]) 2022   • Pleural effusion, left 2022   • Hypertension 2022   • Left leg pain 2022   • Elevated serum creatinine 2022      LOS (days): 2  Geometric Mean LOS (GMLOS) (days): 3.90  Days to GMLOS:2     OBJECTIVE:    Risk of Unplanned Readmission Score: 8.99     Current admission status: Inpatient  Preferred Pharmacy:   [address] [place] [address], [address] [place] [address]  [address] [place] [address]  Phone: [phone] Fax: [address] [facility], [address] [place] [address]  Phone: [phone] Fax: [phone]    Primary Care Provider: No primary care provider on file.     Primary Insurance: PARVIN CABALLERO PENDING  Secondary Insurance:     ASSESSMENT:  [place] [place] [address] Representative - Significant Other   Primary Phone: [phone] (Home)                 Patient Information  Admitted from[de-identified] Home  Mental Status: Alert  During Assessment patient was accompanied by: Not accompanied during assessment  Assessment information provided by[de-identified] Patient  Primary Caregiver: Self  Support Systems: Spouse/significant other, Family members, [address] of Residence: [facility]  Type of Current Residence: Apartment  In the last 12 months, was there a time when you were not able to pay the mortgage or rent on time?: No  In the last 12 months, how many places have you lived?: 1  In the last 12 months, was there a time when you did not have a steady place to sleep or slept in a shelter (including now)?: No  Homeless/housing insecurity resource given?: N/A  Living Arrangements: Lives w/ Spouse/significant other, Lives w/ Daughter    Activities of Daily Living Prior to Admission  Functional Status: Independent  Completes ADLs independently?: Yes  Ambulates independently?: Yes  Does patient use assisted devices?: No  Does patient currently own DME?: No  Does patient have a history of Outpatient Therapy (PT/OT)?: No  Does the patient have a history of Short-Term Rehab?: No  Does patient have a history of HHC?: No  Does patient currently have [facility]?: No    Patient Information Continued  Income Source:  Other (Comment) (unable to work due to CHF)  Within the past 12 months, you worried that your food would run out before you got the money to buy more.: Never true  Within the past 12 months, the food you bought just didn't last and you didn't have money to get more.: Never true  Food insecurity resource given?: Refused  Does patient have a history of substance abuse?: No      Means of Transportation  Means of Transport to Appts[de-identified] Drives Self  In the past 12 months, has lack of transportation kept you from medical appointments or from getting medications?: No  In the past 12 months, has lack of transportation kept you from meetings, work, or from getting things needed for daily living?: No  Was application for public transport provided?: Refused        DISCHARGE DETAILS:    Discharge planning discussed with[de-identified] Patient  Freedom of Choice: Yes     CM contacted family/caregiver?: No- see comments  Were Treatment Team discharge recommendations reviewed with patient/caregiver?: Yes  Did patient/caregiver verbalize understanding of patient care needs?: Yes  Were patient/caregiver advised of the risks associated with not following Treatment Team discharge recommendations?: Yes    Contacts  Patient Contacts: SO Nonda Fritter  Relationship to Patient[de-identified] Family  Contact Method: Phone  Phone Number: [phone]  Reason/Outcome: Continuity of Care    DME Referral Provided  Referral made for DME?: Yes  DME referral completed for the following items[de-identified] Portable Oxygen concentrator, Home Oxygen concentrator  DME Supplier Name[de-identified] [employer]      CM spoke with patient. He is able to go back home with family. Patient is currently requiring oxygen and will get a home O2 study tomorrow 24 hours before discharge. Patient was asking if we could provide a doctors note for the disability office. Cm let patient know he needs to get this from his PCP. Patient referral for [facility] PCP sent. MA application is pending. CM will order home oxygen if needed. Patient states he will be able to get a ride home if it is after 5:30pm. He states he wants to walk home but this CM advised against it because of his shortness of breath. LYFT with portable oxygen tank will be provided if patient needs a ride. Cm to follow through discharge. CM sent email to [facility], hospital financial counselor and insurance verfication. Patient states he may not be able to pay for his medications.

## 2023-08-07 NOTE — UTILIZATION REVIEW
Initial Clinical Review    Admission: Date/Time/Statement:   Admission Orders (From admission, onward)     Ordered        08/05/23 1739  INPATIENT ADMISSION  Once                      Orders Placed This Encounter   Procedures   • INPATIENT ADMISSION     Standing Status:   Standing     Number of Occurrences:   1     Order Specific Question:   Level of Care     Answer:   Med Surg [16]     Order Specific Question:   Estimated length of stay     Answer:   More than 2 Midnights     Order Specific Question:   Certification     Answer:   I certify that inpatient services are medically necessary for this patient for a duration of greater than two midnights. See H&P and MD Progress Notes for additional information about the patient's course of treatment. ED Arrival Information     Expected   -    Arrival   8/5/2023 15:03    Acuity   Emergent            Means of arrival   Walk-In    Escorted by   Self    Service   Hospitalist    Admission type   Emergency            Arrival complaint   Chest pain           Chief Complaint   Patient presents with   • Chest Pain     Chest pain that started 1 and a half hours ago. Worse when he takes a deep breath. Patient also states he is dizzy. Initial Presentation: 48 y.o. male who presented  to [facility] ED. Inpatient admission for evaluation and treatment of aCHF exacerbation, with  Chest pain r/o ACS. PMHx: He  has a past medical history of, HTN, dyslipidemia, CKD stage 3,  Chronic HFrEF (heart failure with reduced ejection fraction), nonischemic cardiomyopathy, DX 4/22, most recent EF 30-35% AHA stage C heart failure. NYHA class II He reports he has been compliant with his medications. Presented to the ED with  C/o left -sided chest pain, described as sharp and  stabbing radiating down bilaterally to his rib cage. His pain is exacerbated with deep inspiration. He also reports SOB since last night with difficulty in laying flat.   He notes b/l foot swelling/ Cardiology Consult - 8/5 -  Consulted due to decompensated  Acute on chronic heart failure. Home regimen: metoprolol succinate 50 mg twice daily losartan 25 mg daily Aldactone 25 mg daily furosemide 20 mg twice daily. He has been in [facility] therapy, needs eval for possible ICD need. He reports he recently notes LE swelling and SOB which worsened last night. IN the ED  He had , NO PE on imaging, but suggestive of small left-sided pleural effusion. And a small hiatial hernia. There was no significant pulmonary vascular congestion reported. ECG showed sinus rhythm with possible biatrial enlargement with nonspecific ST-T wave abnormalities. PLAN; conitue Lasix - transition to Lasix 40 mg po on 8/6, increase losartan to 50 mg qd. Trial Pepcid to consider sucralfate if symptoms persist. If inflammatory markers are elevated and symptoms persist consider adding colchicine plus minus NSAIDs. D/c ASA therapy. If BP is noted to be elevated  Isosorbide dinitrate with hydralazine 10 mg each 3 x daily can be added. Echo on 8/7. Date: 8/6/2023   Day 2:  He reports he feels better than last night but has mild SOB. He is on 1L NC O2.  Currently he is chest pain free. CXR showed pulmonary edema. Currently on PPI and Carafate for esophagitis. Added isosorbide dinitrate and hydralazine 10mg tid, continue pre-hospital  Losartan, spironolactone, Toprol/ Lasix 40 mg po qd.      ED Triage Vitals   Temperature Pulse Respirations Blood Pressure SpO2   08/05/23 1515 08/05/23 1515 08/05/23 1515 08/05/23 1515 08/05/23 1515   99.2 °F (37.3 °C) 96 22 146/83 96 %      Temp Source Heart Rate Source Patient Position - Orthostatic VS BP Location FiO2 (%)   08/05/23 1515 08/05/23 1515 08/05/23 1515 08/05/23 1515 --   Tympanic Monitor Sitting Left arm       Pain Score       08/05/23 1837       5          Wt Readings from Last 1 Encounters:   08/07/23 93.3 kg (205 lb 11 oz)     Additional Vital Signs:     Date/Time Temp Pulse Resp BP MAP (mmHg) SpO2 Calculated FIO2 (%) - Nasal Cannula Nasal Cannula O2 Flow Rate (L/min) O2 Device Patient Position - Orthostatic VS   08/07/23 0732 97 °F (36.1 °C) Abnormal  79 18 121/84 91 91 % -- -- None (Room air) Lying   08/07/23 0654 -- -- -- 134/96 -- -- -- -- -- --   08/06/23 2200 97.1 °F (36.2 °C) Abnormal  76 18 121/80 85 96 % -- -- None (Room air) Lying   08/06/23 1644 -- 86 -- 114/73 -- -- -- -- -- Sitting   08/06/23 1347 97.8 °F (36.6 °C) 84 -- 127/87 -- -- -- -- -- Sitting   08/06/23 1038 97.8 °F (36.6 °C) 82 18 151/107 Abnormal  117 97 % 24 1 L/min Nasal cannula Lying   08/06/23 0836 -- -- -- -- -- 96 % -- -- None (Room air) --   08/06/23 0743 96.3 °F (35.7 °C) Abnormal  87 22 153/105 Abnormal  117 92 % -- -- None (Room air) Sitting   08/06/23 0000 -- -- -- -- -- 96 % 32 3 L/min Nasal cannula  --   O2 Device: pt. complained of SOB at 08/06/23 0000   08/05/23 2344 98.4 °F (36.9 °C) 82 18 155/100 117 93 % -- -- None (Room air) Lying   08/05/23 2050 -- 96 18 147/96 107 96 % -- -- None (Room air) Lying   08/05/23 1823 96.8 °F (36 °C) Abnormal  92 20 159/106 Abnormal  119 95 % -- -- None (Room air) Lying   08/05/23 1750 -- 90 20 158/101 Abnormal  -- 96 % 28 2 L/min Nasal cannula Lying   08/05/23 1600 -- 88 24 Abnormal  152/88 -- 95 % 28 2 L/min Nasal cannula Lying   08/05/23 1534 -- -- -- -- -- -- -- -- Nasal cannula --   08/05/23 1515 99.2 °F (37.3 °C) 96 22 146/83 -- 96 % -- -- None (Room air) Sitting       Pertinent Labs/Diagnostic Test Results:   CTA ED chest PE Study   Final Result by Kiesha Le MD (08/05 1725)      No CT evidence of pulmonary embolism. Small left pleural effusion again seen. Hazy groundglass densities in the dependent portions of the lower lobes, nonspecific but could be infectious or inflammatory in etiology. Tiny hiatal hernia with mild wall thickening of the mid to distal esophagus. Correlation for mild esophagitis advised.                               X-ray chest 1 view portable   ED Interpretation by Tony Feliz MD (71/89 [id number])   Pulmonary edema      Final Result by Giuseppe Saavedra MD (08/05 2220)      Pulmonary edema and small left pleural effusion.                             ECG 8/5 -  NSR, no acute ST-T changes  ECHO 8/5 - pending read    Results from last 7 days   Lab Units 08/05/23  2336   SARS-COV-2  Negative     Results from last 7 days   Lab Units 08/07/23 0449 08/06/23 0612 08/05/23  1523   WBC Thousand/uL 8.96 11.50* 7.51   HEMOGLOBIN g/dL 15.7 16.5 14.8   HEMATOCRIT % 48.1 51.8* 45.3   PLATELETS Thousands/uL 258 271 232   NEUTROS ABS Thousands/µL 5.45  --  5.50         Results from last 7 days   Lab Units 08/07/23  0449 08/06/23  0612 08/05/23  1523   SODIUM mmol/L 140 141 143   POTASSIUM mmol/L 3.9 4.3 4.5   CHLORIDE mmol/L 103 105 107   CO2 mmol/L 27 27 26   ANION GAP mmol/L 10 9 10   BUN mg/dL 18 19 14   CREATININE mg/dL 1.25 1.33* 1.20   EGFR ml/min/1.73sq m 66 61 69   CALCIUM mg/dL 8.9 9.2 9.1     Results from last 7 days   Lab Units 08/06/23  0612 08/05/23  1523   AST U/L 15 15   ALT U/L 22 23   ALK PHOS U/L 100 98   TOTAL PROTEIN g/dL 7.1 6.6   ALBUMIN g/dL 4.3 4.0   TOTAL BILIRUBIN mg/dL 0.60 0.56       Results from last 7 days   Lab Units 08/07/23  0449 08/06/23  0612 08/05/23  1523   GLUCOSE RANDOM mg/dL 91 126 146*       Results from last 7 days   Lab Units 08/05/23  1924 08/05/23  1748 08/05/23  1523   HS TNI 0HR ng/L  --   --  16   HS TNI 2HR ng/L  --  16  --    HSTNI D2 ng/L  --  0  --    HS TNI 4HR ng/L 17  --   --    HSTNI D4 ng/L 1  --   --      Results from last 7 days   Lab Units 08/05/23  1523   D-DIMER QUANTITATIVE ug/ml FEU 0.91*       Results from last 7 days   Lab Units 08/05/23  1523   BNP pg/mL 244*       Results from last 7 days   Lab Units 08/05/23  1523   CRP mg/L 11.9*       Results from last 7 days   Lab Units 08/05/23  2336   INFLUENZA A PCR  Negative   INFLUENZA B PCR  Negative   RSV PCR  Negative       ED Treatment:   Medication Administration from 08/05/2023 1503 to 08/05/2023 1808       Date/Time Order Dose Route Action Comments     08/05/2023 1529 EDT nitroglycerin (NITROSTAT) SL tablet 0.4 mg 0.4 mg Sublingual Given --     08/05/2023 1528 EDT aspirin chewable tablet 324 mg 324 mg Oral Given --     08/05/2023 1605 EDT furosemide (LASIX) injection 20 mg 20 mg Intravenous Given --     08/05/2023 1618 EDT iohexol (OMNIPAQUE) 350 MG/ML injection (SINGLE-DOSE) 85 mL 85 mL Intravenous Given --        Past Medical History:   Diagnosis Date   • Chronic HFrEF (heart failure with reduced ejection fraction) ([address]) 04/01/2022     Present on Admission:  • Acute CHF (congestive heart failure) (HCC)  • Hypertension  • Elevated serum creatinine  • Pleural effusion, left      Admitting Diagnosis: Chest pain [R07.9]  CHF exacerbation (HCC) [I50.9]  Age/Sex: 48 y.o. male         Admission Orders:  Scheduled Medications:  atorvastatin, 40 mg, Oral, Daily With Dinner  furosemide, 40 mg, Oral, Daily- started 8/6  heparin (porcine), 5,000 Units, Subcutaneous, Q8H JONAS  hydrALAZINE, 25 mg, Oral, Q8H JONAS  isosorbide dinitrate, 10 mg, Oral, TID after meals  losartan, 50 mg, Oral, Daily  metoprolol succinate, 50 mg, Oral, Q12H JONAS  pantoprazole, 40 mg, Oral, Early Morning  spironolactone, 25 mg, Oral, Daily  sucralfate, 1 g, Oral, 4x Daily (AC & HS)      Continuous IV Infusions:     PRN Meds:  acetaminophen, 650 mg, Oral, Q6H PRN- 8/7 x 1   nitroglycerin, 0.4 mg, Sublingual, Q5 Min PRN  ondansetron, 4 mg, Intravenous, Q6H PRN  polyethylene glycol, 17 g, Oral, Daily PRN  simethicone, 80 mg, Oral, 4x Daily PRN      Nursing Orders - VS - daily weights - SCD's to le's - Strict I/O's  q shift - up - OOB as tolerated - Diet cardiac - 2 GM NA, no caffeine, Fluid restriction 1500 ml    Network Utilization Review Department  ATTENTION: Please call with any questions or concerns to [phone] and carefully listen to the prompts so that you are directed to the right person. All voicemails are confidential.  Ramy Ortega all requests for admission clinical reviews, approved or denied determinations and any other requests to dedicated fax number below belonging to the campus where the patient is receiving treatment.  List of dedicated fax numbers for the Facilities:  [facility] (Administrative/Medical Necessity) [phone] [address] [facility] (Maternity/NICU/Pediatrics) [phone]   [address] [place] [address] [phone]   [facility] [phone]   [address] [place] [address] [phone]   [address] [address] [phone]

## 2023-08-07 NOTE — ASSESSMENT & PLAN NOTE
· CTA PE study showed small left pleural effusion  · Patient is currently on RA with SPO2 94%  · Currently p.o.  Lasix daily  · Continue to monitor

## 2023-08-07 NOTE — ASSESSMENT & PLAN NOTE
Wt Readings from Last 3 Encounters:   08/07/23 93.3 kg (205 lb 11 oz)   05/19/22 98 kg (216 lb)   05/06/22 96.2 kg (212 lb)       Presents to ED with shortness of breath, bilateral lower extremity swelling  • ED diuresed with IV Lasix 40 mg once  • Take p.o. Lasix 20 mg twice daily for diuresis as outpatient   • CXR possible pulmonary edema  •   • EKG showed NSR, no acute ST-T changes  • ECHO revealed LVEF 30% in 2022  o Repeat ECHO ordered  • Transition to p.o. Lasix 40 mg daily. Increase losartan to 50 mg daily. Discontinue aspirin for now.   • Daily weights, Strict I & Os  • Cardiac diet, low sodium <2g, fluid restriction <1500

## 2023-08-07 NOTE — PROGRESS NOTES
[address] [facility]  Progress Note  Name: Edward Jones  MRN: [id number]  Unit/Bed#: 7T [facility] 706-01 I Date of Admission: 8/5/2023   Date of Service: 8/7/2023 I Hospital Day: 2    Assessment/Plan   * Chest pain, unspecified  Assessment & Plan  Presents to ED with left-sided sharp stabbing chest pain, most likely due to esophagitis  Currently patient is chest pain-free but complaining of discomfort around left rib cage area  On CT scan there is no evidence of rib fracture. EKG show NSR, no acute ST-T changes, no evidence of pericarditis, troponin flat  Cardiac cath in 4/22 was normal.  CXR showed pulmonary edema   LUCIANA score = 3  Cardiologist on board -no aspirin for now  Statin 40 mg daily  Continue prehospital beta-blocker  cardiology consult; recommendations appreciated  Cardiac diet; low sodium   Currently on IV PPI and Carafate for esophagitis        Abnormal CT scan  Assessment & Plan  · CTA PE study showed hazy groundglass densities in the dependent portions of the lower lobes, nonspecific but could be infectious or inflammatory in etiology. · Currently patient is afebrile, no leukocytosis hold off antibiotic for now  · Tiny hiatal hernia with mild wall thickening of the mid to distal esophagus. · Patient currently has chest discomfort but no difficulty with swallowing or eating. .  Started on Protonix empirically    Elevated d-dimer  Assessment & Plan  · CTA PE study was negative for acute PE    Elevated serum creatinine  Assessment & Plan  · Creatinine seems to be around his baseline  · Avoid nephrotoxic agent, hypotension  · Follow BMP    Hypertension  Assessment & Plan  · BP continues to be elevated, added isosorbide dinitrate and hydralazine 10 mg 3 times a day  · Continue prehospital losartan, spironolactone, Toprol  · Currently on p.o.  Lasix 40 mg daily    Pleural effusion, left  Assessment & Plan  · CTA PE study showed small left pleural effusion  · Patient is currently on RA with SPO2 94%  · Currently p.o. Lasix daily  · Continue to monitor    Acute CHF (congestive heart failure) (HCC)  Assessment & Plan  Wt Readings from Last 3 Encounters:   08/07/23 93.3 kg (205 lb 11 oz)   05/19/22 98 kg (216 lb)   05/06/22 96.2 kg (212 lb)       Presents to ED with shortness of breath, bilateral lower extremity swelling  • ED diuresed with IV Lasix 40 mg once  • Take p.o. Lasix 20 mg twice daily for diuresis as outpatient   • CXR possible pulmonary edema  •   • EKG showed NSR, no acute ST-T changes  • ECHO revealed LVEF 30% in 2022  o Repeat ECHO ordered  • Transition to p.o. Lasix 40 mg daily. Increase losartan to 50 mg daily. Discontinue aspirin for now. • Daily weights, Strict I & Os  • Cardiac diet, low sodium <2g, fluid restriction <1500             VTE Pharmacologic Prophylaxis:   Pharmacologic: Heparin  Mechanical VTE Prophylaxis in Place: Yes    Patient Centered Rounds: I have performed bedside rounds with nursing staff today. Discussions with Specialists or Other Care Team Provider: Discussed with cardiologist    Education and Discussions with Family / Patient: Discussed with patient. Patient declined calling a family member    Time Spent for Care: 35 minutes. This time was spent on one or more of the following: performing physical exam; counseling and coordination of care; obtaining or reviewing history; documenting in the medical record; reviewing/ordering tests, medications or procedures; communicating with other healthcare professionals and discussing with patient's family/caregivers. Current Length of Stay: 2 day(s)    Current Patient Status: Inpatient   Certification Statement: The patient will continue to require additional inpatient hospital stay due to CHF exacerbation, chest pain    Discharge Plan / Estimated Discharge Date: Pending clinical course      Code Status: Level 1 - Full Code      Subjective:   Patient was seen and examined at bedside.  The patient has intermittent pain at lower left rib cage area, seems to be better compared to admission time. CT does not show any evidence of rib fracture. Patient denies any shortness of breath, palpitation, nausea, vomiting. No acute overnight changes. Objective:     Vitals:   Temp (24hrs), Av.3 °F (36.3 °C), Min:97 °F (36.1 °C), Max:97.8 °F (36.6 °C)    Temp:  [97 °F (36.1 °C)-97.8 °F (36.6 °C)] 97 °F (36.1 °C)  HR:  [76-86] 79  Resp:  [18] 18  BP: (114-134)/(73-96) 121/84  SpO2:  [91 %-96 %] 91 %  Body mass index is 27.9 kg/m². Input and Output Summary (last 24 hours): Intake/Output Summary (Last 24 hours) at 2023 1102  Last data filed at 2023 0855  Gross per 24 hour   Intake 720 ml   Output --   Net 720 ml       Physical Exam:     Physical Exam  General: no acute distress  HEENT: NC/AT, PERRL, EOM - normal  Neck: Supple  Pulm/Chest: Normal chest wall expansion, clear breath sounds on both side  CVS: RRR, normal S1&S2, capillary refill <2s  Abd: soft, non tender, non distended, bowel sounds +  MSK: move all 4 extremities spontaneously  Skin: warm  CNS: no acute focal neuro deficit      Additional Data:     Labs:    Results from last 7 days   Lab Units 23  0449   WBC Thousand/uL 8.96   HEMOGLOBIN g/dL 15.7   HEMATOCRIT % 48.1   PLATELETS Thousands/uL 258   NEUTROS PCT % 60   LYMPHS PCT % 21   MONOS PCT % 7   EOS PCT % 10*     Results from last 7 days   Lab Units 23  0449 23  0612   POTASSIUM mmol/L 3.9 4.3   CHLORIDE mmol/L 103 105   CO2 mmol/L 27 27   BUN mg/dL 18 19   CREATININE mg/dL 1.25 1.33*   CALCIUM mg/dL 8.9 9.2   ALK PHOS U/L  --  100   ALT U/L  --  22   AST U/L  --  15           * I Have Reviewed All Lab Data Listed Above. * Additional Pertinent Lab Tests Reviewed: [address] Admission Reviewed    Imaging:      I have reviewed pertinent imaging.       Recent Cultures (last 7 days):           Last 24 Hours Medication List:   Current Facility-Administered Medications   Medication Dose Route Frequency Provider Last Rate   • acetaminophen  650 mg Oral Q6H PRN Alonzo Castaneda MD     • atorvastatin  40 mg Oral Daily With Martin Small MD     • furosemide  40 mg Oral Daily Alonzo Castaneda MD     • heparin (porcine)  5,000 Units Subcutaneous [facility] [employer], MD     • hydrALAZINE  25 mg Oral [facility] Alonzo Castaneda MD     • isosorbide dinitrate  10 mg Oral TID after meals [employer], MD     • losartan  50 mg Oral Daily Danielle Law MD     • metoprolol succinate  50 mg Oral Q12H [facility] Alonzo Castaneda MD     • nitroglycerin  0.4 mg Sublingual Q5 Min PRN Avinash Beltran MD     • ondansetron  4 mg Intravenous Q6H PRN Alonzo Castaneda MD     • pantoprazole  40 mg Oral Early Morning Alonzo Castaneda MD     • polyethylene glycol  17 g Oral Daily PRN Alonzo Castaneda MD     • simethicone  80 mg Oral 4x Daily PRN Alonzo Castaneda MD     • spironolactone  25 mg Oral Daily Alonzo Castaneda MD     • sucralfate  1 g Oral 4x Daily ([facility]) Alonzo Castaneda MD          Today, Patient Was Seen By: Alonzo Castaneda MD    ** Please Note: Dragon 360 Dictation voice to text software may have been used in the creation of this document.  **

## 2023-08-07 NOTE — QUICK NOTE
Reviewed echo    Compared to prior echocardiogram on file the LV function appears to be slightly improved and the pulmonary artery pressure has also improved    Blood pressure is now controlled, patient is on p.o. diuretics. Continue current medication regimen.  Will follow prn

## 2023-08-08 ENCOUNTER — TELEPHONE (OUTPATIENT)
Dept: FAMILY MEDICINE CLINIC | Facility: CLINIC | Age: 53
End: 2023-08-08

## 2023-08-08 VITALS
HEIGHT: 72 IN | OXYGEN SATURATION: 94 % | DIASTOLIC BLOOD PRESSURE: 72 MMHG | TEMPERATURE: 96.2 F | WEIGHT: 204.59 LBS | HEART RATE: 75 BPM | SYSTOLIC BLOOD PRESSURE: 104 MMHG | RESPIRATION RATE: 18 BRPM | BODY MASS INDEX: 27.71 KG/M2

## 2023-08-08 PROCEDURE — 99239 HOSP IP/OBS DSCHRG MGMT >30: CPT | Performed by: INTERNAL MEDICINE

## 2023-08-08 RX ORDER — ATORVASTATIN CALCIUM 40 MG/1
40 TABLET, FILM COATED ORAL
Qty: 30 TABLET | Refills: 0 | Status: SHIPPED | OUTPATIENT
Start: 2023-08-08 | End: 2023-08-29 | Stop reason: SDUPTHER

## 2023-08-08 RX ORDER — LOSARTAN POTASSIUM 50 MG/1
50 TABLET ORAL DAILY
Qty: 30 TABLET | Refills: 0 | Status: SHIPPED | OUTPATIENT
Start: 2023-08-09 | End: 2023-08-14 | Stop reason: SDUPTHER

## 2023-08-08 RX ORDER — PANTOPRAZOLE SODIUM 40 MG/1
40 TABLET, DELAYED RELEASE ORAL
Qty: 30 TABLET | Refills: 0 | Status: SHIPPED | OUTPATIENT
Start: 2023-08-09

## 2023-08-08 RX ORDER — SUCRALFATE 1 G/1
1 TABLET ORAL
Qty: 120 TABLET | Refills: 0 | Status: SHIPPED | OUTPATIENT
Start: 2023-08-08

## 2023-08-08 RX ORDER — FUROSEMIDE 40 MG/1
40 TABLET ORAL DAILY
Qty: 30 TABLET | Refills: 0 | Status: SHIPPED | OUTPATIENT
Start: 2023-08-09 | End: 2023-08-29 | Stop reason: SDUPTHER

## 2023-08-08 RX ORDER — BENZONATATE 100 MG/1
100 CAPSULE ORAL 3 TIMES DAILY PRN
Status: DISCONTINUED | OUTPATIENT
Start: 2023-08-08 | End: 2023-08-08 | Stop reason: HOSPADM

## 2023-08-08 RX ORDER — HYDRALAZINE HYDROCHLORIDE 25 MG/1
25 TABLET, FILM COATED ORAL EVERY 8 HOURS SCHEDULED
Qty: 90 TABLET | Refills: 0 | Status: SHIPPED | OUTPATIENT
Start: 2023-08-08 | End: 2023-08-29

## 2023-08-08 RX ORDER — ISOSORBIDE DINITRATE 10 MG/1
10 TABLET ORAL
Qty: 90 TABLET | Refills: 0 | Status: SHIPPED | OUTPATIENT
Start: 2023-08-08 | End: 2023-08-29

## 2023-08-08 RX ADMIN — SUCRALFATE 1 G: 1 TABLET ORAL at 11:39

## 2023-08-08 RX ADMIN — PANTOPRAZOLE SODIUM 40 MG: 40 TABLET, DELAYED RELEASE ORAL at 06:08

## 2023-08-08 RX ADMIN — ISOSORBIDE DINITRATE 10 MG: 10 TABLET ORAL at 08:12

## 2023-08-08 RX ADMIN — SPIRONOLACTONE 25 MG: 25 TABLET, FILM COATED ORAL at 08:12

## 2023-08-08 RX ADMIN — SUCRALFATE 1 G: 1 TABLET ORAL at 06:08

## 2023-08-08 RX ADMIN — LOSARTAN POTASSIUM 50 MG: 50 TABLET, FILM COATED ORAL at 08:12

## 2023-08-08 RX ADMIN — BENZONATATE 100 MG: 100 CAPSULE ORAL at 01:18

## 2023-08-08 RX ADMIN — ISOSORBIDE DINITRATE 10 MG: 10 TABLET ORAL at 11:39

## 2023-08-08 RX ADMIN — FUROSEMIDE 40 MG: 40 TABLET ORAL at 08:12

## 2023-08-08 RX ADMIN — HEPARIN SODIUM 5000 UNITS: 5000 INJECTION INTRAVENOUS; SUBCUTANEOUS at 06:08

## 2023-08-08 RX ADMIN — HYDRALAZINE HYDROCHLORIDE 25 MG: 25 TABLET, FILM COATED ORAL at 06:08

## 2023-08-08 RX ADMIN — METOPROLOL SUCCINATE 50 MG: 50 TABLET, EXTENDED RELEASE ORAL at 08:12

## 2023-08-08 NOTE — TELEPHONE ENCOUNTER
----- Message from Panfilo Fernandez, [address] sent at 8/8/2023 11:31 AM EDT -----  Please contact pt for a new pt visit (40min) thank you   ----- Message -----  From: Luís Ford RN  Sent: 8/7/2023   8:46 AM EDT  To: [facility] Amelie Clinical    Good morning,    This patient will need a new PCP. Please evaluate.  Thank you :)

## 2023-08-08 NOTE — NURSING NOTE
Went over discharge instructions with patient using the . Pt will  meds at community pharmacy. Went over medication education, CHF education, and chest pain education. Pt has belongings. IV d/c'd, pt is requesting to speak to CM before discharge.

## 2023-08-09 NOTE — DISCHARGE SUMMARY
Discharge Summary - [facility]  Patient: Reshma Huerta 48 y.o. male   MRN: [id number]  PCP: No primary care provider on file. Unit/Bed#: 7T [facility] 706-01 Encounter: [id number]            Discharging Physician / Practitioner: Rosalina Aguilera MD  PCP: No primary care provider on file. Admission Date:   Admission Orders (From admission, onward)     Ordered        08/05/23 1739  INPATIENT ADMISSION  Once                      Discharge Date: 08/08/23      Reason for Admission:  Chest discomfort      Discharge Diagnoses:     Principal Problem:    Acute on chronic systolic CHF    Active Problems:    Left pleural effusion    Essential hypertension    Chronic kidney disease stage 3    GERD      Consultations During Hospital Stay:  · Cardiology      Hospital Course: Reshma Huerta is a 48 y.o. male patient who originally presented to the hospital on 8/5/2023 due to complaints of progressive chest discomfort and intermittent shortness of breath. He was found to be in acute on chronic systolic CHF, initially treated with IV diuretics, with subsequent transition to an oral Lasix regimen, in addition to continuation of Aldactone/Cozaar, and addition of Isordil/Hydralazine by cardiology. With progressive improvement in his condition, he will be discharged home, with instructions of outpatient follow-up. He has been counseled on a low-sodium diet and medication compliance. Condition at Discharge: fair       Discharge Day Visit / Exam:     Vitals:  Blood Pressure: 104/72 (08/08/23 1345)  Pulse: 75 (08/08/23 1119)  Temperature: (!) 96.2 °F (35.7 °C) (08/08/23 0725)  Temp Source: Temporal (08/08/23 0725)  Respirations: 18 (08/08/23 0725)  Height: 6' (182.9 cm) (08/07/23 1100)  Weight - Scale: 92.8 kg (204 lb 9.4 oz) (08/08/23 0600)  SpO2: 94 % (08/08/23 1037)      Physical exam:  I had a face-to-face encounter with the patient on day of discharge.       Discussion with Patient and/or Family: The patient has been advised to return to the ER immediately if any symptoms recur or worsen. Discharge instructions/Information to Patient and/or Family:   See after visit summary for information provided to patient and/or family. Provisions for Follow-Up Care:  See after visit summary for information related to follow-up care and any pertinent home health orders. Disposition:   Home      Discharge Medications:  See after visit summary for reconciled discharge medications provided to patient and/or family. Discharge Statement:  I spent 38 minutes discharging the patient. This time was spent on the day of discharge. I had direct contact with the patient on the day of discharge. Greater than 50% of the total time was spent examining patient, answering all patient questions, arranging and discussing plan of care with patient as well as directly providing post-discharge instructions. Additional time then spent on discharge activities. ** Please Note: This note is constructed using a voice recognition dictation system. An occasional wrong word/phrase or “sound-a-like” substitution may have been picked up by dictation device due to the inherent limitations of voice recognition software. Read the chart carefully and recognize, using reasonable context, where substitutions may have occurred. **

## 2023-08-14 ENCOUNTER — TELEPHONE (OUTPATIENT)
Dept: FAMILY MEDICINE CLINIC | Facility: CLINIC | Age: 53
End: 2023-08-14

## 2023-08-14 ENCOUNTER — OFFICE VISIT (OUTPATIENT)
Dept: FAMILY MEDICINE CLINIC | Facility: CLINIC | Age: 53
End: 2023-08-14

## 2023-08-14 VITALS
TEMPERATURE: 98.6 F | WEIGHT: 206 LBS | BODY MASS INDEX: 27.9 KG/M2 | RESPIRATION RATE: 16 BRPM | HEIGHT: 72 IN | OXYGEN SATURATION: 99 % | SYSTOLIC BLOOD PRESSURE: 150 MMHG | HEART RATE: 96 BPM | DIASTOLIC BLOOD PRESSURE: 72 MMHG

## 2023-08-14 DIAGNOSIS — Z59.89 DOES NOT HAVE HEALTH INSURANCE: ICD-10-CM

## 2023-08-14 DIAGNOSIS — Z76.89 ENCOUNTER TO ESTABLISH CARE: Primary | ICD-10-CM

## 2023-08-14 DIAGNOSIS — I10 PRIMARY HYPERTENSION: ICD-10-CM

## 2023-08-14 DIAGNOSIS — I50.42 CHRONIC COMBINED SYSTOLIC AND DIASTOLIC CONGESTIVE HEART FAILURE (HCC): ICD-10-CM

## 2023-08-14 PROBLEM — Z59.71 DOES NOT HAVE HEALTH INSURANCE: Status: ACTIVE | Noted: 2023-08-14

## 2023-08-14 PROBLEM — I50.41 ACUTE COMBINED SYSTOLIC AND DIASTOLIC CONGESTIVE HEART FAILURE (HCC): Status: ACTIVE | Noted: 2022-04-01

## 2023-08-14 PROCEDURE — 99203 OFFICE O/P NEW LOW 30 MIN: CPT | Performed by: INTERNAL MEDICINE

## 2023-08-14 RX ORDER — LOSARTAN POTASSIUM 50 MG/1
50 TABLET ORAL DAILY
Qty: 30 TABLET | Refills: 2 | Status: SHIPPED | OUTPATIENT
Start: 2023-08-14

## 2023-08-14 SDOH — ECONOMIC STABILITY - INCOME SECURITY: OTHER PROBLEMS RELATED TO HOUSING AND ECONOMIC CIRCUMSTANCES: Z59.89

## 2023-08-14 NOTE — PROGRESS NOTES
Name: Shamika Yoo      : 1970      MRN: [id number]  Encounter Provider: Javier Walsh MD  Encounter Date: 2023   Encounter department: [address] [facility]     1. Encounter to establish care  Assessment & Plan:  BP slightly elevated and edema of LEs. Have reviewed pt history and sent message to referral team for help with cardio appointment. Once medication has been optimize will review need for colonoscopy and hep c screenings. 2. Primary hypertension  Assessment & Plan:  BP Readings from Last 3 Encounters:   23 150/72   23 104/72   22 120/78     BP elevated today likely to walking into clinic from home. Have reviewed medications with pt and he will bring in medications from home so I can subhash them to help him with differentiating dosaging. 3. Chronic combined systolic and diastolic congestive heart failure [facility])  Assessment & Plan:  Wt Readings from Last 3 Encounters:   23 93.4 kg (206 lb)   23 92.8 kg (204 lb 9.4 oz)   22 98 kg (216 lb)     Base weight appears to be ~200-205lbs. Currently taking medication as directed. Have reached out to referral team for assistance in scheduling cardio follow up. No SOB present today though still having some pitting edema in bilateral LEs.    - Continue Losartan 50mg daily, spironolactone 25mg, metoprolol 50mg BID, idordil 10mg TID, hydralazine 25mg TID (recommended regimen per cardio note on 2023)  - follow up with cardiology  - reviewed recent echo 23  - Counseled on continuing daily weight checks and low Na diet. Orders:  -     losartan (COZAAR) 50 mg tablet; Take 1 tablet (50 mg total) by mouth daily    4. Does not have health insurance  Assessment & Plan:  Significant barrier to maintaining appropriate care. Currently speaking with financial services for assistance.            Subjective      54yo male presenting to establish care after recent hospitalization. Pt was dx with acute on chronic CHF and was evaluated by cardiology with changes being done to previous medications. Today pt expresses improvement in breathing and lower extremity swelling. He does state he has some trouble with medications as there are quite a few and occasionally hard to keep track of. Has not made appointment for cardiology follow up. Review of Systems   Constitutional: Negative for chills and fever. Respiratory: Negative for cough and shortness of breath. Cardiovascular: Negative for chest pain. Gastrointestinal: Negative for abdominal pain, blood in stool, constipation, diarrhea, nausea and vomiting. Genitourinary: Negative for dysuria and hematuria. Neurological: Negative for dizziness, seizures, syncope and headaches. Current Outpatient Medications on File Prior to Visit   Medication Sig   • atorvastatin (LIPITOR) 40 mg tablet Take 1 tablet (40 mg total) by mouth daily with dinner   • furosemide (LASIX) 40 mg tablet Take 1 tablet (40 mg total) by mouth daily Do not start before August 9, 2023. • hydrALAZINE (APRESOLINE) 25 mg tablet Take 1 tablet (25 mg total) by mouth every 8 (eight) hours   • isosorbide dinitrate (ISORDIL) 10 mg tablet Take 1 tablet (10 mg total) by mouth 3 (three) times daily after meals   • metoprolol succinate (TOPROL-XL) 50 mg 24 hr tablet Take 1 tablet (50 mg total) by mouth every 12 (twelve) hours   • pantoprazole (PROTONIX) 40 mg tablet Take 1 tablet (40 mg total) by mouth daily in the early morning Do not start before August 9, 2023. • spironolactone (ALDACTONE) 25 mg tablet Take 1 tablet (25 mg total) by mouth in the morning. • sucralfate (CARAFATE) 1 g tablet Take 1 tablet (1 g total) by mouth 4 (four) times a day (before meals and at bedtime)   • [DISCONTINUED] losartan (COZAAR) 50 mg tablet Take 1 tablet (50 mg total) by mouth daily Do not start before August 9, 2023.        Objective     /72 (BP Location: Right arm, Patient Position: Sitting, Cuff Size: Standard)   Pulse 96   Temp 98.6 °F (37 °C) (Temporal)   Resp 16   Ht 6' (1.829 m)   Wt 93.4 kg (206 lb)   SpO2 99%   BMI 27.94 kg/m²     Physical Exam  Vitals and nursing note reviewed. Constitutional:       General: He is not in acute distress. Appearance: Normal appearance. He is not ill-appearing, toxic-appearing or diaphoretic. HENT:      Head: Normocephalic and atraumatic. Nose: Nose normal.   Eyes:      General: No scleral icterus. Right eye: No discharge. Left eye: No discharge. Conjunctiva/sclera: Conjunctivae normal.   Cardiovascular:      Rate and Rhythm: Normal rate and regular rhythm. Pulses: Normal pulses. Heart sounds: Normal heart sounds. No murmur heard. No friction rub. No gallop. Pulmonary:      Effort: Pulmonary effort is normal. No respiratory distress. Breath sounds: Normal breath sounds. No stridor. No wheezing, rhonchi or rales. Chest:      Chest wall: No tenderness. Abdominal:      General: There is no distension. Palpations: Abdomen is soft. Tenderness: There is no abdominal tenderness. There is no right CVA tenderness, left CVA tenderness, guarding or rebound. Musculoskeletal:         General: Normal range of motion. Cervical back: Normal range of motion. Right lower le+ Pitting Edema present. Left lower le+ Pitting Edema present. Skin:     General: Skin is warm and dry. Coloration: Skin is not jaundiced or pale. Findings: No bruising, erythema, lesion or rash. Neurological:      General: No focal deficit present. Mental Status: He is alert. Sensory: No sensory deficit. Motor: No weakness.    Psychiatric:         Mood and Affect: Mood normal.         Behavior: Behavior normal.       Oscar Owen MD

## 2023-08-14 NOTE — ASSESSMENT & PLAN NOTE
Significant barrier to maintaining appropriate care. Currently speaking with financial services for assistance.

## 2023-08-14 NOTE — PATIENT INSTRUCTIONS
Insuficiencia cardíaca   LO QUE NECESITA SABER:   La insuficiencia cardíaca es magali condición que no permite que louis corazón se llene o bombee correctamente. No hay suficiente oxígeno en la breezy que llega a stepan órganos y tejidos. Es posible que el líquido no se mueva adecuadamente a través del cuerpo. El líquido se puede acumular y causar hinchazón y dificultad para respirar. [place] se conoce marilyn insuficiencia cardíaca congestiva. La insuficiencia cardíaca puede comenzar en el ventrículo merissa o derecho. La insuficiencia cardíaca a menudo es causada por daños o lesiones en el corazón. El daño puede ser causado por otros problemas del corazón, diabetes o presión arterial mateo. El daño también puede magan sido causado por magali infección. La insuficiencia cardíaca es magali afección a jen plazo que tiende a empeorar con el transcurso del Jj. Es importante controlar louis grisel para mejorar louis calidad de atif. INSTRUCCIONES SOBRE EL MATEO HOSPITALARIA:   Llame al Ganga Rosie de emergencias local (911 en los [place]) si:  Earna Maxine de los siguientes signos de un ataque cardíaco:      Estrujamiento, presión o tensión en louis pecho    Usted también podría presentar alguno de los siguientes:     Malestar o dolor en louis espalda, tyshawn, mandíbula, abdomen, o brazo    Falta de [employer] o vómitos    Desvanecimiento o sudor frío repentino      Regrese a la claudio de emergencias si:  Louis latido cardíaco es acelerado, lento o irregular todo el tiempo. Llame a louis médico si:  Usted tiene síntomas de que la insuficiencia cardíaca está empeorando:     Falta de Aleman Villarreal en reposo o terrence la noche, o que está empeorando de cualquier forma.     Usted aumenta más de 3 libras (1.4 kg) en un día, o más de lo que louis médico le indica que podría aumentar    Inflamación en exceso en stepan piernas o tobillos    Dolor o inflamación abdominal    Aumento de la tos    Pérdida del apetito    Sensación de cansancio todo el tiempo    Usted se siente deprimido, o ha perdido el interés en las cosas que antes disfrutaba. Usted se siente preocupado o tiene miedo con frecuencia. Usted tiene preguntas o inquietudes acerca de ordoñez condición o cuidado. Medicamentos:  Los medicamentos pueden ser necesarios para ayudar a regular ordoñez ritmo cardíaco. Puede que también necesite medicamentos para bajar la presión arterial y para disminuir el exceso de líquidos. [place] stepan medicamentos marilyn se le haya indicado. Consulte con ordoñez médico si usted génesis que ordoñez medicamento no le está ayudando o si presenta efectos secundarios. Infórmele al médico si usted es alérgico a algún medicamento. Mantenga magali lista actualizada de los [place], las vitaminas y los productos herbales que julio c. Incluya los siguientes datos de los medicamentos: cantidad, frecuencia y motivo de administración. Traiga con usted la lista o los envases de las píldoras a stepan citas de seguimiento. Lleve la lista de los medicamentos con usted en kathryn de magali emergencia. Acuda a la rehabilitación cardíaca según le indicaron: La rehabilitación cardíaca es un programa dirigido por especialistas que le ayudan a fortalecer ordoñez corazón de forma romero. El programa incluye ejercicios, relajación, manejo del estrés y nutrición para un corazón saludable. Controle la hinchazón causada por el líquido acumulado:  Eleve las piernas por encima del nivel de ordoñez corazón. [place] ayuda con el líquido que se acumula en las piernas o los tobillos. Eleve las piernas lo más frecuente posible terrence el día. Coloque stepan piernas sobre almohadas o cobijas para mantenerlas elevadas cómodamente. Trate de no permanecer de pie terrence largos períodos de [employer]. Muévase para mantener la circulación de la breezy. Limite el consumo de sodio (sal). Pregunte cuánto sodio puede consumir cada día. Ordoñez médico podría indicarle un límite, marilyn 2,300 miligramos (mg) al día.  Ordoñez médico o un dietista pueden mostrarle cómo leer las etiquetas de los alimentos para determinar la cantidad de mg en un alimento. También puede ayudarlo a encontrar maneras de consumir menos sal. Por ejemplo, si claudio la comida mientras cocina, no añada más en la trujillo. [address] líquidos marilyn se le haya indicado. Es posible que usted necesite limitar la cantidad de líquidos que julio c en 24 horas. Louis médico le indicará cuánto líquido debe beber y [place] Lilly líquidos son los mejores para usted. Podría indicarle que limite los líquidos a 1.5 a 2 litros al día. [place] Barban con qué frecuencia beber líquidos terrence el día. Pésese todas las mañanas. Use la misma báscula en el mismo sitio. Etelvina esto después de ir al baño shruthi antes de consumir cualquier alimento o bebida. Use el mismo tipo de ropa cada vez. Anote louis peso y llame a louis médico si tiene un aumento de peso repentino. Wyona Barrs y el aumento de peso son signos de acumulación de líquidos. Controle la insuficiencia cardíaca: Louis calidad de atif puede mejorar con el tratamiento y con lo siguiente:  No fume. La nicotina y otros químicos contenidos en los cigarrillos y cigarros pueden causar daño a stepan pulmones y el corazón. Pida información a louis médico si usted actualmente fuma y necesita ayuda para dejar de fumar. Los cigarrillos electrónicos o el tabaco sin humo igualmente contienen nicotina. Consulte con louis médico antes de [employer]. No consuma alcohol ni drogas ilegales. El alcohol y las drogas pueden aumentar louis riesgo de hipertensión, diabetes y arteriopatías coronaria. Consuma alimentos saludables para el corazón. Los alimentos saludables para el corazón incluyen frutas, verduras, carne magra (marilyn res, joan o cerdo) y productos lácteos bajos en grasa. También el pescado, marilyn el salmón y el atún son saludables para el corazón. Otros alimentos saludables para el corazón incluyen nueces, panes integrales y legumbres, marilyn frijoles pintos.  Reemplace la mantequilla y la margarina con aceites saludables para el corazón marilyn el aceite de guidry o el aceite de canola. Ordoñez médico o ordoñez dietista pueden ayudarlo a crear planes de comidas saludables. Controle cualquier afección médica que tenga. Estas incluyen presión arterial narda, diabetes, obesidad, colesterol alto, síndrome metabólico y EPOC. Tendrá menos síntomas si controla estas afecciones de [place]. Siga las recomendaciones de ordoñez médico y realice un seguimiento de forma regular. Mantenga un peso saludable. Al tener sobrepeso usted corre un mayor riesgo de sufrir de hipertensión, diabetes y [place] enfermedad de las arterias coronarías. Estas condiciones pueden empeorar stepan síntomas. Pregúntele a ordoñez médico cuál es el peso ideal para usted. [place] que lo ayude a crear un plan para perder peso, si lo necesita    Manténgase activo. La actividad puede ayudarlo a prevenir el empeoramiento de los síntomas. La caminata es un tipo de actividad física que ayuda a mantener ordoñez fortaleza y mejorar ordoñez estado de ánimo. La actividad física también ayuda a controlar ordoñez peso corporal. Colabore con ordoñez médico para desarrollar un plan de ejercicios que sea adecuado para usted. Vaya a que lo vacunen marilyn se le indique. La gripe, la COVID-19 y la neumonía pueden ser [place] condición grave para magali persona que tiene insuficiencia cardíaca. Las vacunas lo protegen de estas infecciones. Vacúnese contra la gripe todos los años tan pronto marilyn se recomiende, generalmente en septiembre u octubre. Reciba la vacuna contra la COVID-19 y las dosis de refuerzo según las indicaciones. Es posible que también necesite magali vacuna contra la neumonía. Ordoñez médico puede indicarle si necesita otras vacunas, y cuándo aplicárselas. Acuda a magali consulta de control con ordoñez médico dentro de los 7 días o según le hayan indicaron. Es posible que necesite regresar para que le georgie otros exámenes. Puede que necesite atención médica domiciliaria.  Un médico controlará stepan signos vitales y ordoñez peso y se asegurará de que los medicamentos están funcionando. Anote stepan preguntas para que se acuerde de hacerlas terrence stepan visitas. Para obtener apoyo y [place] información: La insuficiencia cardíaca puede ser difícil de manejar. Puede ser de ayuda hablar con otras personas que tienen insuficiencia cardíaca. Usted podría aprender a controlar mejor ordoñez condición o recibir apoyo emocional. Para más información:  [address]   Phone: [phone]  Web Address: https://[employer]/. org     © Copyright [facility] 2022 Information is for End User's use only and may not be sold, redistributed or otherwise used for commercial purposes. Esta información es sólo para uso en educación. Ordoñez intención no es darle un consejo médico sobre enfermedades o tratamientos. Colsulte con ordoñez Nuris Rubinstein farmacéutico antes de seguir cualquier régimen médico para saber si es seguro y efectivo para usted.

## 2023-08-14 NOTE — ASSESSMENT & PLAN NOTE
Wt Readings from Last 3 Encounters:   08/14/23 93.4 kg (206 lb)   08/08/23 92.8 kg (204 lb 9.4 oz)   05/19/22 98 kg (216 lb)     Base weight appears to be ~200-205lbs. Currently taking medication as directed. Have reached out to referral team for assistance in scheduling cardio follow up. No SOB present today though still having some pitting edema in bilateral LEs.    - Continue Losartan 50mg daily, spironolactone 25mg, metoprolol 50mg BID, idordil 10mg TID, hydralazine 25mg TID (recommended regimen per cardio note on 8/5/2023)  - follow up with cardiology  - reviewed recent echo 8/7/23  - Counseled on continuing daily weight checks and low Na diet.

## 2023-08-14 NOTE — ASSESSMENT & PLAN NOTE
BP Readings from Last 3 Encounters:   08/14/23 150/72   08/08/23 104/72   05/19/22 120/78     BP elevated today likely to walking into clinic from home. Have reviewed medications with pt and he will bring in medications from home so I can subhash them to help him with differentiating dosaging.

## 2023-08-14 NOTE — ASSESSMENT & PLAN NOTE
BP slightly elevated and edema of LEs. Have reviewed pt history and sent message to referral team for help with cardio appointment. Once medication has been optimize will review need for colonoscopy and hep c screenings.

## 2023-08-15 DIAGNOSIS — I50.42 CHRONIC COMBINED SYSTOLIC AND DIASTOLIC CONGESTIVE HEART FAILURE (HCC): Primary | ICD-10-CM

## 2023-08-16 ENCOUNTER — TELEPHONE (OUTPATIENT)
Dept: CARDIOLOGY CLINIC | Facility: CLINIC | Age: 53
End: 2023-08-16

## 2023-08-16 NOTE — TELEPHONE ENCOUNTER
Patient is being referred to Cardiology after a hospital stay. Can you please schedule a follow up for him? Patient should be seen by Dr. Rufino Higginbotham if possible.     Thank you,    Sommer Walsh

## 2023-08-22 NOTE — PROGRESS NOTES
Heart Failure Outpatient Progress Note - [place]o [place] 48 y.o. male MRN: [id number]    @ Encounter: [id number]      Assessment/Plan:    Patient Active Problem List    Diagnosis Date Noted   • Does not have health insurance 08/14/2023   • Encounter to establish care 08/14/2023   • Chest pain, unspecified 08/05/2023   • Elevated d-dimer 08/05/2023   • Abnormal CT scan 08/05/2023   • Chronic combined systolic and diastolic congestive heart failure ([address]) 04/01/2022   • Pleural effusion, left 04/01/2022   • Hypertension 04/01/2022   • Left leg pain 04/01/2022   • Elevated serum creatinine 04/01/2022     Plan:  Chronic HFmrEF; LVEF 40%; LVIDd 5.2 cm; NYHA III; ACC/AHA Stage C Etiology: nonischemic. Daily cocaine for many years, quit 2011, daily EtOH use, ie 2 cases of beer and up to 1/5 of rum, quit 2years ago today. No fam hx of CHF or SCD. Not seen here in over a year. Comes in today for hospital follow up. Still uninsured, awaiting approval from Medicaid. Paying out of pocket right now. Ran out of some meds yesterday but had been adherent up until then. Warm, with mild volume overload. MAP is high. Will have him double up on Lasix x 3-4 days. Stop Isordil/Hydral to help simplify medical regimen. Increase Losartan to 50 mg BID. Taking Motrin on a daily basis. Advised he stop this and use Tylenol for pain. Follow up again in two weeks for GDMT optimization. Lab Results   Component Value Date    NTBNP 677 (H) 05/06/2022      Weight of 198 lbs on 04/06 (day of discharge). 210 lbs, 213,  212, 204 at discharge,today 207 lbs.    Serology:TSH normal, CHACHA/RF negative, SPEP no monoclonal  bands, iron panel normal, HIV negative, light chains pending               TTE 04/01/2022: LVEF 30-35%. LVIDd 5.2 cm. Normal RV. Moderate to severe MR. PASP 51 mmHg. LHC 04/04/2022: normal coronaries. LVEDP 14 mmHg. cMRI 04/04/2022: LVEF 34%. LVIDD 6.1 cm. LV CO 6.5L/min.  "Normal RV size and systolic function. No evidence of myocardial scarring, inflammation, or infiltrative disease."    TTE 8/7/23: LVEF 40%, LVIDd 5.5 cm, grade II DD, mod to severe MR, mild TR, IVC normal, est RAP 3 mmhg, no effusions.                                Neurohormonal Blockade:  --Beta Blocker: metoprolol succinate 50 mg q12 hours. --ARNi / ACEi / ARB: losartan 50 mg daily, increase to BID  --SVR reducing meds: Stop Iso/Hydral  --Aldosterone Antagonist: Spironolactone 25 mg daily  --SGLT2 Inhibitor: No.   --Diuretic: Lasix 40 mg daily     Sudden Cardiac Death Risk Reduction:  --LVEF 40%     Electrical Resynchronization:  --Candidacy for BiV device: narrow QRS.    Advanced Therapies (if appropriate): Will continue to monitor.     Hypertension  Hyperlipidemia Most recent lipid panel from 03/31/2022: cholesterol 132; ; HDL 44; calculated LDL 47. Continue on atorvastatin 40 mg daily.      HPI:   Jada Mack is a 82-year-old man with a PMH as above who presents to the office for hospital follow-up.      Admitted to [facility] from 03/31 to 04/06/2022 after presenting with SOB and chest discomfort. TTE with newly reduced LVEF of 35%. Started on IV Lasix. LHC completed on 04/04; normal coronaries. BB and ARB added to regimen. Lost 3 lbs and net negative 1.7 L this admission.      04/13/2022: Patient presents for hospital follow-up. Zahira Lakhani (#[id number]) assisted with interpretation during entire encounter. Patient up 12 lbs today (when compared to hospital discharge weight). Does endorse MELENDREZ and worsening LE swelling since discharge. Denies PND and orthopnea. Has been taking medications as prescribed; wife assisting with organizing medications. Has not been completing daily weights; does have scale at home. Drinking about 40-50 oz water daily. Wearing LifeVest today; denies any alarms or shocks. In process of obtaining insurance.  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 Patient anticipates that he will be cleared to work in department with smaller [employer]. Reports mild to moderate left neck pain; states he slept in odd position recently. Pain improved with Tyenol/Advil. 4/27/22. Presents for follow up.  assisted with visit. At last appointment, lasix increased from 40 mg daily to BID. Was up 12 lbs. He continues with symptoms of leg swelling, orthopnea, SOB, and weight gain despite increased Lasix dosing. Feels he is urinating a lot but is also consuming well over 70-80 oz of fluid daily. We discussed PMH again and today admits to heavy cocaine and ETOH use in the past. Continues to abstain from both. Tells me he is still uninsured and having difficulty affording things. Worried about co-pays and paying out of pocket for labs. 5/6/22. Presents for follow up. Still has not gotten lab work drawn. His PA MA is pending. Continues with orthopnea. Still exceeding fluid intake due to thirst. Our LSW reached out to him after last visit to assist with insurance/MA. This is currently pending approval.     05/19/2022: Patient presents for follow-up. Christen Amezcua (ID# [id number]) assisted with interpretation during entire encounter. Up 4 lbs since last visit. Continues with mild MELENDREZ with stairs; denies PND and orthopnea. Is completing daily weights; noticed ~3 lbs gain over last 2 weeks. Was drinking about 5 bottles water; now 2-3 bottles + glass of apple juice. Wearing LifeVest; denies any alarms or shocks. Concerned about income and is looking into donating plasma    8/29/23. Presents for hospital follow up. Patient [place] speaking only therefore online , Allyson Dixon, [id number], utilized during entire visit. Recently admitted at the [address] [place] [address] with acute CHF exacerbation. Seen once by cardiology. Was given a few doses of IV diuretic and placed back on usual dose of oral Lasix.  His Losartan dose was increased and Isordil and hydralazine were added. Today, he reports feeling down and depressed. He is out of work and uninsured. Waiting on Medicaid approval. Currently paying out of pocket for his medications. States he ran out of some meds yesterday. Feeling a little more SOB since discharge. Feels like he is retaining a little fluid. Past Medical History:   Diagnosis Date   • Chronic HFrEF (heart failure with reduced ejection fraction) ([facility]) 04/01/2022       12 point ROS negative other than that stated in HPI    No Known Allergies  .     Current Outpatient Medications:   •  atorvastatin (LIPITOR) 40 mg tablet, Take 1 tablet (40 mg total) by mouth daily with dinner, Disp: 30 tablet, Rfl: 0  •  furosemide (LASIX) 40 mg tablet, Take 1 tablet (40 mg total) by mouth daily Do not start before August 9, 2023., Disp: 30 tablet, Rfl: 0  •  hydrALAZINE (APRESOLINE) 25 mg tablet, Take 1 tablet (25 mg total) by mouth every 8 (eight) hours, Disp: 90 tablet, Rfl: 0  •  isosorbide dinitrate (ISORDIL) 10 mg tablet, Take 1 tablet (10 mg total) by mouth 3 (three) times daily after meals, Disp: 90 tablet, Rfl: 0  •  losartan (COZAAR) 50 mg tablet, Take 1 tablet (50 mg total) by mouth daily, Disp: 30 tablet, Rfl: 2  •  metoprolol succinate (TOPROL-XL) 50 mg 24 hr tablet, Take 1 tablet (50 mg total) by mouth every 12 (twelve) hours, Disp: 60 tablet, Rfl: 3  •  pantoprazole (PROTONIX) 40 mg tablet, Take 1 tablet (40 mg total) by mouth daily in the early morning Do not start before August 9, 2023., Disp: 30 tablet, Rfl: 0  •  spironolactone (ALDACTONE) 25 mg tablet, Take 1 tablet (25 mg total) by mouth in the morning., Disp: 30 tablet, Rfl: 1  •  sucralfate (CARAFATE) 1 g tablet, Take 1 tablet (1 g total) by mouth 4 (four) times a day (before meals and at bedtime), Disp: 120 tablet, Rfl: 0    Social History     Socioeconomic History   • Marital status: Single     Spouse name: Not on file   • Number of children: Not on file   • Years of education: Not on file   • Highest education level: Not on file   Occupational History   • Not on file   Tobacco Use   • Smoking status: Never     Passive exposure: Never   • Smokeless tobacco: Never   Vaping Use   • Vaping Use: Never used   Substance and Sexual Activity   • Alcohol use: Never   • Drug use: Never   • Sexual activity: Not on file   Other Topics Concern   • Not on file   Social History Narrative   • Not on file     Social Determinants of Health     Financial Resource Strain: Not on file   Food Insecurity: Unknown (8/14/2023)    Hunger Vital Sign    • Worried About Running Out of Food in the Last Year: Patient refused    • Ran Out of Food in the Last Year: Patient refused   Transportation Needs: Unknown (8/14/2023)    PRAPARE - Transportation    • Lack of Transportation (Medical): Patient refused    • Lack of Transportation (Non-Medical): Patient refused   Physical Activity: Not on file   Stress: Not on file   Social Connections: Not on file   Intimate Partner Violence: Not on file   Housing Stability: [address]  (8/7/2023)    Housing Stability Vital Sign    • Unable to Pay for Housing in the Last Year: No    • Number of Places Lived in the Last Year: 1    • Unstable Housing in the Last Year: No       No family history on file.     Physical Exam:    Vitals: /66 (BP Location: Right arm, Patient Position: Sitting, Cuff Size: Standard)   Pulse 88   Ht 6' (1.829 m)   Wt 93.9 kg (207 lb)   SpO2 95%   BMI 28.07 kg/m²       Wt Readings from Last 3 Encounters:   08/14/23 93.4 kg (206 lb)   08/08/23 92.8 kg (204 lb 9.4 oz)   05/19/22 98 kg (216 lb)       GEN: Samira Schreiber appears well, alert and oriented x 3, pleasant and cooperative   HEENT: pupils equal, round, and reactive to light; extraocular muscles intact  NECK: supple, no carotid bruits   HEART: regular rhythm, normal S1 and S2, no murmurs, clicks, gallops or rubs, JVP is  up  LUNGS: clear to auscultation bilaterally; no wheezes, rales, or rhonchi   ABDOMEN: normal bowel sounds, firm, no tenderness, + distention  EXTREMITIES: peripheral pulses normal; no clubbing, cyanosis, trace-+1 BLLE edema  NEURO: no focal findings   SKIN: normal without suspicious lesions on exposed skin    Labs & Results:    Chemistry        Component Value Date/Time    K 3.9 08/07/2023 0449     08/07/2023 0449    CO2 27 08/07/2023 0449    BUN 18 08/07/2023 0449    CREATININE 1.25 08/07/2023 0449        Component Value Date/Time    CALCIUM 8.9 08/07/2023 0449    ALKPHOS 100 08/06/2023 0612    AST 15 08/06/2023 0612    ALT 22 08/06/2023 0612        Lab Results   Component Value Date    WBC 8.96 08/07/2023    HGB 15.7 08/07/2023    HCT 48.1 08/07/2023    MCV 95 08/07/2023     08/07/2023     Lab Results   Component Value Date    NTBNP 677 (H) 05/06/2022      Lab Results   Component Value Date    LDLCALC 47 03/31/2022         EKG personally reviewed by Grant Núñez. No results found for this visit on 08/29/23. Counseling / Coordination of Care  Total floor / unit time spent today 40 minutes. Greater than 50% of total time was spent with the patient and / or family counseling and / or coordination of care. A description of the counseling / coordination of care: 20. Thank you for the opportunity to participate in the care of this patient.     Grant Núñez

## 2023-08-29 ENCOUNTER — OFFICE VISIT (OUTPATIENT)
Dept: CARDIOLOGY CLINIC | Facility: CLINIC | Age: 53
End: 2023-08-29

## 2023-08-29 VITALS
HEIGHT: 72 IN | OXYGEN SATURATION: 95 % | WEIGHT: 207 LBS | DIASTOLIC BLOOD PRESSURE: 66 MMHG | SYSTOLIC BLOOD PRESSURE: 142 MMHG | HEART RATE: 88 BPM | BODY MASS INDEX: 28.04 KG/M2

## 2023-08-29 DIAGNOSIS — I50.42 CHRONIC COMBINED SYSTOLIC AND DIASTOLIC CONGESTIVE HEART FAILURE (HCC): ICD-10-CM

## 2023-08-29 DIAGNOSIS — I50.22 CHRONIC HFREF (HEART FAILURE WITH REDUCED EJECTION FRACTION) (HCC): ICD-10-CM

## 2023-08-29 DIAGNOSIS — I50.9 ACUTE CHF (CONGESTIVE HEART FAILURE) (HCC): ICD-10-CM

## 2023-08-29 DIAGNOSIS — I42.8 NONISCHEMIC CARDIOMYOPATHY (HCC): ICD-10-CM

## 2023-08-29 RX ORDER — FUROSEMIDE 40 MG/1
40 TABLET ORAL DAILY
Qty: 30 TABLET | Refills: 3 | Status: SHIPPED | OUTPATIENT
Start: 2023-08-29

## 2023-08-29 RX ORDER — ATORVASTATIN CALCIUM 40 MG/1
40 TABLET, FILM COATED ORAL
Qty: 30 TABLET | Refills: 3 | Status: SHIPPED | OUTPATIENT
Start: 2023-08-29

## 2023-08-29 RX ORDER — IBUPROFEN 600 MG/1
TABLET ORAL
COMMUNITY
Start: 2023-07-18

## 2023-08-29 RX ORDER — LOSARTAN POTASSIUM 50 MG/1
50 TABLET ORAL 2 TIMES DAILY
Qty: 60 TABLET | Refills: 3 | Status: SHIPPED | OUTPATIENT
Start: 2023-08-29

## 2023-08-29 RX ORDER — METOPROLOL SUCCINATE 50 MG/1
50 TABLET, EXTENDED RELEASE ORAL EVERY 12 HOURS SCHEDULED
Qty: 60 TABLET | Refills: 3 | Status: SHIPPED | OUTPATIENT
Start: 2023-08-29

## 2023-08-29 RX ORDER — SPIRONOLACTONE 25 MG/1
25 TABLET ORAL DAILY
Qty: 30 TABLET | Refills: 3 | Status: SHIPPED | OUTPATIENT
Start: 2023-08-29

## 2023-08-29 NOTE — PATIENT INSTRUCTIONS
Weigh yourself daily  If you gain 3 lbs in one day or 5 lbs in one week, please call the office at [phone] and ask for a nurse or the heart failure nurse  Keep your sodium intake to <2 grams, (2000 mg) per day, and fluids <2 Liters (2000 ml) per day. This is around 6-7, 8 oz glasses of fluid per day    Stop Isordil and Hydralazine  Increase Losartan to 50 mg twice daily  Increase Lasix to 40 mg twice daily for 3-4 days and then go back to just 40 mg daily. Stop taking Ibuprofen for pain.  Try Tylenol

## 2023-08-31 ENCOUNTER — TELEPHONE (OUTPATIENT)
Dept: CARDIOLOGY CLINIC | Facility: CLINIC | Age: 53
End: 2023-08-31

## 2023-08-31 NOTE — TELEPHONE ENCOUNTER
Can you please check in on Cristian by the end of the week I asked him to double up on his Lasix for a few days and would like to see how he is feeling. I may continue on the BID dosing depending on his response. He is primarily Serbian speaking so you may need .

## 2023-08-31 NOTE — TELEPHONE ENCOUNTER
Spoke with pt through interpretor-[id number] ( language line). Today is third day for the lasix 40 mg bid. He will go back to lasix 40 mg qd tomorrow. He does not have a scale , so no wts. He will get one from his son tonight and start weighing himself tomorrow. He will call the office with his wt. He states he feels better, pretty good. He is urinating more and denies any CHF symptoms presently. Went over when to call the office for increased wts and worsening of symptoms. He said his health is very important and he will certainly call if he is having a problem. Following a low sodium diet. Wife is doing all the cooking and has him on a strict diet. He takes in about 40-50 oz of fluid daily. Is aware of his next appt on 9/13/23.     Thank you

## 2023-09-06 ENCOUNTER — PATIENT OUTREACH (OUTPATIENT)
Dept: CARDIOLOGY CLINIC | Facility: CLINIC | Age: 53
End: 2023-09-06

## 2023-09-06 NOTE — PROGRESS NOTES
OP Advanced Heart Failure LCSW received referral from [facility] that pt awaits PA MA and ran out of some meds. Reviewed chart. LCSW left message for PATHS to check status of pt's PA MA application. Called pt using [employer]  # [id number]. On 2nd call placed to patient, he answered the phone. Pt stated that he did receive letter in [place] from [facility] and delivered the documents required for the PA MA application. Pt has not received any mail from [facility].    LCSW informed patient that this staff will contact him with information from [facility]. Inquired if pt was out of any medication and he stated that he has enough.   Encouraged pt to contact this LCSW prior to running out of medication so we can discuss coupon cards, samples, etc.

## 2023-09-11 NOTE — PROGRESS NOTES
Heart Failure Outpatient Progress Note - Janet Grove 48 y.o. male MRN: [id number]    @ Encounter: [id number]      Assessment/Plan:    Patient Active Problem List    Diagnosis Date Noted   • Does not have health insurance 08/14/2023   • Encounter to establish care 08/14/2023   • Chest pain, unspecified 08/05/2023   • Elevated d-dimer 08/05/2023   • Abnormal CT scan 08/05/2023   • Chronic combined systolic and diastolic congestive heart failure ([address]) 04/01/2022   • Pleural effusion, left 04/01/2022   • Hypertension 04/01/2022   • Left leg pain 04/01/2022   • Elevated serum creatinine 04/01/2022     Plan:  Chronic HFmrEF; LVEF 40%; LVIDd 5.2 cm; NYHA III; ACC/AHA Stage C Etiology: nonischemic. Daily cocaine for many years, quit 2011, daily EtOH use, ie 2 cases of beer and up to 1/5 of rum, quit 2years ago today. No fam hx of CHF or SCD. Not seen here in over a year and now re-establishing care. Still uninsured, awaiting approval from Medicaid. Paying out of pocket right now but meds affordable. MAP significantly improved today. His volume appears stable though he is still reporting symptoms c/w orthopnea. Will increase Lasix from 40mg to 60 mg daily and assess response. See rest of med changes below. Continue to optimized GDMT as patient tolerates and BP allows. Check BMP in 5-7 days. Lab Results   Component Value Date    NTBNP 677 (H) 05/06/2022      Weight of 198 lbs on 04/06 (day of discharge). 210 lbs, 213,  212, 204 at discharge,207 lbs, today, 207     Serology:TSH normal, CHACHA/RF negative, SPEP no monoclonal  bands, iron panel normal, HIV negative, light chains pending               TTE 04/01/2022: LVEF 30-35%. LVIDd 5.2 cm. Normal RV. Moderate to severe MR. PASP 51 mmHg. LHC 04/04/2022: normal coronaries. LVEDP 14 mmHg. cMRI 04/04/2022: LVEF 34%. LVIDD 6.1 cm. LV CO 6.5L/min. "Normal RV size and systolic function.  No evidence of myocardial scarring, inflammation, or infiltrative disease."    TTE 8/7/23: LVEF 40%, LVIDd 5.5 cm, grade II DD, mod to severe MR, mild TR, IVC normal, est RAP 3 mmhg, no effusions.                                Neurohormonal Blockade:  --Beta Blocker: metoprolol succinate 50 mg in the AM, increase to 75 mg in the PM.   --ARNi / ACEi / ARB: losartan 50 mg BID  --SVR reducing meds:   --Aldosterone Antagonist: Spironolactone 25 mg daily  --SGLT2 Inhibitor: No.   --Diuretic: Lasix 40 mg daily, increase to 60 mg daily     Sudden Cardiac Death Risk Reduction:  --LVEF 40%     Electrical Resynchronization:  --Candidacy for BiV device: narrow QRS.    Advanced Therapies (if appropriate): Will continue to monitor.     Hypertension  Hyperlipidemia Most recent lipid panel from 03/31/2022: cholesterol 132; ; HDL 44; calculated LDL 47. Continue on atorvastatin 40 mg daily.      HPI:   Abdon Francisco is a 80-year-old man with a PMH as above who presents to the office for hospital follow-up.      Admitted to [facility] from 03/31 to 04/06/2022 after presenting with SOB and chest discomfort. TTE with newly reduced LVEF of 35%. Started on IV Lasix. LHC completed on 04/04; normal coronaries. BB and ARB added to regimen. Lost 3 lbs and net negative 1.7 L this admission.      04/13/2022: Patient presents for hospital follow-up. Ingrid Drake (DT#[id number]) assisted with interpretation during entire encounter. Patient up 12 lbs today (when compared to hospital discharge weight). Does endorse MELENDREZ and worsening LE swelling since discharge. Denies PND and orthopnea. Has been taking medications as prescribed; wife assisting with organizing medications. Has not been completing daily weights; does have scale at home. Drinking about 40-50 oz water daily. Wearing LifeVest today; denies any alarms or shocks. In process of obtaining insurance.  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 Reports mild to moderate left neck pain; states he slept in odd position recently. Pain improved with Tyenol/Advil. 4/27/22. Presents for follow up.  assisted with visit. At last appointment, lasix increased from 40 mg daily to BID. Was up 12 lbs. He continues with symptoms of leg swelling, orthopnea, SOB, and weight gain despite increased Lasix dosing. Feels he is urinating a lot but is also consuming well over 70-80 oz of fluid daily. We discussed PMH again and today admits to heavy cocaine and ETOH use in the past. Continues to abstain from both. Tells me he is still uninsured and having difficulty affording things. Worried about co-pays and paying out of pocket for labs. 5/6/22. Presents for follow up. Still has not gotten lab work drawn. His PA MA is pending. Continues with orthopnea. Still exceeding fluid intake due to thirst. Our LSW reached out to him after last visit to assist with insurance/MA. This is currently pending approval.     05/19/2022: Patient presents for follow-up. Arnold Dangelo (ID# [id number]) assisted with interpretation during entire encounter. Up 4 lbs since last visit. Continues with mild MELENDREZ with stairs; denies PND and orthopnea. Is completing daily weights; noticed ~3 lbs gain over last 2 weeks. Was drinking about 5 bottles water; now 2-3 bottles + glass of apple juice. Wearing LifeVest; denies any alarms or shocks. Concerned about income and is looking into donating plasma    8/29/23. Presents for hospital follow up. Patient Wolof speaking only therefore online , Carrol Lugo, [id number], utilized during entire visit. Recently admitted at the [address] [place] [address] with acute CHF exacerbation. Seen once by cardiology. Was given a few doses of IV diuretic and placed back on usual dose of oral Lasix. His Losartan dose was increased and Isordil and hydralazine were added.  Today, he reports feeling down and depressed. He is out of work and uninsured. Waiting on Medicaid approval. Currently paying out of pocket for his medications. States he ran out of some meds yesterday. Feeling a little more SOB since discharge. Feels like he is retaining a little fluid. 9/13/23. Presents for 2 week follow up. Online , Patricia King [id number], present for entire visit. Patient reports feeling great with the exception of waking up at times with SOB. States this has been going on for some time. Denies any exertional symptoms. He is now weighing himself every day. Watching sodium and fluid intake more closely. Taking add medications as prescribed without missed doses. Past Medical History:   Diagnosis Date   • Chronic HFrEF (heart failure with reduced ejection fraction) ([address] [facility]) 04/01/2022       12 point ROS negative other than that stated in HPI    No Known Allergies  . Current Outpatient Medications:   •  atorvastatin (LIPITOR) 40 mg tablet, Take 1 tablet (40 mg total) by mouth daily with dinner, Disp: 30 tablet, Rfl: 3  •  furosemide (LASIX) 40 mg tablet, Take 1 tablet (40 mg total) by mouth daily, Disp: 30 tablet, Rfl: 3  •  ibuprofen (MOTRIN) 600 mg tablet, TAKE 1 TABLET (600 MG TOTAL) BY MOUTH EVERY 6 (SIX) HOURS AS NEEDED FOR MILD PAIN (PAIN SCORE 1-3). , Disp: , Rfl:   •  losartan (COZAAR) 50 mg tablet, Take 1 tablet (50 mg total) by mouth 2 (two) times a day, Disp: 60 tablet, Rfl: 3  •  metoprolol succinate (TOPROL-XL) 50 mg 24 hr tablet, Take 1 tablet (50 mg total) by mouth every 12 (twelve) hours, Disp: 60 tablet, Rfl: 3  •  pantoprazole (PROTONIX) 40 mg tablet, Take 1 tablet (40 mg total) by mouth daily in the early morning Do not start before August 9, 2023., Disp: 30 tablet, Rfl: 0  •  spironolactone (ALDACTONE) 25 mg tablet, Take 1 tablet (25 mg total) by mouth daily, Disp: 30 tablet, Rfl: 3  •  sucralfate (CARAFATE) 1 g tablet, Take 1 tablet (1 g total) by mouth 4 (four) times a day (before meals and at bedtime), Disp: 120 tablet, Rfl: 0    Social History     Socioeconomic History   • Marital status: Single     Spouse name: Not on file   • Number of children: Not on file   • Years of education: Not on file   • Highest education level: Not on file   Occupational History   • Not on file   Tobacco Use   • Smoking status: Never     Passive exposure: Never   • Smokeless tobacco: Never   Vaping Use   • Vaping Use: Never used   Substance and Sexual Activity   • Alcohol use: Never   • Drug use: Never   • Sexual activity: Not on file   Other Topics Concern   • Not on file   Social History Narrative   • Not on file     Social Determinants of Health     Financial Resource Strain: Not on file   Food Insecurity: Unknown (8/14/2023)    Hunger Vital Sign    • Worried About Running Out of Food in the Last Year: Patient refused    • Ran Out of Food in the Last Year: Patient refused   Transportation Needs: Unknown (8/14/2023)    PRAPARE - Transportation    • Lack of Transportation (Medical): Patient refused    • Lack of Transportation (Non-Medical): Patient refused   Physical Activity: Not on file   Stress: Not on file   Social Connections: Not on file   Intimate Partner Violence: Not on file   Housing Stability: [address]  (8/7/2023)    Housing Stability Vital Sign    • Unable to Pay for Housing in the Last Year: No    • Number of Places Lived in the Last Year: 1    • Unstable Housing in the Last Year: No       No family history on file.     Physical Exam:    Vitals: /82 (BP Location: Right arm, Patient Position: Sitting, Cuff Size: Standard)   Pulse 93   Ht 6' (1.829 m)   Wt 93.9 kg (207 lb)   SpO2 96%   BMI 28.07 kg/m²       Wt Readings from Last 3 Encounters:   08/29/23 93.9 kg (207 lb)   08/14/23 93.4 kg (206 lb)   08/08/23 92.8 kg (204 lb 9.4 oz)       GEN: Lilibeth Avon appears well, alert and oriented x 3, pleasant and cooperative   HEENT: pupils equal, round, and reactive to light; extraocular muscles intact  NECK: supple, no carotid bruits   HEART: regular rhythm, normal S1 and S2, no murmurs, clicks, gallops or rubs, JVP is down  LUNGS: clear to auscultation bilaterally; no wheezes, rales, or rhonchi   ABDOMEN: normal bowel sounds, firm, no tenderness, + distention  EXTREMITIES: peripheral pulses normal; no clubbing, cyanosis, no BLLE edema  NEURO: no focal findings   SKIN: normal without suspicious lesions on exposed skin    Labs & Results:    Chemistry        Component Value Date/Time    K 3.9 08/07/2023 0449     08/07/2023 0449    CO2 27 08/07/2023 0449    BUN 18 08/07/2023 0449    CREATININE 1.25 08/07/2023 0449        Component Value Date/Time    CALCIUM 8.9 08/07/2023 0449    ALKPHOS 100 08/06/2023 0612    AST 15 08/06/2023 0612    ALT 22 08/06/2023 0612        Lab Results   Component Value Date    WBC 8.96 08/07/2023    HGB 15.7 08/07/2023    HCT 48.1 08/07/2023    MCV 95 08/07/2023     08/07/2023     Lab Results   Component Value Date    NTBNP 677 (H) 05/06/2022      Lab Results   Component Value Date    LDLCALC 47 03/31/2022         EKG personally reviewed by Chel Chowdhury. No results found for this visit on 09/13/23. Counseling / Coordination of Care  Total floor / unit time spent today 40 minutes. Greater than 50% of total time was spent with the patient and / or family counseling and / or coordination of care. A description of the counseling / coordination of care: 20. Thank you for the opportunity to participate in the care of this patient.     Chel Chowdhury

## 2023-09-13 ENCOUNTER — PATIENT OUTREACH (OUTPATIENT)
Dept: CARDIOLOGY CLINIC | Facility: CLINIC | Age: 53
End: 2023-09-13

## 2023-09-13 ENCOUNTER — OFFICE VISIT (OUTPATIENT)
Dept: CARDIOLOGY CLINIC | Facility: CLINIC | Age: 53
End: 2023-09-13

## 2023-09-13 VITALS
WEIGHT: 207 LBS | HEIGHT: 72 IN | OXYGEN SATURATION: 96 % | DIASTOLIC BLOOD PRESSURE: 82 MMHG | BODY MASS INDEX: 28.04 KG/M2 | HEART RATE: 93 BPM | SYSTOLIC BLOOD PRESSURE: 126 MMHG

## 2023-09-13 DIAGNOSIS — I50.9 ACUTE CHF (CONGESTIVE HEART FAILURE) (HCC): ICD-10-CM

## 2023-09-13 PROCEDURE — 99215 OFFICE O/P EST HI 40 MIN: CPT | Performed by: NURSE PRACTITIONER

## 2023-09-13 RX ORDER — METOPROLOL SUCCINATE 50 MG/1
50 TABLET, EXTENDED RELEASE ORAL
Qty: 30 TABLET | Refills: 3 | Status: SHIPPED | OUTPATIENT
Start: 2023-09-13

## 2023-09-13 RX ORDER — METOPROLOL SUCCINATE 25 MG/1
75 TABLET, EXTENDED RELEASE ORAL
Qty: 30 TABLET | Refills: 3 | Status: SHIPPED | OUTPATIENT
Start: 2023-09-13

## 2023-09-13 RX ORDER — FUROSEMIDE 40 MG/1
60 TABLET ORAL DAILY
Qty: 30 TABLET | Refills: 3 | Status: SHIPPED | OUTPATIENT
Start: 2023-09-13

## 2023-09-13 NOTE — PROGRESS NOTES
Panchito Sue in [place] about pt's PA MA application. Pt previously applied for PA MA in April 2022 but was denied due to excess income. Since that time, pt completed another PA MA application post ED-Hospital stay in August 2023. Bushra Braun from [place] confirmed that pt submitted all of the documentation required at this time to [place]. Bushra Braun will message the representative responsible for pt's application to request that the application get submitted to PA MA as soon as possible. Await determination. 9:01 Utilized [employer] to place call to patient. Left message for patient. Await return call.

## 2023-09-13 NOTE — PATIENT INSTRUCTIONS
Weigh yourself daily  If you gain 3 lbs in one day or 5 lbs in one week, please call the office at [phone] and ask for a nurse or the heart failure nurse  Keep your sodium intake to <2 grams, (2000 mg) per day, and fluids <2 Liters (2000 ml) per day. This is around 6-7, 8 oz glasses of fluid per day    Continue Metoprolol 50 mg in the morning.  Increase evening dose to 75 mg  Increase Lasix to 60 mg daily  Get lab work in one week to check kidney function

## 2023-10-17 ENCOUNTER — PATIENT OUTREACH (OUTPATIENT)
Dept: CARDIOLOGY CLINIC | Facility: CLINIC | Age: 53
End: 2023-10-17

## 2023-10-17 DIAGNOSIS — I42.8 NONISCHEMIC CARDIOMYOPATHY (HCC): ICD-10-CM

## 2023-10-17 DIAGNOSIS — I50.42 CHRONIC COMBINED SYSTOLIC AND DIASTOLIC CONGESTIVE HEART FAILURE (HCC): ICD-10-CM

## 2023-10-17 DIAGNOSIS — I50.22 CHRONIC HFREF (HEART FAILURE WITH REDUCED EJECTION FRACTION) (HCC): ICD-10-CM

## 2023-10-17 DIAGNOSIS — I50.9 ACUTE CHF (CONGESTIVE HEART FAILURE) (HCC): ICD-10-CM

## 2023-10-20 RX ORDER — METOPROLOL SUCCINATE 50 MG/1
50 TABLET, EXTENDED RELEASE ORAL
Qty: 30 TABLET | Refills: 3 | Status: SHIPPED | OUTPATIENT
Start: 2023-10-20

## 2023-10-20 RX ORDER — ATORVASTATIN CALCIUM 40 MG/1
40 TABLET, FILM COATED ORAL
Qty: 30 TABLET | Refills: 3 | Status: SHIPPED | OUTPATIENT
Start: 2023-10-20

## 2023-10-20 RX ORDER — LOSARTAN POTASSIUM 50 MG/1
50 TABLET ORAL 2 TIMES DAILY
Qty: 60 TABLET | Refills: 3 | Status: SHIPPED | OUTPATIENT
Start: 2023-10-20

## 2023-10-20 RX ORDER — FUROSEMIDE 40 MG/1
60 TABLET ORAL DAILY
Qty: 45 TABLET | Refills: 3 | Status: SHIPPED | OUTPATIENT
Start: 2023-10-20 | End: 2024-02-17

## 2023-10-20 RX ORDER — METOPROLOL SUCCINATE 25 MG/1
75 TABLET, EXTENDED RELEASE ORAL
Qty: 90 TABLET | Refills: 3 | Status: SHIPPED | OUTPATIENT
Start: 2023-10-20

## 2023-10-20 RX ORDER — SPIRONOLACTONE 25 MG/1
25 TABLET ORAL DAILY
Qty: 30 TABLET | Refills: 3 | Status: SHIPPED | OUTPATIENT
Start: 2023-10-20

## 2023-11-16 ENCOUNTER — OFFICE VISIT (OUTPATIENT)
Dept: FAMILY MEDICINE CLINIC | Facility: CLINIC | Age: 53
End: 2023-11-16

## 2023-11-16 VITALS
DIASTOLIC BLOOD PRESSURE: 84 MMHG | HEART RATE: 108 BPM | SYSTOLIC BLOOD PRESSURE: 126 MMHG | BODY MASS INDEX: 28.76 KG/M2 | RESPIRATION RATE: 18 BRPM | OXYGEN SATURATION: 94 % | WEIGHT: 217 LBS | TEMPERATURE: 97.9 F | HEIGHT: 73 IN

## 2023-11-16 DIAGNOSIS — I50.22 CHRONIC HFREF (HEART FAILURE WITH REDUCED EJECTION FRACTION) (HCC): ICD-10-CM

## 2023-11-16 DIAGNOSIS — I10 PRIMARY HYPERTENSION: ICD-10-CM

## 2023-11-16 DIAGNOSIS — I50.9 ACUTE CHF (CONGESTIVE HEART FAILURE) (HCC): ICD-10-CM

## 2023-11-16 DIAGNOSIS — I50.42 CHRONIC COMBINED SYSTOLIC AND DIASTOLIC CONGESTIVE HEART FAILURE (HCC): Primary | ICD-10-CM

## 2023-11-16 DIAGNOSIS — I42.8 NONISCHEMIC CARDIOMYOPATHY (HCC): ICD-10-CM

## 2023-11-16 RX ORDER — METOPROLOL SUCCINATE 25 MG/1
75 TABLET, EXTENDED RELEASE ORAL
Qty: 90 TABLET | Refills: 3 | Status: SHIPPED | OUTPATIENT
Start: 2023-11-16

## 2023-11-16 RX ORDER — METOPROLOL SUCCINATE 50 MG/1
50 TABLET, EXTENDED RELEASE ORAL
Qty: 30 TABLET | Refills: 3 | Status: SHIPPED | OUTPATIENT
Start: 2023-11-16

## 2023-11-16 RX ORDER — SPIRONOLACTONE 25 MG/1
25 TABLET ORAL DAILY
Qty: 30 TABLET | Refills: 3 | Status: SHIPPED | OUTPATIENT
Start: 2023-11-16

## 2023-11-16 RX ORDER — LOSARTAN POTASSIUM 50 MG/1
50 TABLET ORAL 2 TIMES DAILY
Qty: 60 TABLET | Refills: 3 | Status: SHIPPED | OUTPATIENT
Start: 2023-11-16

## 2023-11-16 RX ORDER — FUROSEMIDE 40 MG/1
60 TABLET ORAL DAILY
Qty: 45 TABLET | Refills: 3 | Status: SHIPPED | OUTPATIENT
Start: 2023-11-16 | End: 2024-03-15

## 2023-11-16 RX ORDER — ATORVASTATIN CALCIUM 40 MG/1
40 TABLET, FILM COATED ORAL
Qty: 30 TABLET | Refills: 3 | Status: SHIPPED | OUTPATIENT
Start: 2023-11-16

## 2023-11-16 NOTE — PROGRESS NOTES
Name: Amber Poster      : 1970      MRN: [id number]  Encounter Provider: Gill Roman MD  Encounter Date: 2023   Encounter department: [address] [facility]     1. Chronic combined systolic and diastolic congestive heart failure (HCC)  Assessment & Plan:  Wt Readings from Last 3 Encounters:   23 98.4 kg (217 lb)   23 93.9 kg (207 lb)   23 93.9 kg (207 lb)     Stable, not in acute exacerbation. Ran out of medication. Discussed medication compliance and adherence.  on healthy diet, low Na and exercise as tolerated. Continue current medical regimen- lasix 60mg daily, metoprolol succinate 50mg am and 75 pm, losartan 50mg   Start Jardiance 10 mg and titrate as tolerated  Follow up with Cardiology as scheduled  Follow up with PCP          Orders:  -     losartan (COZAAR) 50 mg tablet; Take 1 tablet (50 mg total) by mouth 2 (two) times a day  -     Empagliflozin (JARDIANCE) 10 MG TABS tablet; Take 1 tablet (10 mg total) by mouth daily    2. Acute CHF (congestive heart failure) (HCC)  -     atorvastatin (LIPITOR) 40 mg tablet; Take 1 tablet (40 mg total) by mouth daily with dinner  -     furosemide (LASIX) 40 mg tablet; Take 1.5 tablets (60 mg total) by mouth daily  -     metoprolol succinate (TOPROL-XL) 50 mg 24 hr tablet; Take 1 tablet (50 mg total) by mouth daily in the early morning  -     metoprolol succinate (TOPROL-XL) 25 mg 24 hr tablet; Take 3 tablets (75 mg total) by mouth daily at bedtime    3. Nonischemic cardiomyopathy (HCC)  -     spironolactone (ALDACTONE) 25 mg tablet; Take 1 tablet (25 mg total) by mouth daily    4. Chronic HFrEF (heart failure with reduced ejection fraction) (HCC)  -     spironolactone (ALDACTONE) 25 mg tablet; Take 1 tablet (25 mg total) by mouth daily  -     Empagliflozin (JARDIANCE) 10 MG TABS tablet; Take 1 tablet (10 mg total) by mouth daily    5.  Primary hypertension  Assessment & Plan:  Vitals:    11/16/23 0905   BP: 126/84   Pulse: (!) 108   Resp: 18   Temp: 97.9 °F (36.6 °C)   SpO2: 94%     BP at goal. Medications reviewed. Continue current regimen. Subjective      49 yo M who presents for a follow up visit for CHF. Patient reports running out of his medication and mild SOB with exertion. He also has lower legs swelling. He missed his last appointment with his cardiology. Denies headache, visual changes, chest pain, palpitation, n/v.       Review of Systems   Constitutional:  Negative for chills and fever. Respiratory:  Positive for shortness of breath. Negative for cough. Cardiovascular:  Positive for leg swelling. Negative for chest pain and palpitations. Gastrointestinal:  Negative for abdominal pain, blood in stool, constipation, diarrhea, nausea and vomiting. Genitourinary:  Negative for dysuria and hematuria. Skin: Negative. Neurological:  Negative for dizziness, seizures, syncope and headaches. Psychiatric/Behavioral: Negative. Current Outpatient Medications on File Prior to Visit   Medication Sig    ibuprofen (MOTRIN) 600 mg tablet TAKE 1 TABLET (600 MG TOTAL) BY MOUTH EVERY 6 (SIX) HOURS AS NEEDED FOR MILD PAIN (PAIN SCORE 1-3). pantoprazole (PROTONIX) 40 mg tablet Take 1 tablet (40 mg total) by mouth daily in the early morning Do not start before August 9, 2023.     sucralfate (CARAFATE) 1 g tablet Take 1 tablet (1 g total) by mouth 4 (four) times a day (before meals and at bedtime)    [DISCONTINUED] atorvastatin (LIPITOR) 40 mg tablet Take 1 tablet (40 mg total) by mouth daily with dinner    [DISCONTINUED] furosemide (LASIX) 40 mg tablet Take 1.5 tablets (60 mg total) by mouth daily    [DISCONTINUED] losartan (COZAAR) 50 mg tablet Take 1 tablet (50 mg total) by mouth 2 (two) times a day    [DISCONTINUED] metoprolol succinate (TOPROL-XL) 25 mg 24 hr tablet Take 3 tablets (75 mg total) by mouth daily at bedtime    [DISCONTINUED] metoprolol succinate (TOPROL-XL) 50 mg 24 hr tablet Take 1 tablet (50 mg total) by mouth daily in the early morning    [DISCONTINUED] spironolactone (ALDACTONE) 25 mg tablet Take 1 tablet (25 mg total) by mouth daily       Objective     /84 (BP Location: Left arm, Patient Position: Sitting, Cuff Size: Standard)   Pulse (!) 108   Temp 97.9 °F (36.6 °C) (Temporal)   Resp 18   Ht 6' 1" (1.854 m)   Wt 98.4 kg (217 lb)   SpO2 94%   BMI 28.63 kg/m²     Physical Exam  Vitals and nursing note reviewed. Constitutional:       General: He is not in acute distress. Appearance: Normal appearance. He is not ill-appearing, toxic-appearing or diaphoretic. HENT:      Head: Atraumatic. Nose: Nose normal.      Mouth/Throat:      Mouth: Mucous membranes are moist.   Eyes:      General: No scleral icterus. Right eye: No discharge. Left eye: No discharge. Conjunctiva/sclera: Conjunctivae normal.   Cardiovascular:      Rate and Rhythm: Normal rate and regular rhythm. Pulses: Normal pulses. Heart sounds: Normal heart sounds. No murmur heard. No friction rub. No gallop. Pulmonary:      Effort: Pulmonary effort is normal. No respiratory distress. Breath sounds: Normal breath sounds. No stridor. No wheezing, rhonchi or rales. Chest:      Chest wall: No tenderness. Abdominal:      General: There is no distension. Palpations: Abdomen is soft. Tenderness: There is no abdominal tenderness. There is no right CVA tenderness, left CVA tenderness, guarding or rebound. Musculoskeletal:         General: Normal range of motion. Cervical back: Normal range of motion. Right lower le+ Pitting Edema present. Left lower le+ Pitting Edema present. Skin:     General: Skin is warm and dry. Coloration: Skin is not jaundiced or pale. Findings: No bruising, erythema, lesion or rash. Neurological:      General: No focal deficit present.       Mental Status: He is alert. Sensory: No sensory deficit. Motor: No weakness.    Psychiatric:         Mood and Affect: Mood normal.         Behavior: Behavior normal.       Sybil Tejada MD

## 2023-11-16 NOTE — ASSESSMENT & PLAN NOTE
Wt Readings from Last 3 Encounters:   11/16/23 98.4 kg (217 lb)   09/13/23 93.9 kg (207 lb)   08/29/23 93.9 kg (207 lb)     Stable, not in acute exacerbation. Ran out of medication. Discussed medication compliance and adherence.  on healthy diet, low Na and exercise as tolerated.    Continue current medical regimen- lasix 60mg daily, metoprolol succinate 50mg am and 75 pm, losartan 50mg   Start Jardiance 10 mg and titrate as tolerated  Follow up with Cardiology as scheduled  Follow up with PCP

## 2023-11-16 NOTE — ASSESSMENT & PLAN NOTE
Vitals:    11/16/23 0905   BP: 126/84   Pulse: (!) 108   Resp: 18   Temp: 97.9 °F (36.6 °C)   SpO2: 94%     BP at goal. Medications reviewed. Continue current regimen.

## 2023-11-26 PROBLEM — I42.8 NONISCHEMIC CARDIOMYOPATHY (HCC): Status: ACTIVE | Noted: 2023-11-26

## 2023-11-28 ENCOUNTER — TELEPHONE (OUTPATIENT)
Dept: FAMILY MEDICINE CLINIC | Facility: CLINIC | Age: 53
End: 2023-11-28

## 2023-11-29 ENCOUNTER — PATIENT OUTREACH (OUTPATIENT)
Dept: CARDIOLOGY CLINIC | Facility: CLINIC | Age: 53
End: 2023-11-29

## 2023-11-29 NOTE — PROGRESS NOTES
Patient Outreach Due. OP Advanced Heart Failure LCSW reviewed electronic chart. Utilized [employer]  #[id number] who left message for patient. Patient's goal of obtaining insurance has been completed at this time and he has a f/u appt with Rich Lora in [facility] on 2/19/23. LCSW will resolve social work referral at this time; removed self from care team.   Please order new AMB social work care management referral if needed in the future.

## 2023-12-04 ENCOUNTER — HOSPITAL ENCOUNTER (INPATIENT)
Facility: HOSPITAL | Age: 53
LOS: 9 days | Discharge: HOME HEALTH | DRG: 194 | End: 2023-12-13
Attending: EMERGENCY MEDICINE | Admitting: STUDENT IN AN ORGANIZED HEALTH CARE EDUCATION/TRAINING PROGRAM
Payer: COMMERCIAL

## 2023-12-04 ENCOUNTER — APPOINTMENT (EMERGENCY)
Dept: RADIOLOGY | Facility: HOSPITAL | Age: 53
DRG: 194 | End: 2023-12-04
Payer: COMMERCIAL

## 2023-12-04 DIAGNOSIS — I42.8 NONISCHEMIC CARDIOMYOPATHY (HCC): ICD-10-CM

## 2023-12-04 DIAGNOSIS — I50.42 CHRONIC COMBINED SYSTOLIC AND DIASTOLIC CONGESTIVE HEART FAILURE (HCC): ICD-10-CM

## 2023-12-04 DIAGNOSIS — J10.1 INFLUENZA A: ICD-10-CM

## 2023-12-04 DIAGNOSIS — I50.9 ACUTE CHF (CONGESTIVE HEART FAILURE) (HCC): ICD-10-CM

## 2023-12-04 DIAGNOSIS — I50.22 CHRONIC HFREF (HEART FAILURE WITH REDUCED EJECTION FRACTION) (HCC): ICD-10-CM

## 2023-12-04 DIAGNOSIS — R79.89 ELEVATED SERUM CREATININE: ICD-10-CM

## 2023-12-04 DIAGNOSIS — K21.9 GERD (GASTROESOPHAGEAL REFLUX DISEASE): ICD-10-CM

## 2023-12-04 DIAGNOSIS — I50.9 CHF, ACUTE ON CHRONIC (HCC): Primary | ICD-10-CM

## 2023-12-04 DIAGNOSIS — I10 PRIMARY HYPERTENSION: ICD-10-CM

## 2023-12-04 PROBLEM — E78.5 HYPERLIPIDEMIA: Status: ACTIVE | Noted: 2023-12-04

## 2023-12-04 PROBLEM — F14.10 COCAINE ABUSE (HCC): Status: ACTIVE | Noted: 2023-12-04

## 2023-12-04 PROBLEM — R65.10 SIRS (SYSTEMIC INFLAMMATORY RESPONSE SYNDROME) (HCC): Status: ACTIVE | Noted: 2023-12-04

## 2023-12-04 PROBLEM — R07.9 CHEST PAIN: Status: ACTIVE | Noted: 2023-12-04

## 2023-12-04 LAB
2HR DELTA HS TROPONIN: -3 NG/L
ALBUMIN SERPL BCP-MCNC: 3.8 G/DL (ref 3.5–5)
ALP SERPL-CCNC: 179 U/L (ref 34–104)
ALT SERPL W P-5'-P-CCNC: 30 U/L (ref 7–52)
AMPHETAMINES SERPL QL SCN: NEGATIVE
ANION GAP SERPL CALCULATED.3IONS-SCNC: 10 MMOL/L
APTT PPP: 30 SECONDS (ref 23–37)
AST SERPL W P-5'-P-CCNC: 25 U/L (ref 13–39)
ATRIAL RATE: 111 BPM
BACTERIA UR QL AUTO: ABNORMAL /HPF
BARBITURATES UR QL: NEGATIVE
BASOPHILS # BLD AUTO: 0.02 THOUSANDS/ÂΜL (ref 0–0.1)
BASOPHILS NFR BLD AUTO: 0 % (ref 0–1)
BENZODIAZ UR QL: NEGATIVE
BILIRUB SERPL-MCNC: 1.39 MG/DL (ref 0.2–1)
BILIRUB UR QL STRIP: NEGATIVE
BNP SERPL-MCNC: 724 PG/ML (ref 0–100)
BUN SERPL-MCNC: 19 MG/DL (ref 5–25)
CALCIUM SERPL-MCNC: 8.9 MG/DL (ref 8.4–10.2)
CARDIAC TROPONIN I PNL SERPL HS: 22 NG/L
CARDIAC TROPONIN I PNL SERPL HS: 25 NG/L
CHLORIDE SERPL-SCNC: 105 MMOL/L (ref 96–108)
CLARITY UR: CLEAR
CO2 SERPL-SCNC: 26 MMOL/L (ref 21–32)
COCAINE UR QL: POSITIVE
COLOR UR: ABNORMAL
CREAT SERPL-MCNC: 1.19 MG/DL (ref 0.6–1.3)
EOSINOPHIL # BLD AUTO: 0.12 THOUSAND/ÂΜL (ref 0–0.61)
EOSINOPHIL NFR BLD AUTO: 1 % (ref 0–6)
ERYTHROCYTE [DISTWIDTH] IN BLOOD BY AUTOMATED COUNT: 14.6 % (ref 11.6–15.1)
GFR SERPL CREATININE-BSD FRML MDRD: 70 ML/MIN/1.73SQ M
GLUCOSE SERPL-MCNC: 127 MG/DL (ref 65–140)
GLUCOSE UR STRIP-MCNC: NEGATIVE MG/DL
HCT VFR BLD AUTO: 43.6 % (ref 36.5–49.3)
HGB BLD-MCNC: 13.7 G/DL (ref 12–17)
HGB UR QL STRIP.AUTO: NEGATIVE
IMM GRANULOCYTES # BLD AUTO: 0.03 THOUSAND/UL (ref 0–0.2)
IMM GRANULOCYTES NFR BLD AUTO: 0 % (ref 0–2)
INR PPP: 1.19 (ref 0.84–1.19)
KETONES UR STRIP-MCNC: ABNORMAL MG/DL
LACTATE SERPL-SCNC: 1.1 MMOL/L (ref 0.5–2)
LEUKOCYTE ESTERASE UR QL STRIP: 25
LYMPHOCYTES # BLD AUTO: 0.91 THOUSANDS/ÂΜL (ref 0.6–4.47)
LYMPHOCYTES NFR BLD AUTO: 11 % (ref 14–44)
MCH RBC QN AUTO: 27.7 PG (ref 26.8–34.3)
MCHC RBC AUTO-ENTMCNC: 31.4 G/DL (ref 31.4–37.4)
MCV RBC AUTO: 88 FL (ref 82–98)
METHADONE UR QL: NEGATIVE
MONOCYTES # BLD AUTO: 0.52 THOUSAND/ÂΜL (ref 0.17–1.22)
MONOCYTES NFR BLD AUTO: 6 % (ref 4–12)
MUCOUS THREADS UR QL AUTO: ABNORMAL
NEUTROPHILS # BLD AUTO: 6.79 THOUSANDS/ÂΜL (ref 1.85–7.62)
NEUTS SEG NFR BLD AUTO: 82 % (ref 43–75)
NITRITE UR QL STRIP: NEGATIVE
NON-SQ EPI CELLS URNS QL MICRO: ABNORMAL /HPF
NRBC BLD AUTO-RTO: 0 /100 WBCS
OPIATES UR QL SCN: NEGATIVE
OXYCODONE+OXYMORPHONE UR QL SCN: NEGATIVE
P AXIS: 81 DEGREES
PCP UR QL: NEGATIVE
PH UR STRIP.AUTO: 5 [PH]
PLATELET # BLD AUTO: 259 THOUSANDS/UL (ref 149–390)
PMV BLD AUTO: 10.6 FL (ref 8.9–12.7)
POTASSIUM SERPL-SCNC: 4.2 MMOL/L (ref 3.5–5.3)
PR INTERVAL: 166 MS
PROCALCITONIN SERPL-MCNC: 0.15 NG/ML
PROT SERPL-MCNC: 6.4 G/DL (ref 6.4–8.4)
PROT UR STRIP-MCNC: ABNORMAL MG/DL
PROTHROMBIN TIME: 15.4 SECONDS (ref 11.6–14.5)
QRS AXIS: 28 DEGREES
QRSD INTERVAL: 66 MS
QT INTERVAL: 332 MS
QTC INTERVAL: 451 MS
RBC # BLD AUTO: 4.94 MILLION/UL (ref 3.88–5.62)
RBC #/AREA URNS AUTO: ABNORMAL /HPF
SODIUM SERPL-SCNC: 141 MMOL/L (ref 135–147)
SP GR UR STRIP.AUTO: 1.03 (ref 1–1.04)
T WAVE AXIS: 66 DEGREES
THC UR QL: NEGATIVE
UROBILINOGEN UA: 4 MG/DL
VENTRICULAR RATE: 111 BPM
WBC # BLD AUTO: 8.39 THOUSAND/UL (ref 4.31–10.16)
WBC #/AREA URNS AUTO: ABNORMAL /HPF

## 2023-12-04 PROCEDURE — 83605 ASSAY OF LACTIC ACID: CPT | Performed by: EMERGENCY MEDICINE

## 2023-12-04 PROCEDURE — 93005 ELECTROCARDIOGRAM TRACING: CPT

## 2023-12-04 PROCEDURE — 83880 ASSAY OF NATRIURETIC PEPTIDE: CPT | Performed by: EMERGENCY MEDICINE

## 2023-12-04 PROCEDURE — 81001 URINALYSIS AUTO W/SCOPE: CPT | Performed by: EMERGENCY MEDICINE

## 2023-12-04 PROCEDURE — 71045 X-RAY EXAM CHEST 1 VIEW: CPT

## 2023-12-04 PROCEDURE — 85025 COMPLETE CBC W/AUTO DIFF WBC: CPT | Performed by: EMERGENCY MEDICINE

## 2023-12-04 PROCEDURE — 84484 ASSAY OF TROPONIN QUANT: CPT | Performed by: EMERGENCY MEDICINE

## 2023-12-04 PROCEDURE — 99285 EMERGENCY DEPT VISIT HI MDM: CPT | Performed by: EMERGENCY MEDICINE

## 2023-12-04 PROCEDURE — 81003 URINALYSIS AUTO W/O SCOPE: CPT | Performed by: EMERGENCY MEDICINE

## 2023-12-04 PROCEDURE — 96365 THER/PROPH/DIAG IV INF INIT: CPT

## 2023-12-04 PROCEDURE — 93010 ELECTROCARDIOGRAM REPORT: CPT | Performed by: INTERNAL MEDICINE

## 2023-12-04 PROCEDURE — 80307 DRUG TEST PRSMV CHEM ANLYZR: CPT

## 2023-12-04 PROCEDURE — 99285 EMERGENCY DEPT VISIT HI MDM: CPT

## 2023-12-04 PROCEDURE — 87040 BLOOD CULTURE FOR BACTERIA: CPT | Performed by: EMERGENCY MEDICINE

## 2023-12-04 PROCEDURE — 85610 PROTHROMBIN TIME: CPT | Performed by: EMERGENCY MEDICINE

## 2023-12-04 PROCEDURE — 84145 PROCALCITONIN (PCT): CPT | Performed by: EMERGENCY MEDICINE

## 2023-12-04 PROCEDURE — 85730 THROMBOPLASTIN TIME PARTIAL: CPT | Performed by: EMERGENCY MEDICINE

## 2023-12-04 PROCEDURE — 80053 COMPREHEN METABOLIC PANEL: CPT | Performed by: EMERGENCY MEDICINE

## 2023-12-04 PROCEDURE — 96375 TX/PRO/DX INJ NEW DRUG ADDON: CPT

## 2023-12-04 PROCEDURE — 36415 COLL VENOUS BLD VENIPUNCTURE: CPT | Performed by: EMERGENCY MEDICINE

## 2023-12-04 RX ORDER — LOSARTAN POTASSIUM 50 MG/1
50 TABLET ORAL 2 TIMES DAILY
Status: DISCONTINUED | OUTPATIENT
Start: 2023-12-04 | End: 2023-12-05

## 2023-12-04 RX ORDER — NITROGLYCERIN 0.4 MG/1
0.4 TABLET SUBLINGUAL ONCE
Status: COMPLETED | OUTPATIENT
Start: 2023-12-04 | End: 2023-12-04

## 2023-12-04 RX ORDER — ENOXAPARIN SODIUM 100 MG/ML
40 INJECTION SUBCUTANEOUS DAILY
Status: DISCONTINUED | OUTPATIENT
Start: 2023-12-05 | End: 2023-12-13 | Stop reason: HOSPADM

## 2023-12-04 RX ORDER — SPIRONOLACTONE 25 MG/1
25 TABLET ORAL DAILY
Status: DISCONTINUED | OUTPATIENT
Start: 2023-12-05 | End: 2023-12-13 | Stop reason: HOSPADM

## 2023-12-04 RX ORDER — ACETAMINOPHEN 325 MG/1
650 TABLET ORAL ONCE
Status: COMPLETED | OUTPATIENT
Start: 2023-12-04 | End: 2023-12-04

## 2023-12-04 RX ORDER — PANTOPRAZOLE SODIUM 40 MG/1
40 TABLET, DELAYED RELEASE ORAL
Status: DISCONTINUED | OUTPATIENT
Start: 2023-12-05 | End: 2023-12-13 | Stop reason: HOSPADM

## 2023-12-04 RX ORDER — FUROSEMIDE 10 MG/ML
80 INJECTION INTRAMUSCULAR; INTRAVENOUS ONCE
Status: COMPLETED | OUTPATIENT
Start: 2023-12-05 | End: 2023-12-05

## 2023-12-04 RX ORDER — KETOROLAC TROMETHAMINE 30 MG/ML
15 INJECTION, SOLUTION INTRAMUSCULAR; INTRAVENOUS ONCE
Status: COMPLETED | OUTPATIENT
Start: 2023-12-04 | End: 2023-12-04

## 2023-12-04 RX ORDER — CEFTRIAXONE 2 G/50ML
2000 INJECTION, SOLUTION INTRAVENOUS ONCE
Status: COMPLETED | OUTPATIENT
Start: 2023-12-04 | End: 2023-12-04

## 2023-12-04 RX ORDER — FUROSEMIDE 10 MG/ML
60 INJECTION INTRAMUSCULAR; INTRAVENOUS ONCE
Status: COMPLETED | OUTPATIENT
Start: 2023-12-04 | End: 2023-12-04

## 2023-12-04 RX ADMIN — KETOROLAC TROMETHAMINE 15 MG: 30 INJECTION, SOLUTION INTRAMUSCULAR; INTRAVENOUS at 13:12

## 2023-12-04 RX ADMIN — CEFTRIAXONE 2000 MG: 2 INJECTION, SOLUTION INTRAVENOUS at 13:24

## 2023-12-04 RX ADMIN — LOSARTAN POTASSIUM 50 MG: 50 TABLET, FILM COATED ORAL at 20:20

## 2023-12-04 RX ADMIN — ACETAMINOPHEN 650 MG: 325 TABLET ORAL at 13:10

## 2023-12-04 RX ADMIN — FUROSEMIDE 60 MG: 10 INJECTION, SOLUTION INTRAMUSCULAR; INTRAVENOUS at 18:21

## 2023-12-04 RX ADMIN — NITROGLYCERIN 0.4 MG: 0.4 TABLET SUBLINGUAL at 13:10

## 2023-12-04 NOTE — PLAN OF CARE
Problem: PAIN - ADULT  Goal: Verbalizes/displays adequate comfort level or baseline comfort level  Description: Interventions:  - Encourage patient to monitor pain and request assistance  - Assess pain using appropriate pain scale  - Administer analgesics based on type and severity of pain and evaluate response  - Implement non-pharmacological measures as appropriate and evaluate response  - Consider cultural and social influences on pain and pain management  - Notify physician/advanced practitioner if interventions unsuccessful or patient reports new pain  Outcome: Progressing     Problem: INFECTION - ADULT  Goal: Absence or prevention of progression during hospitalization  Description: INTERVENTIONS:  - Assess and monitor for signs and symptoms of infection  - Monitor lab/diagnostic results  - Monitor all insertion sites, i.e. indwelling lines, tubes, and drains  - Monitor endotracheal if appropriate and nasal secretions for changes in amount and color  - [place] appropriate cooling/warming therapies per order  - Administer medications as ordered  - Instruct and encourage patient and family to use good hand hygiene technique  - Identify and instruct in appropriate isolation precautions for identified infection/condition  Outcome: Progressing  Goal: Absence of fever/infection during neutropenic period  Description: INTERVENTIONS:  - Monitor WBC    Outcome: Progressing     Problem: SAFETY ADULT  Goal: Patient will remain free of falls  Description: INTERVENTIONS:  - Educate patient/family on patient safety including physical limitations  - Instruct patient to call for assistance with activity   - Consult OT/PT to assist with strengthening/mobility   - Keep Call bell within reach  - Keep bed low and locked with side rails adjusted as appropriate  - Keep care items and personal belongings within reach  - Initiate and maintain comfort rounds  - Make Fall Risk Sign visible to staff  - Offer Toileting every 2 Hours, in advance of need  - Apply yellow socks and bracelet for high fall risk patients  - Consider moving patient to room near nurses station  Outcome: Progressing  Goal: Maintain or return to baseline ADL function  Description: INTERVENTIONS:  -  Assess patient's ability to carry out ADLs; assess patient's baseline for ADL function and identify physical deficits which impact ability to perform ADLs (bathing, care of mouth/teeth, toileting, grooming, dressing, etc.)  - Assess/evaluate cause of self-care deficits   - Assess range of motion  - Assess patient's mobility; develop plan if impaired  - Assess patient's need for assistive devices and provide as appropriate  - Encourage maximum independence but intervene and supervise when necessary  - Involve family in performance of ADLs  - Assess for home care needs following discharge   - Consider OT consult to assist with ADL evaluation and planning for discharge  - Provide patient education as appropriate  Outcome: Progressing  Goal: Maintains/Returns to pre admission functional level  Description: INTERVENTIONS:  - Perform AM-PAC 6 Click Basic Mobility/ Daily Activity assessment daily.  - Set and communicate daily mobility goal to care team and patient/family/caregiver. - Collaborate with rehabilitation services on mobility goals if consulted  - Perform Range of Motion 2 times a day. - Reposition patient every 2 hours.   - Dangle patient 2 times a day  - Stand patient 2 times a day  - Ambulate patient 2 times a day  - Out of bed to chair 2 times a day   - Out of bed for meals 2 times a day  - Out of bed for toileting  - Record patient progress and toleration of activity level   Outcome: Progressing     Problem: DISCHARGE PLANNING  Goal: Discharge to home or other facility with appropriate resources  Description: INTERVENTIONS:  - Identify barriers to discharge w/patient and caregiver  - Arrange for needed discharge resources and transportation as appropriate  - Identify discharge learning needs (meds, wound care, etc.)  - Arrange for interpretive services to assist at discharge as needed  - Refer to Case Management Department for coordinating discharge planning if the patient needs post-hospital services based on physician/advanced practitioner order or complex needs related to functional status, cognitive ability, or social support system  Outcome: Progressing     Problem: Knowledge Deficit  Goal: Patient/family/caregiver demonstrates understanding of disease process, treatment plan, medications, and discharge instructions  Description: Complete learning assessment and assess knowledge base.   Interventions:  - Provide teaching at level of understanding  - Provide teaching via preferred learning methods  Outcome: Progressing

## 2023-12-04 NOTE — ASSESSMENT & PLAN NOTE
- Patient denies cocaine use despite positive UDS  - In view of denial, unsure about last use  - No evident signs of withdrawal during this admission       Plan:   - Provide education

## 2023-12-04 NOTE — ED PROVIDER NOTES
History  Chief Complaint   Patient presents with    Chest Pain     Chest pain that radiates down L arm and SOB since 0930 - Hx of CHF - noted bilateral swelling to legs - no meds PTA - compliant with daily meds      HPI  Patient is a 82-year-old male presenting with shortness of breath and chest pain. Patient has a history of hypertension, hyperlipidemia, CHF with an EF of 40% per echo that was done earlier this year. patient states that the chest pain started this morning and has been gradually worsening. Also noted shortness of breath since last night. Also reports that he has been having increasing cough for the past couple weeks. Reports that his legs have been more swollen the past couple of weeks. He has been having worsening exertional shortness of breath. Also has been experiencing worsening orthopnea. Has been taking his meds as prescribed. States that he has been taking his Lasix regularly and has been urinating a lot. Despite that his legs have been more swollen the past couple weeks. Reports no fever, chills, nausea, vomiting. Also reports no neurologic symptoms including weakness or numbness. Chest pain is located in the left chest and does not radiate anywhere. Prior to Admission Medications   Prescriptions Last Dose Informant Patient Reported? Taking? Empagliflozin (JARDIANCE) 10 MG TABS tablet 12/4/2023  No Yes   Sig: Take 1 tablet (10 mg total) by mouth daily   atorvastatin (LIPITOR) 40 mg tablet 12/4/2023  No Yes   Sig: Take 1 tablet (40 mg total) by mouth daily with dinner   furosemide (LASIX) 40 mg tablet 12/4/2023  No Yes   Sig: Take 1.5 tablets (60 mg total) by mouth daily   ibuprofen (MOTRIN) 600 mg tablet 12/4/2023 Self Yes Yes   Sig: TAKE 1 TABLET (600 MG TOTAL) BY MOUTH EVERY 6 (SIX) HOURS AS NEEDED FOR MILD PAIN (PAIN SCORE 1-3).    losartan (COZAAR) 50 mg tablet 12/4/2023  No Yes   Sig: Take 1 tablet (50 mg total) by mouth 2 (two) times a day   metoprolol succinate (TOPROL-XL) 25 mg 24 hr tablet 12/4/2023  No Yes   Sig: Take 3 tablets (75 mg total) by mouth daily at bedtime   metoprolol succinate (TOPROL-XL) 50 mg 24 hr tablet 12/4/2023  No Yes   Sig: Take 1 tablet (50 mg total) by mouth daily in the early morning   pantoprazole (PROTONIX) 40 mg tablet 12/4/2023 Self No Yes   Sig: Take 1 tablet (40 mg total) by mouth daily in the early morning Do not start before August 9, 2023. spironolactone (ALDACTONE) 25 mg tablet 12/4/2023  No Yes   Sig: Take 1 tablet (25 mg total) by mouth daily   sucralfate (CARAFATE) 1 g tablet 12/3/2023 Self No Yes   Sig: Take 1 tablet (1 g total) by mouth 4 (four) times a day (before meals and at bedtime)      Facility-Administered Medications: None       Past Medical History:   Diagnosis Date    Chronic HFrEF (heart failure with reduced ejection fraction) ([address]) 04/01/2022       Past Surgical History:   Procedure Laterality Date    CARDIAC CATHETERIZATION N/A 4/4/2022    Procedure: CARDIAC CORONARY ANGIOGRAM;  Surgeon: Aspen Simon MD;  Location: [facility]; Service: Cardiology    CARDIAC CATHETERIZATION Left 4/4/2022    Procedure: Cardiac Left Heart Cath;  Surgeon: Aspen Simon MD;  Location: [facility]; Service: Cardiology       History reviewed. No pertinent family history. I have reviewed and agree with the history as documented. E-Cigarette/Vaping    E-Cigarette Use Never User      E-Cigarette/Vaping Substances    Nicotine No     THC No     CBD No     Flavoring No     Other No     Unknown No      Social History     Tobacco Use    Smoking status: Never     Passive exposure: Never    Smokeless tobacco: Never   Vaping Use    Vaping Use: Never used   Substance Use Topics    Alcohol use: Never    Drug use: Never       Review of Systems   Constitutional:  Negative for chills, diaphoresis, fever and unexpected weight change. HENT:  Negative for ear pain and sore throat. Eyes:  Negative for visual disturbance. Respiratory:  Positive for shortness of breath. Negative for cough and chest tightness. Cardiovascular:  Positive for chest pain and leg swelling. Gastrointestinal:  Negative for abdominal distention, abdominal pain, constipation, diarrhea, nausea and vomiting. Endocrine: Negative. Genitourinary:  Negative for difficulty urinating and dysuria. Musculoskeletal: Negative. Skin: Negative. Allergic/Immunologic: Negative. Neurological: Negative. Hematological: Negative. Psychiatric/Behavioral: Negative. All other systems reviewed and are negative. Physical Exam  Physical Exam  Vitals and nursing note reviewed. Constitutional:       General: He is not in acute distress. Appearance: Normal appearance. He is not ill-appearing. HENT:      Head: Normocephalic and atraumatic. Right Ear: External ear normal.      Left Ear: External ear normal.      Nose: Nose normal.      Mouth/Throat:      Mouth: Mucous membranes are moist.      Pharynx: Oropharynx is clear. Eyes:      General: No scleral icterus. Right eye: No discharge. Left eye: No discharge. Extraocular Movements: Extraocular movements intact. Conjunctiva/sclera: Conjunctivae normal.      Pupils: Pupils are equal, round, and reactive to light. Cardiovascular:      Rate and Rhythm: Regular rhythm. Tachycardia present. Pulses: Normal pulses. Heart sounds: Normal heart sounds. Pulmonary:      Effort: Pulmonary effort is normal. Tachypnea present. Breath sounds: Rales present. Abdominal:      General: Abdomen is flat. Bowel sounds are normal. There is no distension. Palpations: Abdomen is soft. Tenderness: There is no abdominal tenderness. There is no guarding or rebound. Musculoskeletal:         General: Normal range of motion. Cervical back: Normal range of motion and neck supple. Skin:     General: Skin is warm and dry.       Capillary Refill: Capillary refill takes less than 2 seconds. Neurological:      General: No focal deficit present. Mental Status: He is alert and oriented to person, place, and time. Mental status is at baseline. Psychiatric:         Mood and Affect: Mood normal.         Behavior: Behavior normal.         Thought Content:  Thought content normal.         Judgment: Judgment normal.         Vital Signs  ED Triage Vitals   Temperature Pulse Respirations Blood Pressure SpO2   12/04/23 1255 12/04/23 1255 12/04/23 1255 12/04/23 1255 12/04/23 1255   (!) 100.6 °F (38.1 °C) (!) 109 (!) 30 155/89 94 %      Temp Source Heart Rate Source Patient Position - Orthostatic VS BP Location FiO2 (%)   12/04/23 1255 12/04/23 1255 12/04/23 1255 12/04/23 1255 --   Tympanic Monitor Sitting Right arm       Pain Score       12/04/23 1403       No Pain           Vitals:    12/04/23 1403 12/04/23 1513 12/04/23 1630 12/04/23 1953   BP: 135/84 142/83 (!) 160/103 155/92   Pulse: 95 98 90 102   Patient Position - Orthostatic VS: Lying Sitting Sitting Sitting         Visual Acuity      ED Medications  Medications   enoxaparin (LOVENOX) subcutaneous injection 40 mg (has no administration in time range)   Empagliflozin (JARDIANCE) tablet 10 mg (has no administration in time range)   losartan (COZAAR) tablet 50 mg (has no administration in time range)   spironolactone (ALDACTONE) tablet 25 mg (has no administration in time range)   pantoprazole (PROTONIX) EC tablet 40 mg (has no administration in time range)   nitroglycerin (NITROSTAT) SL tablet 0.4 mg (0.4 mg Sublingual Given 12/4/23 1310)   cefTRIAXone (ROCEPHIN) IVPB (premix in dextrose) 2,000 mg 50 mL (0 mg Intravenous Stopped 12/4/23 1403)   ketorolac (TORADOL) injection 15 mg (15 mg Intravenous Given 12/4/23 1312)   acetaminophen (TYLENOL) tablet 650 mg (650 mg Oral Given 12/4/23 1310)   furosemide (LASIX) injection 60 mg (60 mg Intravenous Given 12/4/23 1821)       Diagnostic Studies  Results Reviewed Procedure Component Value Units Date/Time    Rapid drug screen, urine [[id number]]  (Abnormal) Collected: 12/04/23 1511    Lab Status: Final result Specimen: Urine, Clean Catch Updated: 12/04/23 1657     Amph/Meth UR Negative     Barbiturate Ur Negative     Benzodiazepine Urine Negative     Cocaine Urine Positive     Methadone Urine Negative     Opiate Urine Negative     PCP Ur Negative     THC Urine Negative     Oxycodone Urine Negative    Narrative:      Presumptive report. If requested, specimen will be sent to reference lab for confirmation. FOR MEDICAL PURPOSES ONLY. IF CONFIRMATION NEEDED PLEASE CONTACT THE LAB WITHIN 5 DAYS.     Drug Screen Cutoff Levels:  AMPHETAMINE/METHAMPHETAMINES  1000 ng/mL  BARBITURATES     200 ng/mL  BENZODIAZEPINES     200 ng/mL  COCAINE      300 ng/mL  METHADONE      300 ng/mL  OPIATES      300 ng/mL  PHENCYCLIDINE     25 ng/mL  THC       50 ng/mL  OXYCODONE      100 ng/mL    COVID/FLU/RSV [[id number]]     Lab Status: No result Specimen: Nasopharyngeal Swab     HS Troponin I 2hr [[id number]]  (Normal) Collected: 12/04/23 1506    Lab Status: Final result Specimen: Blood from Arm, Left Updated: 12/04/23 1543     hs TnI 2hr 22 ng/L      Delta 2hr hsTnI -3 ng/L     Procalcitonin [[id number]]  (Normal) Collected: 12/04/23 1304    Lab Status: Final result Specimen: Blood from Arm, Left Updated: 12/04/23 1533     Procalcitonin 0.15 ng/ml     Urine Microscopic [[id number]]  (Abnormal) Collected: 12/04/23 1511    Lab Status: Final result Specimen: Urine, Clean Catch Updated: 12/04/23 1528     RBC, UA None Seen /hpf      WBC, UA 2-4 /hpf      Epithelial Cells Occasional /hpf      Bacteria, UA Occasional /hpf      MUCUS THREADS Moderate    UA w Reflex to Microscopic w Reflex to Culture [[id number]]  (Abnormal) Collected: 12/04/23 1511    Lab Status: Final result Specimen: Urine, Clean Catch Updated: 12/04/23 1527     Color, UA Brown     Clarity, UA Clear     Specific Gravity, UA 1.030     pH, UA 5.0     Leukocytes, UA 25.0     Nitrite, UA Negative     Protein,  (2+) mg/dl      Glucose, UA Negative mg/dl      Ketones, UA 5 (Trace) mg/dl      Bilirubin, UA Negative     Occult Blood, UA Negative     UROBILINOGEN UA 4.0 mg/dL     Lactic acid [[id number]]  (Normal) Collected: 12/04/23 1304    Lab Status: Final result Specimen: Blood from Arm, Left Updated: 12/04/23 1417     LACTIC ACID 1.1 mmol/L     Narrative:      Result may be elevated if tourniquet was used during collection.     Comprehensive metabolic panel [[id number]]  (Abnormal) Collected: 12/04/23 1304    Lab Status: Final result Specimen: Blood from Arm, Left Updated: 12/04/23 1349     Sodium 141 mmol/L      Potassium 4.2 mmol/L      Chloride 105 mmol/L      CO2 26 mmol/L      ANION GAP 10 mmol/L      BUN 19 mg/dL      Creatinine 1.19 mg/dL      Glucose 127 mg/dL      Calcium 8.9 mg/dL      AST 25 U/L      ALT 30 U/L      Alkaline Phosphatase 179 U/L      Total Protein 6.4 g/dL      Albumin 3.8 g/dL      Total Bilirubin 1.39 mg/dL      eGFR 70 ml/min/1.73sq m     Narrative:      [facility] Disease (CKD):     Stage 1 with normal or high GFR (GFR > 90 mL/min/1.73 square meters)    Stage 2 Mild CKD (GFR = 60-89 mL/min/1.73 square meters)    Stage 3A Moderate CKD (GFR = 45-59 mL/min/1.73 square meters)    Stage 3B Moderate CKD (GFR = 30-44 mL/min/1.73 square meters)    Stage 4 Severe CKD (GFR = 15-29 mL/min/1.73 square meters)    Stage 5 End Stage CKD (GFR <15 mL/min/1.73 square meters)  Note: GFR calculation is accurate only with a steady state creatinine    HS Troponin 0hr (reflex protocol) [[id number]]  (Normal) Collected: 12/04/23 1304    Lab Status: Final result Specimen: Blood from Arm, Left Updated: 12/04/23 1346     hs TnI 0hr 25 ng/L     B-Type Natriuretic Peptide(BNP) [[id number]]  (Abnormal) Collected: 12/04/23 1304    Lab Status: Final result Specimen: Blood from Arm, Left Updated: 12/04/23 1344      pg/mL     Protime-INR [[id number]]  (Abnormal) Collected: 12/04/23 1304    Lab Status: Final result Specimen: Blood from Arm, Left Updated: 12/04/23 1335     Protime 15.4 seconds      INR 1.19    APTT [[id number]]  (Normal) Collected: 12/04/23 1304    Lab Status: Final result Specimen: Blood from Arm, Left Updated: 12/04/23 1335     PTT 30 seconds     Blood culture #2 [[id number]] Collected: 12/04/23 1322    Lab Status: In process Specimen: Blood from Arm, Left Updated: 12/04/23 1331    CBC and differential [[id number]]  (Abnormal) Collected: 12/04/23 1304    Lab Status: Final result Specimen: Blood from Arm, Left Updated: 12/04/23 1319     WBC 8.39 Thousand/uL      RBC 4.94 Million/uL      Hemoglobin 13.7 g/dL      Hematocrit 43.6 %      MCV 88 fL      MCH 27.7 pg      MCHC 31.4 g/dL      RDW 14.6 %      MPV 10.6 fL      Platelets 654 Thousands/uL      nRBC 0 /100 WBCs      Neutrophils Relative 82 %      Immat GRANS % 0 %      Lymphocytes Relative 11 %      Monocytes Relative 6 %      Eosinophils Relative 1 %      Basophils Relative 0 %      Neutrophils Absolute 6.79 Thousands/µL      Immature Grans Absolute 0.03 Thousand/uL      Lymphocytes Absolute 0.91 Thousands/µL      Monocytes Absolute 0.52 Thousand/µL      Eosinophils Absolute 0.12 Thousand/µL      Basophils Absolute 0.02 Thousands/µL     Blood culture #1 [[id number]] Collected: 12/04/23 1304    Lab Status: In process Specimen: Blood from Arm, Left Updated: 12/04/23 1314                   XR chest 1 view portable   ED Interpretation by Mendez Box MD (12/04 1335)   Pulmonary edema but otherwise no obvious signs of consolidation concerning for pneumonia      Final Result by Terence Clarke MD (12/04 1545)      Moderate edema.                Workstation performed: [id number]                    Procedures  Procedures         ED Course                               SBIRT 20yo+      Flowsheet Row Most Recent Value   Initial Alcohol Screen: US AUDIT-C     1. How often do you have a drink containing alcohol? 0 Filed at: 12/04/2023 1313   2. How many drinks containing alcohol do you have on a typical day you are drinking? 0 Filed at: 12/04/2023 1313   3a. Male UNDER 65: How often do you have five or more drinks on one occasion? 0 Filed at: 12/04/2023 1313   Audit-C Score 0 Filed at: 12/04/2023 1313   JOHNNIE: How many times in the past year have you. .. Used an illegal drug or used a prescription medication for non-medical reasons? Never Filed at: 12/04/2023 1313                      Medical Decision Making  81-year-old male presenting with shortness of breath, and chest pain  Notable crackles in the base of the lungs as well as patient's history of CHF concerning for CHF exacerbation  Will obtain ACS workup to rule out ACS. Also assess for possible pulmonary edema via x-ray. Also will assess for possible pneumonia  Patient's vitals show fever as well as tachycardic and tachypneic. Concern for possible infectious cause especially in the setting where patient is complaining of increasing cough. Patient with x-ray findings concerning for pulmonary edema as well as elevated BNP and the need for oxygen. Patient most likely suffering from acute exacerbation of CHF  Patient admitted to medicine for further management and care      Problems Addressed:  CHF, acute on [facility]): acute illness or injury    Amount and/or Complexity of Data Reviewed  Labs: ordered. Radiology: ordered and independent interpretation performed. Risk  OTC drugs. Prescription drug management. Decision regarding hospitalization.              Disposition  Final diagnoses:   CHF, acute on [facility])     Time reflects when diagnosis was documented in both MDM as applicable and the Disposition within this note       Time User Action Codes Description Comment    12/4/2023  3:46 PM Myla Ware Add [I50.9] CHF, acute on [facility])           ED Disposition       ED Disposition Admit    Condition   Stable    Date/Time   Mon Dec 4, 2023 9796    Comment   Case was discussed with Dr Darby Mccloud and the patient's admission status was agreed to be Admission Status: inpatient status to the service of Dr. Josie Gaviria . Follow-up Information    None         Current Discharge Medication List        CONTINUE these medications which have NOT CHANGED    Details   atorvastatin (LIPITOR) 40 mg tablet Take 1 tablet (40 mg total) by mouth daily with dinner  Qty: 30 tablet, Refills: 3    Associated Diagnoses: Acute CHF (congestive heart failure) ([facility])      Empagliflozin (JARDIANCE) 10 MG TABS tablet Take 1 tablet (10 mg total) by mouth daily  Qty: 30 tablet, Refills: 5    Associated Diagnoses: Chronic combined systolic and diastolic congestive heart failure ([address]); Chronic HFrEF (heart failure with reduced ejection fraction) ([facility])      furosemide (LASIX) 40 mg tablet Take 1.5 tablets (60 mg total) by mouth daily  Qty: 45 tablet, Refills: 3    Associated Diagnoses: Acute CHF (congestive heart failure) ([facility])      ibuprofen (MOTRIN) 600 mg tablet TAKE 1 TABLET (600 MG TOTAL) BY MOUTH EVERY 6 (SIX) HOURS AS NEEDED FOR MILD PAIN (PAIN SCORE 1-3). losartan (COZAAR) 50 mg tablet Take 1 tablet (50 mg total) by mouth 2 (two) times a day  Qty: 60 tablet, Refills: 3    Associated Diagnoses: Chronic combined systolic and diastolic congestive heart failure ([address])      ! ! metoprolol succinate (TOPROL-XL) 25 mg 24 hr tablet Take 3 tablets (75 mg total) by mouth daily at bedtime  Qty: 90 tablet, Refills: 3    Associated Diagnoses: Acute CHF (congestive heart failure) ([address])      ! ! metoprolol succinate (TOPROL-XL) 50 mg 24 hr tablet Take 1 tablet (50 mg total) by mouth daily in the early morning  Qty: 30 tablet, Refills: 3    Associated Diagnoses: Acute CHF (congestive heart failure) ([facility])      pantoprazole (PROTONIX) 40 mg tablet Take 1 tablet (40 mg total) by mouth daily in the early morning Do not start before August 9, 2023. Qty: 30 tablet, Refills: 0    Associated Diagnoses: GERD (gastroesophageal reflux disease)      spironolactone (ALDACTONE) 25 mg tablet Take 1 tablet (25 mg total) by mouth daily  Qty: 30 tablet, Refills: 3    Associated Diagnoses: Nonischemic cardiomyopathy ([address]); Chronic HFrEF (heart failure with reduced ejection fraction) ([facility])      sucralfate (CARAFATE) 1 g tablet Take 1 tablet (1 g total) by mouth 4 (four) times a day (before meals and at bedtime)  Qty: 120 tablet, Refills: 0    Associated Diagnoses: GERD (gastroesophageal reflux disease)       ! ! - Potential duplicate medications found. Please discuss with provider. No discharge procedures on file.     PDMP Review       None            ED Provider  Electronically Signed by             Haleigh Talley MD  12/04/23 2001

## 2023-12-04 NOTE — ASSESSMENT & PLAN NOTE
During hospitalization his troponins were elevated most likely secondary to CHF exacerbation, ECG on admission was found to be sinus tachycardic with possible left atrial enlargement with normal QTc.   Patient endorses chest pain but it is well-controlled during the admission with topical Voltaren gel

## 2023-12-04 NOTE — ASSESSMENT & PLAN NOTE
- Fever resolved  - S/P Tylenol 650 in ED IV Ceftriaxone x 1 in the ED  - No evidence of bacterial infection  - Flu/RSV/Covid not done overnight    Plan:   Monitor vitals

## 2023-12-04 NOTE — H&P
History and Physical - [facility]    Patient Information: Jane Stover 48 y.o. male MRN: [id number]  Unit/Bed#: 7T [facility] 713-01 Encounter: [id number]  Admitting Physician: Mayuri Shah MD  PCP: Danny Gorman MD  Date of Admission:  12/04/23    Assessment and Plan    * Acute exacerbation of CHF (congestive heart failure) ([address])  Assessment & Plan  Wt Readings from Last 3 Encounters:   12/04/23 110 kg (242 lb 4.6 oz)   11/16/23 98.4 kg (217 lb)   09/13/23 93.9 kg (207 lb)     Patient presents with shortness of breath, crackles on exam and leg swelling, orthopnea   Signs of fluid overload  EKG with sinus tachycardia  X-ray remarkable for venous congestion, BNP:  724  UDS positive for cocaine  Did not receive diuresis in the ED  Last hospitalized for CHF exacerbation in August of this year. Compliant with home regimen: furosemide 60 QD (taking BID), metoprolol 50 in day and 75 at night, losartan 50 mg BID, spironolactone 25 mg daily, Jardiance 10 mg daily  Last echo :  LVEF 40% ( August 2023)     Plan:   Monitor I/O  Daily weight  Fluid restriction 1.5L   60 mg IV Lasix BID   Continuous pulse oximetry and titrate O2 as needed  O2 goal of above 92%   AM BMP  Continue home Losartan, Jardiance and Spironolactone  Hold metoprolol until >24h of cocaine use           Chronic combined systolic and diastolic congestive heart failure ([address])  Assessment & Plan  See A/P for acute exacerbation of CHF       Cocaine abuse [facility])  Assessment & Plan  Patient denies cocaine use.   UDS: positive cocaine    Plan:   hold metoprolol until >24hrs since last use    Hyperlipidemia  Assessment & Plan  Lab Results   Component Value Date    CHOLESTEROL 132 03/31/2022     Lab Results   Component Value Date    HDL 44 03/31/2022     Lab Results   Component Value Date    TRIG 205 (H) 03/31/2022     Home medication: atorvastatin 40 mg QD    SIRS (systemic inflammatory response syndrome) ([address])  Assessment & Plan  Vitals: 100.6F , 109 RR, 94,  RR: 30, 155/89  S/P Tylenol 650 in ED and IV Ceftriaxone x 1 in the ED  No evidence of bacterial infection  No longer meeting SIRS criteria. Plan:   Monitor vitals   Will check Flu/RSV/Covid      Chest pain  Assessment & Plan  Tropinonins: 25 > 22   Non-MI related tropinin elevation most likely 2/2 to CHF exacerbation   ECG sinus tachycardia; Possible Left atrial enlargement, QTC: 451  Patient currently is not endorsing chest pain     Plan:  Monitor for symptoms  Telemetry    Hypertension  Assessment & Plan  Blood Pressure: 142/83    Home medications: furosemide 60 mg daily (using BID), losartan 50 BID, metoprolol 50 mg in morning, metoprolol 75 mg at night, spironolactone 25 mg daily    Plan:   Continue home medications   IV Lasix instead of oral in view of CHF exacerbation  Low sodium diet  Monitor VS        VTE Prophylaxis: VTE Score: 6 High Risk (Score >/= 5) - Pharmacological DVT Prophylaxis Ordered: enoxaparin (Lovenox). Sequential Compression Devices Ordered. Code Status: Prior  Anticipated Length of Stay:  Patient will be admitted on an Inpatient basis with an anticipated length of stay of  more than 2 midnights. Justification for Hospital Stay: acute on chronic CHF exacerbation   Total Time for Visit, including Counseling / Coordination of Care: 45 mins. Greater than 50% of this total time spent on direct patient counseling and coordination of care. Chief Complaint:     Chief Complaint   Patient presents with    Chest Pain     Chest pain that radiates down L arm and SOB since 0930 - Hx of CHF - noted bilateral swelling to legs - no meds PTA - compliant with daily meds      History of Present Illness:    Jad Leonard is a 48 y.o. male who presents with Patient has a hard time breathing and cannot sleep for the last few months . He reports R leg swelling mostly but on both legs for  the past few weeks, and increased cough that he has had for 3-4 months. It is productive with yellow phlegm. He takes lasix BID but cannot remember the dose, but is compliant with the medication. No symptoms of URI, or sick contracts. No dysuria. No alcohol or cigarette smoking. ED course:   VS on arrival- 100.6 F, , 94% SPO2, RR 38, 155/89  Labs-UA positive leukocytes negative nitrite trace ketones  Be an P: 724  EKG-sinus tachycardia  Imaging-chest x-ray portable: Cardiomediastinal silhouette normal.  Moderate edema, no effusion or pneumothorax. Medications-nitroglycerin 0.4 mg tablet, ceftriaxone 2 g IV, ketorolac 15 mg injection, Tylenol 650 mg tablet    He was admitted to the family medicine service for CHF exacerbation management. Review of Systems:  Review of Systems   Constitutional:  Negative for appetite change, chills and fever. HENT:  Negative for sore throat. Respiratory:  Positive for cough and shortness of breath. Cardiovascular:  Positive for leg swelling. Negative for chest pain and palpitations. Gastrointestinal:  Positive for abdominal pain. Negative for constipation, diarrhea, nausea and vomiting. Generalized pain in lower abdomen   Genitourinary:  Negative for dysuria and hematuria. Neurological:  Negative for syncope and light-headedness. Psychiatric/Behavioral:  Negative for agitation. Past Medical and Surgical History:   Past Medical History:   Diagnosis Date    Chronic HFrEF (heart failure with reduced ejection fraction) ([address]) 04/01/2022     Past Surgical History:   Procedure Laterality Date    CARDIAC CATHETERIZATION N/A 4/4/2022    Procedure: CARDIAC CORONARY ANGIOGRAM;  Surgeon: Migue Parrish MD;  Location: [facility]; Service: Cardiology    CARDIAC CATHETERIZATION Left 4/4/2022    Procedure: Cardiac Left Heart Cath;  Surgeon: Migue Parrish MD;  Location: [facility]; Service: Cardiology     Meds/Allergies:   Allergies: No Known Allergies  Prior to Admission Medications   Prescriptions Last Dose Informant Patient Reported? Taking? Empagliflozin (JARDIANCE) 10 MG TABS tablet 12/4/2023  No Yes   Sig: Take 1 tablet (10 mg total) by mouth daily   atorvastatin (LIPITOR) 40 mg tablet 12/4/2023  No Yes   Sig: Take 1 tablet (40 mg total) by mouth daily with dinner   furosemide (LASIX) 40 mg tablet 12/4/2023  No Yes   Sig: Take 1.5 tablets (60 mg total) by mouth daily   ibuprofen (MOTRIN) 600 mg tablet 12/4/2023 Self Yes Yes   Sig: TAKE 1 TABLET (600 MG TOTAL) BY MOUTH EVERY 6 (SIX) HOURS AS NEEDED FOR MILD PAIN (PAIN SCORE 1-3). losartan (COZAAR) 50 mg tablet 12/4/2023  No Yes   Sig: Take 1 tablet (50 mg total) by mouth 2 (two) times a day   metoprolol succinate (TOPROL-XL) 25 mg 24 hr tablet 12/4/2023  No Yes   Sig: Take 3 tablets (75 mg total) by mouth daily at bedtime   metoprolol succinate (TOPROL-XL) 50 mg 24 hr tablet 12/4/2023  No Yes   Sig: Take 1 tablet (50 mg total) by mouth daily in the early morning   pantoprazole (PROTONIX) 40 mg tablet 12/4/2023 Self No Yes   Sig: Take 1 tablet (40 mg total) by mouth daily in the early morning Do not start before August 9, 2023.    spironolactone (ALDACTONE) 25 mg tablet 12/4/2023  No Yes   Sig: Take 1 tablet (25 mg total) by mouth daily   sucralfate (CARAFATE) 1 g tablet 12/3/2023 Self No Yes   Sig: Take 1 tablet (1 g total) by mouth 4 (four) times a day (before meals and at bedtime)      Facility-Administered Medications: None     Social History:     Social History     Socioeconomic History    Marital status: Single     Spouse name: Not on file    Number of children: Not on file    Years of education: Not on file    Highest education level: Not on file   Occupational History    Not on file   Tobacco Use    Smoking status: Never     Passive exposure: Never    Smokeless tobacco: Never   Vaping Use    Vaping Use: Never used   Substance and Sexual Activity    Alcohol use: Never    Drug use: Never    Sexual activity: Not on file   Other Topics Concern    Not on file   Social History Narrative    Not on file     Social Determinants of Health     Financial Resource Strain: Not on file   Food Insecurity: Unknown (8/14/2023)    Hunger Vital Sign     Worried About Running Out of Food in the Last Year: Patient refused     [address] in the Last Year: Patient refused   Transportation Needs: Unknown (8/14/2023)    PRAPARE - Transportation     Lack of Transportation (Medical): Patient refused     Lack of Transportation (Non-Medical): Patient refused   Physical Activity: Not on file   Stress: Not on file   Social Connections: Not on file   Intimate Partner Violence: Not on file   Housing Stability: [address]  (8/7/2023)    Housing Stability Vital Sign     Unable to Pay for Housing in the Last Year: No     Number of [address] in the Last Year: 1     Unstable Housing in the Last Year: No     Patient Pre-hospital Living Situation: home  Patient Pre-hospital Level of Mobility: full  Patient Pre-hospital Diet Restrictions: cardiac    Family History:  History reviewed. No pertinent family history. Physical Exam:   Vitals:   Blood Pressure: (!) 160/103 (notified nurse) (12/04/23 1630)  Pulse: 90 (12/04/23 1630)  Temperature: (!) 97.3 °F (36.3 °C) (12/04/23 1630)  Temp Source: Temporal (12/04/23 1630)  Respirations: 18 (12/04/23 1630)  Height: 6' (182.9 cm) (12/04/23 1630)  Weight - Scale: 108 kg (237 lb 3.4 oz) (12/04/23 1630)  SpO2: 96 % (12/04/23 1630)    Physical Exam  Constitutional:       General: He is not in acute distress. Appearance: He is not ill-appearing, toxic-appearing or diaphoretic. HENT:      Head: Normocephalic. Nose: Nose normal.      Mouth/Throat:      Mouth: Mucous membranes are moist.   Eyes:      General: No scleral icterus. Conjunctiva/sclera: Conjunctivae normal.   Cardiovascular:      Rate and Rhythm: Normal rate and regular rhythm. Pulses: Normal pulses. Heart sounds: Normal heart sounds. No murmur heard. No gallop. Pulmonary:      Effort: Pulmonary effort is normal.      Breath sounds: Rales (R sided fine crackles) present. Abdominal:      General: Bowel sounds are normal. There is distension. Palpations: Abdomen is soft. Tenderness: There is no guarding. Musculoskeletal:      Right lower leg: Edema present. Left lower leg: Edema present. Skin:     General: Skin is warm. Capillary Refill: Capillary refill takes less than 2 seconds. Coloration: Skin is not jaundiced. Neurological:      Mental Status: He is alert and oriented to person, place, and time. Psychiatric:         Mood and Affect: Mood normal.         Lab Results: I have personally reviewed pertinent reports. Results from last 7 days   Lab Units 12/04/23  1304   WBC Thousand/uL 8.39   HEMOGLOBIN g/dL 13.7   HEMATOCRIT % 43.6   PLATELETS Thousands/uL 259   NEUTROS PCT % 82*   LYMPHS PCT % 11*   MONOS PCT % 6   EOS PCT % 1     Results from last 7 days   Lab Units 12/04/23  1304   POTASSIUM mmol/L 4.2   CHLORIDE mmol/L 105   CO2 mmol/L 26   BUN mg/dL 19   CREATININE mg/dL 1.19   CALCIUM mg/dL 8.9   ALK PHOS U/L 179*   ALT U/L 30   AST U/L 25   EGFR ml/min/1.73sq m 70     Results from last 7 days   Lab Units 12/04/23  1304   INR  1.19         Results from last 7 days   Lab Units 12/04/23  1304   LACTIC ACID mmol/L 1.1              Results from last 7 days   Lab Units 12/04/23  1511   COLOR UA  Brown*   CLARITY UA  Clear   SPEC GRAV UA  1.030   PH UA  5.0   LEUKOCYTES UA  25.0*   NITRITE UA  Negative   GLUCOSE UA mg/dl Negative   KETONES UA mg/dl 5 (Trace)*   BILIRUBIN UA  Negative   BLOOD UA  Negative      Results from last 7 days   Lab Units 12/04/23  1511   RBC UA /hpf None Seen   WBC UA /hpf 2-4   EPITHELIAL CELLS WET PREP /hpf Occasional   BACTERIA UA /hpf Occasional        Imaging: I have personally reviewed pertinent reports.     XR chest 1 view portable    Result Date: 12/4/2023  Narrative: CHEST INDICATION:   chest pain and shortness of breath. COMPARISON: CXR and chest CT 8/5/2023. EXAM PERFORMED/VIEWS:  XR CHEST PORTABLE. FINDINGS: Cardiomediastinal silhouette normal. Moderate edema. No effusion or pneumothorax. Upper abdomen normal. Bones normal for age. Impression: Moderate edema.  Workstation performed: [id number]       EKG, Pathology, and Other Studies Reviewed on Admission:   EKG  Result Date: 12/04/23  Impression:  sinus tachycardia     Entire H&P was discussed with Dr. Betina Villaseñor  who agreed to what is noted above    Reid Simmons MD  12/04/23  6:07 PM

## 2023-12-04 NOTE — ASSESSMENT & PLAN NOTE
- Negative net I/O over the last 24 hours   - S/p oxygen study overnight- no oxygen needed   - Improvement in STEPHANIE  Patient was managed with IV diuresis during his hospital stay, he improved overall and was transitioned to oral medication. However during his hospital stay he contracted influenza A, and his shortness of breath had come back. He needed to be placed on IV diuresis again. Today cardio cleared him and he will be continued on oral diuresis at home with torsemide 20 mg twice daily. He will continue to hold Jardiance and losartan, and he will continue the isosorbide dinitrate 5 mg 3 times a day and hydralazine 10 mg 3 times per day, spironolactone 25 mg daily and metoprolol.       Plan:   - Continue isosorbide dinitrate 5 mg TID and Hydralazine 10 mg TID, as per Cardiology recommendations  - continue spironolactone 25mg qd po and beta-blocker  - HOLD jardiance and Losartan in view of kidney function

## 2023-12-04 NOTE — LETTER
December 13, 2023     Andrew Fierro [facility]    Patient: Jazzmine Gruber   YOB: 1970   Date of Visit: 12/4/2023       Dear  [facility]:    Thank you for referring Jazzmine Gruber to me for evaluation. Below are my notes for this consultation. If you have questions, please do not hesitate to call me. I look forward to following your patient along with you. Sincerely,        No name on file        CC: No Recipients    Guillermina White DO  12/13/2023  2:17 PM  Signed  Progress Note - Cardiology   Jazzmine Gruber 48 y.o. male MRN: [id number]  Unit/Bed#: 7T [facility] 713-01 Encounter: [id number]      Assessment/Recommendations/Discussion:   Acute on chronic combined systolic and diastolic heart failure  LVEF 61%, grade 2 diastolic dysfunction, mild RV dilatation, mild LA dilatation, moderate to severe MR, mild TR with PASP 35 mmHg, August 2023  Influenza A infection  Precordial chest pain likely secondary to influenza infection/heart failure  Nonischemic cardiomyopathy  Normal coronary arteries by cardiac catheterization, April 2022  Acute kidney injury  Hypertension  Dyslipidemia  History of cocaine abuse - UDS positive on admission  Alcohol abuse  Left leg cellulitis  UTI      PLAN  He continues to improve, lungs are clear today  Still with lower extremity edema but he states this is about at his baseline. He is adamant about going home today  Can stop IV Lasix  Would place on p.o. torsemide for total of 40 mg/day, continue 20 mg twice daily  Continue hydralazine/isosorbide, spironolactone, Toprol-XL  Will need close outpatient follow-up with cardiology        Subjective:   HPI  Doing well today, no shortness of breath, no chest pain      Review of Systems: As noted in HPI. Rest of ROS is negative.     Vitals:   /90 (BP Location: Left arm)   Pulse 79   Temp 97.5 °F (36.4 °C) (Temporal)   Resp 20   Ht 6' (1.829 m)   Wt 96.4 kg (212 lb 8.4 oz)   SpO2 98%   BMI 28.82 kg/m²   I/O 12/11 0701  12/12 0700 12/12 0701 12/13 0700 12/13 0701  12/14 0700    P. O. 720 1680 240    Total Intake(mL/kg) 720 (7.4) 1680 (17.4) 240 (2.5)    Urine (mL/kg/hr) 3700 (1.6) 3350 (1.4) 1600 (2.4)    Stool       Total Output 3700 3350 1600    Net -2980 -1670 -1360           Unmeasured Urine Occurrence  1 x           Weight (last 2 days)       Date/Time Weight    12/13/23 0600 96.4 (212.52)    12/12/23 0538 97.7 (215.39)    12/11/23 0800 98.8 (217.9)            Physical Exam   Constitutional: awake, alert and oriented, in no acute distress, no obvious deformities  Head: Normocephalic, without obvious abnormality, atraumatic  Eyes: conjunctivae clear and moist. Sclera anicteric. No xanthelasmas. Pupils equal bilaterally. Extraocular motions are full. Ear nose mouth and throat: ears are symmetrical bilaterally, hearing appears to be equal bilaterally, no nasal discharge or epistaxis, oropharynx is clear with moist mucous membranes  Neck: Trachea is midline, neck is supple, no thyromegaly or significant lymphadenopathy, there is full range of motion. Lungs: clear to auscultation bilaterally, no wheezes, no rales, no rhonchi, no accessory muscle use, breathing is nonlabored  Heart: regular rate and rhythm, S1, S2 normal, no murmur, no click, no rub and no gallop, 1-2+ lower extremity edema  Abdomen: soft, non-tender; bowel sounds normal; no masses, no organomegaly  Psychiatric: Patient is oriented to time, place, person, mood/affect is negative for depression, anxiety, agitation, appears to have appropriate insight  Skin: Skin is warm, dry, intact. No obvious rashes or lesions on exposed extremities. Nail beds are pink with no cyanosis or clubbing.     TELEMETRY:   Lab Results:  Results from last 7 days   Lab Units 12/12/23  0513   WBC Thousand/uL 6.10   HEMOGLOBIN g/dL 13.5   HEMATOCRIT % 42.1   PLATELETS Thousands/uL 201     Results from last 7 days   Lab Units 12/13/23  0456   POTASSIUM mmol/L 3.9   CHLORIDE mmol/L 101   CO2 mmol/L 29   BUN mg/dL 30*   CREATININE mg/dL 1.39*   CALCIUM mg/dL 8.9     Results from last 7 days   Lab Units 12/13/23  0456   POTASSIUM mmol/L 3.9   CHLORIDE mmol/L 101   CO2 mmol/L 29   BUN mg/dL 30*   CREATININE mg/dL 1.39*   CALCIUM mg/dL 8.9           Medications:    Current Facility-Administered Medications:   •  acetaminophen (TYLENOL) tablet 975 mg, 975 mg, Oral, Q6H PRN, Zuri Manuel MD  •  benzonatate (TESSALON PERLES) capsule 100 mg, 100 mg, Oral, BID, Marisa Garcia MD, 100 mg at 12/13/23 0830  •  dextromethorphan-guaiFENesin (ROBITUSSIN DM) oral syrup 5 mL, 5 mL, Oral, Q4H, Zuri Manuel MD, 5 mL at 12/13/23 1143  •  Diclofenac Sodium (VOLTAREN) 1 % topical gel 2 g, 2 g, Topical, 4x Daily, Bren Jaquez MD, 2 g at 12/13/23 1149  •  enoxaparin (LOVENOX) subcutaneous injection 40 mg, 40 mg, Subcutaneous, Daily, Zuri Manuel MD, 40 mg at 12/13/23 6052  •  furosemide (LASIX) injection 40 mg, 40 mg, Intravenous, BID (diuretic), Luis Verdugo MD, 40 mg at 12/13/23 0830  •  hydrALAZINE (APRESOLINE) tablet 10 mg, 10 mg, Oral, Q8H [facility], Bren Jaquez MD, 10 mg at 12/13/23 1300  •  ipratropium (ATROVENT) 0.02 % inhalation solution 0.5 mg, 0.5 mg, Nebulization, Q6H PRN, Kaye Lee MD  •  isosorbide dinitrate (ISORDIL) tablet 5 mg, 5 mg, Oral, TID after meals, Bren Jaquez MD, 5 mg at 12/13/23 1148  •  levalbuterol (XOPENEX) inhalation solution 1.25 mg, 1.25 mg, Nebulization, Q6H PRN, Kaye Lee MD  •  methocarbamol (ROBAXIN) tablet 500 mg, 500 mg, Oral, Q8H [facility], Zuri Manuel MD, 500 mg at 12/13/23 1300  •  metoprolol succinate (TOPROL-XL) 24 hr tablet 50 mg, 50 mg, Oral, Daily, Bren Jaquez MD, 50 mg at 12/13/23 0830  •  metoprolol succinate (TOPROL-XL) 24 hr tablet 75 mg, 75 mg, Oral, HS, Bren Jaquez MD, 75 mg at 12/12/23 2311  •  oseltamivir (TAMIFLU) capsule 75 mg, 75 mg, Oral, Q12H [address], Pasquale Etienne MD, 75 mg at 12/13/23 0830  •  pantoprazole (PROTONIX) EC tablet 40 mg, 40 mg, Oral, Early Morning, Bren Mohr MD, 40 mg at 12/13/23 3362  •  saccharomyces boulardii (FLORASTOR) capsule 250 mg, 250 mg, Oral, BID, Raymundo Amezcua MD, 250 mg at 12/13/23 0830  •  spironolactone (ALDACTONE) tablet 25 mg, 25 mg, Oral, Daily, Bren Mohr MD, 25 mg at 12/13/23 0830    Portions of the record may have been created with voice recognition software. Occasional wrong word or "sound a like" substitutions may have occurred due to the inherent limitations of voice recognition software. Read the chart carefully and recognize, using context, where substitutions have occurred.     Angelo Palacios DO, [facility]  12/13/2023 1:51 PM        Grisel Landa DO  12/12/2023 10:53 PM  Signed      Cardiology Progress Note   MD Anne Burton MD, Rowdy Fierro DO, MD Deshawn Sims DO, Vijay Merida DO, [facility]  ----------------------------------------------------------------  [address] [place] [address] [place] [address]    Amber Poster 48 y.o. male MRN: [id number]  Unit/Bed#: 7T [facility] 713-01 Encounter: [id number]      ASSESSMENT:   Acute on chronic combined systolic and diastolic heart failure  LVEF 50%, grade 2 diastolic dysfunction, mild RV dilatation, mild LA dilatation, moderate to severe MR, mild TR with PASP 35 mmHg, August 2023  Influenza A infection  Precordial chest pain likely secondary to influenza infection/heart failure  Nonischemic cardiomyopathy  Normal coronary arteries by cardiac catheterization, April 2022  Acute kidney injury  Hypertension  Dyslipidemia  History of cocaine abuse - UDS positive on admission  Alcohol abuse  Left leg cellulitis  UTI    PLAN:  Patient's volume status continues to improve  Weights continue to trend downward, but patient still somewhat edematous  Continue Lasix 40 mg IV twice daily  Strict I's/O's and daily weights  Keep potassium greater than 4 magnesium greater than 2  Hopeful transition to oral diuretic in the next 24 to 48 hours  Continue hydralazine/isosorbide, Toprol-XL and spironolactone  Hopeful discharge from CV perspective in the next 48 to 72 hours    Signed: Martir Knapp DO, FACC, FASE, FACP      History of Present Illness:  Patient seen and examined. Patient's breathing slowly improving. Denies any chest pain, pressure, tightness or squeezing. Denies lightheadedness, dizziness or palpitations. Admits to improving lower extremity swelling. Denies orthopnea or paroxysmal nocturnal dyspnea. Review of Systems:  Review of Systems   Constitutional: Negative for decreased appetite, fever, weight gain and weight loss. HENT:  Negative for congestion and sore throat. Eyes:  Negative for visual disturbance. Cardiovascular:  Positive for dyspnea on exertion. Negative for chest pain, leg swelling, near-syncope and palpitations. Respiratory:  Positive for shortness of breath. Negative for cough. Hematologic/Lymphatic: Negative for bleeding problem. Skin:  Negative for rash. Musculoskeletal:  Negative for myalgias and neck pain. Gastrointestinal:  Negative for abdominal pain and nausea. Neurological:  Negative for light-headedness and weakness. Psychiatric/Behavioral:  Negative for depression. No Known Allergies    No current facility-administered medications on file prior to encounter.      Current Outpatient Medications on File Prior to Encounter   Medication Sig   • atorvastatin (LIPITOR) 40 mg tablet Take 1 tablet (40 mg total) by mouth daily with dinner   • Empagliflozin (JARDIANCE) 10 MG TABS tablet Take 1 tablet (10 mg total) by mouth daily   • furosemide (LASIX) 40 mg tablet Take 1.5 tablets (60 mg total) by mouth daily   • ibuprofen (MOTRIN) 600 mg tablet TAKE 1 TABLET (600 MG TOTAL) BY MOUTH EVERY 6 (SIX) HOURS AS NEEDED FOR MILD PAIN (PAIN SCORE 1-3). • losartan (COZAAR) 50 mg tablet Take 1 tablet (50 mg total) by mouth 2 (two) times a day   • metoprolol succinate (TOPROL-XL) 25 mg 24 hr tablet Take 3 tablets (75 mg total) by mouth daily at bedtime   • metoprolol succinate (TOPROL-XL) 50 mg 24 hr tablet Take 1 tablet (50 mg total) by mouth daily in the early morning   • pantoprazole (PROTONIX) 40 mg tablet Take 1 tablet (40 mg total) by mouth daily in the early morning Do not start before August 9, 2023.    • spironolactone (ALDACTONE) 25 mg tablet Take 1 tablet (25 mg total) by mouth daily   • sucralfate (CARAFATE) 1 g tablet Take 1 tablet (1 g total) by mouth 4 (four) times a day (before meals and at bedtime)        Current Facility-Administered Medications   Medication Dose Route Frequency Provider Last Rate   • acetaminophen  975 mg Oral Q8H [address] [place] [address] Dr Soledad Tan MD     • benzonatate  100 mg Oral BID Raymundo Amezcua MD     • dextromethorphan-guaiFENesin  5 mL Oral Q4H Pasquale Etienne MD     • Diclofenac Sodium  2 g Topical 4x Daily Bren Mohr MD     • enoxaparin  40 mg Subcutaneous Daily Pasquale Etienne MD     • furosemide  40 mg Intravenous BID (diuretic) Armani Hopkins MD     • hydrALAZINE  10 mg Oral [facility] Armani Hopkins MD     • ipratropium  0.5 mg Nebulization Q6H Pasquale Etienne MD     • isosorbide dinitrate  5 mg Oral TID after meals Armani Hopkins MD     • levalbuterol  1.25 mg Nebulization Q6H Pasquale Etienne MD     • methocarbamol  500 mg Oral [facility] Pasquale Etienne MD     • metoprolol succinate  50 mg Oral Daily Bren Mohr MD     • metoprolol succinate  75 mg Oral HS Armani Hopkins MD     • oseltamivir  75 mg Oral Q12H Shaina Cordero MD     • pantoprazole  40 mg Oral Early Morning Bren Mohr MD     • saccharomyces boulardii  250 mg Oral BID Raymundo Amezcua MD     • spironolactone  25 mg Oral Daily Ryanne Aguirre MD              Vitals:    12/12/23 1300 12/12/23 1350 12/12/23 1516 12/12/23 1948   BP: 137/86  121/87    BP Location:   Right arm    Pulse: 80  80    Resp:   18    Temp:   97.7 °F (36.5 °C)    TempSrc:   Temporal    SpO2: 98% 98% 98% 98%   Weight:       Height:         Body mass index is 29.21 kg/m². Intake/Output Summary (Last 24 hours) at 12/12/2023 2249  Last data filed at 12/12/2023 2207  Gross per 24 hour   Intake 1560 ml   Output 3900 ml   Net -2340 ml       Weight change:     PHYSICAL EXAMINATION:  Gen: Awake, Alert, NAD  Head/eyes: AT/NC, pupils equal and round, Anicteric  ENT: mmm  Neck: Supple, +elevated JVP, trachea midline  Resp: CTA bilaterally no w/r/r  CV: RRR +S1, S2, No m/r/g  Abd: Soft, NT/ND + BS  Ext: +1 pitting LE edema bilaterally  Neuro: Follows commands, moves all extermities  Psych: Appropriate affect, normal mood, pleasant attitude, non-combative  Skin: warm; no rash, erythema or venous stasis changes on exposed skin    Lab Results:  Results from last 7 days   Lab Units 12/12/23  0513   WBC Thousand/uL 6.10   HEMOGLOBIN g/dL 13.5   HEMATOCRIT % 42.1   PLATELETS Thousands/uL 201     Results from last 7 days   Lab Units 12/12/23  0513   POTASSIUM mmol/L 3.8   CHLORIDE mmol/L 101   CO2 mmol/L 26   BUN mg/dL 36*   CREATININE mg/dL 1.35*   CALCIUM mg/dL 8.3*     No results found for: "TROPONINT"                  This note was completed in part utilizing M-Modal Fluency Direct Software. Grammatical errors, random word insertions, spelling mistakes, and incomplete sentences may be an occasional consequence of this system secondary to software limitations, ambient noise, and hardware issues. If you have any questions or concerns about the content, text, or information contained within the body of this dictation, please contact the provider for clarification.         Supa Douglas MD  12/12/2023 10:23 AM  Attested  Progress Note    [place] 53 y.o. male MRN: [id number]  Unit/Bed#: 7T [facility] 713-01 Encounter: [id number]  Admitting Physician: Elsworth Opitz, MD  PCP: Baylee Evans MD  Date of Admission:  12/4/2023 12:49 PM    Assessment and Plan    * Acute exacerbation of CHF (congestive heart failure) ([address])  Assessment & Plan  - Negative net I/O over the last 24 hours   - Currently on IV Lasix 40 mg BID  - S/p oxygen study overnight  - Improvement in STEPHANIE  - Adriana color urine (?mild dehydration)    Plan:   - Continue IV Lasix 40 mg this morning; consider switching to oral in the afternoon  - Continue isosorbide dinitrate 5 mg TID and Hydralazine 10 mg TID, as per Cardiology recommendations  - continue spironolactone 25mg qd po and beta-blocker  - HOLD jardiance and Losartan in view of kidney function  - continous pulse oximetry   - Monitor I/O and daily weight  - Change fluid restriction to 2 L daily in view of dehydration  - Telemetry  - Monitor electrolytes  - Cardiology following  - Away RT report for need of O2 at home    Chronic combined systolic and diastolic congestive heart failure [facility])  Assessment & Plan  See A/P for acute exacerbation of CHF       Influenza A  Assessment & Plan  Positive influenza swab 12/9/2023. Vitals stable. Plan:   Tamiflu 75 mg BID 5 days  Contact precautions   Monitor V/S       Left leg cellulitis  Assessment & Plan  - Continued erythema in LLE though no pain to palpation.  - On Keflex 500 mg BID  - Persistent erythema likely due to venous stasis dermatitis    Plan:  - Day 7/7 of Keflex  - Continue to monitor vitals   - Continue to monitor erythema   - Trend WBC    Acute respiratory failure with hypoxia (HCC)  Assessment & Plan  - Likely in the setting of CHF exacerbation  - currently on RA saturating appropriately on RA.      PLAN:  - Continue O2 as needed and titrate as appropriate  - Continue diuresis    Cocaine abuse ([address])  Assessment & Plan  - Patient denies cocaine use despite positive UDS  - In view of denial, unsure about last use  - No evident signs of withdrawal during this admission     Plan:   - Provide education    Hyperlipidemia  Assessment & Plan  - Home medication: atorvastatin 40 mg QD    PLAN:  - Continue statin    Chest pain  Assessment & Plan  - Troponnins: 25 > 22   - Non-MI related tropinin elevation most likely 2/2 to CHF exacerbation   - ECG sinus tachycardia; Possible Left atrial enlargement, QTC: 451  - Chest pain controlled with voltaren gel    Plan:  - Apply Voltaren gel  - Tylenol 650 mg q8h  - Monitor symptoms      HEIKE (acute kidney injury) ([address])  Assessment & Plan  Lab Results   Component Value Date    CREATININE 1.35 (H) 12/12/2023        - Creatinine is down-trending from yesterday's value (baseline 1.2 - 1.3)  - Likely prerenal in the setting of fluid overload  - No electrolyte derrangements    PLAN:  - Monitor renal function  - Hold ARB and Jardiance  - Caution with nephrotoxic agent  - Continue diuresis    Hypertension  Assessment & Plan  Blood Pressure: 122/83 ; well controlled  - Home medications: furosemide 60 mg daily (using BID), losartan 50 BID, metoprolol 50 mg in morning, metoprolol 75 mg at night, spironolactone 25 mg daily    Plan:   - Hold ARB in view of elevated creatinine  - Continue Metoprolol and Spironolactone  - Isosorbide dinitrate and Hydralazine added today  - IV diuresis for CHF exacerbation  - Low sodium diet  - Monitor VS    UTI (urinary tract infection)-resolved as of 12/8/2023  Assessment & Plan  UA + bacteria, + leukocytes  UC no growth    Plan:   Continue keflex for management of cellulitis      SIRS (systemic inflammatory response syndrome) (HCC)-resolved as of 12/6/2023  Assessment & Plan  - Fever resolved  - S/P Tylenol 650 in ED IV Ceftriaxone x 1 in the ED  - No evidence of bacterial infection  - Flu/RSV/Covid not done overnight    Plan:   Monitor vitals             VTE Pharmacologic Prophylaxis: VTE Score: 6 Moderate Risk (Score 3-4) - Pharmacological DVT Prophylaxis Ordered: enoxaparin (Lovenox). Patient Centered Rounds: I have performed bedside rounds with nursing staff today. Discussions with Specialists or Other Care Team Provider: Cardiology       Time Spent for Care: 45 minutes. More than 50% of total time spent on counseling and coordination of care as described above. Current Length of Stay: 8 day(s)    Current Patient Status: Inpatient   Certification Statement: The patient will continue to require additional inpatient hospital stay due to CHF exacerbation     Discharge Plan: When patient is adequately diuresed and no longer requiring oxygen. Code Status: Level 1 - Full Code      Subjective:   Patient was seen eating breakfast with attending and senior and nursing staff at bedside. Patient is feeling SOB, due to pain in the Left lower ribs. The pain is 6/10 and painful to palpation. His pain and swelling in his leg is mostly resolved. He is peeing adequately, and pee is clear reported to be clear but appears marek this morning. He denies changes in appetite, fever, chills, nausea, vomiting, diarrhea or constipation. Objective:     Vitals:   Temp (24hrs), Av.5 °F (36.4 °C), Min:97.2 °F (36.2 °C), Max:97.8 °F (36.6 °C)    Temp:  [97.2 °F (36.2 °C)-97.8 °F (36.6 °C)] 97.8 °F (36.6 °C)  HR:  [78-81] 80  Resp:  [18-20] 18  BP: (127-134)/(80-93) 130/86  SpO2:  [94 %-99 %] 98 %  Body mass index is 29.21 kg/m². Input and Output Summary (last 24 hours): Intake/Output Summary (Last 24 hours) at 2023 1023  Last data filed at 2023 0848  Gross per 24 hour   Intake 1440 ml   Output 3075 ml   Net -1635 ml       Physical Exam:     Physical Exam  Constitutional:       General: He is not in acute distress. Appearance: Normal appearance. He is obese. He is not ill-appearing, toxic-appearing or diaphoretic. HENT:      Head: Normocephalic.       Mouth/Throat:      Mouth: Mucous membranes are moist.   Eyes:      Conjunctiva/sclera: Conjunctivae normal.   Cardiovascular:      Rate and Rhythm: Normal rate and regular rhythm. Heart sounds: Normal heart sounds. Pulmonary:      Effort: Pulmonary effort is normal. No respiratory distress. Breath sounds: No wheezing or rales. Chest:      Chest wall: No tenderness. Abdominal:      Palpations: Abdomen is soft. Tenderness: There is no abdominal tenderness. Musculoskeletal:      Right lower le+ Edema present. Left lower le+ Edema present. Comments: Edema, pain and erythema of left leg improving. Skin:     General: Skin is warm. Capillary Refill: Capillary refill takes less than 2 seconds. Neurological:      General: No focal deficit present. Mental Status: He is alert and oriented to person, place, and time. Psychiatric:         Mood and Affect: Mood normal.         Behavior: Behavior normal.         Thought Content: Thought content normal.         Judgment: Judgment normal.     All Labs For Current Hospital Admission Reviewed    Additional Data:     Labs:    Results from last 7 days   Lab Units 23  0513   WBC Thousand/uL 6.10   HEMOGLOBIN g/dL 13.5   HEMATOCRIT % 42.1   PLATELETS Thousands/uL 201   NEUTROS PCT % 73   LYMPHS PCT % 19   MONOS PCT % 7   EOS PCT % 0     Results from last 7 days   Lab Units 23  0513   POTASSIUM mmol/L 3.8   CHLORIDE mmol/L 101   CO2 mmol/L 26   BUN mg/dL 36*   CREATININE mg/dL 1.35*   CALCIUM mg/dL 8.3*                       * I Have Reviewed All Lab Data Listed Above. * Additional Pertinent Lab Tests Reviewed:  [address] [place] [address] Admission Reviewed    Imaging:    Imaging Reports Reviewed Today Include: none  Imaging Personally Reviewed by Myself Includes:  none    Recent Cultures (last 7 days):     Results from last 7 days   Lab Units 23  1250   URINE CULTURE  No Growth <1000 cfu/mL       Last 24 Hours Medication List:   Current Facility-Administered Medications   Medication Dose Route Frequency Provider Last Rate   • acetaminophen  975 mg Oral Q8H [address] [place] [address] Dr Chalo Guerra MD     • benzonatate  100 mg Oral BID Aguila Shaver MD     • dextromethorphan-guaiFENesin  5 mL Oral Q4H Nick Farias MD     • Diclofenac Sodium  2 g Topical 4x Daily Bren Burgess MD     • enoxaparin  40 mg Subcutaneous Daily Nick Farias MD     • furosemide  40 mg Intravenous BID (diuretic) Buck Mayberry MD     • hydrALAZINE  10 mg Oral [facility] Buck Mayberry MD     • ipratropium  0.5 mg Nebulization Q6H Nick Farias MD     • isosorbide dinitrate  5 mg Oral TID after meals uBck Mayberry MD     • levalbuterol  1.25 mg Nebulization Q6H Nick Farias MD     • methocarbamol  500 mg Oral [facility] Nick Farias MD     • metoprolol succinate  50 mg Oral Daily Bren Burgess MD     • metoprolol succinate  75 mg Oral HS Bren Burgess MD     • oseltamivir  75 mg Oral Q12H Ning Lopes MD     • pantoprazole  40 mg Oral Early Morning Bren Burgess MD     • saccharomyces boulardii  250 mg Oral BID Aguila Shaver MD     • spironolactone  25 mg Oral Daily MD Nick Mortensen MD  12/12/23  10:23 AM   Attestation signed by Frida Han MD at 12/12/2023  2:15 PM:    Standard Teaching Supervising Statement    I have reviewed the note performed and documented by the Resident's. I personally performed the required components/examined the patient.  I agree with the Resident's findings and plan of care with the following additions/exceptions:     MD Julio Aguillon MD  12/11/2023  5:08 PM  Addendum  Cardiology Progress Note - Hanny Jane 48 y.o. male MRN: [id number]    Unit/Bed#: [facility] 713-01 Encounter: [id number]      Assessment & Plan:    Acute on chronic combined systolic and diastolic CHF  -Nonischemic cardiomyopathy  -TTE 8/7/2023 showed EF 40%, LVIDD 5.5 cm, grade 2 DD, moderate to severe MR, mild TR  - Cardiac MRI/4/2022 showed EF 34%, LVIDD 6.1 cm, normal RV size and function, no evidence of myocardial scarring, inflammation or infiltrative disease  - Chest x-ray on admission showed moderate pulmonary edema  -  on admission  - Outpatient Rx furosemide 60 mg p.o. daily with spironolactone 25 mg daily  - Currently on furosemide 40 mg IV twice daily with spironolactone 25 mg daily  - Also on Jardiance 10 mg daily    Influenza A  -Patient tested positive for influenza A on 12/9/2023  - Currently on Tamiflu  - Continue with supportive care    Hypertension  - Outpatient Rx Toprol-XL 50 mg in a.m. and 75 mg in p.m., losartan 50 mg twice daily, and spironolactone 25 mg daily  - Currently holding losartan due to HEIKE  - Continue with Toprol-XL 50 mg in a.m. and 75 mg in p.m., spironolactone 25 mg daily, hydralazine 10 mg every 8 hours and Isordil 5 mg every 8 hours    HEIKE (acute kidney injury) (HCC)  - Baseline creatinine around 1.2-1.4  - Creatinine elevated to 1.53 today    Chest pain  - Troponins negative x 2 on admission  - No acute ischemic changes on ECG    Hyperlipidemia  - Continue with atorvastatin 40 mg daily    Cocaine abuse (HCC)  - U tox positive for cocaine on admission on 12/4/2023  - History of daily cocaine use for many years reportedly quit in 2011  - Patient denies cocaine use prior to admission    Alcohol abuse    Left leg cellulitis    UTI (urinary tract infection)      Summary:  - Patient's weight trended up 1 pound today, however his furosemide was held yesterday and restarted this morning  - His urine output is just over 2 L so far today  -Continue with furosemide 40 mg IV twice daily with spironolactone 25 mg daily  - Check daily standing weights  - Monitor creatinine electrolytes closely      Subjective:   No significant events overnight.   He reports feeling well today and reports improvement in his lower extremity edema since admission. He also feels his breathing is improved, but he still having shortness of breath at night. Denies chest pain, abdominal pain, nausea, vomiting, fever, chills, headache, dizziness or palpitations. Objective:     Vitals: Blood pressure 122/83, pulse 79, temperature (!) 97.2 °F (36.2 °C), temperature source Temporal, resp. rate 20, height 6' (1.829 m), weight 98.8 kg (217 lb 14.4 oz), SpO2 97 %. , Body mass index is 29.55 kg/m².,   Orthostatic Blood Pressures      Flowsheet Row Most Recent Value   Blood Pressure 122/83 filed at 12/11/2023 9055   Patient Position - Orthostatic VS Sitting filed at 12/11/2023 1232              Intake/Output Summary (Last 24 hours) at 12/11/2023 1518  Last data filed at 12/11/2023 1201  Gross per 24 hour   Intake 960 ml   Output 2650 ml   Net -1690 ml           Physical Exam:    GEN: Cameron Dan appears well, alert and oriented x 3, pleasant and cooperative   HEENT: Mucous membranes moist, no scleral icterus, no conjunctival pallor  NECK: + JVD  HEART: Regular rate and rhythm, normal S1 and S2, no murmurs or rubs   LUNGS: Decreased breath sounds at bases bilaterally, otherwise clear to auscultation  ABDOMEN: normal bowel sounds, soft, no tenderness, no distention  EXTREMITIES: peripheral pulses normal; 1-2+ bilateral lower extremity edema  NEURO: no focal findings   SKIN: No lesions or rashes on exposed skin        Current Facility-Administered Medications:   •  acetaminophen (TYLENOL) tablet 975 mg, 975 mg, Oral, Q8H [address], Bren Black MD, 975 mg at 12/11/23 1432  •  benzonatate (TESSALON PERLES) capsule 100 mg, 100 mg, Oral, BID, Supa Hilliard MD, 100 mg at 12/11/23 0851  •  cephalexin (KEFLEX) capsule 500 mg, 500 mg, Oral, Q12H [address], Twyla Valadez MD, 500 mg at 12/11/23 0850  •  dextromethorphan-guaiFENesin (ROBITUSSIN DM) oral syrup 5 mL, 5 mL, Oral, Q4H, Twyla Valadez MD, 5 mL at 12/11/23 1237  • Diclofenac Sodium (VOLTAREN) 1 % topical gel 2 g, 2 g, Topical, 4x Daily, Bren Hudson MD, 2 g at 12/11/23 1417  •  enoxaparin (LOVENOX) subcutaneous injection 40 mg, 40 mg, Subcutaneous, Daily, Clinton Khan MD, 40 mg at 12/11/23 8510  •  furosemide (LASIX) injection 40 mg, 40 mg, Intravenous, BID (diuretic), Mary Alvarez MD, 40 mg at 12/11/23 5782  •  hydrALAZINE (APRESOLINE) tablet 10 mg, 10 mg, Oral, Q8H [address], Bren Hudson MD, 10 mg at 12/11/23 1436  •  ipratropium (ATROVENT) 0.02 % inhalation solution 0.5 mg, 0.5 mg, Nebulization, Q6H, Clinton Khan MD, 0.5 mg at 12/11/23 1443  •  isosorbide dinitrate (ISORDIL) tablet 5 mg, 5 mg, Oral, TID after meals, Bren Hudson MD, 5 mg at 12/11/23 2026  •  levalbuterol (Tally Clause) inhalation solution 1.25 mg, 1.25 mg, Nebulization, Q6H, Clinton Khan MD, 1.25 mg at 12/11/23 1443  •  methocarbamol (ROBAXIN) tablet 500 mg, 500 mg, Oral, Q8H [address], Clinton Khan MD, 500 mg at 12/11/23 1440  •  metoprolol succinate (TOPROL-XL) 24 hr tablet 50 mg, 50 mg, Oral, Daily, Bren Hudson MD, 50 mg at 12/11/23 4605  •  metoprolol succinate (TOPROL-XL) 24 hr tablet 75 mg, 75 mg, Oral, HS, Bren Hudson MD, 75 mg at 12/10/23 2217  •  oseltamivir (TAMIFLU) capsule 75 mg, 75 mg, Oral, Q12H [address], Clinton Khan MD, 75 mg at 12/11/23 0851  •  pantoprazole (PROTONIX) EC tablet 40 mg, 40 mg, Oral, Early Morning, Bren Hudson MD, 40 mg at 12/11/23 0112  •  saccharomyces boulardii (FLORASTOR) capsule 250 mg, 250 mg, Oral, BID, Karime Sharma MD, 250 mg at 12/11/23 7401  •  spironolactone (ALDACTONE) tablet 25 mg, 25 mg, Oral, Daily, Bren Hudson MD, 25 mg at 12/11/23 0814    Labs & Results:    No results found for: "Aftab Avendaño", "CKMB", "CKMBINDEX", "TROPONINI"    Lab Results   Component Value Date    CALCIUM 8.6 12/11/2023    K 4.5 12/11/2023    CO2 27 12/11/2023    CL 101 12/11/2023    BUN 39 (H) 12/11/2023    CREATININE 1.53 (H) 12/11/2023       Lab Results   Component Value Date    WBC 10.28 (H) 12/11/2023    HGB 13.5 12/11/2023    HCT 43.2 12/11/2023    MCV 89 12/11/2023     12/11/2023           No results found for: "CHOL"  Lab Results   Component Value Date    HDL 44 03/31/2022     Lab Results   Component Value Date    LDLCALC 47 03/31/2022     Lab Results   Component Value Date    TRIG 205 (H) 03/31/2022       Lab Results   Component Value Date    ALT 30 12/05/2023    AST 28 12/05/2023    ALKPHOS 210 (H) 12/05/2023         EKG personally reviewed by )Gerald Bhat MD. No acute changes   TELE: No significant arrhythmias seen on telemetry review.              Theo Chavez MD  12/11/2023 12:52 PM  Attested  Progress Note    Qusaud Host 48 y.o. male MRN: [id number]  Unit/Bed#: 7[facility] 713-01 Encounter: [id number]  Admitting Physician: Theo Chavez MD  PCP: Tiffani Calderon MD  Date of Admission:  12/4/2023 12:49 PM    Assessment and Plan    * Acute exacerbation of CHF (congestive heart failure) (HCC)  Assessment & Plan  - Negative output of -260 over the past 24 hours  - Currently on PO Lasix 60 mg,   - Per Cardiology, patient should return on IV Lasix 40 mg BID if not able to reach negative I/O of 1 to 1.5L by today  - Patient still requiring supplemental O2, in addition to persistent LE edema and rise in creatinine    Plan:   - Change Lasix to 40 mg IV lasix BID for 24- 48hrs   - Patient maintaining systolic > 624 and diastolic > 80: add isosorbide dinitrate 5 mg TID and Hydralazine 10 mg TID, as per Cardiology recommendations  - continue spironolactone 25mg qd po  - HOLD jardiance and Losartan in view of deteriorating kidney function  - continous pulse oximetry   - Monitor I/O and daily weight  - Fluid restriction 1.5L   - Telemetry  - Monitor electrolytes  - Cardiology following    Chronic combined systolic and diastolic congestive heart failure [facility])  Assessment & Plan  See A/P for acute exacerbation of CHF       Influenza A  Assessment & Plan  Positive influenza swab 12/9/2023. Vitals stable. Plan:   Tamiflu 75 mg BID 5 days  Contact precautions   Monitor V/S       Left leg cellulitis  Assessment & Plan  - Continued erythema in LLE though no pain to palpation.  - On Keflex 500 mg BID  - Persistent erythema likely due to venous stasis dermatitis    Plan:  - Day 7 of Keflex  - Continue to monitor vitals   - Continue to monitor erythema (border outlined with marker)  - Trend WBC    Acute respiratory failure with hypoxia (HCC)  Assessment & Plan  - Likely in the setting of CHF exacerbation  - currently on RA saturating appropriately on RA.      PLAN:  - Continue O2 as needed and titrate as appropriate  - Continue diuresis    Cocaine abuse ([address])  Assessment & Plan  - Patient denies cocaine use despite positive UDS  - In view of denial, unsure about last use  - No evident signs of withdrawal during this admission     Plan:   - Provide education    Hyperlipidemia  Assessment & Plan  - Home medication: atorvastatin 40 mg QD    PLAN:  - Continue statin    Chest pain  Assessment & Plan  - Troponnins: 25 > 22   - Non-MI related tropinin elevation most likely 2/2 to CHF exacerbation   - ECG sinus tachycardia; Possible Left atrial enlargement, QTC: 451  - Chest pain controlled with voltaren gel    Plan:  - Apply Voltaren gel  - Tylenol 650 mg q8h  - Monitor symptoms      HEIKE (acute kidney injury) ([address])  Assessment & Plan  Lab Results   Component Value Date    CREATININE 1.53 (H) 12/11/2023        - Creatinine is increase this morning from yesterday's value (baseline 1.2 - 1.3)  - Likely prerenal in the setting of IV diuresis vs fluid overload  - No electrolyte derrangements    PLAN:  - Monitor renal function  - Hold ARB and Jardiance  - Caution with nephrotoxic agent  - Continue diuresis    Hypertension  Assessment & Plan  Blood Pressure: 122/83 ; well controlled  - Home medications: furosemide 60 mg daily (using BID), losartan 50 BID, metoprolol 50 mg in morning, metoprolol 75 mg at night, spironolactone 25 mg daily    Plan:   - Hold ARB in view of rise in creatinine  - Continue Metoprolol and Spironolactone  - Isosorbide dinitrate and Hydralazine added today  - IV diuresis for CHF exacerbation  - Low sodium diet  - Monitor VS    UTI (urinary tract infection)-resolved as of 12/8/2023  Assessment & Plan  UA + bacteria, + leukocytes  UC no growth    Plan:   Continue keflex for management of cellulitis      SIRS (systemic inflammatory response syndrome) (HCC)-resolved as of 12/6/2023  Assessment & Plan  - Fever resolved  - S/P Tylenol 650 in ED IV Ceftriaxone x 1 in the ED  - No evidence of bacterial infection  - Flu/RSV/Covid not done overnight    Plan:   Monitor vitals             VTE Pharmacologic Prophylaxis: VTE Score: 6 Moderate Risk (Score 3-4) - Pharmacological DVT Prophylaxis Ordered: enoxaparin (Lovenox). Patient Centered Rounds: I have performed bedside rounds with nursing staff today. Discussions with Specialists or Other Care Team Provider: Cardiology       Time Spent for Care: 45 minutes. More than 50% of total time spent on counseling and coordination of care as described above. Current Length of Stay: 7 day(s)    Current Patient Status: Inpatient   Certification Statement: The patient will continue to require additional inpatient hospital stay due to CHF exacerbation     Discharge Plan: When patient is adequately diuresed and no longer requiring oxygen. Code Status: Level 1 - Full Code      Subjective:   Patient was seen eating breakfast with attending and senior and nursing staff at bedside. Patient reports feeling better overall, however at night he still feels like he has shortness of breath and is still being maintained on 1 L of oxygen. His leg pain and swelling has markedly decreased since admission.   Patient complains of right-sided musculoskeletal back pain which started last night. Patient denies currently any shortness of breath, chest pain, palpitations, abdominal pain, changes in appetite, dysuria, constipation, diarrhea, lightheadedness, dizziness. Objective:     Vitals:   Temp (24hrs), Av.4 °F (36.3 °C), Min:97.2 °F (36.2 °C), Max:97.8 °F (36.6 °C)    Temp:  [97.2 °F (36.2 °C)-97.8 °F (36.6 °C)] 97.2 °F (36.2 °C)  HR:  [79-87] 79  Resp:  [18-20] 20  BP: (122-143)/(75-99) 122/83  SpO2:  [96 %-100 %] 97 %  Body mass index is 29.55 kg/m². Input and Output Summary (last 24 hours): Intake/Output Summary (Last 24 hours) at 2023 1252  Last data filed at 2023 1201  Gross per 24 hour   Intake 1200 ml   Output 2650 ml   Net -1450 ml       Physical Exam:     Physical Exam  Constitutional:       General: He is not in acute distress. Appearance: Normal appearance. He is obese. He is not ill-appearing, toxic-appearing or diaphoretic. HENT:      Head: Normocephalic. Mouth/Throat:      Mouth: Mucous membranes are moist.   Eyes:      Conjunctiva/sclera: Conjunctivae normal.   Cardiovascular:      Rate and Rhythm: Normal rate and regular rhythm. Heart sounds: Normal heart sounds. Pulmonary:      Effort: Pulmonary effort is normal. No respiratory distress. Breath sounds: No wheezing or rales. Chest:      Chest wall: No tenderness. Abdominal:      Palpations: Abdomen is soft. Tenderness: There is no abdominal tenderness. Musculoskeletal:      Right lower le+ Edema present. Left lower le+ Edema present. Comments: Somewhat decrease in edema   Erythema of Left leg, pain improved, reddness improved    Skin:     General: Skin is warm. Capillary Refill: Capillary refill takes less than 2 seconds. Neurological:      General: No focal deficit present. Mental Status: He is alert and oriented to person, place, and time.    Psychiatric:         Mood and Affect: Mood normal.         Behavior: Behavior normal.         Thought Content: Thought content normal.         Judgment: Judgment normal.     All Labs For Current Hospital Admission Reviewed    Additional Data:     Labs:    Results from last 7 days   Lab Units 12/11/23  0513   WBC Thousand/uL 10.28*   HEMOGLOBIN g/dL 13.5   HEMATOCRIT % 43.2   PLATELETS Thousands/uL 200   NEUTROS PCT % 83*   LYMPHS PCT % 11*   MONOS PCT % 5   EOS PCT % 0     Results from last 7 days   Lab Units 12/11/23  0513 12/06/23  0445 12/05/23  0541   POTASSIUM mmol/L 4.5   < > 4.0   CHLORIDE mmol/L 101   < > 103   CO2 mmol/L 27   < > 25   BUN mg/dL 39*   < > 25   CREATININE mg/dL 1.53*   < > 1.48*   CALCIUM mg/dL 8.6   < > 9.6   ALK PHOS U/L  --   --  210*   ALT U/L  --   --  30   AST U/L  --   --  28    < > = values in this interval not displayed. Results from last 7 days   Lab Units 12/04/23  1304   INR  1.19                 * I Have Reviewed All Lab Data Listed Above. * Additional Pertinent Lab Tests Reviewed: [address] Admission Reviewed    Imaging:    Imaging Reports Reviewed Today Include: none  Imaging Personally Reviewed by Myself Includes:  none    Recent Cultures (last 7 days):     Results from last 7 days   Lab Units 12/06/23  1250 12/04/23  1322 12/04/23  1304   BLOOD CULTURE   --  No Growth After 5 Days. No Growth After 5 Days.    URINE CULTURE  No Growth <1000 cfu/mL  --   --        Last 24 Hours Medication List:   Current Facility-Administered Medications   Medication Dose Route Frequency Provider Last Rate   • acetaminophen  975 mg Oral Q8H 9 [facility] Dr Lilibeth Erickson MD     • benzonatate  100 mg Oral BID Jeff Hope MD     • cephalexin  500 mg Oral Q12H Imani Mckeon MD     • dextromethorphan-guaiFENesin  5 mL Oral Q4H Malena George MD     • Diclofenac Sodium  2 g Topical 4x Daily Bren Alston MD     • enoxaparin  40 mg Subcutaneous Daily Malena George MD     • furosemide  40 mg Intravenous BID (diuretic) Bren Drake MD     • hydrALAZINE  10 mg Oral [facility] Joy Barraza MD     • ipratropium  0.5 mg Nebulization Q6H Alfred Jordan MD     • isosorbide dinitrate  5 mg Oral TID after meals Joy Barraza MD     • levalbuterol  1.25 mg Nebulization Q6H Alfred Jordan MD     • metoprolol succinate  50 mg Oral Daily Bren Drake MD     • metoprolol succinate  75 mg Oral HS Bren Drake MD     • oseltamivir  75 mg Oral Q12H Raúl Matson MD     • pantoprazole  40 mg Oral Early Morning Bren Drake MD     • saccharomyces boulardii  250 mg Oral BID Merly Romero MD     • spironolactone  25 mg Oral Daily MD Alfred Alejandro MD  12/11/23  12:52 PM   Attestation signed by Fátima Bob MD at 12/11/2023  7:15 PM:    Standard Teaching Supervising Statement    I have reviewed the note performed and documented by the Resident's. I personally performed the required components/examined the patient.  I agree with the Resident's findings and plan of care with the following additions/exceptions:     Aftab Hughes MD        Ather MD Thanh  12/10/2023  7:51 PM  Signed  CARDIOLOGY INPATIENT FOLLOW-UP PROGRESS NOTE  *-*-*-*-*-*-*-*-*-*-*-*-*-*-*-*-*-*-*-*-*-*-*-*-*-*-*-*-*-*-*-*-*-*-*-*-*-*-*-*-*-*-*-*-*-*-*-*-*-*-*-*-*-*-  Randi Cabrera DATE: 12/10/23 7:31 PM   AUTHOR: Atul Verde MD  PATIENT: Anne Marie Mercer   1970    [id number]   48 y.o.   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Patrick Mauricio*  DATE OF ADMISSION: 12/4/2023 12:49 PM; LENGTH OF STAY: 6 days  *-*-*-*-*-*-*-*-*-*-*-*-*-*-*-*-*-*-*-*-*-*-*-*-*-*-*-*-*-*-*-*-*-*-*-*-*-*-*-*-*-*-*-*-*-*-*-*-*-*-*-*-*-*-    CARDIOLOGY ASSESSMENT:  Acute decompensated heart failure, combined heart failure with reduced ejection fraction and diastolic heart failure  Acute kidney injury  Hypertension  Dyslipidemia  Cocaine and alcohol dependence and abuse  UTI  Moderate to severe mitral valve regurgitation    *-*-*-*-*-*-*-*-*-*-*-*-*-*-*-*-*-*-*-*-*-*-*-*-*-*-*-*-*-*-*-*-*-*-*-*-*-*-*-*-*-*-*-*-*-*-*-*-*-*-*-*-*-*-  CURRENT SCHEDULED MEDICATIONS:    Current Facility-Administered Medications:   •  acetaminophen (TYLENOL) tablet 975 mg, 975 mg, Oral, Q8H [address], Bren Moe MD, 975 mg at 12/10/23 1406  •  benzonatate (TESSALON PERLES) capsule 100 mg, 100 mg, Oral, BID, Dionisio Cobb MD, 100 mg at 12/10/23 0908  •  cephalexin (KEFLEX) capsule 500 mg, 500 mg, Oral, Q12H [address], Misti Abad MD, 500 mg at 12/10/23 0909  •  dextromethorphan-guaiFENesin (ROBITUSSIN DM) oral syrup 5 mL, 5 mL, Oral, Q4H, Misti Abad MD, 5 mL at 12/10/23 1703  •  Diclofenac Sodium (VOLTAREN) 1 % topical gel 2 g, 2 g, Topical, 4x Daily, Bren Moe MD, 2 g at 12/10/23 1703  •  enoxaparin (LOVENOX) subcutaneous injection 40 mg, 40 mg, Subcutaneous, Daily, Misti Abad MD, 40 mg at 12/10/23 0907  •  furosemide (LASIX) tablet 60 mg, 60 mg, Oral, Daily, Shaun Dyson DO, 60 mg at 12/10/23 0908  •  ipratropium (ATROVENT) 0.02 % inhalation solution 0.5 mg, 0.5 mg, Nebulization, Q6H, Misti Abad MD, 0.5 mg at 12/10/23 1405  •  levalbuterol (XOPENEX) inhalation solution 1.25 mg, 1.25 mg, Nebulization, Q6H, Misti Abad MD, 1.25 mg at 12/10/23 1405  •  metoprolol succinate (TOPROL-XL) 24 hr tablet 50 mg, 50 mg, Oral, Daily, Bren Moe MD, 50 mg at 12/10/23 0908  •  metoprolol succinate (TOPROL-XL) 24 hr tablet 75 mg, 75 mg, Oral, HS, Bren Moe MD, 75 mg at 12/09/23 2108  •  oseltamivir (TAMIFLU) capsule 75 mg, 75 mg, Oral, Q12H [address], Misti Abad MD, 75 mg at 12/10/23 0909  •  pantoprazole (PROTONIX) EC tablet 40 mg, 40 mg, Oral, Early Morning, Bren Moe MD, 40 mg at 12/10/23 6764  •  saccharomyces boulardii (FLORASTOR) capsule 250 mg, 250 mg, Oral, BID, Zulma Shaffer MD, 250 mg at 12/10/23 1703  •  spironolactone (ALDACTONE) tablet 25 mg, 25 mg, Oral, Daily, Bren Kam MD, 25 mg at 12/10/23 0909    Current Core Cardiac medications:   Furosemide 60 mg oral daily metoprolol succinate 50 mg daily morning and 75 mg in evening, spironolactone 25 mg daily, Jardiance 10 mg daily. PERTINENT LABS AND OTHER INVESTIGATIONS:    Labs 12/10/23 : Sodium 135 potassium 4.4 chloride 98 bicarb 22 BUN 32 creatinine 1.50 GFR 53 normal CBC except for 85% neutrophils  on 12/4/2023    ECHOCARDIOGRAM AND OTHER CARDIAC TESTS RESULTS:   Echocardiogram 8/7/2023 showed EF 40% with global hypokinesis, grade 2 diastolic dysfunction, mild dilated right ventricle with normal function mild left atrial enlargement normal aortic valve without stenosis or regurgitation moderate to severe mitral valve regurgitation mild tricuspid valve regurgitation borderline pulmonary hypertension no pericardial effusion    *-*-*-*-*-*-*-*-*-*-*-*-*-*-*-*-*-*-*-*-*-*-*-*-*-*-*-*-*-*-*-*-*-*-*-*-*-*-*-*-*-*-*-*-*-*-*-*-*-*-*-*-*-*-  Patient is here bunamidine stable but remains in a volume overloaded state. He did have rising BUN/creatinine but it is improving. He has been started on oral diuretic therapy reports that he is feeling urinating frequently. PLAN:  -- If patient is not 1 to 1.5 L negative by tomorrow recommend reinitiating diuretic therapy with furosemide 40 mg IV twice daily for another 24 to 48 hours before transitioning back to oral Lasix. .  Patient has significant lower extremity edema and his net weight is still significantly high. He may have some bump in creatinine. -- If his systolic blood pressure is greater than 506 mmHg and diastolic blood pressure greater than 80 mmHg then add isosorbide dinitrate 5 mg 3 times daily and hydralazine 10 mg p.o. 3 times daily to the regimen tomorrow.   -- Continue treatment for influenza. -- Repeat electrolytes and renal function in AM.  -- Encourage patient about use of incentive spirometer regularly. -- Daily standing weight and reliable measurement of intake and output if possible. Continued heart failure diet and other precautions. *-*-*-*-*-*-*-*-*-*-*-*-*-*-*-*-*-*-*-*-*-*-*-*-*-*-*-*-*-*-*-*-*-*-*-*-*-*-*-*-*-*-*-*-*-*-*-*-*-*-*-*-*-*-  INTERVAL CHANGES / HISTORY OF PRESENT ILLNESS:   Interval developments noted. Patient nowInfluenza A positive. Has been started on Tamiflu. Transition to oral diuretic today. From a symptom perspective reports that his breathing is much better. Denies chest pain shortness of breath or dizziness. Also reports that lower extremity swelling is better. Reports that he is urinating frequently. *-*-*-*-*-*-*-*-*-*-*-*-*-*-*-*-*-*-*-*-*-*-*-*-*-*-*-*-*-*-*-*-*-*-*-*-*-*-*-*-*-*-*-*-*-*-*-*-*-*-*-*-*-*    PERTINENT REVIEW OF SYMPTOMS: As noted above in HPI    *-*-*-*-*-*-*-*-*-*-*-*-*-*-*-*-*-*-*-*-*-*-*-*-*-*-*-*-*-*-*-*-*-*-*-*-*-*-*-*-*-*-*-*-*-*-*-*-*-*-*-*-*-*-  VITAL SIGNS:  Vitals:    12/10/23 0751 12/10/23 0926 12/10/23 1104 12/10/23 1535   BP:    125/75   BP Location:    Left arm   Pulse:    81   Resp:    18   Temp:    (!) 97.3 °F (36.3 °C)   TempSrc:    Temporal   SpO2: 99% 98% 97% 97%   Weight:       Height:          Temp (24hrs), Av.3 °F (36.8 °C), Min:97.3 °F (36.3 °C), Max:99.4 °F (37.4 °C)  Current: Temperature: (!) 97.3 °F (36.3 °C)  Body mass index is 29.3 kg/m². Body surface area is 2.2 meters squared.       Intake/Output Summary (Last 24 hours) at 12/10/2023 1931  Last data filed at 12/10/2023 1801  Gross per 24 hour   Intake 1200 ml   Output 1875 ml   Net -675 ml       Weight    23 0532 23 0550 23 0551 23 0537   Weight: 107 kg (234 lb 12.6 oz) 101 kg (221 lb 9 oz) 101 kg (221 lb 9 oz) 99.4 kg (219 lb 2.2 oz)    23 0600 12/10/23 0600   Weight: 98.6 kg (217 lb 6 oz) 98 kg (216 lb 0.8 oz) Wt Readings from Last 3 Encounters:   12/10/23 98 kg (216 lb 0.8 oz)   11/16/23 98.4 kg (217 lb)   09/13/23 93.9 kg (207 lb)        PREVIOUS WEIGHTS:   Wt Readings from Last 25 Encounters:   12/10/23 98 kg (216 lb 0.8 oz)   11/16/23 98.4 kg (217 lb)   09/13/23 93.9 kg (207 lb)   08/29/23 93.9 kg (207 lb)   08/14/23 93.4 kg (206 lb)   08/08/23 92.8 kg (204 lb 9.4 oz)   05/19/22 98 kg (216 lb)   05/06/22 96.2 kg (212 lb)   04/27/22 96.7 kg (213 lb 3.2 oz)   04/13/22 95.6 kg (210 lb 11.2 oz)   04/06/22 89.9 kg (198 lb 1.6 oz)     *-*-*-*-*-*-*-*-*-*-*-*-*-*-*-*-*-*-*-*-*-*-*-*-*-*-*-*-*-*-*-*-*-*-*-*-*-*-*-*-*-*-*-*-*-*-*-*-*-*-*-*-*-*-  PHYSICAL EXAM:  General Appearance:    Alert, cooperative, in no respiratory distress, appears stated age, large build   Head, Eyes, ENT:    No obvious abnormality, moist mucous mebranes. Neck:   Supple, no carotid bruit or JVD   Back:     Symmetric, no curvature. Lungs:     Respirations unlabored. Slightly decreased breath sounds at bases without crackles. Chest wall:    No tenderness or deformity   Heart:    Regular rate and rhythm, S1 and S2 normal, no murmur, rub  or gallop. Abdomen:     Soft, non-tender, No obvious masses, or organomegaly   Extremities:   Extremities warm,, grade 2-3 light bilateral pitting pedal edema. Skin: Moderate venous static abnormalities noted. *-*-*-*-*-*-*-*-*-*-*-*-*-*-*-*-*-*-*-*-*-*-*-*-*-*-*-*-*-*-*-*-*-*-*-*-*-*-*-*-*-*-*-*-*-*-*-*-*-*-*-*-*-*-  TELEMETRY, LAST ECG:  Telemetry reviewed. ...  Is not on telemetry     Results for orders placed or performed during the hospital encounter of 12/04/23   ECG 12 lead   Result Value    Ventricular Rate 87    Atrial Rate 87    IN Interval 164    QRSD Interval 70    QT Interval 386    QTC Interval 464    P Axis 81    QRS Axis -9    T Wave Axis 4    Narrative    Normal sinus rhythm  Possible Left atrial enlargement  Nonspecific T wave abnormality  Prolonged QT  Abnormal ECG  When compared with ECG of 04-DEC-2023 12:47,  No significant change was found  Confirmed by Hussain Ramon ([id number]) on 12/6/2023 8:05:15 AM        *-*-*-*-*-*-*-*-*-*-*-*-*-*-*-*-*-*-*-*-*-*-*-*-*-*-*-*-*-*-*-*-*-*-*-*-*-*-*-*-*-*-*-*-*-*-*-*-*-*-*-*-*-*  LABORATORY DATA:  I have personally reviewed pertinent labs. CMP:  Results from last 7 days   Lab Units 12/10/23  0612 12/09/23  0431 12/08/23  0536 12/06/23  0445 12/05/23  0541 12/04/23  1304   SODIUM mmol/L 135 140 141   < > 142 141   POTASSIUM mmol/L 4.4 3.9 4.1   < > 4.0 4.2   CHLORIDE mmol/L 98 102 103   < > 103 105   CO2 mmol/L 22 25 26   < > 25 26   BUN mg/dL 32* 38* 34*   < > 25 19   CREATININE mg/dL 1.50* 1.68* 1.57*   < > 1.48* 1.19   CALCIUM mg/dL 8.4 8.8 8.8   < > 9.6 8.9   ALK PHOS U/L  --   --   --   --  210* 179*   ALT U/L  --   --   --   --  30 30   AST U/L  --   --   --   --  28 25    < > = values in this interval not displayed. Results from last 7 days   Lab Units 12/09/23  0431 12/08/23  0536   MAGNESIUM mg/dL 2.1 2.4     Results from last 7 days   Lab Units 12/09/23  0431 12/08/23  0536   PHOSPHORUS mg/dL 4.4 4.2    Cardiac Profile:   Results from last 7 days   Lab Units 12/04/23  1506 12/04/23  1304   HS TNI 0HR ng/L  --  25   HS TNI 2HR ng/L 22  --    LACTIC ACID mmol/L  --  1.1                    Arterial Blood Gas Analysis:    Venous Blood Gas Analysis:       Invalid input(s): "PCOVEN"     CBC:   Results from last 7 days   Lab Units 12/10/23  0612 12/09/23  0431 12/08/23  0536   WBC Thousand/uL 5.47 6.74 7.00   HEMOGLOBIN g/dL 13.2 13.1 13.4   HEMATOCRIT % 41.6 41.7 42.2   PLATELETS Thousands/uL 189 224 263     PT/INR: No results found for: "PT", "INR", Microbiology:   Results from last 7 days   Lab Units 12/06/23  1250 12/04/23  1322 12/04/23  1304   BLOOD CULTURE   --  No Growth After 5 Days. No Growth After 5 Days.    URINE CULTURE  No Growth <1000 cfu/mL  --   -- *-*-*-*-*-*-*-*-*-*-*-*-*-*-*-*-*-*-*-*-*-*-*-*-*-*-*-*-*-*-*-*-*-*-*-*-*-*-*-*-*-*-*-*-*-*-*-*-*-*-*-*-*-*-  IMAGING STUDIES REPORTS: Imaging studies results reviewed. ... No valid procedures specified. No Chest XR results available for this patient. *-*-*-*-*-*-*-*-*-*-*-*-*-*-*-*-*-*-*-*-*-*-*-*-*-*-*-*-*-*-*-*-*-*-*-*-*-*-*-*-*-*-*-*-*-*-*-*-*-*-*-*-*-*-  AVAILABLE OLD CARDIAC TESTS REPORTS:   No results found for this or any previous visit. No results found for this or any previous visit. No results found for this or any previous visit. No results found for this or any previous visit.        *-*-*-*-*-*-*-*-*-*-*-*-*-*-*-*-*-*-*-*-*-*-*-*-*-*-*-*-*-*-*-*-*-*-*-*-*-*-*-*-*-*-*-*-*-*-*-*-*-*-*-*-*-*-  SIGNATURES:   [unfilled]   MD Matias Jacinto MD  12/10/2023 11:39 AM  Attested  Progress Note    Amber Poster 48 y.o. male MRN: [id number]  Unit/Bed#: 7T U 713-01 Encounter: [id number]  Admitting Physician: Matias Wadsworth MD  PCP: Matias Wadsworth MD  Date of Admission:  12/4/2023 12:49 PM    Assessment and Plan    * Acute exacerbation of CHF (congestive heart failure) ([address])  Assessment & Plan  - Home regimen: furosemide 60 QD (taking BID), metoprolol 50 in day and 75 at night, losartan 50 mg BID, spironolactone 25 mg daily, Jardiance 10 mg daily  - Last echo :  LVEF 40% ( August 2023)     - Exacerbation likely second to cocaine use (positive UDS) and flu (positive 12/9/2023)  - Patient weight today 216.8 -- Dry weight: 217 lbs  - Lungs clear to auscultation today though continues to have orthopnea and SOB with ambulation  - Maintaining appropriate SpO2 on RA    Plan:   - lasix PO 60 mg QD; additional 40 meq PO given overnight  - continue spironolactone 25mg qd po  - HOLD jardiance in view of deteriorating kidney function, as per cardiology  - continous pulse oximetry   - walking study before patient ready to go home    - Monitor I/O and daily weight  - Fluid restriction 1.5L   - Telemetry  - Continuous pulse oximetry and titrate O2 as appropriate  - Monitor electrolytes  - Hold Losartan and Jardiance  - Cardiology following    Chronic combined systolic and diastolic congestive heart failure (HCC)  Assessment & Plan  See A/P for acute exacerbation of CHF       Acute respiratory failure with hypoxia ([address])  Assessment & Plan  - Likely in the setting of CHF exacerbation  - currently on RA saturating at 95%, however was intermittently requiring O2 supplementation days prior. PLAN:  - Continue O2 as needed and titrate as appropriate  - Continue diuresis    HEIKE (acute kidney injury) [facility])  Assessment & Plan  Lab Results   Component Value Date    CREATININE 1.50 (H) 12/10/2023        - Creatinine decreased this morning from yesterday's value (baseline 1.2 - 1.3)  - Likely prerenal in the setting of IV diuresis vs fluid overload  - S/p NSAIDs for pain and ARB use for hypertension  - No electrolyte derrangements    PLAN:  - Monitor renal function  - Hold ARB  - Caution with nephrotoxic agent  - Continue diuresis    Influenza A  Assessment & Plan  Positive influenza swab 12/9/2023. Vitals stable. Plan:   Tamiflu 75 mg BID   Contact precautions   Monitor V/S       Left leg cellulitis  Assessment & Plan  Continued erythema in LLE though no pain to palpation.     Plan:  PO Keflex 500 mg BID (day 6)   Continue to monitor vitals   Continue to monitor erythema (border outlined with marker)  Follow CBC    Cocaine abuse ([address])  Assessment & Plan  - Patient denies cocaine use despite positive UDS  - In view of denial, unsure about last use     Plan:   - Monitor for signs and symptoms of withdrawal    Hyperlipidemia  Assessment & Plan  - Home medication: atorvastatin 40 mg QD    PLAN:  - Continue statin    Chest pain  Assessment & Plan  - Troponnins: 25 > 22   - Non-MI related tropinin elevation most likely 2/2 to CHF exacerbation   - ECG sinus tachycardia; Possible Left atrial enlargement, QTC: 451  - Chest pain controlled with voltaren gel    Plan:  - Apply Voltaren gel  - Tylenol 650 mg q8h  - Monitor symptoms      Hypertension  Assessment & Plan  Blood Pressure: 125/86 ; well controlled  - Home medications: furosemide 60 mg daily (using BID), losartan 50 BID, metoprolol 50 mg in morning, metoprolol 75 mg at night, spironolactone 25 mg daily    Plan:   - Hold ARB in view of rise in creatinine  - Continue Metoprolol and Spironolactone  - IV diuresis for CHF exacerbation  - Low sodium diet  - Monitor VS    UTI (urinary tract infection)-resolved as of 12/8/2023  Assessment & Plan  UA + bacteria, + leukocytes  UC no growth    Plan:   Continue keflex for management of cellulitis      SIRS (systemic inflammatory response syndrome) (HCC)-resolved as of 12/6/2023  Assessment & Plan  - Fever resolved  - S/P Tylenol 650 in ED IV Ceftriaxone x 1 in the ED  - No evidence of bacterial infection  - Flu/RSV/Covid not done overnight    Plan:   Monitor vitals             VTE Pharmacologic Prophylaxis:   Pharmacologic: Enoxaparin (Lovenox)  Mechanical VTE Prophylaxis in Place: Yes    Patient Centered Rounds: I have performed bedside rounds with nursing staff today. Discussions with Specialists or Other Care Team Provider: Cassy    Education and Discussions with Family / Patient: Patient    Time Spent for Care: 20 minutes. More than 50% of total time spent on counseling and coordination of care as described above. Current Length of Stay: 6 day(s)    Current Patient Status: Inpatient   Certification Statement: The patient will continue to require additional inpatient hospital stay due to  requiring continued monitoring of response to diuretics     Discharge Plan: Once stable with diuretic therapy can DC    Code Status: Level 1 - Full Code      Subjective:   Pt seen at bedside with attending and resident staff. Pt endorsing much improvement with SOB and cough. He is aware that he is flu positive. Continues to endorse orthopnea and SOB with ambulation. Tolerating PO intake and using bathroom without problem. Denying fever, chills, abdominal pain, n/v/d. Chest pain is well controlled with topical diclofenac. Objective:     Vitals:   Temp (24hrs), Av.7 °F (37.1 °C), Min:97.5 °F (36.4 °C), Max:99.4 °F (37.4 °C)    Temp:  [97.5 °F (36.4 °C)-99.4 °F (37.4 °C)] 97.5 °F (36.4 °C)  HR:  [] 78  Resp:  [18-21] 20  BP: (109-129)/(68-90) 128/86  SpO2:  [95 %-100 %] 97 %  Body mass index is 29.3 kg/m². Input and Output Summary (last 24 hours): Intake/Output Summary (Last 24 hours) at 12/10/2023 1139  Last data filed at 12/10/2023 1110  Gross per 24 hour   Intake 960 ml   Output 2125 ml   Net -1165 ml       Physical Exam:     Physical Exam  Vitals reviewed. Constitutional:       Appearance: Normal appearance. Eyes:      Conjunctiva/sclera: Conjunctivae normal.   Cardiovascular:      Rate and Rhythm: Normal rate and regular rhythm. Pulses: Normal pulses. Heart sounds: Normal heart sounds. Pulmonary:      Effort: Pulmonary effort is normal.      Breath sounds: Normal breath sounds. Abdominal:      Palpations: Abdomen is soft. Tenderness: There is no abdominal tenderness. Musculoskeletal:         General: Normal range of motion. Cervical back: Normal range of motion. Right lower leg: No tenderness or bony tenderness. 1+ Edema present. Left lower leg: No tenderness or bony tenderness. 1+ Edema present. Legs:    Skin:     General: Skin is warm and dry. Capillary Refill: Capillary refill takes less than 2 seconds. Neurological:      General: No focal deficit present. Mental Status: He is alert and oriented to person, place, and time.    Psychiatric:         Mood and Affect: Mood normal.         Behavior: Behavior normal.         Additional Data:     Labs:    Results from last 7 days   Lab Units 12/10/23  0612 23  0431   WBC Thousand/uL 5.47 6. 74   HEMOGLOBIN g/dL 13.2 13.1   HEMATOCRIT % 41.6 41.7   PLATELETS Thousands/uL 189 224   NEUTROS PCT %  --  84*   LYMPHS PCT %  --  8*   LYMPHO PCT % 2*  --    MONOS PCT %  --  6   MONO PCT % 2*  --    EOS PCT % 0 1     Results from last 7 days   Lab Units 12/10/23  0612 12/06/23  0445 12/05/23  0541   POTASSIUM mmol/L 4.4   < > 4.0   CHLORIDE mmol/L 98   < > 103   CO2 mmol/L 22   < > 25   BUN mg/dL 32*   < > 25   CREATININE mg/dL 1.50*   < > 1.48*   CALCIUM mg/dL 8.4   < > 9.6   ALK PHOS U/L  --   --  210*   ALT U/L  --   --  30   AST U/L  --   --  28    < > = values in this interval not displayed. Results from last 7 days   Lab Units 12/04/23  1304   INR  1.19                 * I Have Reviewed All Lab Data Listed Above. * Additional Pertinent Lab Tests Reviewed: All [facility] Admission Reviewed    Imaging:    Imaging Reports Reviewed Today Include: CXR  Imaging Personally Reviewed by Myself Includes:  as above    Recent Cultures (last 7 days):     Results from last 7 days   Lab Units 12/06/23  1250 12/04/23  1322 12/04/23  1304   BLOOD CULTURE   --  No Growth After 5 Days. No Growth After 5 Days.    URINE CULTURE  No Growth <1000 cfu/mL  --   --        Last 24 Hours Medication List:   Current Facility-Administered Medications   Medication Dose Route Frequency Provider Last Rate   • acetaminophen  975 mg Oral Q8H [address] [place] [address] Dr Sony Rios MD     • benzonatate  100 mg Oral BID Rashmi Larios MD     • cephalexin  500 mg Oral Q12H Enrique Avila MD     • dextromethorphan-guaiFENesin  5 mL Oral Q4H Sirena Jacob MD     • Diclofenac Sodium  2 g Topical 4x Daily Bren Padilla MD     • enoxaparin  40 mg Subcutaneous Daily Sirena Jacob MD     • furosemide  60 mg Oral Daily Lorraine Lowe Ulysses, DO     • ipratropium  0.5 mg Nebulization Q6H Sirena Jacob MD     • levalbuterol  1.25 mg Nebulization Q6H Sirena Jacob MD     • metoprolol succinate  50 mg Oral Daily Bren Alston MD     • metoprolol succinate  75 mg Oral HS Bren Alston MD     • oseltamivir  75 mg Oral Q12H [address] Malena George MD     • pantoprazole  40 mg Oral Early Morning Bren Alston MD     • saccharomyces boulardii  250 mg Oral BID Jeff Hope MD     • spironolactone  25 mg Oral Daily Bren Alston MD          ** Please Note: Dictation voice to text software may have been used in the creation of this document. Carol Cartwright MD  12/10/23  11:39 AM   Attestation signed by Sameera Haque MD at 12/10/2023  6:36 PM:    Standard Teaching Supervising Statement    I have reviewed the note performed and documented by the Resident's. I personally performed the required components/examined the patient. I agree with the Resident's findings and plan of care with the following additions/exceptions:     12/10/23      MD Malena Choi MD  12/9/2023  7:24 PM  Attested Addendum  Progress Note    Juan Rojas 48 y.o. male MRN: [id number]  Unit/Bed#: 7T [facility] 713-01 Encounter: [id number]  Admitting Physician: Andrew Robin MD  PCP: Carol Cartwright MD  Date of Admission:  12/4/2023 12:49 PM    Assessment and Plan    * Acute exacerbation of CHF (congestive heart failure) ([address])  Assessment & Plan  - Likely in the setting of cocaine use (positive UDS) vs. Possible viral infection (nasal swab not collected overnight)  - Patient close to his dry weight -- Dry weight: 217 lbs  - Patient still having signs of pulmonary congestion, orthopnea  - Improvement in oxygen requirements  - Home regimen: furosemide 60 QD (taking BID), metoprolol 50 in day and 75 at night, losartan 50 mg BID, spironolactone 25 mg daily, Jardiance 10 mg daily  - Last echo :  LVEF 40% ( August 2023)   - Acute increase in cough since last night, patient having more SOB.      Plan:   - lasix PO 60 mg QD  - HOLD jardiance in view of deteriorating kidney function, as per cardiology  - continous pulse oximetry   - walking study pending, before patient ready to go home    - Monitor I/O and daily weight  - Fluid restriction 1.5L   - Telemetry  - Continuous pulse oximetry and titrate O2 as appropriate  - Monitor electrolytes  - Hold Losartan, continue Jardiance and Spironolactone  - Cardiology following              Chronic combined systolic and diastolic congestive heart failure ([address])  Assessment & Plan  See A/P for acute exacerbation of CHF       Influenza A  Assessment & Plan  Positive influenza swab in view of worsening cough. Vitals stable. Plan:   Tamiflu 75 mg BID   Contact precautions   Monitor V/S     Left leg cellulitis  Assessment & Plan  Pain in Left leg has decreased from yesterday. Redness mostly resolved.  Reduced swelling    Plan:  PO Keflex 500 mg BID ( day 5)   Continue to monitor vitals   Continue to monitor redness ( marked with red)  Follow CBC    Acute respiratory failure with hypoxia (HCC)  Assessment & Plan  - Likely in the setting of CHF exacerbation  - currently on RA saturating at 95%, however intermittently requiring O2 supplementation during the day  - Episodes of desaturation at 89%     PLAN:  - Continue O2 as needed and titrate as appropriate  - Continue diuresis    Cocaine abuse ([address])  Assessment & Plan  - Patient denies cocaine use despite positive UDS  - In view of denial, unsure about last use     Plan:   - Monitor for signs and symptoms of withdrawal    Hyperlipidemia  Assessment & Plan  - Home medication: atorvastatin 40 mg QD    PLAN:  - Continue statin    Chest pain  Assessment & Plan  - Troponnins: 25 > 22   - Non-MI related tropinin elevation most likely 2/2 to CHF exacerbation   - ECG sinus tachycardia; Possible Left atrial enlargement, QTC: 451  - This morning patient did not endorse chest pain     Plan:  - Apply Voltaren gel  - Tylenol 650 mg q8h  - Monitor symptoms      HEIKE (acute kidney injury) ([address])  Assessment & Plan  - Creatinine of 1.68  increased this morning (baseline 1.2 - 1.3)  - Likely prerenal in the setting of IV diuresis vs fluid overload  - S/p NSAIDs for pain and ARB use for hypertension  - No electrolyte derrangements    PLAN:  - Monitor renal function  - Hold ARB  - Caution with nephrotoxic agent  - Continue diuresis    Hypertension  Assessment & Plan  Blood Pressure: 125/86 ; well controlled  - Home medications: furosemide 60 mg daily (using BID), losartan 50 BID, metoprolol 50 mg in morning, metoprolol 75 mg at night, spironolactone 25 mg daily    Plan:   - Hold ARB in view of rise in creatinine  - Continue Metoprolol and Spironolactone  - IV diuresis for CHF exacerbation  - Low sodium diet  - Monitor VS    UTI (urinary tract infection)-resolved as of 12/8/2023  Assessment & Plan  UA + bacteria, + leukocytes  UC no growth    Plan:   Continue keflex for management of cellulitis      SIRS (systemic inflammatory response syndrome) (HCC)-resolved as of 12/6/2023  Assessment & Plan  - Fever resolved  - S/P Tylenol 650 in ED IV Ceftriaxone x 1 in the ED  - No evidence of bacterial infection  - Flu/RSV/Covid not done overnight    Plan:   Monitor vitals             VTE Pharmacologic Prophylaxis: VTE Score: 6 High Risk (Score >/= 5) - Pharmacological DVT Prophylaxis Ordered: enoxaparin (Lovenox). Sequential Compression Devices Ordered. Patient Centered Rounds: I have performed bedside rounds with nursing staff today. Discussions with Specialists or Other Care Team Provider: No    Education and Discussions with Family / Patient: Yes  Patient Information Sharing: Family aware of plan    Time Spent for Care: 45 minutes. More than 50% of total time spent on counseling and coordination of care as described above.     Current Length of Stay: 5 day(s)    Current Patient Status: Inpatient   Certification Statement: The patient will continue to require additional inpatient hospital stay due to acute CHF exacerbation. Discharge Plan: Upon improvement in cardiac condition    Code Status: Level 1 - Full Code      Subjective:   Ryanne Newby was seen and evaluated at bedside with senior and attending on duty. Patient has been having increased cough since last night. He was given Tessalon Perles and Robitussin and it did not help the cough. This morning he is having increased shortness of breath and was placed on 2 L of oxygen and continuous pulse oximetry. He was not able to sleep well last night because of the coughing. Denies nausea, chest pain, palpitations vomiting, changes in appetite, dysuria. His leg pain from yesterday has continued to improved. Pitting edema is decreased from yesterday to grade 2.  pain, swelling, redness decreased in the left leg. Objective:     Vitals:   Temp (24hrs), Av.5 °F (36.4 °C), Min:96.7 °F (35.9 °C), Max:98.8 °F (37.1 °C)    Temp:  [96.7 °F (35.9 °C)-98.8 °F (37.1 °C)] 98.8 °F (37.1 °C)  HR:  [85-98] 90  Resp:  [17-19] 18  BP: (129-145)/(68-98) 129/68  SpO2:  [95 %-99 %] 95 %  Body mass index is 29.48 kg/m². Input and Output Summary (last 24 hours): Intake/Output Summary (Last 24 hours) at 2023 1924  Last data filed at 2023 1847  Gross per 24 hour   Intake 760 ml   Output 600 ml   Net 160 ml       Physical Exam:     Physical Exam  Constitutional:       General: He is not in acute distress. Appearance: Normal appearance. He is obese. He is not ill-appearing, toxic-appearing or diaphoretic. HENT:      Head: Normocephalic. Mouth/Throat:      Mouth: Mucous membranes are moist.   Eyes:      Conjunctiva/sclera: Conjunctivae normal.   Cardiovascular:      Rate and Rhythm: Normal rate and regular rhythm. Heart sounds: Normal heart sounds. Pulmonary:      Effort: Pulmonary effort is normal. No respiratory distress. Breath sounds: Rales present.       Comments: coarse rales bilateral lower lung zones   Chest:      Chest wall: No tenderness. Abdominal:      Palpations: Abdomen is soft. Tenderness: There is no abdominal tenderness. Musculoskeletal:      Right lower le+ Edema present. Left lower le+ Edema present. Comments: Somewhat decrease in edema from yesterday  Erythema of Left leg, pain improved, reddness improved since yesterday   Skin:     General: Skin is warm. Neurological:      General: No focal deficit present. Mental Status: He is alert and oriented to person, place, and time. Psychiatric:         Mood and Affect: Mood normal.         Behavior: Behavior normal.         Thought Content: Thought content normal.         Judgment: Judgment normal.           Additional Data:     Labs:    Results from last 7 days   Lab Units 23  0431   WBC Thousand/uL 6.74   HEMOGLOBIN g/dL 13.1   HEMATOCRIT % 41.7   PLATELETS Thousands/uL 224   NEUTROS PCT % 84*   LYMPHS PCT % 8*   MONOS PCT % 6   EOS PCT % 1     Results from last 7 days   Lab Units 23  0431 23  0445 23  0541   POTASSIUM mmol/L 3.9   < > 4.0   CHLORIDE mmol/L 102   < > 103   CO2 mmol/L 25   < > 25   BUN mg/dL 38*   < > 25   CREATININE mg/dL 1.68*   < > 1.48*   CALCIUM mg/dL 8.8   < > 9.6   ALK PHOS U/L  --   --  210*   ALT U/L  --   --  30   AST U/L  --   --  28    < > = values in this interval not displayed. Results from last 7 days   Lab Units 23  1304   INR  1.19                 * I Have Reviewed All Lab Data Listed Above. * Additional Pertinent Lab Tests Reviewed: All Labs Within Last 24 Hours Reviewed    Imaging:    Imaging Reports Reviewed Today Include: N/A  Imaging Personally Reviewed by Myself Includes:  N/A    Recent Cultures (last 7 days):     Results from last 7 days   Lab Units 23  1250 23  1322 23  1304   BLOOD CULTURE   --  No Growth After 4 Days. No Growth After 4 Days.    URINE CULTURE  No Growth <1000 cfu/mL  --   --        Last 24 Hours Medication List:   Current Facility-Administered Medications   Medication Dose Route Frequency Provider Last Rate   • acetaminophen  975 mg Oral Q8H [address] [facility] Dr Akua Ramirez MD     • benzonatate  100 mg Oral BID Merly Romero MD     • cephalexin  500 mg Oral Q12H Raúl Matson MD     • dextromethorphan-guaiFENesin  5 mL Oral Q4H Alfred Jordan MD     • Diclofenac Sodium  2 g Topical 4x Daily Bren Drake MD     • enoxaparin  40 mg Subcutaneous Daily Alfred Jordan MD     • furosemide  60 mg Oral Daily Bonifacio Vela [id number], DO     • ipratropium  0.5 mg Nebulization Q4H PRN Bonifacio Vela RLEXUKLF, DO     • levalbuterol          • levalbuterol  0.63 mg Nebulization Q4H PRN Bonifacio Vela SPELNUSG, DO     • metoprolol succinate  50 mg Oral Daily Bren Drake MD     • metoprolol succinate  75 mg Oral HS Bren Drake MD     • oseltamivir  75 mg Oral Q12H Raúl Matson MD     • pantoprazole  40 mg Oral Early Morning Bren Drake MD     • saccharomyces boulardii  250 mg Oral BID Merly Romero MD     • spironolactone  25 mg Oral Daily MD Alfred Alejandro MD  12/09/23  7:24 PM   Attestation signed by Fátima Bob MD at 12/9/2023  7:28 PM:    Standard Teaching Supervising Statement    I have reviewed the note performed and documented by the Resident's. I personally performed the required components/examined the patient.  I agree with the Resident's findings and plan of care with the following additions/exceptions:     12/09/23      MD Varsha Friend DO  12/8/2023  2:24 PM  Addendum  Progress Note - Cardiology   Anne Marie Mercer 48 y.o. male MRN: [id number]  Unit/Bed#: 7T [facility] 713-01 Encounter: [id number]      Assessment/Recommendations/Discussion:   Acute on chronic combined systolic and diastolic heart failure, EF 40%, moderate global hypokinesis, grade 2 diastolic dysfunction  Pretension  Acute kidney injury  Hyperlipidemia  Cocaine abuse, UDS positive  Alcohol abuse  Left leg cellulitis  UTI      PLAN  No weight has been documented today yet  Weight is down significantly since admission however where he was 237 pounds via standing scale now 219 pounds yesterday  His baseline weight seems to be around 217 pounds  Creatinine did increase from 1.45-1.57 yesterday to today  Blood pressure remained stable at 125/86  Currently on furosemide 40 IV twice daily with spironolactone 25 mg p.o. daily and Jardiance 10 mg daily    Would hold Jardiance right now given fluctuating renal function with need for IV diuretics. Plan to resume once fully euvolemic and stable off IV diuretics. Heart rate is controlled. Continue with Toprol-XL 50 mg in the morning and 75 mg in the evening along with losartan 50 mg twice a day. Continue atorvastatin for dyslipidemia    Likely okay to switch to p.o. Lasix tomorrow, 60 mg p.o. daily in addition to the 25 mg spironolactone  Restart Jardiance once stable on p.o. diuretics and renal function returns to baseline    Subjective:   HPI  Seems to do well on the IV Lasix. Denies significant shortness of breath or chest pain today      Review of Systems: As noted in HPI. Rest of ROS is negative. Vitals:   /86 (BP Location: Left arm)   Pulse 77   Temp (!) 97.4 °F (36.3 °C) (Temporal)   Resp 17   Ht 6' (1.829 m)   Wt 99.4 kg (219 lb 2.2 oz)   SpO2 95%   BMI 29.72 kg/m²   I/O         12/06 0701 12/07 0700 12/07 0701 12/08 0700 12/08 0701 12/09 0700    P. O. 720 720 720    Total Intake(mL/kg) 720 (7.2) 720 (7.2) 720 (7.2)    Urine (mL/kg/hr) 2400 (1)      Total Output 2400      Net -1680 +720 +720           Unmeasured Urine Occurrence   2 x          Weight (last 2 days)       Date/Time Weight    12/08/23 0537 99.4 (219.14)    12/07/23 0551 101 (221.56)    12/07/23 0550 101 (221.56)    12/06/23 0532 107 (234.79)            Physical Exam Constitutional: awake, alert and oriented, in no acute distress, no obvious deformities  Head: Normocephalic, without obvious abnormality, atraumatic  Eyes: conjunctivae clear and moist. Sclera anicteric. No xanthelasmas. Pupils equal bilaterally. Extraocular motions are full. Ear nose mouth and throat: ears are symmetrical bilaterally, hearing appears to be equal bilaterally, no nasal discharge or epistaxis, oropharynx is clear with moist mucous membranes  Neck:  Trachea is midline, neck is supple, no thyromegaly or significant lymphadenopathy, there is full range of motion. Lungs: trace rales  Heart: regular rate and rhythm, S1, S2 normal, no murmur, no click, rub or gallop, no lower extremity edema  Abdomen: soft, non-tender; bowel sounds normal; no masses,  no organomegaly  Psychiatric:  Patient is oriented to time, place, person, mood/affect is negative for depression, anxiety, agitation, appears to have appropriate insight  Skin: Skin is warm, dry, intact. No obvious rashes or lesions on exposed extremities. Nail beds are pink with no cyanosis or clubbing. TELEMETRY:   Lab Results:  Results from last 7 days   Lab Units 12/08/23  0536   WBC Thousand/uL 7.00   HEMOGLOBIN g/dL 13.4   HEMATOCRIT % 42.2   PLATELETS Thousands/uL 263     Results from last 7 days   Lab Units 12/08/23  0536 12/06/23  0445 12/05/23  0541   POTASSIUM mmol/L 4.1   < > 4.0   CHLORIDE mmol/L 103   < > 103   CO2 mmol/L 26   < > 25   BUN mg/dL 34*   < > 25   CREATININE mg/dL 1.57*   < > 1.48*   CALCIUM mg/dL 8.8   < > 9.6   ALK PHOS U/L  --   --  210*   ALT U/L  --   --  30   AST U/L  --   --  28    < > = values in this interval not displayed.      Results from last 7 days   Lab Units 12/08/23  0536   POTASSIUM mmol/L 4.1   CHLORIDE mmol/L 103   CO2 mmol/L 26   BUN mg/dL 34*   CREATININE mg/dL 1.57*   CALCIUM mg/dL 8.8           Medications:    Current Facility-Administered Medications:   •  acetaminophen (TYLENOL) tablet 975 mg, 975 mg, Oral, Q8H [address], Bren Brady MD, 975 mg at 12/08/23 0543  •  benzonatate (TESSALON PERLES) capsule 100 mg, 100 mg, Oral, BID, Harshad Baig MD, 100 mg at 12/08/23 0909  •  cephalexin (KEFLEX) capsule 500 mg, 500 mg, Oral, Q12H [address], Tin Cordoba MD, 500 mg at 12/08/23 8662  •  Diclofenac Sodium (VOLTAREN) 1 % topical gel 2 g, 2 g, Topical, 4x Daily, Bren Brady MD, 2 g at 12/06/23 2123  •  Empagliflozin (JARDIANCE) tablet 10 mg, 10 mg, Oral, Daily, Bren Brady MD, 10 mg at 12/08/23 0910  •  enoxaparin (LOVENOX) subcutaneous injection 40 mg, 40 mg, Subcutaneous, Daily, Tin Cordoba MD, 40 mg at 12/08/23 2393  •  furosemide (LASIX) injection 40 mg, 40 mg, Intravenous, BID (diuretic), Slime Cobb MD, 40 mg at 12/08/23 0691  •  metoprolol succinate (TOPROL-XL) 24 hr tablet 50 mg, 50 mg, Oral, Daily, Bren Brady MD, 50 mg at 12/08/23 7072  •  metoprolol succinate (TOPROL-XL) 24 hr tablet 75 mg, 75 mg, Oral, HS, Bren Brady MD, 75 mg at 12/07/23 2156  •  pantoprazole (PROTONIX) EC tablet 40 mg, 40 mg, Oral, Early Morning, Bren Brady MD, 40 mg at 12/08/23 0543  •  saccharomyces boulardii (FLORASTOR) capsule 250 mg, 250 mg, Oral, BID, Harshad Baig MD, 250 mg at 12/08/23 0911  •  spironolactone (ALDACTONE) tablet 25 mg, 25 mg, Oral, Daily, Bren Brady MD, 25 mg at 12/08/23 8279    Portions of the record may have been created with voice recognition software. Occasional wrong word or "sound a like" substitutions may have occurred due to the inherent limitations of voice recognition software. Read the chart carefully and recognize, using context, where substitutions have occurred.     Claudio Penn DO, [facility]  12/8/2023 1:53 PM        Enrike Downey MD  12/8/2023  9:53 AM  Attested Addendum  Progress Note    Cristian Perfecto 48 y.o. male MRN: [id number]  Unit/Bed#: 7T [facility] 713-01 Encounter: [id number]  Admitting Physician: Parveen Lacey MD  PCP: Swetha Alcaraz MD  Date of Admission:  12/4/2023 12:49 PM    Assessment and Plan    * Acute exacerbation of CHF (congestive heart failure) ([address])  Assessment & Plan  - Likely in the setting of cocaine use (positive UDS) vs. Possible viral infection (nasal swab not collected overnight)  - Patient close to his dry weight -- Dry weight: 217 lbs  - Patient still having signs of pulmonary congestion, orthopnea  - Improvement in oxygen requirements  - Home regimen: furosemide 60 QD (taking BID), metoprolol 50 in day and 75 at night, losartan 50 mg BID, spironolactone 25 mg daily, Jardiance 10 mg daily  - Last echo :  LVEF 40% ( August 2023)       Plan:   - Continue IV Lasix to 40 mg BID  - Consider oxygen sleep study in view of orthopnea    - Monitor I/O and daily weight  - Fluid restriction 1.5L   - Telemetry  - Continuous pulse oximetry and titrate O2 as appropriate  - Monitor electrolytes  - Hold Losartan, continue Jardiance and Spironolactone  - Cardiology following            Hypertension  Assessment & Plan  Blood Pressure: 125/86 ; well controlled  - Home medications: furosemide 60 mg daily (using BID), losartan 50 BID, metoprolol 50 mg in morning, metoprolol 75 mg at night, spironolactone 25 mg daily    Plan:   - Hold ARB in view of rise in creatinine  - Continue Metoprolol and Spironolactone  - IV diuresis for CHF exacerbation  - Low sodium diet  - Monitor VS    Left leg cellulitis  Assessment & Plan  Pain in Left leg has decreased from yesterday. Somewhat warm to the touch.      Plan:  PO Keflex 500 mg BID ( day 4)   Continue to monitor vitals   Continue to monitor redness ( marked with red)  Follow CBC    Acute respiratory failure with hypoxia (HCC)  Assessment & Plan  - Likely in the setting of CHF exacerbation  - currently on RA saturating at 95%, however intermittently requiring O2 supplementation during the day  - Episodes of desaturation at 89% yesterday on RA    PLAN:  - Continue O2 as needed and titrate as appropriate  - Continue IV diuresis    Cocaine abuse (HCC)  Assessment & Plan  - Patient denies cocaine use despite positive UDS  - In view of denial, unsure about last use     Plan:   - Monitor for signs and symptoms of withdrawal    Hyperlipidemia  Assessment & Plan  - Home medication: atorvastatin 40 mg QD    PLAN:  - Continue statin    Chest pain  Assessment & Plan  - Troponnins: 25 > 22   - Non-MI related tropinin elevation most likely 2/2 to CHF exacerbation   - ECG sinus tachycardia; Possible Left atrial enlargement, QTC: 451  - This morning patient did not endorse chest pain     Plan:  - Apply Voltaren gel  - Tylenol 650 mg q8h  - Monitor symptoms      HEIKE (acute kidney injury) ([address])  Assessment & Plan  - Creatinine of 1.57 this morning (baseline 1.2 - 1.3)  - Likely prerenal in the setting of IV diuresis vs fluid overload  - S/p NSAIDs for pain and ARB use for hypertension  - No electrolyte derrangements    PLAN:  - Monitor renal function  - Hold ARB  - Caution with nephrotoxic agent  - Continue diuresis    Chronic combined systolic and diastolic congestive heart failure (HCC)  Assessment & Plan  See A/P for acute exacerbation of CHF       UTI (urinary tract infection)-resolved as of 12/8/2023  Assessment & Plan  UA + bacteria, + leukocytes  UC no growth    Plan:   Continue keflex for management of cellulitis      SIRS (systemic inflammatory response syndrome) ([facility])-resolved as of 12/6/2023  Assessment & Plan  - Fever resolved  - S/P Tylenol 650 in ED IV Ceftriaxone x 1 in the ED  - No evidence of bacterial infection  - Flu/RSV/Covid not done overnight    Plan:   Monitor vitals             VTE Pharmacologic Prophylaxis: VTE Score: 6 High Risk (Score >/= 5) - Pharmacological DVT Prophylaxis Ordered: enoxaparin (Lovenox). Sequential Compression Devices Ordered.     Patient Centered Rounds: I have performed bedside rounds with nursing staff today. Discussions with Specialists or Other Care Team Provider: No    Education and Discussions with Family / Patient: Yes  Patient Information Sharing: Family aware of plan    Time Spent for Care: 45 minutes. More than 50% of total time spent on counseling and coordination of care as described above. Current Length of Stay: 4 day(s)    Current Patient Status: Inpatient   Certification Statement: The patient will continue to require additional inpatient hospital stay due to acute CHF exacerbation. Discharge Plan: Upon improvement in cardiac condition    Code Status: Level 1 - Full Code      Subjective:   Naomi Dexter was seen and evaluated at bedside. He reports doing overall better in comparison to admission; his shortness of breath and cough remains. Reports abdominal discomfort, but no constipation. Denies nausea, vomiting, changes in appetite, dysuria. His leg pain from yesterday has continued to improved. Pitting edema is decreased from yesterday. Objective:     Vitals:   Temp (24hrs), Av.2 °F (36.2 °C), Min:97 °F (36.1 °C), Max:97.4 °F (36.3 °C)    Temp:  [97 °F (36.1 °C)-97.4 °F (36.3 °C)] 97.4 °F (36.3 °C)  HR:  [77-90] 77  Resp:  [17-18] 17  BP: (125-143)/(76-99) 125/86  SpO2:  [94 %-98 %] 95 %  Body mass index is 29.72 kg/m². Input and Output Summary (last 24 hours): Intake/Output Summary (Last 24 hours) at 2023 0953  Last data filed at 2023 3400  Gross per 24 hour   Intake 720 ml   Output --   Net 720 ml       Physical Exam:     Physical Exam  Constitutional:       General: He is not in acute distress. Appearance: Normal appearance. He is not ill-appearing, toxic-appearing or diaphoretic. HENT:      Mouth/Throat:      Mouth: Mucous membranes are moist.   Eyes:      Conjunctiva/sclera: Conjunctivae normal.   Cardiovascular:      Rate and Rhythm: Normal rate and regular rhythm. Heart sounds: Normal heart sounds.    Pulmonary: Effort: Pulmonary effort is normal. No respiratory distress. Breath sounds: Rales present. Comments: Fine rales bilateral lower lung zones   Chest:      Chest wall: No tenderness. Abdominal:      General: There is no distension. Palpations: Abdomen is soft. Tenderness: There is no abdominal tenderness. There is no guarding. Musculoskeletal:      Right lower le+ Edema present. Left lower le+ Edema present. Comments: Somewhat decrease in edema from yesterday  Erythema of Left leg, pain improved, reddness improved since yesterday   Skin:     General: Skin is warm. Neurological:      General: No focal deficit present. Mental Status: He is alert and oriented to person, place, and time. Psychiatric:         Mood and Affect: Mood normal.         Behavior: Behavior normal.         Thought Content: Thought content normal.         Judgment: Judgment normal.           Additional Data:     Labs:    Results from last 7 days   Lab Units 23  0536   WBC Thousand/uL 7.00   HEMOGLOBIN g/dL 13.4   HEMATOCRIT % 42.2   PLATELETS Thousands/uL 263   NEUTROS PCT % 73   LYMPHS PCT % 15   MONOS PCT % 7   EOS PCT % 3     Results from last 7 days   Lab Units 23  0536 23  0445 23  0541   POTASSIUM mmol/L 4.1   < > 4.0   CHLORIDE mmol/L 103   < > 103   CO2 mmol/L 26   < > 25   BUN mg/dL 34*   < > 25   CREATININE mg/dL 1.57*   < > 1.48*   CALCIUM mg/dL 8.8   < > 9.6   ALK PHOS U/L  --   --  210*   ALT U/L  --   --  30   AST U/L  --   --  28    < > = values in this interval not displayed. Results from last 7 days   Lab Units 23  1304   INR  1.19                 * I Have Reviewed All Lab Data Listed Above. * Additional Pertinent Lab Tests Reviewed:  All Labs Within Last 24 Hours Reviewed    Imaging:    Imaging Reports Reviewed Today Include: N/A  Imaging Personally Reviewed by Myself Includes:  N/A    Recent Cultures (last 7 days):     Results from last 7 days   Lab Units 12/06/23  1250 12/04/23  1322 12/04/23  1304   BLOOD CULTURE   --  No Growth at 72 hrs. No Growth at 72 hrs. URINE CULTURE  No Growth <1000 cfu/mL  --   --        Last 24 Hours Medication List:   Current Facility-Administered Medications   Medication Dose Route Frequency Provider Last Rate   • acetaminophen  975 mg Oral Q8H 9 [facility] Dr Ino Casillas MD     • benzonatate  100 mg Oral BID Velma Ny MD     • cephalexin  500 mg Oral Q12H Isabela Mitchell MD     • Diclofenac Sodium  2 g Topical 4x Daily Bren Thompson MD     • Empagliflozin  10 mg Oral Daily Gen Ferris MD     • enoxaparin  40 mg Subcutaneous Daily Misty Hood MD     • furosemide  40 mg Intravenous BID (diuretic) Tej Tyler MD     • metoprolol succinate  50 mg Oral Daily Bren Thompson MD     • metoprolol succinate  75 mg Oral HS Bren Thompson MD     • pantoprazole  40 mg Oral Early Morning Bren Thompson MD     • saccharomyces boulardii  250 mg Oral BID Velma Ny MD     • spironolactone  25 mg Oral Daily MD Gen Espinoza MD  12/08/23  9:53 AM   Attestation signed by Malik Min MD at 12/8/2023  7:08 PM:    Standard Teaching Supervising Statement    I have reviewed the note performed and documented by the Resident's. I personally performed the required components/examined the patient.  I agree with the Resident's findings and plan of care with the following additions/exceptions:     12/08/23      MD Tej Barahona MD  12/7/2023  7:24 PM  Addendum  Progress Note - Cardiology   Audie Bowman 48 y.o. male MRN: [id number]  Unit[facility]Bed#: 7[facility] 713-01 Encounter: [id number]    Assessment:    Acute on chronic combined systolic and diastolic heart failure  Hypertension   Acute kidney injury Improving, creatinine 1.64 -> 1.45  No further chest pain, no acute EKG changes troponins negative x 2  Hyperlipidemia  Cocaine abuse, patient denies cocaine use but urine was positive for cocaine  Alcohol abuse  Left leg cellulitis  UTI    Plan:    Continue furosemide 40 mg IV twice daily with spironolactone 25 mg daily and Jardiance 10 mg daily  Continue metoprolol succinate 50 mg in a.m. and 75 mg in p.m. with losartan 50 mg twice daily and spironolactone 25 mg daily  Continue atorvastatin 40 mg daily      Interval history:  Patient states that he had a standing weight done this morning. Additionally it appeared the patient's weight was not down at all but when rechecked, today's weight was entered twice and actually when compared to yesterday's weight, he is down 13 pounds. Will continue furosemide 40 mg IV twice daily, spironolactone 25 mg daily.       PROBLEM LIST:    Patient Active Problem List    Diagnosis Date Noted   • UTI (urinary tract infection) 12/06/2023   • Left leg cellulitis 12/05/2023   • Acute exacerbation of CHF (congestive heart failure) ([address]) 12/04/2023   • Chest pain 12/04/2023   • Hyperlipidemia 12/04/2023   • Cocaine abuse ([address]) 12/04/2023   • Nonischemic cardiomyopathy ([address]) 11/26/2023   • Does not have health insurance 08/14/2023   • Encounter to establish care 08/14/2023   • Chest pain, unspecified 08/05/2023   • HEIKE (acute kidney injury) ([address]) 08/05/2023   • Abnormal CT scan 08/05/2023   • Chronic combined systolic and diastolic congestive heart failure ([address]) 04/01/2022   • Pleural effusion, left 04/01/2022   • Hypertension 04/01/2022   • Left leg pain 04/01/2022   • Elevated serum creatinine 04/01/2022       Vitals: /90 (BP Location: Right leg)   Pulse 89   Temp (!) 97 °F (36.1 °C) (Temporal)   Resp 20   Ht 6' (1.829 m)   Wt 101 kg (221 lb 9 oz)   SpO2 95%   BMI 30.05 kg/m²   PREVIOUS WEIGHTS:   Weight (last 2 days)       Date/Time Weight    12/07/23 0551 101 (221.56)    12/07/23 0550 101 (221.56)    12/06/23 0532 107 (234.79)    12/05/23 0600 107 (235)            Wt Readings from Last 2 Encounters:   12/07/23 101 kg (221 lb 9 oz)   11/16/23 98.4 kg (217 lb)       Labs/Data:        Results from last 7 days   Lab Units 12/07/23  0509 12/06/23  0445 12/05/23  0541   WBC Thousand/uL 6.60 5.84 8.84   HEMOGLOBIN g/dL 13.5 13.1 15.5   HEMATOCRIT % 43.0 41.5 49.8*   MCV fL 89 89 90   MCH pg 28.0 28.2 28.0   MCHC g/dL 31.4 31.6 31.1*   PLATELETS Thousands/uL 271 230 255     Results from last 7 days   Lab Units 12/07/23  0509 12/06/23  0445 12/05/23  0541   EGFR ml/min/1.73sq m 55 47 54   SODIUM mmol/L 138 139 142   POTASSIUM mmol/L 4.1 3.9 4.0   CHLORIDE mmol/L 101 102 103   CO2 mmol/L 29 28 25   BUN mg/dL 28* 24 25   CREATININE mg/dL 1.45* 1.64* 1.48*     Results from last 7 days   Lab Units 12/07/23  0509 12/06/23  0445 12/05/23  0541 12/04/23  1304   CALCIUM mg/dL 8.8 8.4 9.6 8.9   AST U/L  --   --  28 25   ALT U/L  --   --  30 30   ALK PHOS U/L  --   --  210* 179*     Lab Results   Component Value Date    NTBNP 677 (H) 05/06/2022    NTBNP 3,237 (H) 03/31/2022     Lab Results   Component Value Date    CHOLESTEROL 132 03/31/2022    TRIG 205 (H) 03/31/2022    HDL 44 03/31/2022    LDLCALC 47 03/31/2022    HGBA1C 5.4 03/31/2022       Results from last 7 days   Lab Units 12/04/23  1304   INR  1.19   PROTIME seconds 15.4*          Current Facility-Administered Medications:   •  acetaminophen (TYLENOL) tablet 975 mg, 975 mg, Oral, Q8H [address], Brencarolyn Nava MD, 975 mg at 12/07/23 1354  •  cephalexin (KEFLEX) capsule 500 mg, 500 mg, Oral, Q12H [address], Clarke Olszewski, MD, 500 mg at 12/07/23 4882  •  Diclofenac Sodium (VOLTAREN) 1 % topical gel 2 g, 2 g, Topical, 4x Daily, Bren Nava MD, 2 g at 12/06/23 2123  •  Empagliflozin (JARDIANCE) tablet 10 mg, 10 mg, Oral, Daily, Bren Nava MD, 10 mg at 12/07/23 0851  •  enoxaparin (LOVENOX) subcutaneous injection 40 mg, 40 mg, Subcutaneous, Daily, Clarke Olszewski, MD, 40 mg at 12/07/23 0853  •  furosemide (LASIX) injection 40 mg, 40 mg, Intravenous, BID (diuretic), Johnathan Villatoro MD  •  metoprolol succinate (TOPROL-XL) 24 hr tablet 50 mg, 50 mg, Oral, Daily, Bren Clark MD, 50 mg at 12/07/23 3780  •  metoprolol succinate (TOPROL-XL) 24 hr tablet 75 mg, 75 mg, Oral, HS, Bren Clark MD, 75 mg at 12/06/23 2120  •  pantoprazole (PROTONIX) EC tablet 40 mg, 40 mg, Oral, Early Morning, Bren Clark MD, 40 mg at 12/07/23 0516  •  saccharomyces boulardii (FLORASTOR) capsule 250 mg, 250 mg, Oral, BID, Lannette Peabody, MD, 250 mg at 12/07/23 6901  •  spironolactone (ALDACTONE) tablet 25 mg, 25 mg, Oral, Daily, Bren Clark MD, 25 mg at 12/07/23 0851  No Known Allergies      Intake/Output Summary (Last 24 hours) at 12/7/2023 1522  Last data filed at 12/7/2023 1314  Gross per 24 hour   Intake 960 ml   Output 800 ml   Net 160 ml       Invasive Devices       Peripheral Intravenous Line  Duration             Peripheral IV 12/04/23 Distal;Left;Upper;Ventral (anterior) Arm 3 days    Peripheral IV 12/04/23 Right Antecubital 3 days                    Review of Systems   Constitutional:  Negative for activity change. Respiratory:  Negative for cough, choking, chest tightness, shortness of breath, wheezing and stridor. Cardiovascular:  Positive for leg swelling. Negative for chest pain and palpitations. Musculoskeletal:  Negative for gait problem. Skin:  Negative for color change. Neurological:  Negative for dizziness, tremors, syncope, weakness, light-headedness and headaches. Psychiatric/Behavioral:  Negative for agitation and confusion. Physical Exam  Vitals reviewed. Constitutional:       General: He is not in acute distress. Appearance: He is well-developed. He is obese. HENT:      Head: Normocephalic and atraumatic. Neck:      Thyroid: No thyromegaly. Vascular: No carotid bruit or JVD.       Trachea: No tracheal deviation. Cardiovascular:      Rate and Rhythm: Normal rate and regular rhythm. Pulses: Normal pulses. Heart sounds: Normal heart sounds. No murmur heard. No friction rub. No gallop. Comments: Edema all the way up to below the knee. Previously reported to be up to the thigh. He represents some improvement. Pulmonary:      Effort: Pulmonary effort is normal. No respiratory distress. Breath sounds: Normal breath sounds. No wheezing, rhonchi or rales. Chest:      Chest wall: No tenderness. Musculoskeletal:      Cervical back: Normal range of motion and neck supple. Right lower le+ Pitting Edema present. Left lower le+ Pitting Edema present. Skin:     General: Skin is warm and dry. Neurological:      General: No focal deficit present. Mental Status: He is alert and oriented to person, place, and time. Psychiatric:         Mood and Affect: Mood normal.         Behavior: Behavior normal.         Thought Content: Thought content normal.         Judgment: Judgment normal.         ======================================================     Imaging:   I have personally reviewed pertinent reports. EKG: Sinus tachycardia      Portions of the record may have been created with voice recognition software. Occasional wrong word or "sound a like" substitutions may have occurred due to the inherent limitations of voice recognition software. Read the chart carefully and recognize, using context, where substitutions have occurred.       Jayda Sotelo MD  2023 10:54 AM  Attested  Progress Note    [place] 48 y.o. male MRN: [id number]  Unit/Bed#: 7[facility] 713-01 Encounter: [id number]  Admitting Physician: Cami Slade MD  PCP: Leda Lesches, MD  Date of Admission:  2023 12:49 PM    Assessment and Plan    * Acute exacerbation of CHF (congestive heart failure) ([address])  Assessment & Plan  - Likely in the setting of cocaine use (positive UDS) vs. Possible viral infection (nasal swab not collected overnight)  - Patient has lost 3 lbs since admission on IV diuresis -- Dry weight: 217 lbs  - Patient still having signs of pulmonary congestion, orthopnea  - Improvement in oxygen requirements  - Home regimen: furosemide 60 QD (taking BID), metoprolol 50 in day and 75 at night, losartan 50 mg BID, spironolactone 25 mg daily, Jardiance 10 mg daily  - Last echo :  LVEF 40% ( August 2023)   Currently 95% saturation on RA ; 16 lbs weight loss  near dry weight; adequate urine output ( 1ml/kg)     Plan:   - Continue IV diuresis: Increase IV Lasix to 40 mg BID  - Monitor I/O and daily weight  - Fluid restriction 1.5L   - 24 h telemetry  - Continuous pulse oximetry and titrate O2 as appropriate  - Monitor electrolytes  - Hold Losartan, continue Jardiance and Spironolactone  - Cardiology consult            Chronic combined systolic and diastolic congestive heart failure ([address])  Assessment & Plan  See A/P for acute exacerbation of CHF       UTI (urinary tract infection)  Assessment & Plan  Patient did not complain of dysuria, urine output was not adequate since yesterday despite adequate diureses    Plan:   Continue keflex for 7 days   FU on urine culture for sensitivities     Left leg cellulitis  Assessment & Plan  Pain in Left leg has decreased from yesterday. Somewhat warm to the touch.      Plan:  PO Keflex 500 mg BID ( day 3)   Continue to monitor vitals   Continue to monitor redness ( marked with bed)  Follow CBC    Cocaine abuse ([address])  Assessment & Plan  - Patient denies cocaine use despite positive UDS  - In view of denial, unsure about last use  - Held Metoprolol for 24 h since time of admission    Plan:   - Monitor for signs and symptoms of withdrawal    Hyperlipidemia  Assessment & Plan  - Home medication: atorvastatin 40 mg QD    PLAN:  - Continue statin    Chest pain  Assessment & Plan  - Troponnins: 25 > 22   - Non-MI related tropinin elevation most likely 2/2 to CHF exacerbation   - ECG sinus tachycardia; Possible Left atrial enlargement, QTC: 451  - This morning patient did not endorse chest pain     Plan:  - Apply Voltaren gel  - Tylenol 650 mg q8h  - Monitor symptoms      HEIKE (acute kidney injury) ([address])  Assessment & Plan  - Creatinine of 1.45 this morning (baseline 1.2 - 1.3) admission = 1.19. improving since yesterday   - Likely prerenal in the setting of IV diuresis vs fluid overload  - S/p NSAIDs for pain and ARB use for hypertension  - No electrolyte derrangements    PLAN:  - Monitor renal function  - Hold ARB  - Caution with nephrotoxic agent    Hypertension  Assessment & Plan  Blood Pressure: 128/90     - Home medications: furosemide 60 mg daily (using BID), losartan 50 BID, metoprolol 50 mg in morning, metoprolol 75 mg at night, spironolactone 25 mg daily    Plan:   - Hold ARB in view of new HEIKE  - IV diuresis  - Consider PRN medication if persistent hypertension  - Low sodium diet  - Monitor VS    Acute respiratory failure with hypoxia (HCC)-resolved as of 12/6/2023  Assessment & Plan  - Patient currently on 1L O2 supplementation via NC  - Likely in the setting of CHF exacerbation  - currently on RA saturating at 95%     PLAN:  - Continue O2 as needed and titrate as appropriate  - Continue IV diuresis    SIRS (systemic inflammatory response syndrome) (HCC)-resolved as of 12/6/2023  Assessment & Plan  - Fever resolved  - S/P Tylenol 650 in ED IV Ceftriaxone x 1 in the ED  - No evidence of bacterial infection  - Flu/RSV/Covid not done overnight    Plan:   Monitor vitals             VTE Pharmacologic Prophylaxis: VTE Score: 6 High Risk (Score >/= 5) - Pharmacological DVT Prophylaxis Ordered: enoxaparin (Lovenox). Sequential Compression Devices Ordered. Patient Centered Rounds: I have performed bedside rounds with nursing staff today.     Discussions with Specialists or Other Care Team Provider: No    Education and Discussions with Family / Patient: Yes  Patient Information Sharing: Family aware of plan    Time Spent for Care: 45 minutes. More than 50% of total time spent on counseling and coordination of care as described above. Current Length of Stay: 3 day(s)    Current Patient Status: Inpatient   Certification Statement: The patient will continue to require additional inpatient hospital stay due to acute CHF exacerbation. Discharge Plan: Upon improvement in cardiac condition    Code Status: Level 1 - Full Code      Subjective:   Terry Albert was seen and evaluated at bedside. He reports doing overall better in comparison to admission; his shortness of breath remained since yesterday. He has increased shortness of breath when walking to the bathroom. Yesterday he endorsed 2 episodes of darkening vision lasting about 4 seconds with coughing spells. He has 2 more episodes this morning around 6 am. During spells he endorses feeling somewhat dizzy. No acute changes in vitals or ECG. Denies nausea, vomiting, changes in appetite, dysuria. His leg pain from yesterday has continued to improved. Pitting edema is decreased from yesterday to grade 2    Objective:     Vitals:   Temp (24hrs), Av.3 °F (36.3 °C), Min:97 °F (36.1 °C), Max:97.6 °F (36.4 °C)    Temp:  [97 °F (36.1 °C)-97.6 °F (36.4 °C)] 97 °F (36.1 °C)  HR:  [85-94] 89  Resp:  [18-20] 20  BP: (123-147)/() 128/90  SpO2:  [94 %-97 %] 94 %  Body mass index is 30.05 kg/m². Input and Output Summary (last 24 hours): Intake/Output Summary (Last 24 hours) at 2023 1051  Last data filed at 2023 0900  Gross per 24 hour   Intake 840 ml   Output 1300 ml   Net -460 ml       Physical Exam:     Physical Exam  Constitutional:       General: He is not in acute distress. Appearance: Normal appearance. He is obese. He is not ill-appearing, toxic-appearing or diaphoretic.    HENT:      Mouth/Throat:      Mouth: Mucous membranes are moist.   Eyes:      Conjunctiva/sclera: Conjunctivae normal.   Cardiovascular:      Rate and Rhythm: Normal rate and regular rhythm. Heart sounds: Normal heart sounds. Pulmonary:      Effort: Pulmonary effort is normal. No respiratory distress. Breath sounds: Rales present. Comments: Fine rales bilateral lower lung zones   Chest:      Chest wall: No tenderness. Abdominal:      Palpations: Abdomen is soft. Tenderness: There is no abdominal tenderness. Musculoskeletal:      Right lower le+ Edema present. Left lower le+ Edema present. Comments: Somewhat decrease in edema from yesterday  Erythema of Left leg, pain improved, reddness improved since yesterday   Skin:     General: Skin is warm. Neurological:      General: No focal deficit present. Mental Status: He is alert and oriented to person, place, and time. Psychiatric:         Mood and Affect: Mood normal.         Behavior: Behavior normal.         Thought Content: Thought content normal.         Judgment: Judgment normal.           Additional Data:     Labs:    Results from last 7 days   Lab Units 23  0509   WBC Thousand/uL 6.60   HEMOGLOBIN g/dL 13.5   HEMATOCRIT % 43.0   PLATELETS Thousands/uL 271   NEUTROS PCT % 68   LYMPHS PCT % 19   MONOS PCT % 8   EOS PCT % 4     Results from last 7 days   Lab Units 23  0509 23  0445 23  0541   POTASSIUM mmol/L 4.1   < > 4.0   CHLORIDE mmol/L 101   < > 103   CO2 mmol/L 29   < > 25   BUN mg/dL 28*   < > 25   CREATININE mg/dL 1.45*   < > 1.48*   CALCIUM mg/dL 8.8   < > 9.6   ALK PHOS U/L  --   --  210*   ALT U/L  --   --  30   AST U/L  --   --  28    < > = values in this interval not displayed. Results from last 7 days   Lab Units 23  1304   INR  1.19                 * I Have Reviewed All Lab Data Listed Above. * Additional Pertinent Lab Tests Reviewed:  All Labs Within Last 24 Hours Reviewed    Imaging:    Imaging Reports Reviewed Today Include: N/A  Imaging Personally Reviewed by Myself Includes:  N/A    Recent Cultures (last 7 days):     Results from last 7 days   Lab Units 12/04/23  1322 12/04/23  1304   BLOOD CULTURE  No Growth at 48 hrs. No Growth at 48 hrs. Last 24 Hours Medication List:   Current Facility-Administered Medications   Medication Dose Route Frequency Provider Last Rate   • acetaminophen  975 mg Oral Q8H [address] [facility] [address] Cielo Colon MD     • cephalexin  500 mg Oral Q12H Chrissie Baocn MD     • Diclofenac Sodium  2 g Topical 4x Daily Bren Feliciano MD     • Empagliflozin  10 mg Oral Daily Issac Anderson MD     • enoxaparin  40 mg Subcutaneous Daily Erica Martinez MD     • furosemide  40 mg Intravenous BID (diuretic) Issac Anderson MD     • metoprolol succinate  50 mg Oral Daily Bren Feliciano MD     • metoprolol succinate  75 mg Oral HS Bren Feliciano MD     • pantoprazole  40 mg Oral Early Morning Bren Feliciano MD     • saccharomyces boulardii  250 mg Oral BID Yusra Shepherd MD     • spironolactone  25 mg Oral Daily MD Erica Torres MD  12/07/23  10:51 AM   Attestation signed by Genevieve Bland MD at 12/7/2023  1:52 PM:    Standard Teaching Supervising Statement    I have reviewed the note performed and documented by the Resident's. I personally performed the required components/examined the patient. I agree with the Resident's findings and plan of care with the following additions/exceptions:     Improved creatinine and clinical features of overload. Continue current management. Consider nocturnal oxygen study tomorrow given nocturnal hypoxia possibly due to CHF exacerbation of ANNALISA. Continue current management for the next 24-48 hrs with view to transition to oral diuretics and dry BNP prior to discharge. Restart losartan once HEIKE fully resolved.      DOS: 12/07/23  Laury Sanchez MD        Background, Darwin Max  12/6/2023  2:14 PM  Signed  Patient:    MRN:  [id number]    Darwin Request ID:  [id number]    Level of care reserved:    Partner Reserved:    Clinical needs requested:    Geography searched:  [address]    Start of Service:    Request sent:  3:06pm EST on 12/5/2023 by Carine Gibbs    Partner reserved:  by Carine Gibbs    Choice list shared:  2:00pm EST on 12/6/2023 by Maddie Whitmore MD  12/6/2023  1:41 PM  Attested Addendum  Progress Note    Amber Poster 48 y.o. male MRN: [id number]  Unit/Bed#: 7T [facility] 713-01 Encounter: [id number]  Admitting Physician: Brenton Pablo MD  PCP: Matias Wadsworth MD  Date of Admission:  12/4/2023 12:49 PM    Assessment and Plan    * Acute exacerbation of CHF (congestive heart failure) ([address])  Assessment & Plan  - Likely in the setting of cocaine use (positive UDS) vs. Possible viral infection (nasal swab not collected overnight)  - Patient has lost 3 lbs since admission on IV diuresis -- Dry weight: 217 lbs  - Patient still having signs of pulmonary congestion, orthopnea  - Improvement in oxygen requirements  - Home regimen: furosemide 60 QD (taking BID), metoprolol 50 in day and 75 at night, losartan 50 mg BID, spironolactone 25 mg daily, Jardiance 10 mg daily  - Last echo :  LVEF 40% ( August 2023)     Plan:   - Continue IV diuresis: Increase IV Lasix to 40 mg BID  - Monitor I/O and daily weight  - Fluid restriction 1.5L   - 24 h telemetry  - Continuous pulse oximetry and titrate O2 as appropriate  - Monitor electrolytes  - Hold Losartan, continue Jardiance and Spironolactone  - Cardiology consult            Chronic combined systolic and diastolic congestive heart failure ([address])  Assessment & Plan  See A/P for acute exacerbation of CHF       UTI (urinary tract infection)  Assessment & Plan  Patient did not complain of dysuria, urine output was not adequate since yesterday despite adequate diureses    Plan:   Continue keflex for 7 days   FU on urine culture for sensitivities     Left leg cellulitis  Assessment & Plan  Pain in Left leg has decreased from yesterday. Somewhat warm to the touch.      Plan:  PO Keflex 500 mg BID ( day 2)   Continue to monitor vitals   Continue to monitor redness ( marked with bed)  Follow CBC    Cocaine abuse (HCC)  Assessment & Plan  - Patient denies cocaine use despite positive UDS  - In view of denial, unsure about last use  - Held Metoprolol for 24 h since time of admission    Plan:   - Monitor for signs and symptoms of withdrawal    Hyperlipidemia  Assessment & Plan  - Home medication: atorvastatin 40 mg QD    PLAN:  - Continue statin    Chest pain  Assessment & Plan  - Troponnins: 25 > 22   - Non-MI related tropinin elevation most likely 2/2 to CHF exacerbation   - ECG sinus tachycardia; Possible Left atrial enlargement, QTC: 451  - This morning patient did not endorse chest pain     Plan:  - Apply Voltaren gel  - Tylenol 650 mg q8h  - Monitor symptoms      HEIKE (acute kidney injury) ([facility])  Assessment & Plan  - Creatinine of 1.64 this morning (baseline 1.2 - 1.3) admission = 1.19  - Likely prerenal in the setting of IV diuresis vs fluid overload  - S/p NSAIDs for pain and ARB use for hypertension  - No electrolyte derrangements    PLAN:  - Monitor renal function  - Hold ARB  - Caution with nephrotoxic agents    Hypertension  Assessment & Plan  Blood Pressure: 137/100     - Home medications: furosemide 60 mg daily (using BID), losartan 50 BID, metoprolol 50 mg in morning, metoprolol 75 mg at night, spironolactone 25 mg daily    Plan:   - Hold ARB in view of new HEIKE  - IV diuresis  - Consider PRN medication if persistent hypertension  - Low sodium diet  - Monitor VS    Acute respiratory failure with hypoxia ([facility])-resolved as of 12/6/2023  Assessment & Plan  - Patient currently on 1L O2 supplementation via NC  - Likely in the setting of CHF exacerbation  - currently on 2L saturating at 97%     PLAN:  - Continue O2 as needed and titrate as appropriate  - Continue IV diuresis    SIRS (systemic inflammatory response syndrome) (HCC)-resolved as of 2023  Assessment & Plan  - Fever resolved  - S/P Tylenol 650 in ED IV Ceftriaxone x 1 in the ED  - No evidence of bacterial infection  - Flu/RSV/Covid not done overnight    Plan:   Monitor vitals             VTE Pharmacologic Prophylaxis: VTE Score: 6 High Risk (Score >/= 5) - Pharmacological DVT Prophylaxis Ordered: enoxaparin (Lovenox). Sequential Compression Devices Ordered. Patient Centered Rounds: I have performed bedside rounds with nursing staff today. Discussions with Specialists or Other Care Team Provider: No    Education and Discussions with Family / Patient: Yes  Patient Information Sharing: Family aware of plan    Time Spent for Care: 45 minutes. More than 50% of total time spent on counseling and coordination of care as described above. Current Length of Stay: 2 day(s)    Current Patient Status: Inpatient   Certification Statement: The patient will continue to require additional inpatient hospital stay due to acute CHF exacerbation. Discharge Plan: Upon improvement in cardiac condition    Code Status: Level 1 - Full Code      Subjective:   Augustus Moseley was seen and evaluated at bedside. He reports doing overall better in comparison to admission; his shortness of breath has not improved since yesterday, abdominal discomfort is resolved. He has increased shortness of breath when walking to the bathroom. He endorsed some chest tightness last night and pinching chest pain mid chest, but has resolved this morning. Denies nausea, vomiting, dizziness, lightheadedness, changes in appetite, dysuria. His leg pain from yesterday has improved. Pitting edema is decreased from yesterday.     Objective:     Vitals:   Temp (24hrs), Av.4 °F (36.3 °C), Min:96.4 °F (35.8 °C), Max:98.4 °F (36.9 °C)    Temp:  [96.4 °F (35.8 °C)-98.4 °F (36.9 °C)] 97.1 °F (36.2 °C)  HR:  [] 85  Resp:  [18] 18  BP: (126-147)/() 126/90  SpO2:  [95 %-99 %] 95 %  Body mass index is 31.84 kg/m². Input and Output Summary (last 24 hours): Intake/Output Summary (Last 24 hours) at 2023 1341  Last data filed at 2023 1218  Gross per 24 hour   Intake 1370 ml   Output 2000 ml   Net -630 ml       Physical Exam:     Physical Exam  Constitutional:       General: He is not in acute distress. Appearance: Normal appearance. He is obese. He is not ill-appearing, toxic-appearing or diaphoretic. HENT:      Mouth/Throat:      Mouth: Mucous membranes are moist.   Eyes:      Conjunctiva/sclera: Conjunctivae normal.   Cardiovascular:      Rate and Rhythm: Normal rate and regular rhythm. Heart sounds: Normal heart sounds. Pulmonary:      Effort: Pulmonary effort is normal. No respiratory distress. Breath sounds: Rales present. Comments: Fine rales bilateral  Chest:      Chest wall: No tenderness. Abdominal:      Palpations: Abdomen is soft. Tenderness: There is no abdominal tenderness. Musculoskeletal:      Right lower le+ Edema present. Left lower le+ Edema present. Comments: Somewhat decrease in edema from yesterday  Erythema of Left leg, not spreading since yesterday. Skin:     General: Skin is warm. Neurological:      General: No focal deficit present. Mental Status: He is alert and oriented to person, place, and time. Psychiatric:         Mood and Affect: Mood normal.         Behavior: Behavior normal.         Thought Content:  Thought content normal.         Judgment: Judgment normal.           Additional Data:     Labs:    Results from last 7 days   Lab Units 23  0445   WBC Thousand/uL 5.84   HEMOGLOBIN g/dL 13.1   HEMATOCRIT % 41.5   PLATELETS Thousands/uL 230   NEUTROS PCT % 66   LYMPHS PCT % 19   MONOS PCT % 9   EOS PCT % 5     Results from last 7 days   Lab Units 23  0445 23  0541   POTASSIUM mmol/L 3.9 4.0 CHLORIDE mmol/L 102 103   CO2 mmol/L 28 25   BUN mg/dL 24 25   CREATININE mg/dL 1.64* 1.48*   CALCIUM mg/dL 8.4 9.6   ALK PHOS U/L  --  210*   ALT U/L  --  30   AST U/L  --  28     Results from last 7 days   Lab Units 12/04/23  1304   INR  1.19                 * I Have Reviewed All Lab Data Listed Above. * Additional Pertinent Lab Tests Reviewed: All Labs Within Last 24 Hours Reviewed    Imaging:    Imaging Reports Reviewed Today Include: N/A  Imaging Personally Reviewed by Myself Includes:  N/A    Recent Cultures (last 7 days):     Results from last 7 days   Lab Units 12/04/23  1322 12/04/23  1304   BLOOD CULTURE  No Growth at 24 hrs. No Growth at 24 hrs. Last 24 Hours Medication List:   Current Facility-Administered Medications   Medication Dose Route Frequency Provider Last Rate   • acetaminophen  975 mg Oral Q8H [address] [place] [address] Dr Madeline Wilson MD     • cephalexin  500 mg Oral Q12H [facility] Markie Thornton MD     • Diclofenac Sodium  2 g Topical 4x Daily Bren Echevarria MD     • Empagliflozin  10 mg Oral Daily Markie Thornton MD     • enoxaparin  40 mg Subcutaneous Daily Reid Simmons MD     • furosemide  40 mg Intravenous BID (diuretic) Markie Thornton MD     • metoprolol succinate  50 mg Oral Daily Markie Thornton MD     • metoprolol succinate  75 mg Oral HS Markie Thornton MD     • pantoprazole  40 mg Oral Early Morning Bren Echvearria MD     • spironolactone  25 mg Oral Daily Bren Echevarria MD          ** Please Note: Dictation voice to text software may have been used in the creation of this document. **    Reid Simmons MD  12/06/23  1:41 PM   Attestation signed by Jackie Mares MD at 12/7/2023  1:56 AM:    Standard Teaching Supervising Statement    I have reviewed the note performed and documented by the Resident's. I personally performed the required components/examined the patient.  I agree with the Resident's findings and plan of care with the following additions/exceptions:     DOS: 12/6/2023  MD Francois Babb MD  12/5/2023 11:01 AM  Attested  Progress Note    Lana Crew 48 y.o. male MRN: [id number]  Unit/Bed#: 7T [facility] 713-01 Encounter: [id number]  Admitting Physician: Jasmine Bruno MD  PCP: Sushant Hernandez MD  Date of Admission:  12/4/2023 12:49 PM    Assessment and Plan    * Acute exacerbation of CHF (congestive heart failure) ([address])  Assessment & Plan  - Likely in the setting of cocaine use (positive UDS) vs. Possible viral infection (nasal swab not collected overnight)  - Patient has lost 7 lbs since admission on IV diuresis (242 lbs > 235 lbs) -- Dry weight: 217 lbs  - S/p IV Lasix 60 mg and 80 mg since admission   - Improvement in oxygen requirements  - Home regimen: furosemide 60 QD (taking BID), metoprolol 50 in day and 75 at night, losartan 50 mg BID, spironolactone 25 mg daily, Jardiance 10 mg daily  - Last echo :  LVEF 40% ( August 2023)     Plan:   - Continue IV diuresis: Lasix 60 mg daily  - Monitor I/O and daily weight  - Fluid restriction 1.5L   - 24 h telemetry  - Continuous pulse oximetry and titrate O2 as appropriate  - Monitor electrolytes  - Continue home Losartan, Jardiance and Spironolactone  - Hold metoprolol for 24 hours in view of cocaine use            Hypertension  Assessment & Plan  Blood Pressure: (!) 168/110   - Possible rebound hypertension in view of cocaine use   - Home medications: furosemide 60 mg daily (using BID), losartan 50 BID, metoprolol 50 mg in morning, metoprolol 75 mg at night, spironolactone 25 mg daily    Plan:   - Give morning dose of anti-hypertensive medications now  - IV diuresis  - Consider PRN medication if persistent hypertension  - Low sodium diet  - Monitor VS    Acute respiratory failure with hypoxia (HCC)  Assessment & Plan  - Patient currently on 1L O2 supplementation via NC  - Likely in the setting of CHF exacerbation    PLAN:  - Continue O2 as needed and titrate as appropriate  - Continue IV diuresis    Cocaine abuse (HCC)  Assessment & Plan  - Patient denies cocaine use despite positive UDS  - In view of denial, unsure about last use    Plan:   - Hold Metoprolol for 24 h since time of admission  - Monitor for signs and symptoms of withdrawal    Hyperlipidemia  Assessment & Plan  - Home medication: atorvastatin 40 mg QD    PLAN:  - Continue statin    SIRS (systemic inflammatory response syndrome) (HCC)  Assessment & Plan  - Fever resolved  - S/P Tylenol 650 in ED IV Ceftriaxone x 1 in the ED  - No evidence of bacterial infection  - Flu/RSV/Covid not done overnight    Plan:   Monitor vitals         Chest pain  Assessment & Plan  - Troponnins: 25 > 22   - Non-MI related tropinin elevation most likely 2/2 to CHF exacerbation   - ECG sinus tachycardia; Possible Left atrial enlargement, QTC: 451  - Likely MSK related: Tenderness to palpation over R and L rib cages    Plan:  - Apply Voltaren gel  - Tylenol 650 mg q8h  - Monitor symptoms      Creatinine elevation  Assessment & Plan  - Creatinine of 1.48 this morning (baseline 1.2 - 1.3)  - Likely prerenal in the setting of IV diuresis  - Patient currently does not meet criteria for HEIKE  - S/p NSAIDs for pain and ARB use for hypertension  - No electrolyte derrangements    PLAN:  - Monitor renal function  - Caution with nephrotoxic agents    Chronic combined systolic and diastolic congestive heart failure (HCC)  Assessment & Plan  See A/P for acute exacerbation of CHF           VTE Pharmacologic Prophylaxis: VTE Score: 6 High Risk (Score >/= 5) - Pharmacological DVT Prophylaxis Ordered: enoxaparin (Lovenox). Sequential Compression Devices Ordered. Patient Centered Rounds: I have performed bedside rounds with nursing staff today.     Discussions with Specialists or Other Care Team Provider: No    Education and Discussions with Family / Patient: Yes  Patient Information Sharing: Family aware of plan    Time Spent for Care: 45 minutes. More than 50% of total time spent on counseling and coordination of care as described above. Current Length of Stay: 1 day(s)    Current Patient Status: Inpatient   Certification Statement: The patient will continue to require additional inpatient hospital stay due to acute CHF exacerbation. Discharge Plan: Upon improvement in cardiac condition    Code Status: Level 1 - Full Code      Subjective:   Eligio Gray was seen and evaluated at bedside. He reports doing overall better in comparison to admission; he is breathing better and abdominal discomfort is resolved. Complains of bilateral chest pain. He was not able to sleep overnight unfortunately. Objective:     Vitals:   Temp (24hrs), Av.1 °F (36.7 °C), Min:96.7 °F (35.9 °C), Max:100.6 °F (38.1 °C)    Temp:  [96.7 °F (35.9 °C)-100.6 °F (38.1 °C)] 96.7 °F (35.9 °C)  HR:  [] 96  Resp:  [15-30] 19  BP: (135-168)/() 168/110  SpO2:  [93 %-100 %] 99 %  Body mass index is 31.87 kg/m². Input and Output Summary (last 24 hours): Intake/Output Summary (Last 24 hours) at 2023 1101  Last data filed at 2023 5027  Gross per 24 hour   Intake 770 ml   Output 900 ml   Net -130 ml       Physical Exam:     Physical Exam  Constitutional:       General: He is not in acute distress. Appearance: Normal appearance. He is obese. He is not ill-appearing, toxic-appearing or diaphoretic. HENT:      Mouth/Throat:      Mouth: Mucous membranes are moist.   Eyes:      Conjunctiva/sclera: Conjunctivae normal.   Cardiovascular:      Rate and Rhythm: Normal rate and regular rhythm. Heart sounds: Normal heart sounds. Pulmonary:      Breath sounds: Rales present. Chest:      Comments: Tenderness to palpation over R and L rib cages  Abdominal:      Palpations: Abdomen is soft. Tenderness: There is no abdominal tenderness.    Musculoskeletal: Right lower le+ Edema present. Left lower le+ Edema present. Skin:     General: Skin is warm. Neurological:      General: No focal deficit present. Mental Status: He is alert and oriented to person, place, and time. Psychiatric:         Mood and Affect: Mood normal.         Behavior: Behavior normal.         Thought Content: Thought content normal.         Judgment: Judgment normal.           Additional Data:     Labs:    Results from last 7 days   Lab Units 23  0541   WBC Thousand/uL 8.84   HEMOGLOBIN g/dL 15.5   HEMATOCRIT % 49.8*   PLATELETS Thousands/uL 255   NEUTROS PCT % 80*   LYMPHS PCT % 11*   MONOS PCT % 6   EOS PCT % 2     Results from last 7 days   Lab Units 23  0541   POTASSIUM mmol/L 4.0   CHLORIDE mmol/L 103   CO2 mmol/L 25   BUN mg/dL 25   CREATININE mg/dL 1.48*   CALCIUM mg/dL 9.6   ALK PHOS U/L 210*   ALT U/L 30   AST U/L 28     Results from last 7 days   Lab Units 23  1304   INR  1.19                 * I Have Reviewed All Lab Data Listed Above. * Additional Pertinent Lab Tests Reviewed: All Labs Within Last 24 Hours Reviewed    Imaging:    Imaging Reports Reviewed Today Include: N/A  Imaging Personally Reviewed by Myself Includes:  N/A    Recent Cultures (last 7 days):     Results from last 7 days   Lab Units 23  1322 23  1304   BLOOD CULTURE  Received in Microbiology Lab. Culture in Progress. Received in Microbiology Lab. Culture in Progress.        Last 24 Hours Medication List:   Current Facility-Administered Medications   Medication Dose Route Frequency Provider Last Rate   • acetaminophen  650 mg Oral Q8H PRN Kendrick Figueroa MD     • Diclofenac Sodium  2 g Topical 4x Daily Bren Black MD     • Empagliflozin  10 mg Oral Daily Kendrick Figueroa MD     • enoxaparin  40 mg Subcutaneous Daily Twyla Valadez MD     • furosemide  60 mg Intravenous Daily Kendrick Figueroa MD     • Duc Garcia ON 12/6/2023] losartan  50 mg Oral BID Buck Mayberry MD     • pantoprazole  40 mg Oral Early Morning Bren Burgess MD     • spironolactone  25 mg Oral Daily Bren Burgess MD          ** Please Note: Dictation voice to text software may have been used in the creation of this document. **    Buck Mayberry MD  12/05/23  11:01 AM   Attestation signed by Kirsten Leon MD at 12/5/2023 11:55 AM:    Standard Teaching Supervising Statement    I have reviewed the note performed and documented by the Resident's. I personally performed the required components/examined the patient. I agree with the Resident's findings and plan of care with the following additions/exceptions:     - Continue IV Lasix with decreased dose given increase in creatinine.      DOS: 12/05/23  Sylvia Gimenez MD

## 2023-12-04 NOTE — ASSESSMENT & PLAN NOTE
Blood Pressure: 122/83 ; well controlled  - Home medications: furosemide 60 mg daily (using BID), losartan 50 BID, metoprolol 50 mg in morning, metoprolol 75 mg at night, spironolactone 25 mg daily    On admission patient was given his home medications aside from his diuretic, which included losartan 50 mg twice daily, metoprolol 50 mg in the morning, metoprolol 75 mg at night, spironolactone 25 mg daily. Due to elevated creatinine levels losartan and Jardiance were held. Blood pressure control was not adequate and patient required additional blood pressure control with hydralazine 10 mg 3 times daily and high isosorbide dinitrate 5 mg 3 times per day after meals. Patient will continue to hold losartan and Jardiance until creatinine stabilizes to baseline. He will continue losartan, metoprolol, spironolactone, hydralazine Sorbide dinitrate at home after discharge.

## 2023-12-04 NOTE — ASSESSMENT & PLAN NOTE
Patient was continued on his home medication atorvastatin 40 mg once daily during his hospital stay.   He may continue his home medication after discharge

## 2023-12-05 PROBLEM — J96.01 ACUTE RESPIRATORY FAILURE WITH HYPOXIA (HCC): Status: ACTIVE | Noted: 2023-12-05

## 2023-12-05 PROBLEM — IMO0002 CREATININE ELEVATION: Status: ACTIVE | Noted: 2023-08-05

## 2023-12-05 PROBLEM — L03.116 LEFT LEG CELLULITIS: Status: ACTIVE | Noted: 2023-12-05

## 2023-12-05 LAB
ALBUMIN SERPL BCP-MCNC: 4.3 G/DL (ref 3.5–5)
ALP SERPL-CCNC: 210 U/L (ref 34–104)
ALT SERPL W P-5'-P-CCNC: 30 U/L (ref 7–52)
ANION GAP SERPL CALCULATED.3IONS-SCNC: 14 MMOL/L
AST SERPL W P-5'-P-CCNC: 28 U/L (ref 13–39)
BASOPHILS # BLD AUTO: 0.05 THOUSANDS/ÂΜL (ref 0–0.1)
BASOPHILS NFR BLD AUTO: 1 % (ref 0–1)
BILIRUB SERPL-MCNC: 1 MG/DL (ref 0.2–1)
BUN SERPL-MCNC: 25 MG/DL (ref 5–25)
CALCIUM SERPL-MCNC: 9.6 MG/DL (ref 8.4–10.2)
CHLORIDE SERPL-SCNC: 103 MMOL/L (ref 96–108)
CO2 SERPL-SCNC: 25 MMOL/L (ref 21–32)
CREAT SERPL-MCNC: 1.48 MG/DL (ref 0.6–1.3)
EOSINOPHIL # BLD AUTO: 0.2 THOUSAND/ÂΜL (ref 0–0.61)
EOSINOPHIL NFR BLD AUTO: 2 % (ref 0–6)
ERYTHROCYTE [DISTWIDTH] IN BLOOD BY AUTOMATED COUNT: 14.6 % (ref 11.6–15.1)
FLUAV RNA RESP QL NAA+PROBE: NEGATIVE
FLUBV RNA RESP QL NAA+PROBE: NEGATIVE
GFR SERPL CREATININE-BSD FRML MDRD: 54 ML/MIN/1.73SQ M
GLUCOSE SERPL-MCNC: 91 MG/DL (ref 65–140)
HCT VFR BLD AUTO: 49.8 % (ref 36.5–49.3)
HGB BLD-MCNC: 15.5 G/DL (ref 12–17)
IMM GRANULOCYTES # BLD AUTO: 0.03 THOUSAND/UL (ref 0–0.2)
IMM GRANULOCYTES NFR BLD AUTO: 0 % (ref 0–2)
LYMPHOCYTES # BLD AUTO: 0.99 THOUSANDS/ÂΜL (ref 0.6–4.47)
LYMPHOCYTES NFR BLD AUTO: 11 % (ref 14–44)
MCH RBC QN AUTO: 28 PG (ref 26.8–34.3)
MCHC RBC AUTO-ENTMCNC: 31.1 G/DL (ref 31.4–37.4)
MCV RBC AUTO: 90 FL (ref 82–98)
MONOCYTES # BLD AUTO: 0.54 THOUSAND/ÂΜL (ref 0.17–1.22)
MONOCYTES NFR BLD AUTO: 6 % (ref 4–12)
NEUTROPHILS # BLD AUTO: 7.03 THOUSANDS/ÂΜL (ref 1.85–7.62)
NEUTS SEG NFR BLD AUTO: 80 % (ref 43–75)
NRBC BLD AUTO-RTO: 0 /100 WBCS
PLATELET # BLD AUTO: 255 THOUSANDS/UL (ref 149–390)
PMV BLD AUTO: 11.2 FL (ref 8.9–12.7)
POTASSIUM SERPL-SCNC: 4 MMOL/L (ref 3.5–5.3)
PROT SERPL-MCNC: 7.1 G/DL (ref 6.4–8.4)
RBC # BLD AUTO: 5.53 MILLION/UL (ref 3.88–5.62)
RSV RNA RESP QL NAA+PROBE: NEGATIVE
SARS-COV-2 RNA RESP QL NAA+PROBE: NEGATIVE
SODIUM SERPL-SCNC: 142 MMOL/L (ref 135–147)
WBC # BLD AUTO: 8.84 THOUSAND/UL (ref 4.31–10.16)

## 2023-12-05 PROCEDURE — 80053 COMPREHEN METABOLIC PANEL: CPT

## 2023-12-05 PROCEDURE — 85025 COMPLETE CBC W/AUTO DIFF WBC: CPT

## 2023-12-05 PROCEDURE — 99223 1ST HOSP IP/OBS HIGH 75: CPT | Performed by: STUDENT IN AN ORGANIZED HEALTH CARE EDUCATION/TRAINING PROGRAM

## 2023-12-05 PROCEDURE — 0241U HB NFCT DS VIR RESP RNA 4 TRGT: CPT

## 2023-12-05 PROCEDURE — 97163 PT EVAL HIGH COMPLEX 45 MIN: CPT

## 2023-12-05 RX ORDER — ACETAMINOPHEN 325 MG/1
650 TABLET ORAL EVERY 8 HOURS PRN
Status: DISCONTINUED | OUTPATIENT
Start: 2023-12-05 | End: 2023-12-05

## 2023-12-05 RX ORDER — FUROSEMIDE 10 MG/ML
60 INJECTION INTRAMUSCULAR; INTRAVENOUS DAILY
Status: DISCONTINUED | OUTPATIENT
Start: 2023-12-05 | End: 2023-12-06

## 2023-12-05 RX ORDER — METOPROLOL SUCCINATE 50 MG/1
50 TABLET, EXTENDED RELEASE ORAL DAILY
Status: DISCONTINUED | OUTPATIENT
Start: 2023-12-06 | End: 2023-12-13 | Stop reason: HOSPADM

## 2023-12-05 RX ORDER — ACETAMINOPHEN 325 MG/1
975 TABLET ORAL EVERY 8 HOURS SCHEDULED
Status: DISCONTINUED | OUTPATIENT
Start: 2023-12-05 | End: 2023-12-13

## 2023-12-05 RX ORDER — LOSARTAN POTASSIUM 50 MG/1
50 TABLET ORAL 2 TIMES DAILY
Status: DISCONTINUED | OUTPATIENT
Start: 2023-12-06 | End: 2023-12-06

## 2023-12-05 RX ORDER — CEPHALEXIN 500 MG/1
500 CAPSULE ORAL EVERY 12 HOURS SCHEDULED
Status: DISCONTINUED | OUTPATIENT
Start: 2023-12-05 | End: 2023-12-06

## 2023-12-05 RX ADMIN — ENOXAPARIN SODIUM 40 MG: 40 INJECTION SUBCUTANEOUS at 08:18

## 2023-12-05 RX ADMIN — ACETAMINOPHEN 975 MG: 325 TABLET ORAL at 21:00

## 2023-12-05 RX ADMIN — Medication 2 G: at 21:00

## 2023-12-05 RX ADMIN — LOSARTAN POTASSIUM 50 MG: 50 TABLET, FILM COATED ORAL at 08:18

## 2023-12-05 RX ADMIN — ACETAMINOPHEN 975 MG: 325 TABLET ORAL at 16:12

## 2023-12-05 RX ADMIN — Medication 2 G: at 17:14

## 2023-12-05 RX ADMIN — EMPAGLIFLOZIN 10 MG: 10 TABLET, FILM COATED ORAL at 08:18

## 2023-12-05 RX ADMIN — FUROSEMIDE 60 MG: 10 INJECTION, SOLUTION INTRAMUSCULAR; INTRAVENOUS at 11:06

## 2023-12-05 RX ADMIN — PANTOPRAZOLE SODIUM 40 MG: 40 TABLET, DELAYED RELEASE ORAL at 05:16

## 2023-12-05 RX ADMIN — CEPHALEXIN 500 MG: 500 CAPSULE ORAL at 16:11

## 2023-12-05 RX ADMIN — LOSARTAN POTASSIUM 50 MG: 50 TABLET, FILM COATED ORAL at 07:06

## 2023-12-05 RX ADMIN — FUROSEMIDE 80 MG: 10 INJECTION, SOLUTION INTRAVENOUS at 00:21

## 2023-12-05 RX ADMIN — SPIRONOLACTONE 25 MG: 25 TABLET, FILM COATED ORAL at 08:18

## 2023-12-05 RX ADMIN — METOPROLOL SUCCINATE 75 MG: 50 TABLET, EXTENDED RELEASE ORAL at 21:01

## 2023-12-05 NOTE — QUICK NOTE
LE POCUS performed at bedside in view of concerns for venous stasis dermatitis vs. Cellulitis. Patient complains of worsening LLE pain with erythema, left worse than right. One episode of fever on presentation. POCUS revels cobblestone appearance, which can related to cellulitis. Will start patient on Keflex 500 mg q12h. No risk factors for MRSA.

## 2023-12-05 NOTE — NURSING NOTE
Pt educated on the need for nurse to report strict accurate I&O. Pt  urinal found in bathroom with 200 cc in it. Pt toilet contained urine. Pt reeducated provider made aware.

## 2023-12-05 NOTE — PROGRESS NOTES
Progress Note    Hanny Jane 48 y.o. male MRN: [id number]  Unit/Bed#: 7T [facility] 713-01 Encounter: [id number]  Admitting Physician: Sylvia Gimenez MD  PCP: Althea Huntley MD  Date of Admission:  12/4/2023 12:49 PM    Assessment and Plan    * Acute exacerbation of CHF (congestive heart failure) ([address])  Assessment & Plan  - Likely in the setting of cocaine use (positive UDS) vs. Possible viral infection (nasal swab not collected overnight)  - Patient has lost 7 lbs since admission on IV diuresis (242 lbs > 235 lbs) -- Dry weight: 217 lbs  - S/p IV Lasix 60 mg and 80 mg since admission   - Improvement in oxygen requirements  - Home regimen: furosemide 60 QD (taking BID), metoprolol 50 in day and 75 at night, losartan 50 mg BID, spironolactone 25 mg daily, Jardiance 10 mg daily  - Last echo :  LVEF 40% ( August 2023)     Plan:   - Continue IV diuresis: Lasix 60 mg daily  - Monitor I/O and daily weight  - Fluid restriction 1.5L   - 24 h telemetry  - Continuous pulse oximetry and titrate O2 as appropriate  - Monitor electrolytes  - Continue home Losartan, Jardiance and Spironolactone  - Hold metoprolol for 24 hours in view of cocaine use            Hypertension  Assessment & Plan  Blood Pressure: (!) 168/110   - Possible rebound hypertension in view of cocaine use   - Home medications: furosemide 60 mg daily (using BID), losartan 50 BID, metoprolol 50 mg in morning, metoprolol 75 mg at night, spironolactone 25 mg daily    Plan:   - Give morning dose of anti-hypertensive medications now  - IV diuresis  - Consider PRN medication if persistent hypertension  - Low sodium diet  - Monitor VS    Acute respiratory failure with hypoxia (HCC)  Assessment & Plan  - Patient currently on 1L O2 supplementation via NC  - Likely in the setting of CHF exacerbation    PLAN:  - Continue O2 as needed and titrate as appropriate  - Continue IV diuresis    Cocaine abuse ([address])  Assessment & Plan  - Patient denies cocaine use despite positive UDS  - In view of denial, unsure about last use    Plan:   - Hold Metoprolol for 24 h since time of admission  - Monitor for signs and symptoms of withdrawal    Hyperlipidemia  Assessment & Plan  - Home medication: atorvastatin 40 mg QD    PLAN:  - Continue statin    SIRS (systemic inflammatory response syndrome) (HCC)  Assessment & Plan  - Fever resolved  - S/P Tylenol 650 in ED IV Ceftriaxone x 1 in the ED  - No evidence of bacterial infection  - Flu/RSV/Covid not done overnight    Plan:   Monitor vitals         Chest pain  Assessment & Plan  - Troponnins: 25 > 22   - Non-MI related tropinin elevation most likely 2/2 to CHF exacerbation   - ECG sinus tachycardia; Possible Left atrial enlargement, QTC: 451  - Likely MSK related: Tenderness to palpation over R and L rib cages    Plan:  - Apply Voltaren gel  - Tylenol 650 mg q8h  - Monitor symptoms      Creatinine elevation  Assessment & Plan  - Creatinine of 1.48 this morning (baseline 1.2 - 1.3)  - Likely prerenal in the setting of IV diuresis  - Patient currently does not meet criteria for HEIKE  - S/p NSAIDs for pain and ARB use for hypertension  - No electrolyte derrangements    PLAN:  - Monitor renal function  - Caution with nephrotoxic agents    Chronic combined systolic and diastolic congestive heart failure (HCC)  Assessment & Plan  See A/P for acute exacerbation of CHF           VTE Pharmacologic Prophylaxis: VTE Score: 6 High Risk (Score >/= 5) - Pharmacological DVT Prophylaxis Ordered: enoxaparin (Lovenox). Sequential Compression Devices Ordered. Patient Centered Rounds: I have performed bedside rounds with nursing staff today. Discussions with Specialists or Other Care Team Provider: No    Education and Discussions with Family / Patient: Yes  Patient Information Sharing: Family aware of plan    Time Spent for Care: 45 minutes. More than 50% of total time spent on counseling and coordination of care as described above.     Current Length of Stay: 1 day(s)    Current Patient Status: Inpatient   Certification Statement: The patient will continue to require additional inpatient hospital stay due to acute CHF exacerbation. Discharge Plan: Upon improvement in cardiac condition    Code Status: Level 1 - Full Code      Subjective:   Reid Maher was seen and evaluated at bedside. He reports doing overall better in comparison to admission; he is breathing better and abdominal discomfort is resolved. Complains of bilateral chest pain. He was not able to sleep overnight unfortunately. Objective:     Vitals:   Temp (24hrs), Av.1 °F (36.7 °C), Min:96.7 °F (35.9 °C), Max:100.6 °F (38.1 °C)    Temp:  [96.7 °F (35.9 °C)-100.6 °F (38.1 °C)] 96.7 °F (35.9 °C)  HR:  [] 96  Resp:  [15-30] 19  BP: (135-168)/() 168/110  SpO2:  [93 %-100 %] 99 %  Body mass index is 31.87 kg/m². Input and Output Summary (last 24 hours): Intake/Output Summary (Last 24 hours) at 2023 1101  Last data filed at 2023 6352  Gross per 24 hour   Intake 770 ml   Output 900 ml   Net -130 ml       Physical Exam:     Physical Exam  Constitutional:       General: He is not in acute distress. Appearance: Normal appearance. He is obese. He is not ill-appearing, toxic-appearing or diaphoretic. HENT:      Mouth/Throat:      Mouth: Mucous membranes are moist.   Eyes:      Conjunctiva/sclera: Conjunctivae normal.   Cardiovascular:      Rate and Rhythm: Normal rate and regular rhythm. Heart sounds: Normal heart sounds. Pulmonary:      Breath sounds: Rales present. Chest:      Comments: Tenderness to palpation over R and L rib cages  Abdominal:      Palpations: Abdomen is soft. Tenderness: There is no abdominal tenderness. Musculoskeletal:      Right lower le+ Edema present. Left lower le+ Edema present. Skin:     General: Skin is warm. Neurological:      General: No focal deficit present.       Mental Status: He is alert and oriented to person, place, and time. Psychiatric:         Mood and Affect: Mood normal.         Behavior: Behavior normal.         Thought Content: Thought content normal.         Judgment: Judgment normal.           Additional Data:     Labs:    Results from last 7 days   Lab Units 12/05/23  0541   WBC Thousand/uL 8.84   HEMOGLOBIN g/dL 15.5   HEMATOCRIT % 49.8*   PLATELETS Thousands/uL 255   NEUTROS PCT % 80*   LYMPHS PCT % 11*   MONOS PCT % 6   EOS PCT % 2     Results from last 7 days   Lab Units 12/05/23  0541   POTASSIUM mmol/L 4.0   CHLORIDE mmol/L 103   CO2 mmol/L 25   BUN mg/dL 25   CREATININE mg/dL 1.48*   CALCIUM mg/dL 9.6   ALK PHOS U/L 210*   ALT U/L 30   AST U/L 28     Results from last 7 days   Lab Units 12/04/23  1304   INR  1.19                 * I Have Reviewed All Lab Data Listed Above. * Additional Pertinent Lab Tests Reviewed: All Labs Within Last 24 Hours Reviewed    Imaging:    Imaging Reports Reviewed Today Include: N/A  Imaging Personally Reviewed by Myself Includes:  N/A    Recent Cultures (last 7 days):     Results from last 7 days   Lab Units 12/04/23  1322 12/04/23  1304   BLOOD CULTURE  Received in Microbiology Lab. Culture in Progress. Received in Microbiology Lab. Culture in Progress.        Last 24 Hours Medication List:   Current Facility-Administered Medications   Medication Dose Route Frequency Provider Last Rate    acetaminophen  650 mg Oral Q8H PRN Vidal Harper MD      Diclofenac Sodium  2 g Topical 4x Daily Bren Saavedra MD      Empagliflozin  10 mg Oral Daily Vidal Harper MD      enoxaparin  40 mg Subcutaneous Daily Elsworth Opitz, MD      furosemide  60 mg Intravenous Daily MD Roopa Cabrales ON 12/6/2023] losartan  50 mg Oral BID Vidal Harper MD      pantoprazole  40 mg Oral Early Morning Bren Saavedra MD      spironolactone  25 mg Oral Daily Olga schaefer [facility] Maria Luz Vegas MD          ** Please Note: Dictation voice to text software may have been used in the creation of this document.  **    Evelin Cardenas MD  12/05/23  11:01 AM

## 2023-12-05 NOTE — ASSESSMENT & PLAN NOTE
- Likely in the setting of CHF exacerbation  - currently on RA saturating appropriately on RA.        PLAN:  - Continue O2 as needed and titrate as appropriate  - Continue diuresis

## 2023-12-05 NOTE — UTILIZATION REVIEW
Initial Clinical Review    Admission: Date/Time/Statement:   Admission Orders (From admission, onward)       Ordered        12/04/23 1546  INPATIENT ADMISSION  Once                          Orders Placed This Encounter   Procedures    INPATIENT ADMISSION     Standing Status:   Standing     Number of Occurrences:   1     Order Specific Question:   Level of Care     Answer:   Med Surg [16]     Order Specific Question:   Estimated length of stay     Answer:   More than 2 Midnights     Order Specific Question:   Certification     Answer:   I certify that inpatient services are medically necessary for this patient for a duration of greater than two midnights. See H&P and MD Progress Notes for additional information about the patient's course of treatment. ED Arrival Information       Expected   -    Arrival   12/4/2023 12:39    Acuity   Emergent              Means of arrival   Wheelchair    Escorted by   [facility]    Admission type   Emergency              Arrival complaint   cough/difficulty breathing             Chief Complaint   Patient presents with    Chest Pain     Chest pain that radiates down L arm and SOB since 0930 - Hx of CHF - noted bilateral swelling to legs - no meds PTA - compliant with daily meds        Initial Presentation: 48 y.o. male who presented self from home to [facility] ED. Inpatient admission for evaluation and treatment of CHF exacerbation. PMHx: HFrEF. Presented w/ difficulty breathing and inability to sleep for past few months, b/l LE swelling, increased productive cough w/ yellow phlegm. Reports compliance w/ home lasix. On exam, crackles, abdominal distention, b/l LE edema. EKG sinus tachycardia. Plan: DW, I&O, fluid restriction, IV lasix 60 mg BID, continuous pulse ox, telemetry, continue PTA meds except metoprolol, Trend labs, replete electrolytes as needed; check respiratory panel, low sodium diet.        Date: 12/05/23   Day 2: Reports feeling overall better than presentation. Notes b/l chest pain. On exam, rales, tenderness over b/l rib cages, b/l LE 2+ edema w/ venous stasis dermatitis. ECG sinus tachycardia; Possible Left atrial enlargement, QTC: 451. Plan: IV lasix BID, telemetry, continuous pulse ox, DW, I&O, fluid/sodium restriction, continue current meds, Trend labs, replete electrolytes as needed. Start voltaren gel for rib cage pain. Supplemental O2, weaned as tolerated.     ED Triage Vitals   Temperature Pulse Respirations Blood Pressure SpO2   12/04/23 1255 12/04/23 1255 12/04/23 1255 12/04/23 1255 12/04/23 1255   (!) 100.6 °F (38.1 °C) (!) 109 (!) 30 155/89 94 %      Temp Source Heart Rate Source Patient Position - Orthostatic VS BP Location FiO2 (%)   12/04/23 1255 12/04/23 1255 12/04/23 1255 12/04/23 1255 --   Tympanic Monitor Sitting Right arm       Pain Score       12/04/23 1403       No Pain          Wt Readings from Last 1 Encounters:   12/05/23 107 kg (235 lb)     Additional Vital Signs:   Date/Time Temp Pulse Resp BP MAP (mmHg) SpO2 Calculated FIO2 (%) - Nasal Cannula Nasal Cannula O2 Flow Rate (L/min) O2 Device   12/05/23 0727 96.7 °F (35.9 °C) Abnormal  96 19 168/110 Abnormal  124 99 % 28 2 L/min Nasal cannula   12/05/23 0636 97.3 °F (36.3 °C) Abnormal  96 20 166/112 Abnormal  126 96 % 28 2 L/min Nasal cannula   12/04/23 2348 97.9 °F (36.6 °C) 106 Abnormal  20 146/89 100 93 % 28 2 L/min Nasal cannula   12/04/23 1953 98 °F (36.7 °C) 102 18 155/92 106 96 % 28 2 L/min Nasal cannula   12/04/23 1630 97.3 °F (36.3 °C) Abnormal  90 18 160/103 Abnormal  117 96 % 28 2 L/min Nasal cannula   12/04/23 1552 99 °F (37.2 °C) -- -- -- -- -- -- -- --   12/04/23 1513 -- 98 19 142/83 -- 100 % 28 2 L/min Nasal cannula   12/04/23 1403 -- 95 15 135/84 101 94 % -- -- None (Room air)     Pertinent Labs/Diagnostic Test Results:   12/4 - EKG  Sinus tachycardia  Possible Left atrial enlargement  Nonspecific T wave abnormality    XR chest 1 view portable   ED Interpretation by Brad Murry MD (12/04 1335)   Pulmonary edema but otherwise no obvious signs of consolidation concerning for pneumonia      Final Result by Jessica Andrea MD (12/04 1545)      Moderate edema.                Workstation performed: [id number]           Results from last 7 days   Lab Units 12/05/23  0802   SARS-COV-2  Negative     Results from last 7 days   Lab Units 12/05/23  0541 12/04/23  1304   WBC Thousand/uL 8.84 8.39   HEMOGLOBIN g/dL 15.5 13.7   HEMATOCRIT % 49.8* 43.6   PLATELETS Thousands/uL 255 259   NEUTROS ABS Thousands/µL 7.03 6.79         Results from last 7 days   Lab Units 12/05/23  0541 12/04/23  1304   SODIUM mmol/L 142 141   POTASSIUM mmol/L 4.0 4.2   CHLORIDE mmol/L 103 105   CO2 mmol/L 25 26   ANION GAP mmol/L 14 10   BUN mg/dL 25 19   CREATININE mg/dL 1.48* 1.19   EGFR ml/min/1.73sq m 54 70   CALCIUM mg/dL 9.6 8.9     Results from last 7 days   Lab Units 12/05/23  0541 12/04/23  1304   AST U/L 28 25   ALT U/L 30 30   ALK PHOS U/L 210* 179*   TOTAL PROTEIN g/dL 7.1 6.4   ALBUMIN g/dL 4.3 3.8   TOTAL BILIRUBIN mg/dL 1.00 1.39*         Results from last 7 days   Lab Units 12/05/23  0541 12/04/23  1304   GLUCOSE RANDOM mg/dL 91 127      Results from last 7 days   Lab Units 12/04/23  1506 12/04/23  1304   HS TNI 0HR ng/L  --  25   HS TNI 2HR ng/L 22  --    HSTNI D2 ng/L -3  --          Results from last 7 days   Lab Units 12/04/23  1304   PROTIME seconds 15.4*   INR  1.19   PTT seconds 30         Results from last 7 days   Lab Units 12/04/23  1304   PROCALCITONIN ng/ml 0.15     Results from last 7 days   Lab Units 12/04/23  1304   LACTIC ACID mmol/L 1.1             Results from last 7 days   Lab Units 12/04/23  1304   BNP pg/mL 724*      Results from last 7 days   Lab Units 12/04/23  1511   CLARITY UA  Clear   COLOR UA  Brown*   SPEC GRAV UA  1.030   PH UA  5.0   GLUCOSE UA mg/dl Negative   KETONES UA mg/dl 5 (Trace)*   BLOOD UA  Negative   PROTEIN UA mg/dl 100 (2+)*   NITRITE UA Negative   BILIRUBIN UA  Negative   UROBILINOGEN UA mg/dL 4.0*   LEUKOCYTES UA  25.0*   WBC UA /hpf 2-4   RBC UA /hpf None Seen   BACTERIA UA /hpf Occasional   EPITHELIAL CELLS WET PREP /hpf Occasional   MUCUS THREADS  Moderate*     Results from last 7 days   Lab Units 12/05/23  0802   INFLUENZA A PCR  Negative   INFLUENZA B PCR  Negative   RSV PCR  Negative         Results from last 7 days   Lab Units 12/04/23  1511   AMPH/METH  Negative   BARBITURATE UR  Negative   BENZODIAZEPINE UR  Negative   COCAINE UR  Positive*   METHADONE URINE  Negative   OPIATE UR  Negative   PCP UR  Negative   THC UR  Negative      Results from last 7 days   Lab Units 12/04/23  1322 12/04/23  1304   BLOOD CULTURE  Received in Microbiology Lab. Culture in Progress. Received in Microbiology Lab. Culture in Progress. ED Treatment:   Medication Administration from 12/04/2023 1239 to 12/04/2023 1628         Date/Time Order Dose Route Action     12/04/2023 1310 EST nitroglycerin (NITROSTAT) SL tablet 0.4 mg 0.4 mg Sublingual Given     12/04/2023 1324 EST cefTRIAXone (ROCEPHIN) IVPB (premix in dextrose) 2,000 mg 50 mL 2,000 mg Intravenous New Bag     12/04/2023 1312 EST ketorolac (TORADOL) injection 15 mg 15 mg Intravenous Given     12/04/2023 1310 EST acetaminophen (TYLENOL) tablet 650 mg 650 mg Oral Given          Past Medical History:   Diagnosis Date    Chronic HFrEF (heart failure with reduced ejection fraction) ([address]) 04/01/2022     Present on Admission:   Chronic combined systolic and diastolic congestive heart failure (HCC)   Hypertension   Creatinine elevation      Admitting Diagnosis: Chest pain [R07.9]  CHF, acute on chronic (HCC) [I50.9]  Age/Sex: 48 y.o. male  Admission Orders:  Cardiac Diet w/ 2 gm sodium restriction. 1500 mL fluid restriction. Telemetry. Continuous Pulse Ox.  DW. I&O. SCDs.     Scheduled Medications:  Diclofenac Sodium, 2 g, Topical, 4x Daily  Empagliflozin, 10 mg, Oral, Daily  enoxaparin, 40 mg, Subcutaneous, Daily  furosemide, 60 mg, Intravenous, Daily  losartan, 50 mg, Oral, BID  pantoprazole, 40 mg, Oral, Early Morning  spironolactone, 25 mg, Oral, Daily    Continuous IV Infusions: none    PRN Meds:   furosemide, 60 mg, Intravenous; 12/4 x1  furosemide, 80 mg, Intravenous; 12/5 x1    Network Utilization Review Department  ATTENTION: Please call with any questions or concerns to [phone] and carefully listen to the prompts so that you are directed to the right person. All voicemails are confidential.   For Discharge needs, contact Care Management DC Support Team at [phone] opt. 2  Send all requests for admission clinical reviews, approved or denied determinations and any other requests to dedicated fax number below belonging to the campus where the patient is receiving treatment.  List of dedicated fax numbers for the Facilities:  [place] DENIALS (Administrative/Medical Necessity) [phone]   DISCHARGE SUPPORT TEAM (NETWORK) [address] [facility] (Maternity/NICU/Pediatrics) [phone]   [address] [facility] [address] [place] [address] 1000 [facility] [phone]   [address] [facility] [address] [facility] [address] [place] [address] [address] [address] [facility] [address] [facility] [address] [place] [address] [phone]

## 2023-12-05 NOTE — PLAN OF CARE
Problem: PAIN - ADULT  Goal: Verbalizes/displays adequate comfort level or baseline comfort level  Description: Interventions:  - Encourage patient to monitor pain and request assistance  - Assess pain using appropriate pain scale  - Administer analgesics based on type and severity of pain and evaluate response  - Implement non-pharmacological measures as appropriate and evaluate response  - Consider cultural and social influences on pain and pain management  - Notify physician/advanced practitioner if interventions unsuccessful or patient reports new pain  Outcome: Progressing     Problem: SAFETY ADULT  Goal: Patient will remain free of falls  Description: INTERVENTIONS:  - Educate patient/family on patient safety including physical limitations  - Instruct patient to call for assistance with activity   - Consult OT/PT to assist with strengthening/mobility   - Keep Call bell within reach  - Keep bed low and locked with side rails adjusted as appropriate  - Keep care items and personal belongings within reach  - Initiate and maintain comfort rounds  - Make Fall Risk Sign visible to staff  -   - Apply yellow socks and bracelet for high fall risk patients  - Consider moving patient to room near nurses station  Outcome: Progressing     Problem: DISCHARGE PLANNING  Goal: Discharge to home or other facility with appropriate resources  Description: INTERVENTIONS:  - Identify barriers to discharge w/patient and caregiver  - Arrange for needed discharge resources and transportation as appropriate  - Identify discharge learning needs (meds, wound care, etc.)  - Arrange for interpretive services to assist at discharge as needed  - Refer to Case Management Department for coordinating discharge planning if the patient needs post-hospital services based on physician/advanced practitioner order or complex needs related to functional status, cognitive ability, or social support system  Outcome: Progressing     Problem: Knowledge Deficit  Goal: Patient/family/caregiver demonstrates understanding of disease process, treatment plan, medications, and discharge instructions  Description: Complete learning assessment and assess knowledge base.   Interventions:  - Provide teaching at level of understanding  - Provide teaching via preferred learning methods  Outcome: Progressing

## 2023-12-05 NOTE — ASSESSMENT & PLAN NOTE
Lab Results   Component Value Date    CREATININE 1.35 (H) 12/12/2023      During hospitalization creatinine had been trending up and has currently been trending down towards baseline. Will start on and Jardiance were held during admission due to deteriorating creatinine. Did not display any electrolyte derangements. Most likely due to prerenal in the setting of fluid overload. Discharge he will continue to hold losartan and Jardiance until creatinine reaches baseline.

## 2023-12-05 NOTE — PLAN OF CARE
Problem: PAIN - ADULT  Goal: Verbalizes/displays adequate comfort level or baseline comfort level  Description: Interventions:  - Encourage patient to monitor pain and request assistance  - Assess pain using appropriate pain scale  - Administer analgesics based on type and severity of pain and evaluate response  - Implement non-pharmacological measures as appropriate and evaluate response  - Consider cultural and social influences on pain and pain management  - Notify physician/advanced practitioner if interventions unsuccessful or patient reports new pain  Outcome: Progressing     Problem: INFECTION - ADULT  Goal: Absence or prevention of progression during hospitalization  Description: INTERVENTIONS:  - Assess and monitor for signs and symptoms of infection  - Monitor lab/diagnostic results  - Monitor all insertion sites, i.e. indwelling lines, tubes, and drains  - Monitor endotracheal if appropriate and nasal secretions for changes in amount and color  - [place] appropriate cooling/warming therapies per order  - Administer medications as ordered  - Instruct and encourage patient and family to use good hand hygiene technique  - Identify and instruct in appropriate isolation precautions for identified infection/condition  Outcome: Progressing  Goal: Absence of fever/infection during neutropenic period  Description: INTERVENTIONS:  - Monitor WBC    Outcome: Progressing     Problem: SAFETY ADULT  Goal: Patient will remain free of falls  Description: INTERVENTIONS:  - Educate patient/family on patient safety including physical limitations  - Instruct patient to call for assistance with activity   - Consult OT/PT to assist with strengthening/mobility   - Keep Call bell within reach  - Keep bed low and locked with side rails adjusted as appropriate  - Keep care items and personal belongings within reach  - Initiate and maintain comfort rounds  - Make Fall Risk Sign visible to staff  - Offer Toileting every 2 Hours, in advance of need  - Apply yellow socks and bracelet for high fall risk patients  - Consider moving patient to room near nurses station  Outcome: Progressing  Goal: Maintain or return to baseline ADL function  Description: INTERVENTIONS:  -  Assess patient's ability to carry out ADLs; assess patient's baseline for ADL function and identify physical deficits which impact ability to perform ADLs (bathing, care of mouth/teeth, toileting, grooming, dressing, etc.)  - Assess/evaluate cause of self-care deficits   - Assess range of motion  - Assess patient's mobility; develop plan if impaired  - Assess patient's need for assistive devices and provide as appropriate  - Encourage maximum independence but intervene and supervise when necessary  - Involve family in performance of ADLs  - Assess for home care needs following discharge   - Consider OT consult to assist with ADL evaluation and planning for discharge  - Provide patient education as appropriate  Outcome: Progressing  Goal: Maintains/Returns to pre admission functional level  Description: INTERVENTIONS:  - Perform AM-PAC 6 Click Basic Mobility/ Daily Activity assessment daily.  - Set and communicate daily mobility goal to care team and patient/family/caregiver. - Collaborate with rehabilitation services on mobility goals if consulted  - Perform Range of Motion 2 times a day. - Reposition patient every 2 hours.   - Dangle patient 2 times a day  - Stand patient 2 times a day  - Ambulate patient 2 times a day  - Out of bed to chair 2 times a day   - Out of bed for meals 2 times a day  - Out of bed for toileting  - Record patient progress and toleration of activity level   Outcome: Progressing     Problem: DISCHARGE PLANNING  Goal: Discharge to home or other facility with appropriate resources  Description: INTERVENTIONS:  - Identify barriers to discharge w/patient and caregiver  - Arrange for needed discharge resources and transportation as appropriate  - Identify discharge learning needs (meds, wound care, etc.)  - Arrange for interpretive services to assist at discharge as needed  - Refer to Case Management Department for coordinating discharge planning if the patient needs post-hospital services based on physician/advanced practitioner order or complex needs related to functional status, cognitive ability, or social support system  Outcome: Progressing     Problem: Knowledge Deficit  Goal: Patient/family/caregiver demonstrates understanding of disease process, treatment plan, medications, and discharge instructions  Description: Complete learning assessment and assess knowledge base.   Interventions:  - Provide teaching at level of understanding  - Provide teaching via preferred learning methods  Outcome: Progressing

## 2023-12-05 NOTE — CASE MANAGEMENT
Case Management Assessment & Discharge Planning Note    Patient name Brent Siddiqi  Location 7T U 713/7T U 713-01 MRN [id number]  : 1970 Date 2023       Current Admission Date: 2023  Current Admission Diagnosis:Acute exacerbation of CHF (congestive heart failure) [facility])   Patient Active Problem List    Diagnosis Date Noted    Acute respiratory failure with hypoxia ([address]) 2023    Acute exacerbation of CHF (congestive heart failure) ([address]) 2023    Chest pain 2023    SIRS (systemic inflammatory response syndrome) ([address]) 2023    Hyperlipidemia 2023    Cocaine abuse ([address]) 2023    Nonischemic cardiomyopathy ([address]) 2023    Does not have health insurance 2023    Encounter to establish care 2023    Chest pain, unspecified 2023    Creatinine elevation 2023    Abnormal CT scan 2023    Chronic combined systolic and diastolic congestive heart failure ([address]) 2022    Pleural effusion, left 2022    Hypertension 2022    Left leg pain 2022    Elevated serum creatinine 2022      LOS (days): 1  Geometric Mean LOS (GMLOS) (days):   Days to GMLOS:     OBJECTIVE:    Risk of Unplanned Readmission Score: 11.22      Current admission status: Inpatient  Referral Reason: VNA    Preferred Pharmacy:   [address] [place] [address]  Phone: [phone] Fax: [phone]    [id number] [place], [address] [address]  Phone: [phone] Fax: [phone]    Primary Care Provider: Arabella Salas MD    Primary Insurance: [employer]  Secondary Insurance:     ASSESSMENT:  [id number] [facility] Other   Primary Phone: [phone] (Home)                   Readmission Root Cause  30 Day Readmission: No    Patient Information  Admitted from[de-identified] Home  Mental Status: Alert  During Assessment patient was accompanied by: Spouse  Assessment information provided by[de-identified] Patient  Primary Caregiver: Self  Support Systems: Family members, Self, Spouse/significant other  [facility]: [address] [place] [address] do you live in?: [address] entry access options. Select all that apply.: Stairs  Number of steps to enter home.: 3  Do the steps have railings?: Yes  Type of Current Residence: 3 story home  Upon entering residence, is there a bedroom on the main floor (no further steps)?: No  A bedroom is located on the following floor levels of residence (select all that apply):: 2nd Floor  Upon entering residence, is there a bathroom on the main floor (no further steps)?: Yes  Number of steps to 2nd floor from main floor: One Flight  Living Arrangements: Lives w/ Spouse/significant other  Is patient a ?: No    Activities of Daily Living Prior to Admission  Functional Status: Independent  Completes ADLs independently?: Yes  Ambulates independently?: Yes  Does patient use assisted devices?: No  Does patient currently own DME?: No  Does patient have a history of Outpatient Therapy (PT/OT)?: No  Does the patient have a history of Short-Term Rehab?: No  Does patient have a history of HHC?: No  Does patient currently have [facility]?: No      Patient Information Continued  Income Source: Unknown  Does patient have prescription coverage?: Yes  Does patient receive dialysis treatments?: No  Does patient have a history of substance abuse?: Yes  Historical substance use preference: "Crack" cocaine  History of Withdrawal Symptoms: Denies past symptoms  Is patient currently in treatment for substance abuse?: No. Patient declined treatment information.   Does patient have a history of Mental Health Diagnosis?: No         Means of Transportation  Means of Transport to Appts[de-identified] Public Transportation - [address] Stability: Low Risk  (8/7/2023) Housing Stability Vital Sign     Unable to Pay for Housing in the Last Year: No     Number of Places Lived in the Last Year: 1     Unstable Housing in the Last Year: No   Food Insecurity: Unknown (8/14/2023)    Hunger Vital Sign     Worried About Running Out of Food in the Last Year: Patient refused     [address] in the Last Year: Patient refused   Transportation Needs: Unknown (8/14/2023)    PRAPARE - Transportation     Lack of Transportation (Medical): Patient refused     Lack of Transportation (Non-Medical): Patient refused   Utilities: Not on file     DISCHARGE DETAILS:    Discharge planning discussed with[de-identified] Patient  Freedom of Choice: Yes  Comments - Freedom of Choice: Agreeable to [facility] referrals made to [facility]  contacted family/caregiver?: Yes  Were Treatment Team discharge recommendations reviewed with patient/caregiver?: Yes  Did patient/caregiver verbalize understanding of patient care needs?: Yes  Were patient/caregiver advised of the risks associated with not following Treatment Team discharge recommendations?: Yes    Contacts  Patient Contacts: EMELYN Hanson  Relationship to Patient[de-identified] Family  Contact Method: Phone  Phone Number: [phone]  Reason/Outcome: Continuity of [address]         Is the patient interested in [facility] at discharge?: Yes         Other Referral/Resources/Interventions Provided:  Interventions: [facility]    Would you like to participate in our [address] service program?  : No - Declined       Additional Comments: Met with patient and used interperter #[id number]. Agreeable to [facility]. Asking about cane.   Per PT Celestina Rodriguez he will give patient a cane following eval.  CM department following thru discharge

## 2023-12-06 PROBLEM — J96.01 ACUTE RESPIRATORY FAILURE WITH HYPOXIA (HCC): Status: RESOLVED | Noted: 2023-12-05 | Resolved: 2023-12-06

## 2023-12-06 PROBLEM — N39.0 UTI (URINARY TRACT INFECTION): Status: ACTIVE | Noted: 2023-12-06

## 2023-12-06 PROBLEM — N17.9 AKI (ACUTE KIDNEY INJURY) (HCC): Status: ACTIVE | Noted: 2023-08-05

## 2023-12-06 PROBLEM — R65.10 SIRS (SYSTEMIC INFLAMMATORY RESPONSE SYNDROME) (HCC): Status: RESOLVED | Noted: 2023-12-04 | Resolved: 2023-12-06

## 2023-12-06 LAB
ANION GAP SERPL CALCULATED.3IONS-SCNC: 9 MMOL/L
ATRIAL RATE: 87 BPM
BACTERIA UR QL AUTO: ABNORMAL /HPF
BASOPHILS # BLD AUTO: 0.05 THOUSANDS/ÂΜL (ref 0–0.1)
BASOPHILS NFR BLD AUTO: 1 % (ref 0–1)
BILIRUB UR QL STRIP: NEGATIVE
BUN SERPL-MCNC: 24 MG/DL (ref 5–25)
CALCIUM SERPL-MCNC: 8.4 MG/DL (ref 8.4–10.2)
CHLORIDE SERPL-SCNC: 102 MMOL/L (ref 96–108)
CLARITY UR: CLEAR
CO2 SERPL-SCNC: 28 MMOL/L (ref 21–32)
COLOR UR: ABNORMAL
CREAT SERPL-MCNC: 1.64 MG/DL (ref 0.6–1.3)
EOSINOPHIL # BLD AUTO: 0.28 THOUSAND/ÂΜL (ref 0–0.61)
EOSINOPHIL NFR BLD AUTO: 5 % (ref 0–6)
ERYTHROCYTE [DISTWIDTH] IN BLOOD BY AUTOMATED COUNT: 14.6 % (ref 11.6–15.1)
GFR SERPL CREATININE-BSD FRML MDRD: 47 ML/MIN/1.73SQ M
GLUCOSE SERPL-MCNC: 103 MG/DL (ref 65–140)
GLUCOSE UR STRIP-MCNC: ABNORMAL MG/DL
HCT VFR BLD AUTO: 41.5 % (ref 36.5–49.3)
HGB BLD-MCNC: 13.1 G/DL (ref 12–17)
HGB UR QL STRIP.AUTO: NEGATIVE
IMM GRANULOCYTES # BLD AUTO: 0.02 THOUSAND/UL (ref 0–0.2)
IMM GRANULOCYTES NFR BLD AUTO: 0 % (ref 0–2)
KETONES UR STRIP-MCNC: NEGATIVE MG/DL
LEUKOCYTE ESTERASE UR QL STRIP: NEGATIVE
LYMPHOCYTES # BLD AUTO: 1.09 THOUSANDS/ÂΜL (ref 0.6–4.47)
LYMPHOCYTES NFR BLD AUTO: 19 % (ref 14–44)
MCH RBC QN AUTO: 28.2 PG (ref 26.8–34.3)
MCHC RBC AUTO-ENTMCNC: 31.6 G/DL (ref 31.4–37.4)
MCV RBC AUTO: 89 FL (ref 82–98)
MONOCYTES # BLD AUTO: 0.51 THOUSAND/ÂΜL (ref 0.17–1.22)
MONOCYTES NFR BLD AUTO: 9 % (ref 4–12)
NEUTROPHILS # BLD AUTO: 3.89 THOUSANDS/ÂΜL (ref 1.85–7.62)
NEUTS SEG NFR BLD AUTO: 66 % (ref 43–75)
NITRITE UR QL STRIP: NEGATIVE
NON-SQ EPI CELLS URNS QL MICRO: ABNORMAL /HPF
NRBC BLD AUTO-RTO: 0 /100 WBCS
OTHER STN SPEC: ABNORMAL
P AXIS: 81 DEGREES
PH UR STRIP.AUTO: 7 [PH]
PLATELET # BLD AUTO: 230 THOUSANDS/UL (ref 149–390)
PMV BLD AUTO: 10.9 FL (ref 8.9–12.7)
POTASSIUM SERPL-SCNC: 3.9 MMOL/L (ref 3.5–5.3)
PR INTERVAL: 164 MS
PROT UR STRIP-MCNC: NEGATIVE MG/DL
QRS AXIS: -9 DEGREES
QRSD INTERVAL: 70 MS
QT INTERVAL: 386 MS
QTC INTERVAL: 464 MS
RBC # BLD AUTO: 4.64 MILLION/UL (ref 3.88–5.62)
RBC #/AREA URNS AUTO: ABNORMAL /HPF
SODIUM SERPL-SCNC: 139 MMOL/L (ref 135–147)
SP GR UR STRIP.AUTO: 1.01 (ref 1–1.04)
T WAVE AXIS: 4 DEGREES
UROBILINOGEN UA: NEGATIVE MG/DL
VENTRICULAR RATE: 87 BPM
WBC # BLD AUTO: 5.84 THOUSAND/UL (ref 4.31–10.16)
WBC #/AREA URNS AUTO: ABNORMAL /HPF

## 2023-12-06 PROCEDURE — 93010 ELECTROCARDIOGRAM REPORT: CPT | Performed by: STUDENT IN AN ORGANIZED HEALTH CARE EDUCATION/TRAINING PROGRAM

## 2023-12-06 PROCEDURE — 81003 URINALYSIS AUTO W/O SCOPE: CPT

## 2023-12-06 PROCEDURE — 99233 SBSQ HOSP IP/OBS HIGH 50: CPT | Performed by: STUDENT IN AN ORGANIZED HEALTH CARE EDUCATION/TRAINING PROGRAM

## 2023-12-06 PROCEDURE — 87086 URINE CULTURE/COLONY COUNT: CPT

## 2023-12-06 PROCEDURE — 85025 COMPLETE CBC W/AUTO DIFF WBC: CPT

## 2023-12-06 PROCEDURE — 81001 URINALYSIS AUTO W/SCOPE: CPT

## 2023-12-06 PROCEDURE — 93005 ELECTROCARDIOGRAM TRACING: CPT

## 2023-12-06 PROCEDURE — 80048 BASIC METABOLIC PNL TOTAL CA: CPT

## 2023-12-06 PROCEDURE — 99223 1ST HOSP IP/OBS HIGH 75: CPT | Performed by: STUDENT IN AN ORGANIZED HEALTH CARE EDUCATION/TRAINING PROGRAM

## 2023-12-06 RX ORDER — CEPHALEXIN 500 MG/1
500 CAPSULE ORAL EVERY 12 HOURS SCHEDULED
Status: COMPLETED | OUTPATIENT
Start: 2023-12-06 | End: 2023-12-12

## 2023-12-06 RX ORDER — SACCHAROMYCES BOULARDII 250 MG
250 CAPSULE ORAL 2 TIMES DAILY
Status: DISCONTINUED | OUTPATIENT
Start: 2023-12-06 | End: 2023-12-13 | Stop reason: HOSPADM

## 2023-12-06 RX ORDER — FUROSEMIDE 10 MG/ML
40 INJECTION INTRAMUSCULAR; INTRAVENOUS
Status: DISCONTINUED | OUTPATIENT
Start: 2023-12-06 | End: 2023-12-07

## 2023-12-06 RX ADMIN — Medication 2 G: at 02:33

## 2023-12-06 RX ADMIN — FUROSEMIDE 60 MG: 10 INJECTION, SOLUTION INTRAMUSCULAR; INTRAVENOUS at 08:37

## 2023-12-06 RX ADMIN — Medication 2 G: at 21:23

## 2023-12-06 RX ADMIN — SPIRONOLACTONE 25 MG: 25 TABLET, FILM COATED ORAL at 08:38

## 2023-12-06 RX ADMIN — ENOXAPARIN SODIUM 40 MG: 40 INJECTION SUBCUTANEOUS at 08:37

## 2023-12-06 RX ADMIN — LOSARTAN POTASSIUM 50 MG: 50 TABLET, FILM COATED ORAL at 08:38

## 2023-12-06 RX ADMIN — CEPHALEXIN 500 MG: 500 CAPSULE ORAL at 08:38

## 2023-12-06 RX ADMIN — ACETAMINOPHEN 975 MG: 325 TABLET ORAL at 05:49

## 2023-12-06 RX ADMIN — METOPROLOL SUCCINATE 75 MG: 50 TABLET, EXTENDED RELEASE ORAL at 21:20

## 2023-12-06 RX ADMIN — Medication 250 MG: at 21:21

## 2023-12-06 RX ADMIN — CEPHALEXIN 500 MG: 500 CAPSULE ORAL at 21:20

## 2023-12-06 RX ADMIN — PANTOPRAZOLE SODIUM 40 MG: 40 TABLET, DELAYED RELEASE ORAL at 05:49

## 2023-12-06 RX ADMIN — EMPAGLIFLOZIN 10 MG: 10 TABLET, FILM COATED ORAL at 08:38

## 2023-12-06 RX ADMIN — ACETAMINOPHEN 975 MG: 325 TABLET ORAL at 14:11

## 2023-12-06 RX ADMIN — METOPROLOL SUCCINATE 50 MG: 50 TABLET, EXTENDED RELEASE ORAL at 08:38

## 2023-12-06 RX ADMIN — FUROSEMIDE 40 MG: 10 INJECTION, SOLUTION INTRAVENOUS at 16:10

## 2023-12-06 RX ADMIN — ACETAMINOPHEN 975 MG: 325 TABLET ORAL at 21:20

## 2023-12-06 NOTE — PROGRESS NOTES
Progress Note    Sam Rice 48 y.o. male MRN: [id number]  Unit/Bed#: 7T [facility] 713-01 Encounter: [id number]  Admitting Physician: Ana Patel MD  PCP: Bushra Newman MD  Date of Admission:  12/4/2023 12:49 PM    Assessment and Plan    * Acute exacerbation of CHF (congestive heart failure) ([address])  Assessment & Plan  - Likely in the setting of cocaine use (positive UDS) vs. Possible viral infection (nasal swab not collected overnight)  - Patient has lost 3 lbs since admission on IV diuresis -- Dry weight: 217 lbs  - Patient still having signs of pulmonary congestion, orthopnea  - Improvement in oxygen requirements  - Home regimen: furosemide 60 QD (taking BID), metoprolol 50 in day and 75 at night, losartan 50 mg BID, spironolactone 25 mg daily, Jardiance 10 mg daily  - Last echo :  LVEF 40% ( August 2023)     Plan:   - Continue IV diuresis: Increase IV Lasix to 40 mg BID  - Monitor I/O and daily weight  - Fluid restriction 1.5L   - 24 h telemetry  - Continuous pulse oximetry and titrate O2 as appropriate  - Monitor electrolytes  - Hold Losartan, continue Jardiance and Spironolactone  - Cardiology consult            Chronic combined systolic and diastolic congestive heart failure ([address])  Assessment & Plan  See A/P for acute exacerbation of CHF       UTI (urinary tract infection)  Assessment & Plan  Patient did not complain of dysuria, urine output was not adequate since yesterday despite adequate diureses    Plan:   Continue keflex for 7 days   FU on urine culture for sensitivities     Left leg cellulitis  Assessment & Plan  Pain in Left leg has decreased from yesterday. Somewhat warm to the touch.      Plan:  PO Keflex 500 mg BID ( day 2)   Continue to monitor vitals   Continue to monitor redness ( marked with bed)  Follow CBC    Cocaine abuse ([address])  Assessment & Plan  - Patient denies cocaine use despite positive UDS  - In view of denial, unsure about last use  - Held Metoprolol for 24 h since time of admission    Plan:   - Monitor for signs and symptoms of withdrawal    Hyperlipidemia  Assessment & Plan  - Home medication: atorvastatin 40 mg QD    PLAN:  - Continue statin    Chest pain  Assessment & Plan  - Troponnins: 25 > 22   - Non-MI related tropinin elevation most likely 2/2 to CHF exacerbation   - ECG sinus tachycardia; Possible Left atrial enlargement, QTC: 451  - This morning patient did not endorse chest pain     Plan:  - Apply Voltaren gel  - Tylenol 650 mg q8h  - Monitor symptoms      HEIKE (acute kidney injury) ([address])  Assessment & Plan  - Creatinine of 1.64 this morning (baseline 1.2 - 1.3) admission = 1.19  - Likely prerenal in the setting of IV diuresis vs fluid overload  - S/p NSAIDs for pain and ARB use for hypertension  - No electrolyte derrangements    PLAN:  - Monitor renal function  - Hold ARB  - Caution with nephrotoxic agents    Hypertension  Assessment & Plan  Blood Pressure: 137/100     - Home medications: furosemide 60 mg daily (using BID), losartan 50 BID, metoprolol 50 mg in morning, metoprolol 75 mg at night, spironolactone 25 mg daily    Plan:   - Hold ARB in view of new HEIKE  - IV diuresis  - Consider PRN medication if persistent hypertension  - Low sodium diet  - Monitor VS    Acute respiratory failure with hypoxia (HCC)-resolved as of 12/6/2023  Assessment & Plan  - Patient currently on 1L O2 supplementation via NC  - Likely in the setting of CHF exacerbation  - currently on 2L saturating at 97%     PLAN:  - Continue O2 as needed and titrate as appropriate  - Continue IV diuresis    SIRS (systemic inflammatory response syndrome) (HCC)-resolved as of 12/6/2023  Assessment & Plan  - Fever resolved  - S/P Tylenol 650 in ED IV Ceftriaxone x 1 in the ED  - No evidence of bacterial infection  - Flu/RSV/Covid not done overnight    Plan:   Monitor vitals             VTE Pharmacologic Prophylaxis: VTE Score: 6 High Risk (Score >/= 5) - Pharmacological DVT Prophylaxis Ordered: enoxaparin (Lovenox). Sequential Compression Devices Ordered. Patient Centered Rounds: I have performed bedside rounds with nursing staff today. Discussions with Specialists or Other Care Team Provider: No    Education and Discussions with Family / Patient: Yes  Patient Information Sharing: Family aware of plan    Time Spent for Care: 45 minutes. More than 50% of total time spent on counseling and coordination of care as described above. Current Length of Stay: 2 day(s)    Current Patient Status: Inpatient   Certification Statement: The patient will continue to require additional inpatient hospital stay due to acute CHF exacerbation. Discharge Plan: Upon improvement in cardiac condition    Code Status: Level 1 - Full Code      Subjective:   Sergio Rios was seen and evaluated at bedside. He reports doing overall better in comparison to admission; his shortness of breath has not improved since yesterday, abdominal discomfort is resolved. He has increased shortness of breath when walking to the bathroom. He endorsed some chest tightness last night and pinching chest pain mid chest, but has resolved this morning. Denies nausea, vomiting, dizziness, lightheadedness, changes in appetite, dysuria. His leg pain from yesterday has improved. Pitting edema is decreased from yesterday. Objective:     Vitals:   Temp (24hrs), Av.4 °F (36.3 °C), Min:96.4 °F (35.8 °C), Max:98.4 °F (36.9 °C)    Temp:  [96.4 °F (35.8 °C)-98.4 °F (36.9 °C)] 97.1 °F (36.2 °C)  HR:  [] 85  Resp:  [18] 18  BP: (126-147)/() 126/90  SpO2:  [95 %-99 %] 95 %  Body mass index is 31.84 kg/m². Input and Output Summary (last 24 hours): Intake/Output Summary (Last 24 hours) at 2023 1341  Last data filed at 2023 1218  Gross per 24 hour   Intake 1370 ml   Output 2000 ml   Net -630 ml       Physical Exam:     Physical Exam  Constitutional:       General: He is not in acute distress.      Appearance: Normal appearance. He is obese. He is not ill-appearing, toxic-appearing or diaphoretic. HENT:      Mouth/Throat:      Mouth: Mucous membranes are moist.   Eyes:      Conjunctiva/sclera: Conjunctivae normal.   Cardiovascular:      Rate and Rhythm: Normal rate and regular rhythm. Heart sounds: Normal heart sounds. Pulmonary:      Effort: Pulmonary effort is normal. No respiratory distress. Breath sounds: Rales present. Comments: Fine rales bilateral  Chest:      Chest wall: No tenderness. Abdominal:      Palpations: Abdomen is soft. Tenderness: There is no abdominal tenderness. Musculoskeletal:      Right lower le+ Edema present. Left lower le+ Edema present. Comments: Somewhat decrease in edema from yesterday  Erythema of Left leg, not spreading since yesterday. Skin:     General: Skin is warm. Neurological:      General: No focal deficit present. Mental Status: He is alert and oriented to person, place, and time. Psychiatric:         Mood and Affect: Mood normal.         Behavior: Behavior normal.         Thought Content: Thought content normal.         Judgment: Judgment normal.           Additional Data:     Labs:    Results from last 7 days   Lab Units 23  0445   WBC Thousand/uL 5.84   HEMOGLOBIN g/dL 13.1   HEMATOCRIT % 41.5   PLATELETS Thousands/uL 230   NEUTROS PCT % 66   LYMPHS PCT % 19   MONOS PCT % 9   EOS PCT % 5     Results from last 7 days   Lab Units 23  0445 23  0541   POTASSIUM mmol/L 3.9 4.0   CHLORIDE mmol/L 102 103   CO2 mmol/L 28 25   BUN mg/dL 24 25   CREATININE mg/dL 1.64* 1.48*   CALCIUM mg/dL 8.4 9.6   ALK PHOS U/L  --  210*   ALT U/L  --  30   AST U/L  --  28     Results from last 7 days   Lab Units 23  1304   INR  1.19                 * I Have Reviewed All Lab Data Listed Above. * Additional Pertinent Lab Tests Reviewed:  All Labs Within Last 24 Hours Reviewed    Imaging:    Imaging Reports Reviewed Today Include: N/A  Imaging Personally Reviewed by Myself Includes:  N/A    Recent Cultures (last 7 days):     Results from last 7 days   Lab Units 12/04/23  1322 12/04/23  1304   BLOOD CULTURE  No Growth at 24 hrs. No Growth at 24 hrs. Last 24 Hours Medication List:   Current Facility-Administered Medications   Medication Dose Route Frequency Provider Last Rate    acetaminophen  975 mg Oral [facility] Bren Burgess MD      cephalexin  500 mg Oral Q12H [address] Buck Mayberry MD      Diclofenac Sodium  2 g Topical 4x Daily Bren Burgess MD      Empagliflozin  10 mg Oral Daily Buck Mayberry MD      enoxaparin  40 mg Subcutaneous Daily Nick Farias MD      furosemide  40 mg Intravenous BID (diuretic) Buck Mayberry MD      metoprolol succinate  50 mg Oral Daily Bren Burgess MD      metoprolol succinate  75 mg Oral HS Buck Mayberry MD      pantoprazole  40 mg Oral Early Morning Buck Mayberry MD      spironolactone  25 mg Oral Daily Bren Burgess MD          ** Please Note: Dictation voice to text software may have been used in the creation of this document.  **    Nick Farias MD  12/06/23  1:41 PM

## 2023-12-06 NOTE — PROGRESS NOTES
Patient:    MRN:  [id number]    [employer] Request ID:  [id number]    Level of care reserved:    Partner Reserved:    Clinical needs requested:    Geography searched:  [id number]    Start of Service:    Request sent:  3:06pm EST on 12/5/2023 by Trell Keyes    Partner reserved:  by Trell Keyes    Choice list shared:  2:00pm EST on 12/6/2023 by Trell Keyes

## 2023-12-06 NOTE — PLAN OF CARE
Problem: INFECTION - ADULT  Goal: Absence or prevention of progression during hospitalization  Description: INTERVENTIONS:  - Assess and monitor for signs and symptoms of infection  - Monitor lab/diagnostic results  - Monitor all insertion sites, i.e. indwelling lines, tubes, and drains  - Monitor endotracheal if appropriate and nasal secretions for changes in amount and color  - [place] appropriate cooling/warming therapies per order  - Administer medications as ordered  - Instruct and encourage patient and family to use good hand hygiene technique  - Identify and instruct in appropriate isolation precautions for identified infection/condition  Outcome: Progressing  Goal: Absence of fever/infection during neutropenic period  Description: INTERVENTIONS:  - Monitor WBC    Outcome: Progressing     Problem: SAFETY ADULT  Goal: Patient will remain free of falls  Description: INTERVENTIONS:  - Educate patient/family on patient safety including physical limitations  - Instruct patient to call for assistance with activity   - Consult OT/PT to assist with strengthening/mobility   - Keep Call bell within reach  - Keep bed low and locked with side rails adjusted as appropriate  - Keep care items and personal belongings within reach  - Initiate and maintain comfort rounds  - Make Fall Risk Sign visible to staff  - Apply yellow socks and bracelet for high fall risk patients  - Consider moving patient to room near nurses station  Outcome: Progressing  Goal: Maintain or return to baseline ADL function  Description: INTERVENTIONS:  -  Assess patient's ability to carry out ADLs; assess patient's baseline for ADL function and identify physical deficits which impact ability to perform ADLs (bathing, care of mouth/teeth, toileting, grooming, dressing, etc.)  - Assess/evaluate cause of self-care deficits   - Assess range of motion  - Assess patient's mobility; develop plan if impaired  - Assess patient's need for assistive devices and provide as appropriate  - Encourage maximum independence but intervene and supervise when necessary  - Involve family in performance of ADLs  - Assess for home care needs following discharge   - Consider OT consult to assist with ADL evaluation and planning for discharge  - Provide patient education as appropriate  Outcome: Progressing  Goal: Maintains/Returns to pre admission functional level  Description: INTERVENTIONS:  - Perform AM-PAC 6 Click Basic Mobility/ Daily Activity assessment daily.  - Set and communicate daily mobility goal to care team and patient/family/caregiver.    - Collaborate with rehabilitation services on mobility goals if consulted  - Out of bed for toileting  - Record patient progress and toleration of activity level   Outcome: Progressing

## 2023-12-06 NOTE — CONSULTS
Consultation - Cardiology   Judy Host 48 y.o. male MRN: [id number]  Unit/Bed#: 7T [facility] 713-01 Encounter: [id number]      Assessment and Plan:    Acute on chronic combined systolic and diastolic CHF  -Nonischemic cardiomyopathy  -TTE 8/7/2023 showed EF 40%, LVIDD 5.5 cm, grade 2 DD, moderate to severe MR, mild TR  - Cardiac MRI/4/2022 showed EF 34%, LVIDD 6.1 cm, normal RV size and function, no evidence of myocardial scarring, inflammation or infiltrative disease  - Chest x-ray on admission showed moderate pulmonary edema  -  on admission  - Outpatient Rx furosemide 60 mg p.o. daily with spironolactone 25 mg daily  - Currently on furosemide 40 mg IV twice daily with spironolactone 25 mg daily  - Also on Jardiance 10 mg daily    Hypertension  - Outpatient Rx Toprol-XL 50 mg in a.m. and 75 mg in p.m., losartan 50 mg twice daily, and spironolactone 25 mg daily  - Currently holding losartan due to HEIKE    HEIKE (acute kidney injury) (HCC)  - Baseline creatinine around 1.2-1.4  - Creatinine trended up to 1.64 today    Chest pain  - Troponins negative x 2 on admission  - No acute ischemic changes on ECG    Hyperlipidemia  - Continue with atorvastatin 40 mg daily    Cocaine abuse (HCC)  - U tox positive for cocaine on admission on 12/4/2023  - History of daily cocaine use for many years reportedly quit in 2011  - Patient denies cocaine use prior to admission    Alcohol abuse    Left leg cellulitis    UTI (urinary tract infection)    Summary:  -Patient significantly volume overloaded on exam with bilateral pitting edema up to his thighs  - Continue with furosemide 40 mg IV twice daily with spironolactone 25 mg daily  - Apply compression stockings to help mobilize fluids  - Will likely need to accept higher creatinine for diuresis  - Strict I's and O's  - Monitor creatinine and electrolytes closely  - Keep potassium greater than 4.0 magnesium greater than 2.0        History of Present Illness   Physician Requesting Consult: Vance Prader Hines-Smit*  Reason for Consult / Principal Problem: CHF  HPI: Barber Mathew is a 48y.o. year old male who presents with left arm pain and shortness of breath. He has a past medical history of hypertension, hyperlipidemia, CHF, prior cocaine use, and alcohol use. He reports having a persistent chest pressure and shortness of breath prior to admission. In addition, he has noticed increase swelling in his lower extremities as well as pain down his entire left leg and below his knee on his right leg. He feels his breathing has been worsening over the last few days in particular prompting him to come to the emergency department. Currently the constant chest pressure is resolved, but he is now having intermittent episodes of left-sided pain. He describes the pain now as a stabbing like sensation. Currently improved with Tylenol. Inpatient consult to Cardiology  Consult performed by: Roselia Talavera MD  Consult ordered by: Mary Alvarez MD          Review of Systems:  Review of Systems   Constitutional:  Negative for chills, diaphoresis, fatigue and fever. HENT:  Negative for congestion. Eyes:  Negative for photophobia and visual disturbance. Respiratory:  Positive for shortness of breath. Negative for chest tightness. Cardiovascular:  Positive for chest pain and leg swelling. Negative for palpitations. Gastrointestinal:  Negative for abdominal distention, abdominal pain, diarrhea, nausea and vomiting. Genitourinary:  Negative for difficulty urinating and dysuria. Musculoskeletal:  Positive for arthralgias. Negative for gait problem and joint swelling. Skin:  Negative for color change, pallor and rash. Neurological:  Negative for dizziness, syncope, numbness and headaches. Psychiatric/Behavioral:  Negative for agitation, behavioral problems and confusion.           Historical Information   Past Medical History:   Diagnosis Date Chronic HFrEF (heart failure with reduced ejection fraction) ([address]) 04/01/2022     Past Surgical History:   Procedure Laterality Date    CARDIAC CATHETERIZATION N/A 4/4/2022    Procedure: CARDIAC CORONARY ANGIOGRAM;  Surgeon: Alisa Crowley MD;  Location: [facility]; Service: Cardiology    CARDIAC CATHETERIZATION Left 4/4/2022    Procedure: Cardiac Left Heart Cath;  Surgeon: Alisa Crowley MD;  Location: [facility]; Service: Cardiology     Social History     Substance and Sexual Activity   Alcohol Use Never     Social History     Substance and Sexual Activity   Drug Use Never     Social History     Tobacco Use   Smoking Status Never    Passive exposure: Never   Smokeless Tobacco Never     Family History: non-contributory    Meds/Allergies   all current active meds have been reviewed and current meds:   Current Facility-Administered Medications   Medication Dose Route Frequency    acetaminophen (TYLENOL) tablet 975 mg  975 mg Oral Q8H [address]    cephalexin (KEFLEX) capsule 500 mg  500 mg Oral Q12H [address]    Diclofenac Sodium (VOLTAREN) 1 % topical gel 2 g  2 g Topical 4x Daily    Empagliflozin (JARDIANCE) tablet 10 mg  10 mg Oral Daily    enoxaparin (LOVENOX) subcutaneous injection 40 mg  40 mg Subcutaneous Daily    furosemide (LASIX) injection 40 mg  40 mg Intravenous BID (diuretic)    metoprolol succinate (TOPROL-XL) 24 hr tablet 50 mg  50 mg Oral Daily    metoprolol succinate (TOPROL-XL) 24 hr tablet 75 mg  75 mg Oral HS    pantoprazole (PROTONIX) EC tablet 40 mg  40 mg Oral Early Morning    spironolactone (ALDACTONE) tablet 25 mg  25 mg Oral Daily     No Known Allergies    Objective   Vitals: Blood pressure 126/90, pulse 85, temperature (!) 97.1 °F (36.2 °C), temperature source Temporal, resp. rate 18, height 6' (1.829 m), weight 107 kg (234 lb 12.6 oz), SpO2 95 %. , Body mass index is 31.84 kg/m².,   Orthostatic Blood Pressures      Flowsheet Row Most Recent Value   Blood Pressure 126/90 filed at 12/06/2023 1122   Patient Position - Orthostatic VS Lying filed at 12/06/2023 1122              Intake/Output Summary (Last 24 hours) at 12/6/2023 1409  Last data filed at 12/6/2023 1218  Gross per 24 hour   Intake 1370 ml   Output 2000 ml   Net -630 ml       Invasive Devices       Peripheral Intravenous Line  Duration             Peripheral IV 12/04/23 Distal;Left;Upper;Ventral (anterior) Arm 2 days    Peripheral IV 12/04/23 Right Antecubital 2 days                        Physical Exam:  Physical Exam      Lab Results:     No results found for: "CKTOTAL", "CKMB", "CKMBINDEX", "TROPONINI"    Lab Results   Component Value Date    CALCIUM 8.4 12/06/2023    K 3.9 12/06/2023    CO2 28 12/06/2023     12/06/2023    BUN 24 12/06/2023    CREATININE 1.64 (H) 12/06/2023       Lab Results   Component Value Date    WBC 5.84 12/06/2023    HGB 13.1 12/06/2023    HCT 41.5 12/06/2023    MCV 89 12/06/2023     12/06/2023       No results found for: "CHOL"  Lab Results   Component Value Date    HDL 44 03/31/2022     Lab Results   Component Value Date    LDLCALC 47 03/31/2022     Lab Results   Component Value Date    TRIG 205 (H) 03/31/2022       Lab Results   Component Value Date    ALT 30 12/05/2023    AST 28 12/05/2023    ALKPHOS 210 (H) 12/05/2023       Results from last 7 days   Lab Units 12/04/23  1304   INR  1.19         Imaging: I have personally reviewed pertinent reports.       EKG: NSR, nonspecific T wave changes unchanged from prior

## 2023-12-06 NOTE — PLAN OF CARE
Problem: PHYSICAL THERAPY ADULT  Goal: Performs mobility at highest level of function for planned discharge setting. See evaluation for individualized goals. Description: Treatment/Interventions: ADL retraining, Functional transfer training, LE strengthening/ROM, Elevations, Therapeutic exercise, Endurance training, Patient/family training, Equipment eval/education, Bed mobility, Gait training, Spoke to case management, Spoke to nursing, OT  Equipment Recommended: Fei Son       See flowsheet documentation for full assessment, interventions and recommendations. Outcome: Progressing  Note: Prognosis: Good  Problem List: Decreased strength, Decreased endurance, Impaired balance, Decreased mobility, Decreased coordination, Impaired sensation, Obesity     Barriers to Discharge: Inaccessible home environment, Decreased caregiver support     Rehab Resource Intensity Level, PT: III (Minimum Resource Intensity)    See flowsheet documentation for full assessment.

## 2023-12-06 NOTE — PLAN OF CARE
Problem: Knowledge Deficit  Goal: Patient/family/caregiver demonstrates understanding of disease process, treatment plan, medications, and discharge instructions  Description: Complete learning assessment and assess knowledge base.   Interventions:  - Provide teaching at level of understanding  - Provide teaching via preferred learning methods  12/6/2023 1114 by Eduar Mclain RN  Outcome: Progressing  12/6/2023 1111 by Eduar Mclain RN  Outcome: Progressing     Problem: PAIN - ADULT  Goal: Verbalizes/displays adequate comfort level or baseline comfort level  Description: Interventions:  - Encourage patient to monitor pain and request assistance  - Assess pain using appropriate pain scale  - Administer analgesics based on type and severity of pain and evaluate response  - Implement non-pharmacological measures as appropriate and evaluate response  - Consider cultural and social influences on pain and pain management  - Notify physician/advanced practitioner if interventions unsuccessful or patient reports new pain  12/6/2023 1114 by Eduar Mclain RN  Outcome: Progressing  12/6/2023 1111 by Eduar Mclain RN  Outcome: Progressing      Problem: PAIN - ADULT  Goal: Verbalizes/displays adequate comfort level or baseline comfort level  Description: Interventions:  - Encourage patient to monitor pain and request assistance  - Assess pain using appropriate pain scale  - Administer analgesics based on type and severity of pain and evaluate response  - Implement non-pharmacological measures as appropriate and evaluate response  - Consider cultural and social influences on pain and pain management  - Notify physician/advanced practitioner if interventions unsuccessful or patient reports new pain  12/6/2023 1114 by Eduar Mclain RN  Outcome: Progressing  12/6/2023 1111 by Eduar Mclain RN  Outcome: Progressing

## 2023-12-06 NOTE — PLAN OF CARE
Problem: PAIN - ADULT  Goal: Verbalizes/displays adequate comfort level or baseline comfort level  Description: Interventions:  - Encourage patient to monitor pain and request assistance  - Assess pain using appropriate pain scale  - Administer analgesics based on type and severity of pain and evaluate response  - Implement non-pharmacological measures as appropriate and evaluate response  - Consider cultural and social influences on pain and pain management  - Notify physician/advanced practitioner if interventions unsuccessful or patient reports new pain  Outcome: Progressing     Problem: SAFETY ADULT  Goal: Patient will remain free of falls  Description: INTERVENTIONS:  - Educate patient/family on patient safety including physical limitations  - Instruct patient to call for assistance with activity   - Consult OT/PT to assist with strengthening/mobility   - Keep Call bell within reach  - Keep bed low and locked with side rails adjusted as appropriate  - Keep care items and personal belongings within reach  - Initiate and maintain comfort rounds  - Make Fall Risk Sign visible to staff  - Apply yellow socks and bracelet for high fall risk patients  - Consider moving patient to room near nurses station  Outcome: Progressing     Problem: SAFETY ADULT  Goal: Maintain or return to baseline ADL function  Description: INTERVENTIONS:  -  Assess patient's ability to carry out ADLs; assess patient's baseline for ADL function and identify physical deficits which impact ability to perform ADLs (bathing, care of mouth/teeth, toileting, grooming, dressing, etc.)  - Assess/evaluate cause of self-care deficits   - Assess range of motion  - Assess patient's mobility; develop plan if impaired  - Assess patient's need for assistive devices and provide as appropriate  - Encourage maximum independence but intervene and supervise when necessary  - Involve family in performance of ADLs  - Assess for home care needs following discharge - Consider OT consult to assist with ADL evaluation and planning for discharge  - Provide patient education as appropriate  Outcome: Progressing     Problem: SAFETY ADULT  Goal: Maintains/Returns to pre admission functional level  Description: INTERVENTIONS:  - Perform AM-PAC 6 Click Basic Mobility/ Daily Activity assessment daily.  - Set and communicate daily mobility goal to care team and patient/family/caregiver. - Collaborate with rehabilitation services on mobility goals if consulted  - Out of bed for toileting  - Record patient progress and toleration of activity level   Outcome: Progressing     Problem: Knowledge Deficit  Goal: Patient/family/caregiver demonstrates understanding of disease process, treatment plan, medications, and discharge instructions  Description: Complete learning assessment and assess knowledge base.   Interventions:  - Provide teaching at level of understanding  - Provide teaching via preferred learning methods  Outcome: Progressing     Problem: DISCHARGE PLANNING  Goal: Discharge to home or other facility with appropriate resources  Description: INTERVENTIONS:  - Identify barriers to discharge w/patient and caregiver  - Arrange for needed discharge resources and transportation as appropriate  - Identify discharge learning needs (meds, wound care, etc.)  - Arrange for interpretive services to assist at discharge as needed  - Refer to Case Management Department for coordinating discharge planning if the patient needs post-hospital services based on physician/advanced practitioner order or complex needs related to functional status, cognitive ability, or social support system  Outcome: Progressing

## 2023-12-06 NOTE — CASE MANAGEMENT
Case Management Discharge Planning Note    Patient name Hanny Jane  Location 7T /7T -73 MRN [id number]  : 1970 Date 2023       Current Admission Date: 2023  Current Admission Diagnosis:Acute exacerbation of CHF (congestive heart failure) [facility])   Patient Active Problem List    Diagnosis Date Noted    UTI (urinary tract infection) 2023    Left leg cellulitis 2023    Acute exacerbation of CHF (congestive heart failure) ([address]) 2023    Chest pain 2023    Hyperlipidemia 2023    Cocaine abuse ([address]) 2023    Nonischemic cardiomyopathy ([address]) 2023    Does not have health insurance 2023    Encounter to establish care 2023    Chest pain, unspecified 2023    HEIKE (acute kidney injury) ([address]) 2023    Abnormal CT scan 2023    Chronic combined systolic and diastolic congestive heart failure ([address]) 2022    Pleural effusion, left 2022    Hypertension 2022    Left leg pain 2022    Elevated serum creatinine 2022      LOS (days): 2  Geometric Mean LOS (GMLOS) (days):   Days to GMLOS:     OBJECTIVE:  Risk of Unplanned Readmission Score: 14.84         Current admission status: Inpatient   Preferred Pharmacy:   [address] [place], [place] [id number]  Phone: [phone] Fax: [phone]    [facility]pharmacy #[id number], [address] [place] [address]  Phone: [phone] Fax: [phone]    Primary Care Provider: Althea Huntley MD    Primary Insurance: [employer]  Secondary Insurance:     DISCHARGE DETAILS:    Discharge planning discussed with[de-identified] Patient  Freedom of Choice: Yes  Comments - Freedom of Choice: [address] [place] [address]  Can provide [place] staff Agency reserved via [address]         Is the patient interested in [facility] at discharge?: Yes  Home Health Discipline requested[de-identified] Nursing, Physical Therapy  Home Health Agency Name[de-identified] Other ([address])  [facility] External Referral Reason (only applicable if external HHA name selected): Services not provided in network or near patient location  [address] Provider[de-identified] PCP  Home Health Services Needed[de-identified] Evaluate Functional Status and Safety, Gait/ADL Training, Heart Failure Management, Strengthening/Theraputic Exercises to Improve Function  Homebound Criteria Met[de-identified] Uses an Assist Device (i.e. cane, walker, etc)  Supporting Clincal Findings[de-identified] Fatigues Easliy in Short Distances, Dyspnea with Exertion, Limited Endurance      Other Referral/Resources/Interventions Provided:  Interventions: [facility]      Treatment Team Recommendation: Home with [address]

## 2023-12-06 NOTE — PHYSICAL THERAPY NOTE
Physical Therapy Evaluation    Patient's Name: Audie Bowman    Admitting Diagnosis  Chest pain [R07.9]  CHF, acute on chronic [facility]) [I50.9]    Problem List  Patient Active Problem List   Diagnosis    Chronic combined systolic and diastolic congestive heart failure (HCC)    Pleural effusion, left    Hypertension    Left leg pain    Elevated serum creatinine    Chest pain, unspecified    HEIKE (acute kidney injury) ([address])    Abnormal CT scan    Does not have health insurance    Encounter to establish care    Nonischemic cardiomyopathy ([address])    Acute exacerbation of CHF (congestive heart failure) ([address])    Chest pain    Hyperlipidemia    Cocaine abuse ([address])    Acute respiratory failure with hypoxia ([address])    Left leg cellulitis       Past Medical History  Past Medical History:   Diagnosis Date    Chronic HFrEF (heart failure with reduced ejection fraction) ([address]) 04/01/2022       Past Surgical History  Past Surgical History:   Procedure Laterality Date    CARDIAC CATHETERIZATION N/A 4/4/2022    Procedure: CARDIAC CORONARY ANGIOGRAM;  Surgeon: Kristian Owens MD;  [facility]; Service: Cardiology    CARDIAC CATHETERIZATION Left 4/4/2022    Procedure: Cardiac Left Heart Cath;  Surgeon: Kristian Owens MD;  [facility]; Service: Cardiology       Recent Imaging  XR chest 1 view portable   ED Interpretation by Ernesto Tam MD (12/04 1335)   Pulmonary edema but otherwise no obvious signs of consolidation concerning for pneumonia      Final Result by Pérez Guo MD (12/04 1545)      Moderate edema.                Workstation performed: [id number]             Recent Vital Signs  Vitals:    12/05/23 2319 12/06/23 0238 12/06/23 0532 12/06/23 0717   BP: 137/86 137/100  (!) 147/103   BP Location: Left arm Left arm  Left arm   Pulse: 90 88  86   Resp: 18 18  18   Temp: (!) 97.2 °F (36.2 °C) 98.4 °F (36.9 °C)  (!) 96.4 °F (35.8 °C)   TempSrc: Temporal Temporal  Temporal   SpO2: 96% 99% 97%   Weight:   107 kg (234 lb 12.6 oz)    Height:            12/05/23 1340   PT Last Visit   PT Visit Date 12/05/23   Note Type   Note type Evaluation   Pain Assessment   Pain Assessment Tool 0-10   Pain Score No Pain   Restrictions/Precautions   Weight Bearing Precautions Per Order No   Other Precautions O2;Multiple lines   Home Living   Type of [facility]  Multi-level;Bed/bath upstairs  (able to sleep on first floor)   Bathroom Shower/Tub Tub/shower unit   [employer] Standard   Prior Function   Level of [place] Independent with ADLs; Independent with functional mobility   Lives With Significant other   Receives Help From Family   General   Family/Caregiver Present No   Cognition   Overall Cognitive Status WFL   Arousal/Participation Alert   Orientation Level Oriented X4   Memory Within functional limits   Following Commands Follows all commands and directions without difficulty   RLE Assessment   RLE Assessment   (3+/5; edema)   LLE Assessment   LLE Assessment   (3+/5; edema)   Coordination   Movements are Fluid and Coordinated 0   Coordination and Movement Description decreased LE and gross motor coordination   Sensation X   Light Touch   RLE Light Touch Impaired   RLE Light Touch Comments bottom of foot and shin   LLE Light Touch Impaired   LLE Light Touch Comments bottom of foot and shin   Bed Mobility   Supine to Sit 6  Modified independent   Additional items Increased time required   Sit to Supine 6  Modified independent   Additional items Increased time required   Transfers   Sit to Stand 5  Supervision   Additional items Increased time required   Stand to Sit 5  Supervision   Additional items Increased time required   Ambulation/Elevation   Gait pattern Step through pattern;Decreased toe off;Decreased heel strike;Decreased foot clearance; Improper Weight shift   Gait Assistance 5  Supervision   Additional items Verbal cues   Assistive Device [facility]   Distance 45ft   Balance   Static Sitting Fair +   Dynamic Sitting Fair +   Static Standing Fair   Dynamic Standing Fair -   Ambulatory Fair -   Endurance Deficit   Endurance Deficit Yes   Endurance Deficit Description SOB and fatigue   Activity Tolerance   Activity Tolerance Patient tolerated treatment well   Medical Staff Made Aware spoke to CM   Nurse Made Aware spoke to RN   Assessment   Prognosis Good   Problem List Decreased strength;Decreased endurance; Impaired balance;Decreased mobility; Decreased coordination; Impaired sensation;Obesity   Barriers to Discharge Inaccessible home environment;Decreased caregiver support   Goals   Patient Goals to return home and get better   STG Expiration Date 12/16/23   Short Term Goal #1 see eval note   Plan   Treatment/Interventions ADL retraining;Functional transfer training;LE strengthening/ROM; Elevations; Therapeutic exercise; Endurance training;Patient/family training;Equipment eval/education; Bed mobility;Gait training;Spoke to case management;Spoke to nursing;OT   PT Frequency 2-3x/wk   Discharge Recommendation   Rehab Resource Intensity Level, PT III (Minimum Resource Intensity)   Equipment Recommended Cane   AM-PAC Basic Mobility Inpatient   Turning in Flat Bed Without Bedrails 4   Lying on Back to Sitting on Edge of Flat Bed Without Bedrails 4   Moving Bed to Chair 4   Standing Up From Chair Using Arms 4   Walk in Room 3   Climb 3-5 Stairs With Railing 3   Basic Mobility Inpatient Raw Score 22   Basic Mobility Standardized Score 47.4   Highest Level Of Mobility   JH-HLM Goal 7: Walk 25 feet or more   JH-HLM Achieved 7: Walk 25 feet or more   End of Consult   Patient Position at End of Consult Bedside chair; All needs within reach         ASSESSMENT                                                                                                                     Jada Mack is a 48 y.o. male admitted to [facility] on 12/4/2023 for Acute exacerbation of CHF (congestive heart failure) ([address]  Pt  has a past medical history of Chronic HFrEF (heart failure with reduced ejection fraction) ([address]) (04/01/2022). . PT was consulted and pt was seen on 12/6/2023 for mobility assessment and d/c planning. Impairments limiting pt at this time include decreased ROM, impaired balance, decreased endurance, decreased coordination, new onset of impairment of functional mobility, decreased IADLS, decreased activity tolerance, decreased sensation, and decreased strength. Pt is currently functioning at a modified independent assistance level for bed mobility, modified independent assistance level for transfers, supervision assistance x1 level for ambulation with [address]. The patient's AM-[facility] Basic Mobility Inpatient Short Form Raw Score is 22. A Raw score of greater than 16 suggests the patient may benefit from discharge to home. Please also refer to the recommendation of the Physical Therapist for safe discharge planning. Goals                                                                                                                                    1) Bed mobility skills with modified independent assistance to facilitate safe return to previous living environment 2) Functional transfers with modified independent assistance to facilitate safe return to previous living environment  3) Ambulation with least restrictive AD modified independent assistance without LOB and stable vitals for safe ambulation home/ community distances. 4) Stair training up/down flight 12 step/s with appropriate rail/s  and modified independent assistance for safe access to previous living environment. 5) Improve balance grades to fair + to reduce risk of falls. 6)Improve LE strength grades by 1 to increase independence w/ transfers and gait. 7) PT for ongoing pt and family education; DME needs and D/C planning to promote highest level of function in least restrictive environment.      Recommendations Pt will benefit from continued skilled IP PT to address the above mentioned impairments in order to maximize recovery and increase functional independence when completing mobility and ADLs. See flow sheet for goals and POC.      DME: Single Point Cane    Discharge Disposition:  Home with Home Physical Therapy      Nata Romero PT, DPT

## 2023-12-06 NOTE — ASSESSMENT & PLAN NOTE
- Continued erythema in LLE though no pain to palpation.  - On Keflex 500 mg BID  - Persistent erythema likely due to venous stasis dermatitis    Plan:  - Day 7/7 of Keflex  - Continue to monitor vitals   - Continue to monitor erythema   - Trend WBC

## 2023-12-07 LAB
ANION GAP SERPL CALCULATED.3IONS-SCNC: 8 MMOL/L
BACTERIA UR CULT: NORMAL
BASOPHILS # BLD AUTO: 0.06 THOUSANDS/ÂΜL (ref 0–0.1)
BASOPHILS NFR BLD AUTO: 1 % (ref 0–1)
BUN SERPL-MCNC: 28 MG/DL (ref 5–25)
CALCIUM SERPL-MCNC: 8.8 MG/DL (ref 8.4–10.2)
CHLORIDE SERPL-SCNC: 101 MMOL/L (ref 96–108)
CO2 SERPL-SCNC: 29 MMOL/L (ref 21–32)
CREAT SERPL-MCNC: 1.45 MG/DL (ref 0.6–1.3)
EOSINOPHIL # BLD AUTO: 0.27 THOUSAND/ÂΜL (ref 0–0.61)
EOSINOPHIL NFR BLD AUTO: 4 % (ref 0–6)
ERYTHROCYTE [DISTWIDTH] IN BLOOD BY AUTOMATED COUNT: 14.6 % (ref 11.6–15.1)
GFR SERPL CREATININE-BSD FRML MDRD: 55 ML/MIN/1.73SQ M
GLUCOSE SERPL-MCNC: 101 MG/DL (ref 65–140)
HCT VFR BLD AUTO: 43 % (ref 36.5–49.3)
HGB BLD-MCNC: 13.5 G/DL (ref 12–17)
IMM GRANULOCYTES # BLD AUTO: 0.02 THOUSAND/UL (ref 0–0.2)
IMM GRANULOCYTES NFR BLD AUTO: 0 % (ref 0–2)
LYMPHOCYTES # BLD AUTO: 1.28 THOUSANDS/ÂΜL (ref 0.6–4.47)
LYMPHOCYTES NFR BLD AUTO: 19 % (ref 14–44)
MCH RBC QN AUTO: 28 PG (ref 26.8–34.3)
MCHC RBC AUTO-ENTMCNC: 31.4 G/DL (ref 31.4–37.4)
MCV RBC AUTO: 89 FL (ref 82–98)
MONOCYTES # BLD AUTO: 0.55 THOUSAND/ÂΜL (ref 0.17–1.22)
MONOCYTES NFR BLD AUTO: 8 % (ref 4–12)
NEUTROPHILS # BLD AUTO: 4.42 THOUSANDS/ÂΜL (ref 1.85–7.62)
NEUTS SEG NFR BLD AUTO: 68 % (ref 43–75)
NRBC BLD AUTO-RTO: 0 /100 WBCS
PLATELET # BLD AUTO: 271 THOUSANDS/UL (ref 149–390)
PMV BLD AUTO: 11 FL (ref 8.9–12.7)
POTASSIUM SERPL-SCNC: 4.1 MMOL/L (ref 3.5–5.3)
RBC # BLD AUTO: 4.83 MILLION/UL (ref 3.88–5.62)
SODIUM SERPL-SCNC: 138 MMOL/L (ref 135–147)
WBC # BLD AUTO: 6.6 THOUSAND/UL (ref 4.31–10.16)

## 2023-12-07 PROCEDURE — 85025 COMPLETE CBC W/AUTO DIFF WBC: CPT

## 2023-12-07 PROCEDURE — 99232 SBSQ HOSP IP/OBS MODERATE 35: CPT | Performed by: INTERNAL MEDICINE

## 2023-12-07 PROCEDURE — 99233 SBSQ HOSP IP/OBS HIGH 50: CPT | Performed by: STUDENT IN AN ORGANIZED HEALTH CARE EDUCATION/TRAINING PROGRAM

## 2023-12-07 PROCEDURE — 97167 OT EVAL HIGH COMPLEX 60 MIN: CPT

## 2023-12-07 PROCEDURE — 80048 BASIC METABOLIC PNL TOTAL CA: CPT

## 2023-12-07 RX ORDER — FUROSEMIDE 10 MG/ML
80 INJECTION INTRAMUSCULAR; INTRAVENOUS
Status: DISCONTINUED | OUTPATIENT
Start: 2023-12-07 | End: 2023-12-07

## 2023-12-07 RX ORDER — FUROSEMIDE 10 MG/ML
40 INJECTION INTRAMUSCULAR; INTRAVENOUS
Status: DISCONTINUED | OUTPATIENT
Start: 2023-12-07 | End: 2023-12-09

## 2023-12-07 RX ORDER — ACETAZOLAMIDE 500 MG/5ML
500 INJECTION, POWDER, LYOPHILIZED, FOR SOLUTION INTRAVENOUS ONCE
Status: DISCONTINUED | OUTPATIENT
Start: 2023-12-07 | End: 2023-12-07

## 2023-12-07 RX ORDER — BENZONATATE 100 MG/1
100 CAPSULE ORAL 2 TIMES DAILY
Status: DISCONTINUED | OUTPATIENT
Start: 2023-12-07 | End: 2023-12-13 | Stop reason: HOSPADM

## 2023-12-07 RX ORDER — POTASSIUM CHLORIDE 20 MEQ/1
20 TABLET, EXTENDED RELEASE ORAL 2 TIMES DAILY
Status: DISCONTINUED | OUTPATIENT
Start: 2023-12-07 | End: 2023-12-07

## 2023-12-07 RX ADMIN — BENZONATATE 100 MG: 100 CAPSULE ORAL at 22:00

## 2023-12-07 RX ADMIN — CEPHALEXIN 500 MG: 500 CAPSULE ORAL at 21:56

## 2023-12-07 RX ADMIN — SPIRONOLACTONE 25 MG: 25 TABLET, FILM COATED ORAL at 08:51

## 2023-12-07 RX ADMIN — FUROSEMIDE 40 MG: 10 INJECTION, SOLUTION INTRAVENOUS at 08:51

## 2023-12-07 RX ADMIN — PANTOPRAZOLE SODIUM 40 MG: 40 TABLET, DELAYED RELEASE ORAL at 05:16

## 2023-12-07 RX ADMIN — ENOXAPARIN SODIUM 40 MG: 40 INJECTION SUBCUTANEOUS at 08:53

## 2023-12-07 RX ADMIN — ACETAMINOPHEN 975 MG: 325 TABLET ORAL at 13:54

## 2023-12-07 RX ADMIN — FUROSEMIDE 40 MG: 10 INJECTION, SOLUTION INTRAVENOUS at 15:27

## 2023-12-07 RX ADMIN — ACETAMINOPHEN 975 MG: 325 TABLET ORAL at 05:16

## 2023-12-07 RX ADMIN — Medication 250 MG: at 17:34

## 2023-12-07 RX ADMIN — METOPROLOL SUCCINATE 50 MG: 50 TABLET, EXTENDED RELEASE ORAL at 08:51

## 2023-12-07 RX ADMIN — ACETAMINOPHEN 975 MG: 325 TABLET ORAL at 21:56

## 2023-12-07 RX ADMIN — CEPHALEXIN 500 MG: 500 CAPSULE ORAL at 08:51

## 2023-12-07 RX ADMIN — METOPROLOL SUCCINATE 75 MG: 50 TABLET, EXTENDED RELEASE ORAL at 21:56

## 2023-12-07 RX ADMIN — EMPAGLIFLOZIN 10 MG: 10 TABLET, FILM COATED ORAL at 08:51

## 2023-12-07 RX ADMIN — Medication 250 MG: at 08:51

## 2023-12-07 NOTE — OCCUPATIONAL THERAPY NOTE
Occupational Therapy Evaluation     Patient Name: Vidal Naylor  YAVNP'E Date: 12/7/2023  Problem List  Principal Problem:    Acute exacerbation of CHF (congestive heart failure) ([address])  Active Problems:    Chronic combined systolic and diastolic congestive heart failure (HCC)    Hypertension    HEIKE (acute kidney injury) ([address])    Chest pain    Hyperlipidemia    Cocaine abuse ([address])    Left leg cellulitis    UTI (urinary tract infection)    Past Medical History  Past Medical History:   Diagnosis Date    Chronic HFrEF (heart failure with reduced ejection fraction) ([address]) 04/01/2022     Past Surgical History  Past Surgical History:   Procedure Laterality Date    CARDIAC CATHETERIZATION N/A 4/4/2022    Procedure: CARDIAC CORONARY ANGIOGRAM;  Surgeon: Edward Hilario MD;  [facility]; Service: Cardiology    CARDIAC CATHETERIZATION Left 4/4/2022    Procedure: Cardiac Left Heart Cath;  Surgeon: Edward Hilario MD;  [facility]; Service: Cardiology             12/07/23 1315   OT Last Visit   OT Visit Date 12/07/23   Note Type   Note type Evaluation   Pain Assessment   Pain Assessment Tool 0-10   Pain Score 7   Pain Location/Orientation Orientation: Bilateral;Location: Leg   Patient's Stated Pain Goal No pain   Restrictions/Precautions   Weight Bearing Precautions Per Order No   Other Precautions Pain   Home Living   Type of [address] [place] [address]  Multi-level;Bed/bath upstairs   Bathroom Shower/Tub Tub/shower unit   Bathroom Toilet Standard   Prior Function   Level of [place] Independent with ADLs; Independent with functional mobility   Lives With Significant other   Receives Help From Family   Vocational Unemployed   General   Family/Caregiver Present No   Subjective   Subjective " it's hard to walk because my legs hurt"   ADL   Eating Assistance 6  Modified independent   Grooming Assistance 6  Modified Independent   UB Bathing Assistance 6  Modified Independent   LB Bathing Assistance 5  Supervision/Setup   UB Dressing Assistance 6  Modified independent   LB Dressing Assistance 5  Supervision/Setup   Toileting Assistance  5  Supervision/Setup   Additional Comments Increased SOB when bending to feet. Encouraged tailor sit method for EC. Bed Mobility   Additional Comments Received on EOB and remains there at end of session. Transfers   Sit to Stand 5  Supervision   Additional items Increased time required   Stand to Sit 5  Supervision   Additional items Increased time required   Stand pivot 5  Supervision   Additional items   [facility])   Functional Mobility   Functional Mobility 5  Supervision   Additional Comments (S) using SPC   Balance   Static Sitting Good   Dynamic Sitting Fair +   Static Standing Fair +   Dynamic Standing Fair   Ambulatory Fair   Activity Tolerance   Activity Tolerance Patient limited by pain; Other (Comment)  (Easily SOB)   Nurse Made Aware Spoke to RN   RUE Assessment   RUE Assessment WFL  (4/5)   LUE Assessment   LUE Assessment WFL  (4/5)   Hand Function   Gross Motor Coordination Functional   Fine Motor Coordination Functional   Vision-Basic Assessment   Current Vision No visual deficits   Psychosocial   Psychosocial (WDL) WDL   Cognition   Overall Cognitive Status WFL   Arousal/Participation Responsive; Cooperative   Attention Within functional limits   Orientation Level Oriented X4   Memory Within functional limits   Following Commands Follows all commands and directions without difficulty   Comments Pleasant and cooperative   Assessment   Limitation Decreased ADL status; Decreased endurance;Decreased self-care trans;Decreased high-level ADLs   Prognosis Good   Assessment Pt admit 12/4/23 with acute CHF exacerbation. Now w/ UTI and L LE cellulitis. OT completed extensive review of pt's medical and social history. Pt with h/o HTN, HEIKE, and cocaine abuse. Prior to admit was living w/ significant other in multi story house w/ bed and bath on 2nd fl.  (I) PLOF. Pt presents to OT below baseline due to the following performance deficits: strength; edema; pain; balance; stand tolerance; functional mobility; coping; community integration; self care; and IADLs. Therefore, pt would benefit from OT services to achieve optimal level of performance. Occupational performance areas to be addressed include:  bathing, toileting, dressing, activity tolerance, functional mobility, and clothing management. Pt is (S) for transfers and mobility using SPC. Endorses increased B LE pain. + L LE cellulitis. TEDS intact. MI for UB ADLS and (S) for LB ADLs w/ increased SOB. Encouraged tailor sit method. SPO2 96-98% RA. /82 seated and 106/81 standing. Denies dizziness. Chest XR showed moderate edema. Very easily SOB w/ minimal activity. Based on findings, pt is of high complexity. The patient's raw score on the AM-PAC Daily Activity Inpatient Short Form is 21. A raw score of greater than or equal to 19 suggests the patient may benefit from discharge to home. Recommend minimal level 3 resources. Goals   Patient Goals to have less pain in my legs   Plan   Treatment Interventions ADL retraining;Functional transfer training;UE strengthening/ROM; Endurance training;Patient/family training;Equipment evaluation/education; Activityengagement; Energy conservation   Goal Expiration Date 12/21/23   OT Treatment Day 0   OT Frequency 2-3x/wk   Discharge Recommendation   Rehab Resource Intensity Level, OT III (Minimum Resource Intensity)   AM-PAC Daily Activity Inpatient   Lower Body Dressing 3   Bathing 3   Toileting 3   Upper Body Dressing 4   Grooming 4   Eating 4   Daily Activity Raw Score 21   Daily Activity Standardized Score (Calc for Raw Score >=11) 44.27       GOALS:     Pt will complete UB ADL's w/ MI     Pt will complete LB ADL's w/ MI     Pt will complete toileting including hygiene and clothing management w/ MI     Pt will complete functional transfers w/ MI using SPC    Pt will complete dynamic and unilateral stand balance w/ G- for safe clothing management     Pt will improve stand tolerance to 5-10 mins for safety w/ ADL tasks     Pt will improve activity tolerance to G for 20- 30 min tx session for increased engagement in functional tasks     Pt will achieve UE MMT 4+/5 to increase independence w/ ADL txfs         [place] MS, OTR/L

## 2023-12-07 NOTE — PLAN OF CARE
Problem: OCCUPATIONAL THERAPY ADULT  Goal: Performs self-care activities at highest level of function for planned discharge setting. See evaluation for individualized goals. Description: Treatment Interventions: ADL retraining, Functional transfer training, UE strengthening/ROM, Endurance training, Patient/family training, Equipment evaluation/education, Activityengagement, Energy conservation          See flowsheet documentation for full assessment, interventions and recommendations. Note: Limitation: Decreased ADL status, Decreased endurance, Decreased self-care trans, Decreased high-level ADLs  Prognosis: Good  Assessment: Pt admit 12/4/23 with acute CHF exacerbation. Now w/ UTI and L LE cellulitis. OT completed extensive review of pt's medical and social history. Pt with h/o HTN, HEIKE, and cocaine abuse. Prior to admit was living w/ significant other in multi story house w/ bed and bath on 2nd fl. (I) PLOF. Pt presents to OT below baseline due to the following performance deficits: strength; edema; pain; balance; stand tolerance; functional mobility; coping; community integration; self care; and IADLs. Therefore, pt would benefit from OT services to achieve optimal level of performance. Occupational performance areas to be addressed include:  bathing, toileting, dressing, activity tolerance, functional mobility, and clothing management. Pt is (S) for transfers and mobility using SPC. Endorses increased B LE pain. + L LE cellulitis. TEDS intact. MI for UB ADLS and (S) for LB ADLs w/ increased SOB. Encouraged tailor sit method. SPO2 96-98% RA. /82 seated and 106/81 standing. Denies dizziness. Chest XR showed moderate edema. Very easily SOB w/ minimal activity. Based on findings, pt is of high complexity. The patient's raw score on the AM-PAC Daily Activity Inpatient Short Form is 21. A raw score of greater than or equal to 19 suggests the patient may benefit from discharge to home.  Recommend minimal level 3 resources.      Rehab Resource Intensity Level, OT: III (Minimum Resource Intensity)

## 2023-12-07 NOTE — PLAN OF CARE
Problem: PAIN - ADULT  Goal: Verbalizes/displays adequate comfort level or baseline comfort level  Description: Interventions:  - Encourage patient to monitor pain and request assistance  - Assess pain using appropriate pain scale  - Administer analgesics based on type and severity of pain and evaluate response  - Implement non-pharmacological measures as appropriate and evaluate response  - Consider cultural and social influences on pain and pain management  - Notify physician/advanced practitioner if interventions unsuccessful or patient reports new pain  Outcome: Progressing     Problem: INFECTION - ADULT  Goal: Absence or prevention of progression during hospitalization  Description: INTERVENTIONS:  - Assess and monitor for signs and symptoms of infection  - Monitor lab/diagnostic results  - Monitor all insertion sites, i.e. indwelling lines, tubes, and drains  - Monitor endotracheal if appropriate and nasal secretions for changes in amount and color  - [place] appropriate cooling/warming therapies per order  - Administer medications as ordered  - Instruct and encourage patient and family to use good hand hygiene technique  - Identify and instruct in appropriate isolation precautions for identified infection/condition  Outcome: Progressing     Problem: Knowledge Deficit  Goal: Patient/family/caregiver demonstrates understanding of disease process, treatment plan, medications, and discharge instructions  Description: Complete learning assessment and assess knowledge base.   Interventions:  - Provide teaching at level of understanding  - Provide teaching via preferred learning methods  Outcome: Progressing     Problem: DISCHARGE PLANNING  Goal: Discharge to home or other facility with appropriate resources  Description: INTERVENTIONS:  - Identify barriers to discharge w/patient and caregiver  - Arrange for needed discharge resources and transportation as appropriate  - Identify discharge learning needs (meds, wound care, etc.)  - Arrange for interpretive services to assist at discharge as needed  - Refer to Case Management Department for coordinating discharge planning if the patient needs post-hospital services based on physician/advanced practitioner order or complex needs related to functional status, cognitive ability, or social support system  Outcome: Progressing

## 2023-12-07 NOTE — PROGRESS NOTES
Progress Note    Sharron Crigler 48 y.o. male MRN: [id number]  Unit/Bed#: 7T [facility] 713-01 Encounter: [id number]  Admitting Physician: Zheng Esquivel MD  PCP: Blaire Barnes MD  Date of Admission:  12/4/2023 12:49 PM    Assessment and Plan    * Acute exacerbation of CHF (congestive heart failure) ([address] )  Assessment & Plan  - Likely in the setting of cocaine use (positive UDS) vs. Possible viral infection (nasal swab not collected overnight)  - Patient has lost 3 lbs since admission on IV diuresis -- Dry weight: 217 lbs  - Patient still having signs of pulmonary congestion, orthopnea  - Improvement in oxygen requirements  - Home regimen: furosemide 60 QD (taking BID), metoprolol 50 in day and 75 at night, losartan 50 mg BID, spironolactone 25 mg daily, Jardiance 10 mg daily  - Last echo :  LVEF 40% ( August 2023)   Currently 95% saturation on RA ; 16 lbs weight loss  near dry weight; adequate urine output ( 1ml/kg)     Plan:   - Continue IV diuresis: Increase IV Lasix to 40 mg BID  - Monitor I/O and daily weight  - Fluid restriction 1.5L   - 24 h telemetry  - Continuous pulse oximetry and titrate O2 as appropriate  - Monitor electrolytes  - Hold Losartan, continue Jardiance and Spironolactone  - Cardiology consult            Chronic combined systolic and diastolic congestive heart failure ([address])  Assessment & Plan  See A/P for acute exacerbation of CHF       UTI (urinary tract infection)  Assessment & Plan  Patient did not complain of dysuria, urine output was not adequate since yesterday despite adequate diureses    Plan:   Continue keflex for 7 days   FU on urine culture for sensitivities     Left leg cellulitis  Assessment & Plan  Pain in Left leg has decreased from yesterday. Somewhat warm to the touch.      Plan:  PO Keflex 500 mg BID ( day 3)   Continue to monitor vitals   Continue to monitor redness ( marked with bed)  Follow CBC    Cocaine abuse [facility])  Assessment & Plan  - Patient denies cocaine use despite positive UDS  - In view of denial, unsure about last use  - Held Metoprolol for 24 h since time of admission    Plan:   - Monitor for signs and symptoms of withdrawal    Hyperlipidemia  Assessment & Plan  - Home medication: atorvastatin 40 mg QD    PLAN:  - Continue statin    Chest pain  Assessment & Plan  - Troponnins: 25 > 22   - Non-MI related tropinin elevation most likely 2/2 to CHF exacerbation   - ECG sinus tachycardia; Possible Left atrial enlargement, QTC: 451  - This morning patient did not endorse chest pain     Plan:  - Apply Voltaren gel  - Tylenol 650 mg q8h  - Monitor symptoms      HEIKE (acute kidney injury) ([address])  Assessment & Plan  - Creatinine of 1.45 this morning (baseline 1.2 - 1.3) admission = 1.19. improving since yesterday   - Likely prerenal in the setting of IV diuresis vs fluid overload  - S/p NSAIDs for pain and ARB use for hypertension  - No electrolyte derrangements    PLAN:  - Monitor renal function  - Hold ARB  - Caution with nephrotoxic agent    Hypertension  Assessment & Plan  Blood Pressure: 128/90     - Home medications: furosemide 60 mg daily (using BID), losartan 50 BID, metoprolol 50 mg in morning, metoprolol 75 mg at night, spironolactone 25 mg daily    Plan:   - Hold ARB in view of new HEIKE  - IV diuresis  - Consider PRN medication if persistent hypertension  - Low sodium diet  - Monitor VS    Acute respiratory failure with hypoxia (HCC)-resolved as of 12/6/2023  Assessment & Plan  - Patient currently on 1L O2 supplementation via NC  - Likely in the setting of CHF exacerbation  - currently on RA saturating at 95%     PLAN:  - Continue O2 as needed and titrate as appropriate  - Continue IV diuresis    SIRS (systemic inflammatory response syndrome) (HCC)-resolved as of 12/6/2023  Assessment & Plan  - Fever resolved  - S/P Tylenol 650 in ED IV Ceftriaxone x 1 in the ED  - No evidence of bacterial infection  - Flu/RSV/Covid not done overnight    Plan:   Monitor vitals VTE Pharmacologic Prophylaxis: VTE Score: 6 High Risk (Score >/= 5) - Pharmacological DVT Prophylaxis Ordered: enoxaparin (Lovenox). Sequential Compression Devices Ordered. Patient Centered Rounds: I have performed bedside rounds with nursing staff today. Discussions with Specialists or Other Care Team Provider: No    Education and Discussions with Family / Patient: Yes  Patient Information Sharing: Family aware of plan    Time Spent for Care: 45 minutes. More than 50% of total time spent on counseling and coordination of care as described above. Current Length of Stay: 3 day(s)    Current Patient Status: Inpatient   Certification Statement: The patient will continue to require additional inpatient hospital stay due to acute CHF exacerbation. Discharge Plan: Upon improvement in cardiac condition    Code Status: Level 1 - Full Code      Subjective:   Sergio Rios was seen and evaluated at bedside. He reports doing overall better in comparison to admission; his shortness of breath remained since yesterday. He has increased shortness of breath when walking to the bathroom. Yesterday he endorsed 2 episodes of darkening vision lasting about 4 seconds with coughing spells. He has 2 more episodes this morning around 6 am. During spells he endorses feeling somewhat dizzy. No acute changes in vitals or ECG. Denies nausea, vomiting, changes in appetite, dysuria. His leg pain from yesterday has continued to improved. Pitting edema is decreased from yesterday to grade 2    Objective:     Vitals:   Temp (24hrs), Av.3 °F (36.3 °C), Min:97 °F (36.1 °C), Max:97.6 °F (36.4 °C)    Temp:  [97 °F (36.1 °C)-97.6 °F (36.4 °C)] 97 °F (36.1 °C)  HR:  [85-94] 89  Resp:  [18-20] 20  BP: (123-147)/() 128/90  SpO2:  [94 %-97 %] 94 %  Body mass index is 30.05 kg/m². Input and Output Summary (last 24 hours):        Intake/Output Summary (Last 24 hours) at 2023 1051  Last data filed at 2023 0900  Gross per 24 hour   Intake 840 ml   Output 1300 ml   Net -460 ml       Physical Exam:     Physical Exam  Constitutional:       General: He is not in acute distress. Appearance: Normal appearance. He is obese. He is not ill-appearing, toxic-appearing or diaphoretic. HENT:      Mouth/Throat:      Mouth: Mucous membranes are moist.   Eyes:      Conjunctiva/sclera: Conjunctivae normal.   Cardiovascular:      Rate and Rhythm: Normal rate and regular rhythm. Heart sounds: Normal heart sounds. Pulmonary:      Effort: Pulmonary effort is normal. No respiratory distress. Breath sounds: Rales present. Comments: Fine rales bilateral lower lung zones   Chest:      Chest wall: No tenderness. Abdominal:      Palpations: Abdomen is soft. Tenderness: There is no abdominal tenderness. Musculoskeletal:      Right lower le+ Edema present. Left lower le+ Edema present. Comments: Somewhat decrease in edema from yesterday  Erythema of Left leg, pain improved, reddness improved since yesterday   Skin:     General: Skin is warm. Neurological:      General: No focal deficit present. Mental Status: He is alert and oriented to person, place, and time. Psychiatric:         Mood and Affect: Mood normal.         Behavior: Behavior normal.         Thought Content:  Thought content normal.         Judgment: Judgment normal.           Additional Data:     Labs:    Results from last 7 days   Lab Units 23  0509   WBC Thousand/uL 6.60   HEMOGLOBIN g/dL 13.5   HEMATOCRIT % 43.0   PLATELETS Thousands/uL 271   NEUTROS PCT % 68   LYMPHS PCT % 19   MONOS PCT % 8   EOS PCT % 4     Results from last 7 days   Lab Units 23  0509 23  0445 23  0541   POTASSIUM mmol/L 4.1   < > 4.0   CHLORIDE mmol/L 101   < > 103   CO2 mmol/L 29   < > 25   BUN mg/dL 28*   < > 25   CREATININE mg/dL 1.45*   < > 1.48*   CALCIUM mg/dL 8.8   < > 9.6   ALK PHOS U/L  --   --  210* ALT U/L  --   --  30   AST U/L  --   --  28    < > = values in this interval not displayed. Results from last 7 days   Lab Units 12/04/23  1304   INR  1.19                 * I Have Reviewed All Lab Data Listed Above. * Additional Pertinent Lab Tests Reviewed: All Labs Within Last 24 Hours Reviewed    Imaging:    Imaging Reports Reviewed Today Include: N/A  Imaging Personally Reviewed by Myself Includes:  N/A    Recent Cultures (last 7 days):     Results from last 7 days   Lab Units 12/04/23  1322 12/04/23  1304   BLOOD CULTURE  No Growth at 48 hrs. No Growth at 48 hrs.        Last 24 Hours Medication List:   Current Facility-Administered Medications   Medication Dose Route Frequency Provider Last Rate    acetaminophen  975 mg Oral Q8H [address] [facility] [address] Lloyd Kennedy MD      cephalexin  500 mg Oral Q12H Kami Oviedo MD      Diclofenac Sodium  2 g Topical 4x Daily Bren Saavedra MD      Empagliflozin  10 mg Oral Daily Vidal Harper MD      enoxaparin  40 mg Subcutaneous Daily Elsworth Opitz, MD      furosemide  40 mg Intravenous BID (diuretic) Vidal Harper MD      metoprolol succinate  50 mg Oral Daily Bren Saavedra MD      metoprolol succinate  75 mg Oral HS Vidal Harper MD      pantoprazole  40 mg Oral Early Morning Bren Saavedra MD      saccharomyces boulardii  250 mg Oral BID Felipe Perez MD      spironolactone  25 mg Oral Daily Gabriela de Ellison Butler, MD Elsworth Opitz, MD  12/07/23  10:51 AM

## 2023-12-07 NOTE — PROGRESS NOTES
Progress Note - Cardiology   Jad Grove 48 y.o. male MRN: [id number]  Unit/Bed#: 7T [facility] 713-01 Encounter: [id number]    Assessment:    Acute on chronic combined systolic and diastolic heart failure  Hypertension   Acute kidney injury Improving, creatinine 1.64 -> 1.45  No further chest pain, no acute EKG changes troponins negative x 2  Hyperlipidemia  Cocaine abuse, patient denies cocaine use but urine was positive for cocaine  Alcohol abuse  Left leg cellulitis  UTI    Plan:    Continue furosemide 40 mg IV twice daily with spironolactone 25 mg daily and Jardiance 10 mg daily  Continue metoprolol succinate 50 mg in a.m. and 75 mg in p.m. with losartan 50 mg twice daily and spironolactone 25 mg daily  Continue atorvastatin 40 mg daily      Interval history:  Patient states that he had a standing weight done this morning. Additionally it appeared the patient's weight was not down at all but when rechecked, today's weight was entered twice and actually when compared to yesterday's weight, he is down 13 pounds. Will continue furosemide 40 mg IV twice daily, spironolactone 25 mg daily.       PROBLEM LIST:    Patient Active Problem List    Diagnosis Date Noted    UTI (urinary tract infection) 12/06/2023    Left leg cellulitis 12/05/2023    Acute exacerbation of CHF (congestive heart failure) ([address]) 12/04/2023    Chest pain 12/04/2023    Hyperlipidemia 12/04/2023    Cocaine abuse ([address]) 12/04/2023    Nonischemic cardiomyopathy ([address]) 11/26/2023    Does not have health insurance 08/14/2023    Encounter to establish care 08/14/2023    Chest pain, unspecified 08/05/2023    HEIKE (acute kidney injury) ([address]) 08/05/2023    Abnormal CT scan 08/05/2023    Chronic combined systolic and diastolic congestive heart failure ([address]) 04/01/2022    Pleural effusion, left 04/01/2022    Hypertension 04/01/2022    Left leg pain 04/01/2022    Elevated serum creatinine 04/01/2022       Vitals: /90 (BP Location: Right leg)   Pulse 89 Temp (!) 97 °F (36.1 °C) (Temporal)   Resp 20   Ht 6' (1.829 m)   Wt 101 kg (221 lb 9 oz)   SpO2 95%   BMI 30.05 kg/m²   PREVIOUS WEIGHTS:   Weight (last 2 days)       Date/Time Weight    12/07/23 0551 101 (221.56)    12/07/23 0550 101 (221.56)    12/06/23 0532 107 (234.79)    12/05/23 0600 107 (235)            Wt Readings from Last 2 Encounters:   12/07/23 101 kg (221 lb 9 oz)   11/16/23 98.4 kg (217 lb)       Labs/Data:        Results from last 7 days   Lab Units 12/07/23  0509 12/06/23 0445 12/05/23  0541   WBC Thousand/uL 6.60 5.84 8.84   HEMOGLOBIN g/dL 13.5 13.1 15.5   HEMATOCRIT % 43.0 41.5 49.8*   MCV fL 89 89 90   MCH pg 28.0 28.2 28.0   MCHC g/dL 31.4 31.6 31.1*   PLATELETS Thousands/uL 271 230 255     Results from last 7 days   Lab Units 12/07/23  0509 12/06/23 0445 12/05/23  0541   EGFR ml/min/1.73sq m 55 47 54   SODIUM mmol/L 138 139 142   POTASSIUM mmol/L 4.1 3.9 4.0   CHLORIDE mmol/L 101 102 103   CO2 mmol/L 29 28 25   BUN mg/dL 28* 24 25   CREATININE mg/dL 1.45* 1.64* 1.48*     Results from last 7 days   Lab Units 12/07/23  0509 12/06/23  0445 12/05/23  0541 12/04/23  1304   CALCIUM mg/dL 8.8 8.4 9.6 8.9   AST U/L  --   --  28 25   ALT U/L  --   --  30 30   ALK PHOS U/L  --   --  210* 179*     Lab Results   Component Value Date    NTBNP 677 (H) 05/06/2022    NTBNP 3,237 (H) 03/31/2022     Lab Results   Component Value Date    CHOLESTEROL 132 03/31/2022    TRIG 205 (H) 03/31/2022    HDL 44 03/31/2022    LDLCALC 47 03/31/2022    HGBA1C 5.4 03/31/2022       Results from last 7 days   Lab Units 12/04/23  1304   INR  1.19   PROTIME seconds 15.4*          Current Facility-Administered Medications:     acetaminophen (TYLENOL) tablet 975 mg, 975 mg, Oral, Q8H [address], Bren Barlow MD, 975 mg at 12/07/23 1354    cephalexin (KEFLEX) capsule 500 mg, 500 mg, Oral, Q12H [address], Donato Smalls MD, 500 mg at 12/07/23 0851    Diclofenac Sodium (VOLTAREN) 1 % topical gel 2 g, 2 g, Topical, 4x Daily, Evelin Cardenas MD, 2 g at 12/06/23 2123    Empagliflozin (JARDIANCE) tablet 10 mg, 10 mg, Oral, Daily, Bren Valenzuela MD, 10 mg at 12/07/23 0851    enoxaparin (LOVENOX) subcutaneous injection 40 mg, 40 mg, Subcutaneous, Daily, Bruce Moreno MD, 40 mg at 12/07/23 0853    furosemide (LASIX) injection 40 mg, 40 mg, Intravenous, BID (diuretic), Monserrat Matute MD    metoprolol succinate (TOPROL-XL) 24 hr tablet 50 mg, 50 mg, Oral, Daily, Bren Valenzuela MD, 50 mg at 12/07/23 0851    metoprolol succinate (TOPROL-XL) 24 hr tablet 75 mg, 75 mg, Oral, HS, Bren Valenzuela MD, 75 mg at 12/06/23 2120    pantoprazole (PROTONIX) EC tablet 40 mg, 40 mg, Oral, Early Morning, Bren Valenzuela MD, 40 mg at 12/07/23 2220    saccharomyces boulardii (FLORASTOR) capsule 250 mg, 250 mg, Oral, BID, Columba Chapman MD, 250 mg at 12/07/23 3771    spironolactone (ALDACTONE) tablet 25 mg, 25 mg, Oral, Daily, Bren Valenzuela MD, 25 mg at 12/07/23 0851  No Known Allergies      Intake/Output Summary (Last 24 hours) at 12/7/2023 1522  Last data filed at 12/7/2023 1314  Gross per 24 hour   Intake 960 ml   Output 800 ml   Net 160 ml       Invasive Devices       Peripheral Intravenous Line  Duration             Peripheral IV 12/04/23 Distal;Left;Upper;Ventral (anterior) Arm 3 days    Peripheral IV 12/04/23 Right Antecubital 3 days                    Review of Systems   Constitutional:  Negative for activity change. Respiratory:  Negative for cough, choking, chest tightness, shortness of breath, wheezing and stridor. Cardiovascular:  Positive for leg swelling. Negative for chest pain and palpitations. Musculoskeletal:  Negative for gait problem. Skin:  Negative for color change. Neurological:  Negative for dizziness, tremors, syncope, weakness, light-headedness and headaches. Psychiatric/Behavioral:  Negative for agitation and confusion.         Physical Exam  Vitals reviewed. Constitutional:       General: He is not in acute distress. Appearance: He is well-developed. He is obese. HENT:      Head: Normocephalic and atraumatic. Neck:      Thyroid: No thyromegaly. Vascular: No carotid bruit or JVD. Trachea: No tracheal deviation. Cardiovascular:      Rate and Rhythm: Normal rate and regular rhythm. Pulses: Normal pulses. Heart sounds: Normal heart sounds. No murmur heard. No friction rub. No gallop. Comments: Edema all the way up to below the knee. Previously reported to be up to the thigh. He represents some improvement. Pulmonary:      Effort: Pulmonary effort is normal. No respiratory distress. Breath sounds: Normal breath sounds. No wheezing, rhonchi or rales. Chest:      Chest wall: No tenderness. Musculoskeletal:      Cervical back: Normal range of motion and neck supple. Right lower le+ Pitting Edema present. Left lower le+ Pitting Edema present. Skin:     General: Skin is warm and dry. Neurological:      General: No focal deficit present. Mental Status: He is alert and oriented to person, place, and time. Psychiatric:         Mood and Affect: Mood normal.         Behavior: Behavior normal.         Thought Content: Thought content normal.         Judgment: Judgment normal.         ======================================================     Imaging:   I have personally reviewed pertinent reports. EKG: Sinus tachycardia      Portions of the record may have been created with voice recognition software. Occasional wrong word or "sound a like" substitutions may have occurred due to the inherent limitations of voice recognition software. Read the chart carefully and recognize, using context, where substitutions have occurred.

## 2023-12-08 PROBLEM — N39.0 UTI (URINARY TRACT INFECTION): Status: RESOLVED | Noted: 2023-12-06 | Resolved: 2023-12-08

## 2023-12-08 LAB
ANION GAP SERPL CALCULATED.3IONS-SCNC: 12 MMOL/L
BASOPHILS # BLD AUTO: 0.05 THOUSANDS/ÂΜL (ref 0–0.1)
BASOPHILS NFR BLD AUTO: 1 % (ref 0–1)
BUN SERPL-MCNC: 34 MG/DL (ref 5–25)
CALCIUM SERPL-MCNC: 8.8 MG/DL (ref 8.4–10.2)
CHLORIDE SERPL-SCNC: 103 MMOL/L (ref 96–108)
CO2 SERPL-SCNC: 26 MMOL/L (ref 21–32)
CREAT SERPL-MCNC: 1.57 MG/DL (ref 0.6–1.3)
EOSINOPHIL # BLD AUTO: 0.23 THOUSAND/ÂΜL (ref 0–0.61)
EOSINOPHIL NFR BLD AUTO: 3 % (ref 0–6)
ERYTHROCYTE [DISTWIDTH] IN BLOOD BY AUTOMATED COUNT: 14.5 % (ref 11.6–15.1)
GFR SERPL CREATININE-BSD FRML MDRD: 50 ML/MIN/1.73SQ M
GLUCOSE SERPL-MCNC: 95 MG/DL (ref 65–140)
HCT VFR BLD AUTO: 42.2 % (ref 36.5–49.3)
HGB BLD-MCNC: 13.4 G/DL (ref 12–17)
IMM GRANULOCYTES # BLD AUTO: 0.04 THOUSAND/UL (ref 0–0.2)
IMM GRANULOCYTES NFR BLD AUTO: 1 % (ref 0–2)
LYMPHOCYTES # BLD AUTO: 1.05 THOUSANDS/ÂΜL (ref 0.6–4.47)
LYMPHOCYTES NFR BLD AUTO: 15 % (ref 14–44)
MAGNESIUM SERPL-MCNC: 2.4 MG/DL (ref 1.9–2.7)
MCH RBC QN AUTO: 28 PG (ref 26.8–34.3)
MCHC RBC AUTO-ENTMCNC: 31.8 G/DL (ref 31.4–37.4)
MCV RBC AUTO: 88 FL (ref 82–98)
MONOCYTES # BLD AUTO: 0.49 THOUSAND/ÂΜL (ref 0.17–1.22)
MONOCYTES NFR BLD AUTO: 7 % (ref 4–12)
NEUTROPHILS # BLD AUTO: 5.14 THOUSANDS/ÂΜL (ref 1.85–7.62)
NEUTS SEG NFR BLD AUTO: 73 % (ref 43–75)
NRBC BLD AUTO-RTO: 0 /100 WBCS
PHOSPHATE SERPL-MCNC: 4.2 MG/DL (ref 2.7–4.5)
PLATELET # BLD AUTO: 263 THOUSANDS/UL (ref 149–390)
PMV BLD AUTO: 11.3 FL (ref 8.9–12.7)
POTASSIUM SERPL-SCNC: 4.1 MMOL/L (ref 3.5–5.3)
RBC # BLD AUTO: 4.79 MILLION/UL (ref 3.88–5.62)
SODIUM SERPL-SCNC: 141 MMOL/L (ref 135–147)
WBC # BLD AUTO: 7 THOUSAND/UL (ref 4.31–10.16)

## 2023-12-08 PROCEDURE — 99233 SBSQ HOSP IP/OBS HIGH 50: CPT | Performed by: FAMILY MEDICINE

## 2023-12-08 PROCEDURE — 85025 COMPLETE CBC W/AUTO DIFF WBC: CPT

## 2023-12-08 PROCEDURE — 99232 SBSQ HOSP IP/OBS MODERATE 35: CPT

## 2023-12-08 PROCEDURE — 84100 ASSAY OF PHOSPHORUS: CPT

## 2023-12-08 PROCEDURE — 83735 ASSAY OF MAGNESIUM: CPT

## 2023-12-08 PROCEDURE — 80048 BASIC METABOLIC PNL TOTAL CA: CPT

## 2023-12-08 RX ADMIN — CEPHALEXIN 500 MG: 500 CAPSULE ORAL at 09:09

## 2023-12-08 RX ADMIN — BENZONATATE 100 MG: 100 CAPSULE ORAL at 09:09

## 2023-12-08 RX ADMIN — ACETAMINOPHEN 975 MG: 325 TABLET ORAL at 05:43

## 2023-12-08 RX ADMIN — ACETAMINOPHEN 975 MG: 325 TABLET ORAL at 21:01

## 2023-12-08 RX ADMIN — METOPROLOL SUCCINATE 50 MG: 50 TABLET, EXTENDED RELEASE ORAL at 09:10

## 2023-12-08 RX ADMIN — METOPROLOL SUCCINATE 75 MG: 50 TABLET, EXTENDED RELEASE ORAL at 21:00

## 2023-12-08 RX ADMIN — EMPAGLIFLOZIN 10 MG: 10 TABLET, FILM COATED ORAL at 09:10

## 2023-12-08 RX ADMIN — PANTOPRAZOLE SODIUM 40 MG: 40 TABLET, DELAYED RELEASE ORAL at 05:43

## 2023-12-08 RX ADMIN — Medication 2 G: at 21:05

## 2023-12-08 RX ADMIN — Medication 250 MG: at 09:11

## 2023-12-08 RX ADMIN — FUROSEMIDE 40 MG: 10 INJECTION, SOLUTION INTRAVENOUS at 09:09

## 2023-12-08 RX ADMIN — ENOXAPARIN SODIUM 40 MG: 40 INJECTION SUBCUTANEOUS at 09:10

## 2023-12-08 RX ADMIN — SPIRONOLACTONE 25 MG: 25 TABLET, FILM COATED ORAL at 09:09

## 2023-12-08 RX ADMIN — FUROSEMIDE 40 MG: 10 INJECTION, SOLUTION INTRAVENOUS at 16:41

## 2023-12-08 RX ADMIN — CEPHALEXIN 500 MG: 500 CAPSULE ORAL at 20:52

## 2023-12-08 RX ADMIN — ACETAMINOPHEN 975 MG: 325 TABLET ORAL at 14:22

## 2023-12-08 RX ADMIN — Medication 250 MG: at 17:23

## 2023-12-08 RX ADMIN — BENZONATATE 100 MG: 100 CAPSULE ORAL at 20:52

## 2023-12-08 NOTE — PROGRESS NOTES
Progress Note - Cardiology   Hanny Jane 48 y.o. male MRN: [id number]  Unit/Bed#: 7T [facility] 713-01 Encounter: [id number]      Assessment/Recommendations/Discussion:   Acute on chronic combined systolic and diastolic heart failure, EF 40%, moderate global hypokinesis, grade 2 diastolic dysfunction  Pretension  Acute kidney injury  Hyperlipidemia  Cocaine abuse, UDS positive  Alcohol abuse  Left leg cellulitis  UTI      PLAN  No weight has been documented today yet  Weight is down significantly since admission however where he was 237 pounds via standing scale now 219 pounds yesterday  His baseline weight seems to be around 217 pounds  Creatinine did increase from 1.45-1.57 yesterday to today  Blood pressure remained stable at 125/86  Currently on furosemide 40 IV twice daily with spironolactone 25 mg p.o. daily and Jardiance 10 mg daily    Would hold Jardiance right now given fluctuating renal function with need for IV diuretics. Plan to resume once fully euvolemic and stable off IV diuretics. Heart rate is controlled. Continue with Toprol-XL 50 mg in the morning and 75 mg in the evening along with losartan 50 mg twice a day. Continue atorvastatin for dyslipidemia    Likely okay to switch to p.o. Lasix tomorrow, 60 mg p.o. daily in addition to the 25 mg spironolactone  Restart Jardiance once stable on p.o. diuretics and renal function returns to baseline    Subjective:   HPI  Seems to do well on the IV Lasix. Denies significant shortness of breath or chest pain today      Review of Systems: As noted in HPI. Rest of ROS is negative. Vitals:   /86 (BP Location: Left arm)   Pulse 77   Temp (!) 97.4 °F (36.3 °C) (Temporal)   Resp 17   Ht 6' (1.829 m)   Wt 99.4 kg (219 lb 2.2 oz)   SpO2 95%   BMI 29.72 kg/m²   I/O         12/06 0701 12/07 0700 12/07 0701 12/08 0700 12/08 0701 12/09 0700    P. O. 720 720 720    Total Intake(mL/kg) 720 (7.2) 720 (7.2) 720 (7.2)    Urine (mL/kg/hr) 2400 (1) Total Output 2400      Net -1680 +720 +720           Unmeasured Urine Occurrence   2 x          Weight (last 2 days)       Date/Time Weight    12/08/23 0537 99.4 (219.14)    12/07/23 0551 101 (221.56)    12/07/23 0550 101 (221.56)    12/06/23 0532 107 (234.79)            Physical Exam   Constitutional: awake, alert and oriented, in no acute distress, no obvious deformities  Head: Normocephalic, without obvious abnormality, atraumatic  Eyes: conjunctivae clear and moist. Sclera anicteric. No xanthelasmas. Pupils equal bilaterally. Extraocular motions are full. Ear nose mouth and throat: ears are symmetrical bilaterally, hearing appears to be equal bilaterally, no nasal discharge or epistaxis, oropharynx is clear with moist mucous membranes  Neck:  Trachea is midline, neck is supple, no thyromegaly or significant lymphadenopathy, there is full range of motion. Lungs: trace rales  Heart: regular rate and rhythm, S1, S2 normal, no murmur, no click, rub or gallop, no lower extremity edema  Abdomen: soft, non-tender; bowel sounds normal; no masses,  no organomegaly  Psychiatric:  Patient is oriented to time, place, person, mood/affect is negative for depression, anxiety, agitation, appears to have appropriate insight  Skin: Skin is warm, dry, intact. No obvious rashes or lesions on exposed extremities. Nail beds are pink with no cyanosis or clubbing. TELEMETRY:   Lab Results:  Results from last 7 days   Lab Units 12/08/23  0536   WBC Thousand/uL 7.00   HEMOGLOBIN g/dL 13.4   HEMATOCRIT % 42.2   PLATELETS Thousands/uL 263     Results from last 7 days   Lab Units 12/08/23  0536 12/06/23  0445 12/05/23  0541   POTASSIUM mmol/L 4.1   < > 4.0   CHLORIDE mmol/L 103   < > 103   CO2 mmol/L 26   < > 25   BUN mg/dL 34*   < > 25   CREATININE mg/dL 1.57*   < > 1.48*   CALCIUM mg/dL 8.8   < > 9.6   ALK PHOS U/L  --   --  210*   ALT U/L  --   --  30   AST U/L  --   --  28    < > = values in this interval not displayed. Results from last 7 days   Lab Units 12/08/23  0536   POTASSIUM mmol/L 4.1   CHLORIDE mmol/L 103   CO2 mmol/L 26   BUN mg/dL 34*   CREATININE mg/dL 1.57*   CALCIUM mg/dL 8.8           Medications:    Current Facility-Administered Medications:     acetaminophen (TYLENOL) tablet 975 mg, 975 mg, Oral, Q8H [facility], Bren Valenzuela MD, 975 mg at 12/08/23 0543    benzonatate (TESSALON PERLES) capsule 100 mg, 100 mg, Oral, BID, Columba Chapman MD, 100 mg at 12/08/23 0909    cephalexin (KEFLEX) capsule 500 mg, 500 mg, Oral, Q12H [facility], Bruce Moreno MD, 500 mg at 12/08/23 6731    Diclofenac Sodium (VOLTAREN) 1 % topical gel 2 g, 2 g, Topical, 4x Daily, Bren Valenzuela MD, 2 g at 12/06/23 2123    Empagliflozin (JARDIANCE) tablet 10 mg, 10 mg, Oral, Daily, Bren Valenzuela MD, 10 mg at 12/08/23 0910    enoxaparin (LOVENOX) subcutaneous injection 40 mg, 40 mg, Subcutaneous, Daily, Bruce Moreno MD, 40 mg at 12/08/23 0910    furosemide (LASIX) injection 40 mg, 40 mg, Intravenous, BID (diuretic), Monserrat Matute MD, 40 mg at 12/08/23 4994    metoprolol succinate (TOPROL-XL) 24 hr tablet 50 mg, 50 mg, Oral, Daily, Bren Valenzuela MD, 50 mg at 12/08/23 0910    metoprolol succinate (TOPROL-XL) 24 hr tablet 75 mg, 75 mg, Oral, HS, Bren Valenzuela MD, 75 mg at 12/07/23 2156    pantoprazole (PROTONIX) EC tablet 40 mg, 40 mg, Oral, Early Morning, Bren Valenzuela MD, 40 mg at 12/08/23 0543    saccharomyces boulardii (FLORASTOR) capsule 250 mg, 250 mg, Oral, BID, Columba Chapman MD, 250 mg at 12/08/23 8429    spironolactone (ALDACTONE) tablet 25 mg, 25 mg, Oral, Daily, Bren Valenzuela MD, 25 mg at 12/08/23 4110    Portions of the record may have been created with voice recognition software. Occasional wrong word or "sound a like" substitutions may have occurred due to the inherent limitations of voice recognition software.  Read the chart carefully and recognize, using context, where substitutions have occurred.     Annika Boo DO, [facility]  12/8/2023 1:53 PM

## 2023-12-08 NOTE — PLAN OF CARE
Problem: PAIN - ADULT  Goal: Verbalizes/displays adequate comfort level or baseline comfort level  Description: Interventions:  - Encourage patient to monitor pain and request assistance  - Assess pain using appropriate pain scale  - Administer analgesics based on type and severity of pain and evaluate response  - Implement non-pharmacological measures as appropriate and evaluate response  - Consider cultural and social influences on pain and pain management  - Notify physician/advanced practitioner if interventions unsuccessful or patient reports new pain  Outcome: Progressing     Problem: INFECTION - ADULT  Goal: Absence or prevention of progression during hospitalization  Description: INTERVENTIONS:  - Assess and monitor for signs and symptoms of infection  - Monitor lab/diagnostic results  - Monitor all insertion sites, i.e. indwelling lines, tubes, and drains  - Monitor endotracheal if appropriate and nasal secretions for changes in amount and color  - [place] appropriate cooling/warming therapies per order  - Administer medications as ordered  - Instruct and encourage patient and family to use good hand hygiene technique  - Identify and instruct in appropriate isolation precautions for identified infection/condition  Outcome: Progressing     Problem: Knowledge Deficit  Goal: Patient/family/caregiver demonstrates understanding of disease process, treatment plan, medications, and discharge instructions  Description: Complete learning assessment and assess knowledge base.   Interventions:  - Provide teaching at level of understanding  - Provide teaching via preferred learning methods  Outcome: Progressing     Problem: DISCHARGE PLANNING  Goal: Discharge to home or other facility with appropriate resources  Description: INTERVENTIONS:  - Identify barriers to discharge w/patient and caregiver  - Arrange for needed discharge resources and transportation as appropriate  - Identify discharge learning needs (meds, wound care, etc.)  - Arrange for interpretive services to assist at discharge as needed  - Refer to Case Management Department for coordinating discharge planning if the patient needs post-hospital services based on physician/advanced practitioner order or complex needs related to functional status, cognitive ability, or social support system  Outcome: Progressing

## 2023-12-08 NOTE — PLAN OF CARE
INTERNAL MEDICINE DISCHARGE SUMMARY    Patient: Clark Sheridan               Sex: male            MRN:  [id number]      YOB: 1994      Age:  28 year old               Admit Date: 10/16/2023  Discharge Date: 10/23/2023   Admitting Physician:  Nicky Mackey MD    Consults:    Problem List: Principal Problem:    Non-traumatic rhabdomyolysis  Resolved Problems:    * No resolved hospital problems. *     Admitting Diagnosis: Transaminitis [R74.01]  Non-traumatic rhabdomyolysis [M62.82]  Discharge Diagnoses:   Active Hospital Problems    Diagnosis    • Non-traumatic rhabdomyolysis       Hospital Course:     Mr. Sheridan is a 28 year old male who presented to [facility] 10/16/2023 bilateral arm swelling and hematuria. Patient was lifting weights on 10/13/23 which he has not done in few months. Typically does more cardio lately. Patient noted to have arm swelling and decreasing ROM of bilateral upper arms. This morning patient also noted to have tea colored urine that prompted his ED visit. The patient is sore but not in significant pain.     #Exercise induced Rhabdomyolysis- Improved   #Myoglobinuria- resolved   - ,400 on admission, Improved to 4K on day of discharge  - No concern for compartment syndrome during admission. Soft compartments with pulses present and intact sensation   - Discussed follow up instructions including avoidance of alcohol, heavy lifting, strenuous workouts. RTH if he is having myalgia or other new symotoms Follow up with PCP with CMP, CK . Already has follow up appt      #Elevated Liver Enzymes- near resolved   - Likely Non hepatic, mediated 2/2 rhabdomyolysis   - OP CMP   - Discussed avoidance of excessive ETOH, Tylenol            Labs on Day of discharge:   141 106 9 87   3.8 29 1.00      5.2 14.3 217    41.0        Imaging:  No results found for any visits on 10/16/23 (from the past 72 hour(s)).      Final diagnosis, treatment plan and follow up recommendations were discussed and  Problem: PAIN - ADULT  Goal: Verbalizes/displays adequate comfort level or baseline comfort level  Description: Interventions:  - Encourage patient to monitor pain and request assistance  - Assess pain using appropriate pain scale  - Administer analgesics based on type and severity of pain and evaluate response  - Implement non-pharmacological measures as appropriate and evaluate response  - Consider cultural and social influences on pain and pain management  - Notify physician/advanced practitioner if interventions unsuccessful or patient reports new pain  Outcome: Progressing     Problem: Knowledge Deficit  Goal: Patient/family/caregiver demonstrates understanding of disease process, treatment plan, medications, and discharge instructions  Description: Complete learning assessment and assess knowledge base.   Interventions:  - Provide teaching at level of understanding  - Provide teaching via preferred learning methods  Outcome: Progressing explained to the patient. The patient was given an opportunity to ask questions regarding diagnosis and treatment plan. The patient acknowledged understanding of diagnosis, plan and follow up instructions. The patient was advised to seek urgent care upon discharge if worsening symptoms develop prior to follow up. Greater than 50% of the >35 mins of time spent on discharge included time spent with the patient,reviewing labs/imaging, discussing with appropriate consultants and coordinating discharge care as outlined above.     Discharged Condition: stable  Discharge Medications: [unfilled]   Activity: as tolerated  Diet: regular diet  Disposition: Home  Follow up with PCP in 1-2 weeks.     Nicky Mackey MD

## 2023-12-08 NOTE — PROGRESS NOTES
Progress Note    Benny Gale 48 y.o. male MRN: [id number]  Unit/Bed#: 7T [facility] 713-01 Encounter: [id number]  Admitting Physician: Long Keyes MD  PCP: Fartun Leon MD  Date of Admission:  12/4/2023 12:49 PM    Assessment and Plan    * Acute exacerbation of CHF (congestive heart failure) ([address])  Assessment & Plan  - Likely in the setting of cocaine use (positive UDS) vs. Possible viral infection (nasal swab not collected overnight)  - Patient close to his dry weight -- Dry weight: 217 lbs  - Patient still having signs of pulmonary congestion, orthopnea  - Improvement in oxygen requirements  - Home regimen: furosemide 60 QD (taking BID), metoprolol 50 in day and 75 at night, losartan 50 mg BID, spironolactone 25 mg daily, Jardiance 10 mg daily  - Last echo :  LVEF 40% ( August 2023)       Plan:   - Continue IV Lasix to 40 mg BID  - Consider oxygen sleep study in view of orthopnea    - Monitor I/O and daily weight  - Fluid restriction 1.5L   - Telemetry  - Continuous pulse oximetry and titrate O2 as appropriate  - Monitor electrolytes  - Hold Losartan, continue Jardiance and Spironolactone  - Cardiology following            Hypertension  Assessment & Plan  Blood Pressure: 125/86 ; well controlled  - Home medications: furosemide 60 mg daily (using BID), losartan 50 BID, metoprolol 50 mg in morning, metoprolol 75 mg at night, spironolactone 25 mg daily    Plan:   - Hold ARB in view of rise in creatinine  - Continue Metoprolol and Spironolactone  - IV diuresis for CHF exacerbation  - Low sodium diet  - Monitor VS    Left leg cellulitis  Assessment & Plan  Pain in Left leg has decreased from yesterday. Somewhat warm to the touch.      Plan:  PO Keflex 500 mg BID ( day 4)   Continue to monitor vitals   Continue to monitor redness ( marked with red)  Follow CBC    Acute respiratory failure with hypoxia (HCC)  Assessment & Plan  - Likely in the setting of CHF exacerbation  - currently on RA saturating at 95%, however intermittently requiring O2 supplementation during the day  - Episodes of desaturation at 89% yesterday on RA    PLAN:  - Continue O2 as needed and titrate as appropriate  - Continue IV diuresis    Cocaine abuse ([address])  Assessment & Plan  - Patient denies cocaine use despite positive UDS  - In view of denial, unsure about last use     Plan:   - Monitor for signs and symptoms of withdrawal    Hyperlipidemia  Assessment & Plan  - Home medication: atorvastatin 40 mg QD    PLAN:  - Continue statin    Chest pain  Assessment & Plan  - Troponnins: 25 > 22   - Non-MI related tropinin elevation most likely 2/2 to CHF exacerbation   - ECG sinus tachycardia; Possible Left atrial enlargement, QTC: 451  - This morning patient did not endorse chest pain     Plan:  - Apply Voltaren gel  - Tylenol 650 mg q8h  - Monitor symptoms      HEIKE (acute kidney injury) ([address])  Assessment & Plan  - Creatinine of 1.57 this morning (baseline 1.2 - 1.3)  - Likely prerenal in the setting of IV diuresis vs fluid overload  - S/p NSAIDs for pain and ARB use for hypertension  - No electrolyte derrangements    PLAN:  - Monitor renal function  - Hold ARB  - Caution with nephrotoxic agent  - Continue diuresis    Chronic combined systolic and diastolic congestive heart failure (HCC)  Assessment & Plan  See A/P for acute exacerbation of CHF       UTI (urinary tract infection)-resolved as of 12/8/2023  Assessment & Plan  UA + bacteria, + leukocytes  UC no growth    Plan:   Continue keflex for management of cellulitis      SIRS (systemic inflammatory response syndrome) ([facility])-resolved as of 12/6/2023  Assessment & Plan  - Fever resolved  - S/P Tylenol 650 in ED IV Ceftriaxone x 1 in the ED  - No evidence of bacterial infection  - Flu/RSV/Covid not done overnight    Plan:   Monitor vitals             VTE Pharmacologic Prophylaxis: VTE Score: 6 High Risk (Score >/= 5) - Pharmacological DVT Prophylaxis Ordered: enoxaparin (Lovenox).  Sequential Compression Devices Ordered. Patient Centered Rounds: I have performed bedside rounds with nursing staff today. Discussions with Specialists or Other Care Team Provider: No    Education and Discussions with Family / Patient: Yes  Patient Information Sharing: Family aware of plan    Time Spent for Care: 45 minutes. More than 50% of total time spent on counseling and coordination of care as described above. Current Length of Stay: 4 day(s)    Current Patient Status: Inpatient   Certification Statement: The patient will continue to require additional inpatient hospital stay due to acute CHF exacerbation. Discharge Plan: Upon improvement in cardiac condition    Code Status: Level 1 - Full Code      Subjective:   Topher Srivastava was seen and evaluated at bedside. He reports doing overall better in comparison to admission; his shortness of breath and cough remains. Reports abdominal discomfort, but no constipation. Denies nausea, vomiting, changes in appetite, dysuria. His leg pain from yesterday has continued to improved. Pitting edema is decreased from yesterday. Objective:     Vitals:   Temp (24hrs), Av.2 °F (36.2 °C), Min:97 °F (36.1 °C), Max:97.4 °F (36.3 °C)    Temp:  [97 °F (36.1 °C)-97.4 °F (36.3 °C)] 97.4 °F (36.3 °C)  HR:  [77-90] 77  Resp:  [17-18] 17  BP: (125-143)/(76-99) 125/86  SpO2:  [94 %-98 %] 95 %  Body mass index is 29.72 kg/m². Input and Output Summary (last 24 hours): Intake/Output Summary (Last 24 hours) at 2023 0953  Last data filed at 2023 1307  Gross per 24 hour   Intake 720 ml   Output --   Net 720 ml       Physical Exam:     Physical Exam  Constitutional:       General: He is not in acute distress. Appearance: Normal appearance. He is not ill-appearing, toxic-appearing or diaphoretic.    HENT:      Mouth/Throat:      Mouth: Mucous membranes are moist.   Eyes:      Conjunctiva/sclera: Conjunctivae normal.   Cardiovascular:      Rate and Rhythm: Normal rate and regular rhythm. Heart sounds: Normal heart sounds. Pulmonary:      Effort: Pulmonary effort is normal. No respiratory distress. Breath sounds: Rales present. Comments: Fine rales bilateral lower lung zones   Chest:      Chest wall: No tenderness. Abdominal:      General: There is no distension. Palpations: Abdomen is soft. Tenderness: There is no abdominal tenderness. There is no guarding. Musculoskeletal:      Right lower le+ Edema present. Left lower le+ Edema present. Comments: Somewhat decrease in edema from yesterday  Erythema of Left leg, pain improved, reddness improved since yesterday   Skin:     General: Skin is warm. Neurological:      General: No focal deficit present. Mental Status: He is alert and oriented to person, place, and time. Psychiatric:         Mood and Affect: Mood normal.         Behavior: Behavior normal.         Thought Content: Thought content normal.         Judgment: Judgment normal.           Additional Data:     Labs:    Results from last 7 days   Lab Units 23  0536   WBC Thousand/uL 7.00   HEMOGLOBIN g/dL 13.4   HEMATOCRIT % 42.2   PLATELETS Thousands/uL 263   NEUTROS PCT % 73   LYMPHS PCT % 15   MONOS PCT % 7   EOS PCT % 3     Results from last 7 days   Lab Units 23  0536 23  0445 23  0541   POTASSIUM mmol/L 4.1   < > 4.0   CHLORIDE mmol/L 103   < > 103   CO2 mmol/L 26   < > 25   BUN mg/dL 34*   < > 25   CREATININE mg/dL 1.57*   < > 1.48*   CALCIUM mg/dL 8.8   < > 9.6   ALK PHOS U/L  --   --  210*   ALT U/L  --   --  30   AST U/L  --   --  28    < > = values in this interval not displayed. Results from last 7 days   Lab Units 23  1304   INR  1.19                 * I Have Reviewed All Lab Data Listed Above. * Additional Pertinent Lab Tests Reviewed:  All Labs Within Last 24 Hours Reviewed    Imaging:    Imaging Reports Reviewed Today Include: N/A  Imaging Personally Reviewed by Myself Includes:  N/A    Recent Cultures (last 7 days):     Results from last 7 days   Lab Units 12/06/23  1250 12/04/23  1322 12/04/23  1304   BLOOD CULTURE   --  No Growth at 72 hrs. No Growth at 72 hrs.    URINE CULTURE  No Growth <1000 cfu/mL  --   --        Last 24 Hours Medication List:   Current Facility-Administered Medications   Medication Dose Route Frequency Provider Last Rate    acetaminophen  975 mg Oral [facility] Bren Hudson MD      benzonatate  100 mg Oral BID Karime Sharma MD      cephalexin  500 mg Oral Q12H [address] Clinton Khan MD      Diclofenac Sodium  2 g Topical 4x Daily Bren Hudson MD      Empagliflozin  10 mg Oral Daily Mary Alvarez MD      enoxaparin  40 mg Subcutaneous Daily Clinton Khan MD      furosemide  40 mg Intravenous BID (diuretic) Pascual Jiménez MD      metoprolol succinate  50 mg Oral Daily Bren Hudson MD      metoprolol succinate  75 mg Oral HS Mary Alvarez MD      pantoprazole  40 mg Oral Early Morning Bren Hudson MD      saccharomyces boulardii  250 mg Oral BID Karime Sharma MD      spironolactone  25 mg Oral Daily MD Mary Finch Cap, MD  12/08/23  9:53 AM

## 2023-12-09 PROBLEM — J10.1 INFLUENZA A: Status: ACTIVE | Noted: 2023-12-09

## 2023-12-09 LAB
ANION GAP SERPL CALCULATED.3IONS-SCNC: 13 MMOL/L
BACTERIA BLD CULT: NORMAL
BACTERIA BLD CULT: NORMAL
BASOPHILS # BLD AUTO: 0.05 THOUSANDS/ÂΜL (ref 0–0.1)
BASOPHILS NFR BLD AUTO: 1 % (ref 0–1)
BUN SERPL-MCNC: 38 MG/DL (ref 5–25)
CALCIUM SERPL-MCNC: 8.8 MG/DL (ref 8.4–10.2)
CHLORIDE SERPL-SCNC: 102 MMOL/L (ref 96–108)
CO2 SERPL-SCNC: 25 MMOL/L (ref 21–32)
CREAT SERPL-MCNC: 1.68 MG/DL (ref 0.6–1.3)
EOSINOPHIL # BLD AUTO: 0.09 THOUSAND/ÂΜL (ref 0–0.61)
EOSINOPHIL NFR BLD AUTO: 1 % (ref 0–6)
ERYTHROCYTE [DISTWIDTH] IN BLOOD BY AUTOMATED COUNT: 14.5 % (ref 11.6–15.1)
FLUAV RNA RESP QL NAA+PROBE: POSITIVE
FLUBV RNA RESP QL NAA+PROBE: NEGATIVE
GFR SERPL CREATININE-BSD FRML MDRD: 46 ML/MIN/1.73SQ M
GLUCOSE SERPL-MCNC: 87 MG/DL (ref 65–140)
HCT VFR BLD AUTO: 41.7 % (ref 36.5–49.3)
HGB BLD-MCNC: 13.1 G/DL (ref 12–17)
IMM GRANULOCYTES # BLD AUTO: 0.03 THOUSAND/UL (ref 0–0.2)
IMM GRANULOCYTES NFR BLD AUTO: 0 % (ref 0–2)
LYMPHOCYTES # BLD AUTO: 0.52 THOUSANDS/ÂΜL (ref 0.6–4.47)
LYMPHOCYTES NFR BLD AUTO: 8 % (ref 14–44)
MAGNESIUM SERPL-MCNC: 2.1 MG/DL (ref 1.9–2.7)
MCH RBC QN AUTO: 27.6 PG (ref 26.8–34.3)
MCHC RBC AUTO-ENTMCNC: 31.4 G/DL (ref 31.4–37.4)
MCV RBC AUTO: 88 FL (ref 82–98)
MONOCYTES # BLD AUTO: 0.39 THOUSAND/ÂΜL (ref 0.17–1.22)
MONOCYTES NFR BLD AUTO: 6 % (ref 4–12)
NEUTROPHILS # BLD AUTO: 5.66 THOUSANDS/ÂΜL (ref 1.85–7.62)
NEUTS SEG NFR BLD AUTO: 84 % (ref 43–75)
NRBC BLD AUTO-RTO: 0 /100 WBCS
PHOSPHATE SERPL-MCNC: 4.4 MG/DL (ref 2.7–4.5)
PLATELET # BLD AUTO: 224 THOUSANDS/UL (ref 149–390)
PMV BLD AUTO: 11.2 FL (ref 8.9–12.7)
POTASSIUM SERPL-SCNC: 3.9 MMOL/L (ref 3.5–5.3)
RBC # BLD AUTO: 4.74 MILLION/UL (ref 3.88–5.62)
RSV RNA RESP QL NAA+PROBE: NEGATIVE
SARS-COV-2 RNA RESP QL NAA+PROBE: NEGATIVE
SODIUM SERPL-SCNC: 140 MMOL/L (ref 135–147)
WBC # BLD AUTO: 6.74 THOUSAND/UL (ref 4.31–10.16)

## 2023-12-09 PROCEDURE — 85025 COMPLETE CBC W/AUTO DIFF WBC: CPT

## 2023-12-09 PROCEDURE — 94664 DEMO&/EVAL PT USE INHALER: CPT

## 2023-12-09 PROCEDURE — 99233 SBSQ HOSP IP/OBS HIGH 50: CPT | Performed by: FAMILY MEDICINE

## 2023-12-09 PROCEDURE — 94640 AIRWAY INHALATION TREATMENT: CPT

## 2023-12-09 PROCEDURE — 94760 N-INVAS EAR/PLS OXIMETRY 1: CPT

## 2023-12-09 PROCEDURE — 84100 ASSAY OF PHOSPHORUS: CPT

## 2023-12-09 PROCEDURE — 0241U HB NFCT DS VIR RESP RNA 4 TRGT: CPT

## 2023-12-09 PROCEDURE — 83735 ASSAY OF MAGNESIUM: CPT

## 2023-12-09 PROCEDURE — 80048 BASIC METABOLIC PNL TOTAL CA: CPT

## 2023-12-09 RX ORDER — LEVALBUTEROL INHALATION SOLUTION 1.25 MG/3ML
SOLUTION RESPIRATORY (INHALATION)
Status: DISPENSED
Start: 2023-12-09 | End: 2023-12-09

## 2023-12-09 RX ORDER — METHYLPREDNISOLONE SODIUM SUCCINATE 125 MG/2ML
125 INJECTION, POWDER, LYOPHILIZED, FOR SOLUTION INTRAMUSCULAR; INTRAVENOUS ONCE
Status: COMPLETED | OUTPATIENT
Start: 2023-12-09 | End: 2023-12-09

## 2023-12-09 RX ORDER — OSELTAMIVIR PHOSPHATE 75 MG/1
75 CAPSULE ORAL EVERY 12 HOURS SCHEDULED
Status: DISCONTINUED | OUTPATIENT
Start: 2023-12-09 | End: 2023-12-13 | Stop reason: HOSPADM

## 2023-12-09 RX ORDER — LEVALBUTEROL INHALATION SOLUTION 0.63 MG/3ML
0.63 SOLUTION RESPIRATORY (INHALATION) EVERY 4 HOURS PRN
Status: DISCONTINUED | OUTPATIENT
Start: 2023-12-09 | End: 2023-12-09

## 2023-12-09 RX ORDER — FUROSEMIDE 10 MG/ML
40 INJECTION INTRAMUSCULAR; INTRAVENOUS ONCE
Status: COMPLETED | OUTPATIENT
Start: 2023-12-09 | End: 2023-12-09

## 2023-12-09 RX ORDER — MAGNESIUM SULFATE HEPTAHYDRATE 40 MG/ML
2 INJECTION, SOLUTION INTRAVENOUS ONCE
Status: COMPLETED | OUTPATIENT
Start: 2023-12-09 | End: 2023-12-09

## 2023-12-09 RX ORDER — FUROSEMIDE 40 MG/1
40 TABLET ORAL
Status: DISCONTINUED | OUTPATIENT
Start: 2023-12-09 | End: 2023-12-09

## 2023-12-09 RX ORDER — GUAIFENESIN/DEXTROMETHORPHAN 100-10MG/5
5 SYRUP ORAL EVERY 4 HOURS
Status: DISCONTINUED | OUTPATIENT
Start: 2023-12-09 | End: 2023-12-13 | Stop reason: HOSPADM

## 2023-12-09 RX ORDER — GUAIFENESIN/DEXTROMETHORPHAN 100-10MG/5
5 SYRUP ORAL EVERY 4 HOURS PRN
Status: DISCONTINUED | OUTPATIENT
Start: 2023-12-09 | End: 2023-12-09

## 2023-12-09 RX ORDER — LEVALBUTEROL INHALATION SOLUTION 1.25 MG/3ML
1.25 SOLUTION RESPIRATORY (INHALATION) EVERY 6 HOURS
Status: DISCONTINUED | OUTPATIENT
Start: 2023-12-10 | End: 2023-12-13

## 2023-12-09 RX ADMIN — GUAIFENESIN AND DEXTROMETHORPHAN 5 ML: 100; 10 SYRUP ORAL at 12:29

## 2023-12-09 RX ADMIN — SPIRONOLACTONE 25 MG: 25 TABLET, FILM COATED ORAL at 09:33

## 2023-12-09 RX ADMIN — BENZONATATE 100 MG: 100 CAPSULE ORAL at 20:45

## 2023-12-09 RX ADMIN — METHYLPREDNISOLONE SODIUM SUCCINATE 125 MG: 125 INJECTION, POWDER, FOR SOLUTION INTRAMUSCULAR; INTRAVENOUS at 20:46

## 2023-12-09 RX ADMIN — CEPHALEXIN 500 MG: 500 CAPSULE ORAL at 09:33

## 2023-12-09 RX ADMIN — ACETAMINOPHEN 975 MG: 325 TABLET ORAL at 13:46

## 2023-12-09 RX ADMIN — PANTOPRAZOLE SODIUM 40 MG: 40 TABLET, DELAYED RELEASE ORAL at 05:32

## 2023-12-09 RX ADMIN — METOPROLOL SUCCINATE 75 MG: 50 TABLET, EXTENDED RELEASE ORAL at 21:08

## 2023-12-09 RX ADMIN — BENZONATATE 100 MG: 100 CAPSULE ORAL at 09:33

## 2023-12-09 RX ADMIN — OSELTAMIVIR PHOSPHATE 75 MG: 75 CAPSULE ORAL at 13:46

## 2023-12-09 RX ADMIN — LEVALBUTEROL HYDROCHLORIDE 0.63 MG: 0.63 SOLUTION RESPIRATORY (INHALATION) at 12:58

## 2023-12-09 RX ADMIN — ACETAMINOPHEN 975 MG: 325 TABLET ORAL at 05:32

## 2023-12-09 RX ADMIN — ACETAMINOPHEN 975 MG: 325 TABLET ORAL at 21:09

## 2023-12-09 RX ADMIN — CEPHALEXIN 500 MG: 500 CAPSULE ORAL at 20:45

## 2023-12-09 RX ADMIN — ENOXAPARIN SODIUM 40 MG: 40 INJECTION SUBCUTANEOUS at 09:33

## 2023-12-09 RX ADMIN — Medication 250 MG: at 17:16

## 2023-12-09 RX ADMIN — GUAIFENESIN AND DEXTROMETHORPHAN 5 ML: 100; 10 SYRUP ORAL at 16:26

## 2023-12-09 RX ADMIN — FUROSEMIDE 60 MG: 40 TABLET ORAL at 09:33

## 2023-12-09 RX ADMIN — OSELTAMIVIR PHOSPHATE 75 MG: 75 CAPSULE ORAL at 21:08

## 2023-12-09 RX ADMIN — IPRATROPIUM BROMIDE 0.5 MG: 0.5 SOLUTION RESPIRATORY (INHALATION) at 12:58

## 2023-12-09 RX ADMIN — Medication 250 MG: at 09:33

## 2023-12-09 RX ADMIN — LEVALBUTEROL HYDROCHLORIDE 0.63 MG: 0.63 SOLUTION RESPIRATORY (INHALATION) at 20:19

## 2023-12-09 RX ADMIN — GUAIFENESIN AND DEXTROMETHORPHAN 5 ML: 100; 10 SYRUP ORAL at 20:45

## 2023-12-09 RX ADMIN — METOPROLOL SUCCINATE 50 MG: 50 TABLET, EXTENDED RELEASE ORAL at 09:33

## 2023-12-09 RX ADMIN — IPRATROPIUM BROMIDE 0.5 MG: 0.5 SOLUTION RESPIRATORY (INHALATION) at 20:19

## 2023-12-09 RX ADMIN — MAGNESIUM SULFATE IN WATER 2 G: 40 INJECTION, SOLUTION INTRAVENOUS at 21:11

## 2023-12-09 RX ADMIN — FUROSEMIDE 40 MG: 10 INJECTION, SOLUTION INTRAVENOUS at 20:53

## 2023-12-09 RX ADMIN — GUAIFENESIN AND DEXTROMETHORPHAN 5 ML: 100; 10 SYRUP ORAL at 01:57

## 2023-12-09 NOTE — ASSESSMENT & PLAN NOTE
Positive influenza swab 12/9/2023. Vitals stable.      Plan:   1 more dose of tamiflu  remain  Contact precautions   Monitor V/S

## 2023-12-09 NOTE — PLAN OF CARE
Problem: PAIN - ADULT  Goal: Verbalizes/displays adequate comfort level or baseline comfort level  Description: Interventions:  - Encourage patient to monitor pain and request assistance  - Assess pain using appropriate pain scale  - Administer analgesics based on type and severity of pain and evaluate response  - Implement non-pharmacological measures as appropriate and evaluate response  - Consider cultural and social influences on pain and pain management  - Notify physician/advanced practitioner if interventions unsuccessful or patient reports new pain  Outcome: Progressing     Problem: INFECTION - ADULT  Goal: Absence or prevention of progression during hospitalization  Description: INTERVENTIONS:  - Assess and monitor for signs and symptoms of infection  - Monitor lab/diagnostic results  - Monitor all insertion sites, i.e. indwelling lines, tubes, and drains  - Monitor endotracheal if appropriate and nasal secretions for changes in amount and color  - [place] appropriate cooling/warming therapies per order  - Administer medications as ordered  - Instruct and encourage patient and family to use good hand hygiene technique  - Identify and instruct in appropriate isolation precautions for identified infection/condition  Outcome: Progressing  Goal: Absence of fever/infection during neutropenic period  Description: INTERVENTIONS:  - Monitor WBC    Outcome: Progressing     Problem: SAFETY ADULT  Goal: Patient will remain free of falls  Description: INTERVENTIONS:  - Educate patient/family on patient safety including physical limitations  - Instruct patient to call for assistance with activity   - Consult OT/PT to assist with strengthening/mobility   - Keep Call bell within reach  - Keep bed low and locked with side rails adjusted as appropriate  - Keep care items and personal belongings within reach  - Initiate and maintain comfort rounds  - Make Fall Risk Sign visible to staff  - Apply yellow socks and bracelet for high fall risk patients  - Consider moving patient to room near nurses station  Outcome: Progressing  Goal: Maintain or return to baseline ADL function  Description: INTERVENTIONS:  -  Assess patient's ability to carry out ADLs; assess patient's baseline for ADL function and identify physical deficits which impact ability to perform ADLs (bathing, care of mouth/teeth, toileting, grooming, dressing, etc.)  - Assess/evaluate cause of self-care deficits   - Assess range of motion  - Assess patient's mobility; develop plan if impaired  - Assess patient's need for assistive devices and provide as appropriate  - Encourage maximum independence but intervene and supervise when necessary  - Involve family in performance of ADLs  - Assess for home care needs following discharge   - Consider OT consult to assist with ADL evaluation and planning for discharge  - Provide patient education as appropriate  Outcome: Progressing  Goal: Maintains/Returns to pre admission functional level  Description: INTERVENTIONS:  - Perform AM-PAC 6 Click Basic Mobility/ Daily Activity assessment daily.  - Set and communicate daily mobility goal to care team and patient/family/caregiver.    - Collaborate with rehabilitation services on mobility goals if consulted  - Out of bed for toileting  - Record patient progress and toleration of activity level   Outcome: Progressing     Problem: DISCHARGE PLANNING  Goal: Discharge to home or other facility with appropriate resources  Description: INTERVENTIONS:  - Identify barriers to discharge w/patient and caregiver  - Arrange for needed discharge resources and transportation as appropriate  - Identify discharge learning needs (meds, wound care, etc.)  - Arrange for interpretive services to assist at discharge as needed  - Refer to Case Management Department for coordinating discharge planning if the patient needs post-hospital services based on physician/advanced practitioner order or complex needs related to functional status, cognitive ability, or social support system  Outcome: Progressing     Problem: Knowledge Deficit  Goal: Patient/family/caregiver demonstrates understanding of disease process, treatment plan, medications, and discharge instructions  Description: Complete learning assessment and assess knowledge base.   Interventions:  - Provide teaching at level of understanding  - Provide teaching via preferred learning methods  Outcome: Progressing

## 2023-12-09 NOTE — PROGRESS NOTES
Progress Note    Babak Hamlin 48 y.o. male MRN: [id number]  Unit/Bed#: 7T [facility] 713-01 Encounter: [id number]  Admitting Physician: Clara Burr MD  PCP: Valdo Long MD  Date of Admission:  12/4/2023 12:49 PM    Assessment and Plan    * Acute exacerbation of CHF (congestive heart failure) ([address])  Assessment & Plan  - Likely in the setting of cocaine use (positive UDS) vs. Possible viral infection (nasal swab not collected overnight)  - Patient close to his dry weight -- Dry weight: 217 lbs  - Patient still having signs of pulmonary congestion, orthopnea  - Improvement in oxygen requirements  - Home regimen: furosemide 60 QD (taking BID), metoprolol 50 in day and 75 at night, losartan 50 mg BID, spironolactone 25 mg daily, Jardiance 10 mg daily  - Last echo :  LVEF 40% ( August 2023)   - Acute increase in cough since last night, patient having more SOB. Plan:   - lasix PO 60 mg QD  - HOLD jardiance in view of deteriorating kidney function, as per cardiology  - continous pulse oximetry   - walking study pending, before patient ready to go home    - Monitor I/O and daily weight  - Fluid restriction 1.5L   - Telemetry  - Continuous pulse oximetry and titrate O2 as appropriate  - Monitor electrolytes  - Hold Losartan, continue Jardiance and Spironolactone  - Cardiology following              Chronic combined systolic and diastolic congestive heart failure ([address])  Assessment & Plan  See A/P for acute exacerbation of CHF       Influenza A  Assessment & Plan  Positive influenza swab in view of worsening cough. Vitals stable. Plan:   Tamiflu 75 mg BID   Contact precautions   Monitor V/S     Left leg cellulitis  Assessment & Plan  Pain in Left leg has decreased from yesterday. Redness mostly resolved.  Reduced swelling    Plan:  PO Keflex 500 mg BID ( day 5)   Continue to monitor vitals   Continue to monitor redness ( marked with red)  Follow CBC    Acute respiratory failure with hypoxia [facility])  Assessment & Plan  - Likely in the setting of CHF exacerbation  - currently on RA saturating at 95%, however intermittently requiring O2 supplementation during the day  - Episodes of desaturation at 89%     PLAN:  - Continue O2 as needed and titrate as appropriate  - Continue diuresis    Cocaine abuse ([address])  Assessment & Plan  - Patient denies cocaine use despite positive UDS  - In view of denial, unsure about last use     Plan:   - Monitor for signs and symptoms of withdrawal    Hyperlipidemia  Assessment & Plan  - Home medication: atorvastatin 40 mg QD    PLAN:  - Continue statin    Chest pain  Assessment & Plan  - Troponnins: 25 > 22   - Non-MI related tropinin elevation most likely 2/2 to CHF exacerbation   - ECG sinus tachycardia; Possible Left atrial enlargement, QTC: 451  - This morning patient did not endorse chest pain     Plan:  - Apply Voltaren gel  - Tylenol 650 mg q8h  - Monitor symptoms      HEIKE (acute kidney injury) ([address])  Assessment & Plan  - Creatinine of 1.68  increased this morning (baseline 1.2 - 1.3)  - Likely prerenal in the setting of IV diuresis vs fluid overload  - S/p NSAIDs for pain and ARB use for hypertension  - No electrolyte derrangements    PLAN:  - Monitor renal function  - Hold ARB  - Caution with nephrotoxic agent  - Continue diuresis    Hypertension  Assessment & Plan  Blood Pressure: 125/86 ; well controlled  - Home medications: furosemide 60 mg daily (using BID), losartan 50 BID, metoprolol 50 mg in morning, metoprolol 75 mg at night, spironolactone 25 mg daily    Plan:   - Hold ARB in view of rise in creatinine  - Continue Metoprolol and Spironolactone  - IV diuresis for CHF exacerbation  - Low sodium diet  - Monitor VS    UTI (urinary tract infection)-resolved as of 12/8/2023  Assessment & Plan  UA + bacteria, + leukocytes  UC no growth    Plan:   Continue keflex for management of cellulitis      SIRS (systemic inflammatory response syndrome) (HCC)-resolved as of 2023  Assessment & Plan  - Fever resolved  - S/P Tylenol 650 in ED IV Ceftriaxone x 1 in the ED  - No evidence of bacterial infection  - Flu/RSV/Covid not done overnight    Plan:   Monitor vitals             VTE Pharmacologic Prophylaxis: VTE Score: 6 High Risk (Score >/= 5) - Pharmacological DVT Prophylaxis Ordered: enoxaparin (Lovenox). Sequential Compression Devices Ordered. Patient Centered Rounds: I have performed bedside rounds with nursing staff today. Discussions with Specialists or Other Care Team Provider: No    Education and Discussions with Family / Patient: Yes  Patient Information Sharing: Family aware of plan    Time Spent for Care: 45 minutes. More than 50% of total time spent on counseling and coordination of care as described above. Current Length of Stay: 5 day(s)    Current Patient Status: Inpatient   Certification Statement: The patient will continue to require additional inpatient hospital stay due to acute CHF exacerbation. Discharge Plan: Upon improvement in cardiac condition    Code Status: Level 1 - Full Code      Subjective:   Eligio Gray was seen and evaluated at bedside with senior and attending on duty. Patient has been having increased cough since last night. He was given Tessalon Perles and Robitussin and it did not help the cough. This morning he is having increased shortness of breath and was placed on 2 L of oxygen and continuous pulse oximetry. He was not able to sleep well last night because of the coughing. Denies nausea, chest pain, palpitations vomiting, changes in appetite, dysuria. His leg pain from yesterday has continued to improved. Pitting edema is decreased from yesterday to grade 2.  pain, swelling, redness decreased in the left leg.     Objective:     Vitals:   Temp (24hrs), Av.5 °F (36.4 °C), Min:96.7 °F (35.9 °C), Max:98.8 °F (37.1 °C)    Temp:  [96.7 °F (35.9 °C)-98.8 °F (37.1 °C)] 98.8 °F (37.1 °C)  HR:  [85-98] 90  Resp: [17-19] 18  BP: (129-145)/(68-98) 129/68  SpO2:  [95 %-99 %] 95 %  Body mass index is 29.48 kg/m². Input and Output Summary (last 24 hours): Intake/Output Summary (Last 24 hours) at 20234  Last data filed at 2023 1847  Gross per 24 hour   Intake 760 ml   Output 600 ml   Net 160 ml       Physical Exam:     Physical Exam  Constitutional:       General: He is not in acute distress. Appearance: Normal appearance. He is obese. He is not ill-appearing, toxic-appearing or diaphoretic. HENT:      Head: Normocephalic. Mouth/Throat:      Mouth: Mucous membranes are moist.   Eyes:      Conjunctiva/sclera: Conjunctivae normal.   Cardiovascular:      Rate and Rhythm: Normal rate and regular rhythm. Heart sounds: Normal heart sounds. Pulmonary:      Effort: Pulmonary effort is normal. No respiratory distress. Breath sounds: Rales present. Comments: coarse rales bilateral lower lung zones   Chest:      Chest wall: No tenderness. Abdominal:      Palpations: Abdomen is soft. Tenderness: There is no abdominal tenderness. Musculoskeletal:      Right lower le+ Edema present. Left lower le+ Edema present. Comments: Somewhat decrease in edema from yesterday  Erythema of Left leg, pain improved, reddness improved since yesterday   Skin:     General: Skin is warm. Neurological:      General: No focal deficit present. Mental Status: He is alert and oriented to person, place, and time. Psychiatric:         Mood and Affect: Mood normal.         Behavior: Behavior normal.         Thought Content:  Thought content normal.         Judgment: Judgment normal.           Additional Data:     Labs:    Results from last 7 days   Lab Units 23  0431   WBC Thousand/uL 6.74   HEMOGLOBIN g/dL 13.1   HEMATOCRIT % 41.7   PLATELETS Thousands/uL 224   NEUTROS PCT % 84*   LYMPHS PCT % 8*   MONOS PCT % 6   EOS PCT % 1     Results from last 7 days   Lab Units 12/09/23  0431 12/06/23  0445 12/05/23  0541   POTASSIUM mmol/L 3.9   < > 4.0   CHLORIDE mmol/L 102   < > 103   CO2 mmol/L 25   < > 25   BUN mg/dL 38*   < > 25   CREATININE mg/dL 1.68*   < > 1.48*   CALCIUM mg/dL 8.8   < > 9.6   ALK PHOS U/L  --   --  210*   ALT U/L  --   --  30   AST U/L  --   --  28    < > = values in this interval not displayed. Results from last 7 days   Lab Units 12/04/23  1304   INR  1.19                 * I Have Reviewed All Lab Data Listed Above. * Additional Pertinent Lab Tests Reviewed: All Labs Within Last 24 Hours Reviewed    Imaging:    Imaging Reports Reviewed Today Include: N/A  Imaging Personally Reviewed by Myself Includes:  N/A    Recent Cultures (last 7 days):     Results from last 7 days   Lab Units 12/06/23  1250 12/04/23  1322 12/04/23  1304   BLOOD CULTURE   --  No Growth After 4 Days. No Growth After 4 Days.    URINE CULTURE  No Growth <1000 cfu/mL  --   --        Last 24 Hours Medication List:   Current Facility-Administered Medications   Medication Dose Route Frequency Provider Last Rate    acetaminophen  975 mg Oral [facility] Bren Beltran MD      benzonatate  100 mg Oral BID Almas Ruvalcaba MD      cephalexin  500 mg Oral Q12H [address] Jojo Ramon MD      dextromethorphan-guaiFENesin  5 mL Oral Q4H Jojo Ramon MD      Diclofenac Sodium  2 g Topical 4x Daily Bren Beltran MD      enoxaparin  40 mg Subcutaneous Daily Jojo Ramon MD      furosemide  60 mg Oral Daily Rosy Andrew [id number], DO      ipratropium  0.5 mg Nebulization Q4H PRN Rosy Andrew [id number], DO      levalbuterol           levalbuterol  0.63 mg Nebulization Q4H PRN Rosy Andrew [id number], DO      metoprolol succinate  50 mg Oral Daily Bren Beltran MD      metoprolol succinate  75 mg Oral HS Mariela White MD      oseltamivir  75 mg Oral Q12H Lloyd Brand MD      pantoprazole  40 mg Oral Early Morning Bren Beltran, MD      saccharomyces boulardii  250 mg Oral BID Fransisco Persaud MD      spironolactone  25 mg Oral Daily MD Deb Velazquez MD  12/09/23  7:24 PM

## 2023-12-10 LAB
ANION GAP SERPL CALCULATED.3IONS-SCNC: 15 MMOL/L
ANISOCYTOSIS BLD QL SMEAR: PRESENT
BASOPHILS # BLD MANUAL: 0 THOUSAND/UL (ref 0–0.1)
BASOPHILS NFR MAR MANUAL: 0 % (ref 0–1)
BUN SERPL-MCNC: 32 MG/DL (ref 5–25)
CALCIUM SERPL-MCNC: 8.4 MG/DL (ref 8.4–10.2)
CHLORIDE SERPL-SCNC: 98 MMOL/L (ref 96–108)
CO2 SERPL-SCNC: 22 MMOL/L (ref 21–32)
CREAT SERPL-MCNC: 1.5 MG/DL (ref 0.6–1.3)
EOSINOPHIL # BLD MANUAL: 0 THOUSAND/UL (ref 0–0.4)
EOSINOPHIL NFR BLD MANUAL: 0 % (ref 0–6)
ERYTHROCYTE [DISTWIDTH] IN BLOOD BY AUTOMATED COUNT: 14.6 % (ref 11.6–15.1)
GFR SERPL CREATININE-BSD FRML MDRD: 53 ML/MIN/1.73SQ M
GLUCOSE SERPL-MCNC: 226 MG/DL (ref 65–140)
HCT VFR BLD AUTO: 41.6 % (ref 36.5–49.3)
HGB BLD-MCNC: 13.2 G/DL (ref 12–17)
LYMPHOCYTES # BLD AUTO: 0.11 THOUSAND/UL (ref 0.6–4.47)
LYMPHOCYTES # BLD AUTO: 2 % (ref 14–44)
MCH RBC QN AUTO: 27.7 PG (ref 26.8–34.3)
MCHC RBC AUTO-ENTMCNC: 31.7 G/DL (ref 31.4–37.4)
MCV RBC AUTO: 87 FL (ref 82–98)
MONOCYTES # BLD AUTO: 0.11 THOUSAND/UL (ref 0–1.22)
MONOCYTES NFR BLD: 2 % (ref 4–12)
NEUTROPHILS # BLD MANUAL: 5.25 THOUSAND/UL (ref 1.85–7.62)
NEUTS BAND NFR BLD MANUAL: 11 % (ref 0–8)
NEUTS SEG NFR BLD AUTO: 85 % (ref 43–75)
PLATELET # BLD AUTO: 189 THOUSANDS/UL (ref 149–390)
PLATELET BLD QL SMEAR: ADEQUATE
PMV BLD AUTO: 11.8 FL (ref 8.9–12.7)
POTASSIUM SERPL-SCNC: 4.4 MMOL/L (ref 3.5–5.3)
RBC # BLD AUTO: 4.77 MILLION/UL (ref 3.88–5.62)
RBC MORPH BLD: PRESENT
SODIUM SERPL-SCNC: 135 MMOL/L (ref 135–147)
WBC # BLD AUTO: 5.47 THOUSAND/UL (ref 4.31–10.16)

## 2023-12-10 PROCEDURE — 85007 BL SMEAR W/DIFF WBC COUNT: CPT

## 2023-12-10 PROCEDURE — 94760 N-INVAS EAR/PLS OXIMETRY 1: CPT

## 2023-12-10 PROCEDURE — 80048 BASIC METABOLIC PNL TOTAL CA: CPT

## 2023-12-10 PROCEDURE — 85027 COMPLETE CBC AUTOMATED: CPT

## 2023-12-10 PROCEDURE — 94640 AIRWAY INHALATION TREATMENT: CPT

## 2023-12-10 PROCEDURE — 99232 SBSQ HOSP IP/OBS MODERATE 35: CPT | Performed by: INTERNAL MEDICINE

## 2023-12-10 PROCEDURE — 99233 SBSQ HOSP IP/OBS HIGH 50: CPT | Performed by: FAMILY MEDICINE

## 2023-12-10 RX ADMIN — LEVALBUTEROL HYDROCHLORIDE 1.25 MG: 1.25 SOLUTION RESPIRATORY (INHALATION) at 02:35

## 2023-12-10 RX ADMIN — METOPROLOL SUCCINATE 75 MG: 50 TABLET, EXTENDED RELEASE ORAL at 22:17

## 2023-12-10 RX ADMIN — LEVALBUTEROL HYDROCHLORIDE 1.25 MG: 1.25 SOLUTION RESPIRATORY (INHALATION) at 20:00

## 2023-12-10 RX ADMIN — ENOXAPARIN SODIUM 40 MG: 40 INJECTION SUBCUTANEOUS at 09:07

## 2023-12-10 RX ADMIN — METOPROLOL SUCCINATE 50 MG: 50 TABLET, EXTENDED RELEASE ORAL at 09:08

## 2023-12-10 RX ADMIN — BENZONATATE 100 MG: 100 CAPSULE ORAL at 09:08

## 2023-12-10 RX ADMIN — CEPHALEXIN 500 MG: 500 CAPSULE ORAL at 20:55

## 2023-12-10 RX ADMIN — Medication 250 MG: at 09:09

## 2023-12-10 RX ADMIN — ACETAMINOPHEN 975 MG: 325 TABLET ORAL at 22:12

## 2023-12-10 RX ADMIN — GUAIFENESIN AND DEXTROMETHORPHAN 5 ML: 100; 10 SYRUP ORAL at 17:03

## 2023-12-10 RX ADMIN — Medication 250 MG: at 17:03

## 2023-12-10 RX ADMIN — FUROSEMIDE 60 MG: 40 TABLET ORAL at 09:08

## 2023-12-10 RX ADMIN — GUAIFENESIN AND DEXTROMETHORPHAN 5 ML: 100; 10 SYRUP ORAL at 12:43

## 2023-12-10 RX ADMIN — LEVALBUTEROL HYDROCHLORIDE 1.25 MG: 1.25 SOLUTION RESPIRATORY (INHALATION) at 14:05

## 2023-12-10 RX ADMIN — GUAIFENESIN AND DEXTROMETHORPHAN 5 ML: 100; 10 SYRUP ORAL at 00:22

## 2023-12-10 RX ADMIN — SPIRONOLACTONE 25 MG: 25 TABLET, FILM COATED ORAL at 09:09

## 2023-12-10 RX ADMIN — LEVALBUTEROL HYDROCHLORIDE 1.25 MG: 1.25 SOLUTION RESPIRATORY (INHALATION) at 07:51

## 2023-12-10 RX ADMIN — IPRATROPIUM BROMIDE 0.5 MG: 0.5 SOLUTION RESPIRATORY (INHALATION) at 02:36

## 2023-12-10 RX ADMIN — ACETAMINOPHEN 975 MG: 325 TABLET ORAL at 14:06

## 2023-12-10 RX ADMIN — BENZONATATE 100 MG: 100 CAPSULE ORAL at 20:55

## 2023-12-10 RX ADMIN — GUAIFENESIN AND DEXTROMETHORPHAN 5 ML: 100; 10 SYRUP ORAL at 20:55

## 2023-12-10 RX ADMIN — Medication 2 G: at 17:03

## 2023-12-10 RX ADMIN — Medication 2 G: at 22:14

## 2023-12-10 RX ADMIN — OSELTAMIVIR PHOSPHATE 75 MG: 75 CAPSULE ORAL at 09:09

## 2023-12-10 RX ADMIN — OSELTAMIVIR PHOSPHATE 75 MG: 75 CAPSULE ORAL at 20:55

## 2023-12-10 RX ADMIN — IPRATROPIUM BROMIDE 0.5 MG: 0.5 SOLUTION RESPIRATORY (INHALATION) at 20:00

## 2023-12-10 RX ADMIN — GUAIFENESIN AND DEXTROMETHORPHAN 5 ML: 100; 10 SYRUP ORAL at 05:13

## 2023-12-10 RX ADMIN — GUAIFENESIN AND DEXTROMETHORPHAN 5 ML: 100; 10 SYRUP ORAL at 09:07

## 2023-12-10 RX ADMIN — IPRATROPIUM BROMIDE 0.5 MG: 0.5 SOLUTION RESPIRATORY (INHALATION) at 07:50

## 2023-12-10 RX ADMIN — Medication 2 G: at 11:21

## 2023-12-10 RX ADMIN — CEPHALEXIN 500 MG: 500 CAPSULE ORAL at 09:09

## 2023-12-10 RX ADMIN — IPRATROPIUM BROMIDE 0.5 MG: 0.5 SOLUTION RESPIRATORY (INHALATION) at 14:05

## 2023-12-10 RX ADMIN — ACETAMINOPHEN 975 MG: 325 TABLET ORAL at 05:13

## 2023-12-10 RX ADMIN — PANTOPRAZOLE SODIUM 40 MG: 40 TABLET, DELAYED RELEASE ORAL at 05:13

## 2023-12-10 NOTE — PLAN OF CARE
Problem: PAIN - ADULT  Goal: Verbalizes/displays adequate comfort level or baseline comfort level  Description: Interventions:  - Encourage patient to monitor pain and request assistance  - Assess pain using appropriate pain scale  - Administer analgesics based on type and severity of pain and evaluate response  - Implement non-pharmacological measures as appropriate and evaluate response  - Consider cultural and social influences on pain and pain management  - Notify physician/advanced practitioner if interventions unsuccessful or patient reports new pain  Outcome: Progressing     Problem: INFECTION - ADULT  Goal: Absence or prevention of progression during hospitalization  Description: INTERVENTIONS:  - Assess and monitor for signs and symptoms of infection  - Monitor lab/diagnostic results  - Monitor all insertion sites, i.e. indwelling lines, tubes, and drains  - Monitor endotracheal if appropriate and nasal secretions for changes in amount and color  - Unity appropriate cooling/warming therapies per order  - Administer medications as ordered  - Instruct and encourage patient and family to use good hand hygiene technique  - Identify and instruct in appropriate isolation precautions for identified infection/condition  Outcome: Progressing  Goal: Absence of fever/infection during neutropenic period  Description: INTERVENTIONS:  - Monitor WBC    Outcome: Progressing     Problem: SAFETY ADULT  Goal: Patient will remain free of falls  Description: INTERVENTIONS:  - Educate patient/family on patient safety including physical limitations  - Instruct patient to call for assistance with activity   - Consult OT/PT to assist with strengthening/mobility   - Keep Call bell within reach  - Keep bed low and locked with side rails adjusted as appropriate  - Keep care items and personal belongings within reach  - Initiate and maintain comfort rounds  - Make Fall Risk Sign visible to staff  - Apply yellow socks and bracelet for high fall risk patients  - Consider moving patient to room near nurses station  Outcome: Progressing  Goal: Maintain or return to baseline ADL function  Description: INTERVENTIONS:  -  Assess patient's ability to carry out ADLs; assess patient's baseline for ADL function and identify physical deficits which impact ability to perform ADLs (bathing, care of mouth/teeth, toileting, grooming, dressing, etc.)  - Assess/evaluate cause of self-care deficits   - Assess range of motion  - Assess patient's mobility; develop plan if impaired  - Assess patient's need for assistive devices and provide as appropriate  - Encourage maximum independence but intervene and supervise when necessary  - Involve family in performance of ADLs  - Assess for home care needs following discharge   - Consider OT consult to assist with ADL evaluation and planning for discharge  - Provide patient education as appropriate  Outcome: Progressing  Goal: Maintains/Returns to pre admission functional level  Description: INTERVENTIONS:  - Perform AM-PAC 6 Click Basic Mobility/ Daily Activity assessment daily.  - Set and communicate daily mobility goal to care team and patient/family/caregiver.    - Collaborate with rehabilitation services on mobility goals if consulted  - Out of bed for toileting  - Record patient progress and toleration of activity level   Outcome: Progressing     Problem: DISCHARGE PLANNING  Goal: Discharge to home or other facility with appropriate resources  Description: INTERVENTIONS:  - Identify barriers to discharge w/patient and caregiver  - Arrange for needed discharge resources and transportation as appropriate  - Identify discharge learning needs (meds, wound care, etc.)  - Arrange for interpretive services to assist at discharge as needed  - Refer to Case Management Department for coordinating discharge planning if the patient needs post-hospital services based on physician/advanced practitioner order or complex needs related to functional status, cognitive ability, or social support system  Outcome: Progressing     Problem: DISCHARGE PLANNING  Goal: Discharge to home or other facility with appropriate resources  Description: INTERVENTIONS:  - Identify barriers to discharge w/patient and caregiver  - Arrange for needed discharge resources and transportation as appropriate  - Identify discharge learning needs (meds, wound care, etc.)  - Arrange for interpretive services to assist at discharge as needed  - Refer to Case Management Department for coordinating discharge planning if the patient needs post-hospital services based on physician/advanced practitioner order or complex needs related to functional status, cognitive ability, or social support system  Outcome: Progressing     Problem: Knowledge Deficit  Goal: Patient/family/caregiver demonstrates understanding of disease process, treatment plan, medications, and discharge instructions  Description: Complete learning assessment and assess knowledge base.   Interventions:  - Provide teaching at level of understanding  - Provide teaching via preferred learning methods  Outcome: Progressing

## 2023-12-10 NOTE — PROGRESS NOTES
Progress Note    Ivelisse Loomis 48 y.o. male MRN: [id number]  Unit/Bed#: 7T [facility] 713-01 Encounter: [id number]  Admitting Physician: Danielle Arguello MD  PCP: Danielle Arguello MD  Date of Admission:  12/4/2023 12:49 PM    Assessment and Plan    * Acute exacerbation of CHF (congestive heart failure) ([address])  Assessment & Plan  - Home regimen: furosemide 60 QD (taking BID), metoprolol 50 in day and 75 at night, losartan 50 mg BID, spironolactone 25 mg daily, Jardiance 10 mg daily  - Last echo :  LVEF 40% ( August 2023)     - Exacerbation likely second to cocaine use (positive UDS) and flu (positive 12/9/2023)  - Patient weight today 216.8 -- Dry weight: 217 lbs  - Lungs clear to auscultation today though continues to have orthopnea and SOB with ambulation  - Maintaining appropriate SpO2 on RA    Plan:   - lasix PO 60 mg QD; additional 40 meq PO given overnight  - continue spironolactone 25mg qd po  - HOLD jardiance in view of deteriorating kidney function, as per cardiology  - continous pulse oximetry   - walking study before patient ready to go home    - Monitor I/O and daily weight  - Fluid restriction 1.5L   - Telemetry  - Continuous pulse oximetry and titrate O2 as appropriate  - Monitor electrolytes  - Hold Losartan and Jardiance  - Cardiology following    Chronic combined systolic and diastolic congestive heart failure ([address])  Assessment & Plan  See A/P for acute exacerbation of CHF       Acute respiratory failure with hypoxia ([address])  Assessment & Plan  - Likely in the setting of CHF exacerbation  - currently on RA saturating at 95%, however was intermittently requiring O2 supplementation days prior.      PLAN:  - Continue O2 as needed and titrate as appropriate  - Continue diuresis    HEIKE (acute kidney injury) [facility])  Assessment & Plan  Lab Results   Component Value Date    CREATININE 1.50 (H) 12/10/2023        - Creatinine decreased this morning from yesterday's value (baseline 1.2 - 1.3)  - Likely prerenal in the setting of IV diuresis vs fluid overload  - S/p NSAIDs for pain and ARB use for hypertension  - No electrolyte derrangements    PLAN:  - Monitor renal function  - Hold ARB  - Caution with nephrotoxic agent  - Continue diuresis    Influenza A  Assessment & Plan  Positive influenza swab 12/9/2023. Vitals stable. Plan:   Tamiflu 75 mg BID   Contact precautions   Monitor V/S       Left leg cellulitis  Assessment & Plan  Continued erythema in LLE though no pain to palpation.     Plan:  PO Keflex 500 mg BID (day 6)   Continue to monitor vitals   Continue to monitor erythema (border outlined with marker)  Follow CBC    Cocaine abuse ([address])  Assessment & Plan  - Patient denies cocaine use despite positive UDS  - In view of denial, unsure about last use     Plan:   - Monitor for signs and symptoms of withdrawal    Hyperlipidemia  Assessment & Plan  - Home medication: atorvastatin 40 mg QD    PLAN:  - Continue statin    Chest pain  Assessment & Plan  - Troponnins: 25 > 22   - Non-MI related tropinin elevation most likely 2/2 to CHF exacerbation   - ECG sinus tachycardia; Possible Left atrial enlargement, QTC: 451  - Chest pain controlled with voltaren gel    Plan:  - Apply Voltaren gel  - Tylenol 650 mg q8h  - Monitor symptoms      Hypertension  Assessment & Plan  Blood Pressure: 125/86 ; well controlled  - Home medications: furosemide 60 mg daily (using BID), losartan 50 BID, metoprolol 50 mg in morning, metoprolol 75 mg at night, spironolactone 25 mg daily    Plan:   - Hold ARB in view of rise in creatinine  - Continue Metoprolol and Spironolactone  - IV diuresis for CHF exacerbation  - Low sodium diet  - Monitor VS    UTI (urinary tract infection)-resolved as of 12/8/2023  Assessment & Plan  UA + bacteria, + leukocytes  UC no growth    Plan:   Continue keflex for management of cellulitis      SIRS (systemic inflammatory response syndrome) (HCC)-resolved as of 12/6/2023  Assessment & Plan  - Fever resolved  - S/P Tylenol 650 in ED IV Ceftriaxone x 1 in the ED  - No evidence of bacterial infection  - Flu/RSV/Covid not done overnight    Plan:   Monitor vitals             VTE Pharmacologic Prophylaxis:   Pharmacologic: Enoxaparin (Lovenox)  Mechanical VTE Prophylaxis in Place: Yes    Patient Centered Rounds: I have performed bedside rounds with nursing staff today. Discussions with Specialists or Other Care Team Provider: Cassy    Education and Discussions with Family / Patient: Patient    Time Spent for Care: 20 minutes. More than 50% of total time spent on counseling and coordination of care as described above. Current Length of Stay: 6 day(s)    Current Patient Status: Inpatient   Certification Statement: The patient will continue to require additional inpatient hospital stay due to  requiring continued monitoring of response to diuretics     Discharge Plan: Once stable with diuretic therapy can DC    Code Status: Level 1 - Full Code      Subjective:   Pt seen at bedside with attending and resident staff. Pt endorsing much improvement with SOB and cough. He is aware that he is flu positive. Continues to endorse orthopnea and SOB with ambulation. Tolerating PO intake and using bathroom without problem. Denying fever, chills, abdominal pain, n/v/d. Chest pain is well controlled with topical diclofenac. Objective:     Vitals:   Temp (24hrs), Av.7 °F (37.1 °C), Min:97.5 °F (36.4 °C), Max:99.4 °F (37.4 °C)    Temp:  [97.5 °F (36.4 °C)-99.4 °F (37.4 °C)] 97.5 °F (36.4 °C)  HR:  [] 78  Resp:  [18-21] 20  BP: (109-129)/(68-90) 128/86  SpO2:  [95 %-100 %] 97 %  Body mass index is 29.3 kg/m². Input and Output Summary (last 24 hours): Intake/Output Summary (Last 24 hours) at 12/10/2023 1139  Last data filed at 12/10/2023 1110  Gross per 24 hour   Intake 960 ml   Output 2125 ml   Net -1165 ml       Physical Exam:     Physical Exam  Vitals reviewed. Constitutional:       Appearance: Normal appearance. Eyes:      Conjunctiva/sclera: Conjunctivae normal.   Cardiovascular:      Rate and Rhythm: Normal rate and regular rhythm. Pulses: Normal pulses. Heart sounds: Normal heart sounds. Pulmonary:      Effort: Pulmonary effort is normal.      Breath sounds: Normal breath sounds. Abdominal:      Palpations: Abdomen is soft. Tenderness: There is no abdominal tenderness. Musculoskeletal:         General: Normal range of motion. Cervical back: Normal range of motion. Right lower leg: No tenderness or bony tenderness. 1+ Edema present. Left lower leg: No tenderness or bony tenderness. 1+ Edema present. Legs:    Skin:     General: Skin is warm and dry. Capillary Refill: Capillary refill takes less than 2 seconds. Neurological:      General: No focal deficit present. Mental Status: He is alert and oriented to person, place, and time. Psychiatric:         Mood and Affect: Mood normal.         Behavior: Behavior normal.         Additional Data:     Labs:    Results from last 7 days   Lab Units 12/10/23  0612 12/09/23  0431   WBC Thousand/uL 5.47 6.74   HEMOGLOBIN g/dL 13.2 13.1   HEMATOCRIT % 41.6 41.7   PLATELETS Thousands/uL 189 224   NEUTROS PCT %  --  84*   LYMPHS PCT %  --  8*   LYMPHO PCT % 2*  --    MONOS PCT %  --  6   MONO PCT % 2*  --    EOS PCT % 0 1     Results from last 7 days   Lab Units 12/10/23  0612 12/06/23  0445 12/05/23  0541   POTASSIUM mmol/L 4.4   < > 4.0   CHLORIDE mmol/L 98   < > 103   CO2 mmol/L 22   < > 25   BUN mg/dL 32*   < > 25   CREATININE mg/dL 1.50*   < > 1.48*   CALCIUM mg/dL 8.4   < > 9.6   ALK PHOS U/L  --   --  210*   ALT U/L  --   --  30   AST U/L  --   --  28    < > = values in this interval not displayed. Results from last 7 days   Lab Units 12/04/23  1304   INR  1.19                 * I Have Reviewed All Lab Data Listed Above. * Additional Pertinent Lab Tests Reviewed:  [address] [facility] Admission Reviewed    Imaging:    Imaging Reports Reviewed Today Include: CXR  Imaging Personally Reviewed by Myself Includes:  as above    Recent Cultures (last 7 days):     Results from last 7 days   Lab Units 12/06/23  1250 12/04/23  1322 12/04/23  1304   BLOOD CULTURE   --  No Growth After 5 Days. No Growth After 5 Days. URINE CULTURE  No Growth <1000 cfu/mL  --   --        Last 24 Hours Medication List:   Current Facility-Administered Medications   Medication Dose Route Frequency Provider Last Rate    acetaminophen  975 mg Oral Q8H [address] Mare Bran MD      benzonatate  100 mg Oral BID Joaquin Martinez MD      cephalexin  500 mg Oral Q12H [address] Josh Prince MD      dextromethorphan-guaiFENesin  5 mL Oral Q4H Josh Prince MD      Diclofenac Sodium  2 g Topical 4x Daily Bren Leo MD      enoxaparin  40 mg Subcutaneous Daily Josh Prince MD      furosemide  60 mg Oral Daily Rosario Rivas [id number],       ipratropium  0.5 mg Nebulization Q6H Josh Prince MD      levalbuterol  1.25 mg Nebulization Urban Aggarwal MD      metoprolol succinate  50 mg Oral Daily Bren Leo MD      metoprolol succinate  75 mg Oral HS Carie Washington MD      oseltamivir  75 mg Oral Q12H Paloma Ivory MD      pantoprazole  40 mg Oral Early Morning Carie Washington MD      saccharomyces boulardii  250 mg Oral BID Joaquin Martinez MD      spironolactone  25 mg Oral Daily Carie Washington MD          ** Please Note: Dictation voice to text software may have been used in the creation of this document.  Valdo Long MD  12/10/23  11:39 AM

## 2023-12-11 LAB
ANION GAP SERPL CALCULATED.3IONS-SCNC: 10 MMOL/L
BASOPHILS # BLD AUTO: 0.01 THOUSANDS/ÂΜL (ref 0–0.1)
BASOPHILS NFR BLD AUTO: 0 % (ref 0–1)
BUN SERPL-MCNC: 39 MG/DL (ref 5–25)
CALCIUM SERPL-MCNC: 8.6 MG/DL (ref 8.4–10.2)
CHLORIDE SERPL-SCNC: 101 MMOL/L (ref 96–108)
CO2 SERPL-SCNC: 27 MMOL/L (ref 21–32)
CREAT SERPL-MCNC: 1.53 MG/DL (ref 0.6–1.3)
EOSINOPHIL # BLD AUTO: 0 THOUSAND/ÂΜL (ref 0–0.61)
EOSINOPHIL NFR BLD AUTO: 0 % (ref 0–6)
ERYTHROCYTE [DISTWIDTH] IN BLOOD BY AUTOMATED COUNT: 14.8 % (ref 11.6–15.1)
GFR SERPL CREATININE-BSD FRML MDRD: 51 ML/MIN/1.73SQ M
GLUCOSE SERPL-MCNC: 120 MG/DL (ref 65–140)
HCT VFR BLD AUTO: 43.2 % (ref 36.5–49.3)
HGB BLD-MCNC: 13.5 G/DL (ref 12–17)
IMM GRANULOCYTES # BLD AUTO: 0.05 THOUSAND/UL (ref 0–0.2)
IMM GRANULOCYTES NFR BLD AUTO: 1 % (ref 0–2)
LYMPHOCYTES # BLD AUTO: 1.08 THOUSANDS/ÂΜL (ref 0.6–4.47)
LYMPHOCYTES NFR BLD AUTO: 11 % (ref 14–44)
MCH RBC QN AUTO: 27.7 PG (ref 26.8–34.3)
MCHC RBC AUTO-ENTMCNC: 31.3 G/DL (ref 31.4–37.4)
MCV RBC AUTO: 89 FL (ref 82–98)
MONOCYTES # BLD AUTO: 0.49 THOUSAND/ÂΜL (ref 0.17–1.22)
MONOCYTES NFR BLD AUTO: 5 % (ref 4–12)
NEUTROPHILS # BLD AUTO: 8.65 THOUSANDS/ÂΜL (ref 1.85–7.62)
NEUTS SEG NFR BLD AUTO: 83 % (ref 43–75)
NRBC BLD AUTO-RTO: 0 /100 WBCS
PLATELET # BLD AUTO: 200 THOUSANDS/UL (ref 149–390)
PMV BLD AUTO: 12.4 FL (ref 8.9–12.7)
POTASSIUM SERPL-SCNC: 4.5 MMOL/L (ref 3.5–5.3)
RBC # BLD AUTO: 4.88 MILLION/UL (ref 3.88–5.62)
SODIUM SERPL-SCNC: 138 MMOL/L (ref 135–147)
WBC # BLD AUTO: 10.28 THOUSAND/UL (ref 4.31–10.16)

## 2023-12-11 PROCEDURE — 99232 SBSQ HOSP IP/OBS MODERATE 35: CPT | Performed by: STUDENT IN AN ORGANIZED HEALTH CARE EDUCATION/TRAINING PROGRAM

## 2023-12-11 PROCEDURE — 85025 COMPLETE CBC W/AUTO DIFF WBC: CPT

## 2023-12-11 PROCEDURE — 80048 BASIC METABOLIC PNL TOTAL CA: CPT

## 2023-12-11 PROCEDURE — 99233 SBSQ HOSP IP/OBS HIGH 50: CPT | Performed by: FAMILY MEDICINE

## 2023-12-11 PROCEDURE — 94760 N-INVAS EAR/PLS OXIMETRY 1: CPT

## 2023-12-11 PROCEDURE — 94640 AIRWAY INHALATION TREATMENT: CPT

## 2023-12-11 RX ORDER — FUROSEMIDE 10 MG/ML
40 INJECTION INTRAMUSCULAR; INTRAVENOUS
Status: DISCONTINUED | OUTPATIENT
Start: 2023-12-11 | End: 2023-12-13 | Stop reason: HOSPADM

## 2023-12-11 RX ORDER — ISOSORBIDE DINITRATE 10 MG/1
5 TABLET ORAL
Status: DISCONTINUED | OUTPATIENT
Start: 2023-12-11 | End: 2023-12-13 | Stop reason: HOSPADM

## 2023-12-11 RX ORDER — HYDRALAZINE HYDROCHLORIDE 10 MG/1
10 TABLET, FILM COATED ORAL EVERY 8 HOURS SCHEDULED
Status: DISCONTINUED | OUTPATIENT
Start: 2023-12-11 | End: 2023-12-13 | Stop reason: HOSPADM

## 2023-12-11 RX ORDER — METHOCARBAMOL 500 MG/1
500 TABLET, FILM COATED ORAL EVERY 8 HOURS SCHEDULED
Status: DISCONTINUED | OUTPATIENT
Start: 2023-12-11 | End: 2023-12-13 | Stop reason: HOSPADM

## 2023-12-11 RX ADMIN — ACETAMINOPHEN 975 MG: 325 TABLET ORAL at 21:30

## 2023-12-11 RX ADMIN — ACETAMINOPHEN 975 MG: 325 TABLET ORAL at 05:14

## 2023-12-11 RX ADMIN — GUAIFENESIN AND DEXTROMETHORPHAN 5 ML: 100; 10 SYRUP ORAL at 12:37

## 2023-12-11 RX ADMIN — GUAIFENESIN AND DEXTROMETHORPHAN 5 ML: 100; 10 SYRUP ORAL at 00:30

## 2023-12-11 RX ADMIN — LEVALBUTEROL HYDROCHLORIDE 1.25 MG: 1.25 SOLUTION RESPIRATORY (INHALATION) at 07:42

## 2023-12-11 RX ADMIN — FUROSEMIDE 40 MG: 10 INJECTION, SOLUTION INTRAVENOUS at 08:51

## 2023-12-11 RX ADMIN — IPRATROPIUM BROMIDE 0.5 MG: 0.5 SOLUTION RESPIRATORY (INHALATION) at 20:14

## 2023-12-11 RX ADMIN — Medication 250 MG: at 08:51

## 2023-12-11 RX ADMIN — METOPROLOL SUCCINATE 75 MG: 50 TABLET, EXTENDED RELEASE ORAL at 21:30

## 2023-12-11 RX ADMIN — METHOCARBAMOL TABLETS 500 MG: 500 TABLET, COATED ORAL at 14:40

## 2023-12-11 RX ADMIN — Medication 2 G: at 17:52

## 2023-12-11 RX ADMIN — OSELTAMIVIR PHOSPHATE 75 MG: 75 CAPSULE ORAL at 21:31

## 2023-12-11 RX ADMIN — METOPROLOL SUCCINATE 50 MG: 50 TABLET, EXTENDED RELEASE ORAL at 08:51

## 2023-12-11 RX ADMIN — PANTOPRAZOLE SODIUM 40 MG: 40 TABLET, DELAYED RELEASE ORAL at 05:14

## 2023-12-11 RX ADMIN — Medication 2 G: at 21:37

## 2023-12-11 RX ADMIN — ISOSORBIDE DINITRATE 5 MG: 10 TABLET ORAL at 16:30

## 2023-12-11 RX ADMIN — BENZONATATE 100 MG: 100 CAPSULE ORAL at 08:51

## 2023-12-11 RX ADMIN — LEVALBUTEROL HYDROCHLORIDE 1.25 MG: 1.25 SOLUTION RESPIRATORY (INHALATION) at 20:14

## 2023-12-11 RX ADMIN — IPRATROPIUM BROMIDE 0.5 MG: 0.5 SOLUTION RESPIRATORY (INHALATION) at 07:42

## 2023-12-11 RX ADMIN — HYDRALAZINE HYDROCHLORIDE 10 MG: 10 TABLET, FILM COATED ORAL at 21:31

## 2023-12-11 RX ADMIN — METHOCARBAMOL TABLETS 500 MG: 500 TABLET, COATED ORAL at 21:30

## 2023-12-11 RX ADMIN — FUROSEMIDE 40 MG: 10 INJECTION, SOLUTION INTRAVENOUS at 16:10

## 2023-12-11 RX ADMIN — HYDRALAZINE HYDROCHLORIDE 10 MG: 10 TABLET, FILM COATED ORAL at 08:51

## 2023-12-11 RX ADMIN — GUAIFENESIN AND DEXTROMETHORPHAN 5 ML: 100; 10 SYRUP ORAL at 05:13

## 2023-12-11 RX ADMIN — LEVALBUTEROL HYDROCHLORIDE 1.25 MG: 1.25 SOLUTION RESPIRATORY (INHALATION) at 14:43

## 2023-12-11 RX ADMIN — Medication 2 G: at 09:04

## 2023-12-11 RX ADMIN — GUAIFENESIN AND DEXTROMETHORPHAN 5 ML: 100; 10 SYRUP ORAL at 21:36

## 2023-12-11 RX ADMIN — ENOXAPARIN SODIUM 40 MG: 40 INJECTION SUBCUTANEOUS at 08:51

## 2023-12-11 RX ADMIN — ISOSORBIDE DINITRATE 5 MG: 10 TABLET ORAL at 09:05

## 2023-12-11 RX ADMIN — HYDRALAZINE HYDROCHLORIDE 10 MG: 10 TABLET, FILM COATED ORAL at 14:36

## 2023-12-11 RX ADMIN — OSELTAMIVIR PHOSPHATE 75 MG: 75 CAPSULE ORAL at 08:51

## 2023-12-11 RX ADMIN — IPRATROPIUM BROMIDE 0.5 MG: 0.5 SOLUTION RESPIRATORY (INHALATION) at 14:43

## 2023-12-11 RX ADMIN — CEPHALEXIN 500 MG: 500 CAPSULE ORAL at 21:31

## 2023-12-11 RX ADMIN — GUAIFENESIN AND DEXTROMETHORPHAN 5 ML: 100; 10 SYRUP ORAL at 08:50

## 2023-12-11 RX ADMIN — GUAIFENESIN AND DEXTROMETHORPHAN 5 ML: 100; 10 SYRUP ORAL at 16:09

## 2023-12-11 RX ADMIN — Medication 250 MG: at 17:52

## 2023-12-11 RX ADMIN — BENZONATATE 100 MG: 100 CAPSULE ORAL at 21:31

## 2023-12-11 RX ADMIN — ACETAMINOPHEN 975 MG: 325 TABLET ORAL at 14:32

## 2023-12-11 RX ADMIN — CEPHALEXIN 500 MG: 500 CAPSULE ORAL at 08:50

## 2023-12-11 RX ADMIN — SPIRONOLACTONE 25 MG: 25 TABLET, FILM COATED ORAL at 08:51

## 2023-12-11 NOTE — PLAN OF CARE
Problem: INFECTION - ADULT  Goal: Absence or prevention of progression during hospitalization  Description: INTERVENTIONS:  - Assess and monitor for signs and symptoms of infection  - Monitor lab/diagnostic results  - Monitor all insertion sites, i.e. indwelling lines, tubes, and drains  - Monitor endotracheal if appropriate and nasal secretions for changes in amount and color  - [place] appropriate cooling/warming therapies per order  - Administer medications as ordered  - Instruct and encourage patient and family to use good hand hygiene technique  - Identify and instruct in appropriate isolation precautions for identified infection/condition  12/11/2023 0105 by Sheryl Viera  Outcome: Progressing  12/11/2023 0105 by Sheryl Viera  Outcome: Progressing  Goal: Absence of fever/infection during neutropenic period  Description: INTERVENTIONS:  - Monitor WBC    12/11/2023 0105 by Sheryl Viera  Outcome: Progressing  12/11/2023 0105 by Sheryl Viera  Outcome: Progressing     Problem: SAFETY ADULT  Goal: Patient will remain free of falls  Description: INTERVENTIONS:  - Educate patient/family on patient safety including physical limitations  - Instruct patient to call for assistance with activity   - Consult OT/PT to assist with strengthening/mobility   - Keep Call bell within reach  - Keep bed low and locked with side rails adjusted as appropriate  - Keep care items and personal belongings within reach  - Initiate and maintain comfort rounds  - Make Fall Risk Sign visible to staff  - Apply yellow socks and bracelet for high fall risk patients  - Consider moving patient to room near nurses station  12/11/2023 0105 by Sheryl Viera  Outcome: Progressing  12/11/2023 0105 by Sheryl Viera  Outcome: Progressing  Goal: Maintain or return to baseline ADL function  Description: INTERVENTIONS:  -  Assess patient's ability to carry out ADLs; assess patient's baseline for ADL function and identify physical deficits which impact ability to perform ADLs (bathing, care of mouth/teeth, toileting, grooming, dressing, etc.)  - Assess/evaluate cause of self-care deficits   - Assess range of motion  - Assess patient's mobility; develop plan if impaired  - Assess patient's need for assistive devices and provide as appropriate  - Encourage maximum independence but intervene and supervise when necessary  - Involve family in performance of ADLs  - Assess for home care needs following discharge   - Consider OT consult to assist with ADL evaluation and planning for discharge  - Provide patient education as appropriate  12/11/2023 0105 by Rishabh Mojica  Outcome: Progressing  12/11/2023 0105 by Rishabh Mojica  Outcome: Progressing  Goal: Maintains/Returns to pre admission functional level  Description: INTERVENTIONS:  - Perform AM-PAC 6 Click Basic Mobility/ Daily Activity assessment daily.  - Set and communicate daily mobility goal to care team and patient/family/caregiver.    - Collaborate with rehabilitation services on mobility goals if consulted  - Out of bed for toileting  - Record patient progress and toleration of activity level   12/11/2023 0105 by Rishabh Mjoica  Outcome: Progressing  12/11/2023 0105 by Rishabh Mojica  Outcome: Progressing     Problem: DISCHARGE PLANNING  Goal: Discharge to home or other facility with appropriate resources  Description: INTERVENTIONS:  - Identify barriers to discharge w/patient and caregiver  - Arrange for needed discharge resources and transportation as appropriate  - Identify discharge learning needs (meds, wound care, etc.)  - Arrange for interpretive services to assist at discharge as needed  - Refer to Case Management Department for coordinating discharge planning if the patient needs post-hospital services based on physician/advanced practitioner order or complex needs related to functional status, cognitive ability, or social support system  12/11/2023 0105 by Rishabh Mojica  Outcome: Progressing  12/11/2023 0105 by Alejandrina Cade  Outcome: Progressing     Problem: Knowledge Deficit  Goal: Patient/family/caregiver demonstrates understanding of disease process, treatment plan, medications, and discharge instructions  Description: Complete learning assessment and assess knowledge base.   Interventions:  - Provide teaching at level of understanding  - Provide teaching via preferred learning methods  12/11/2023 0105 by Alejandrina Cade  Outcome: Progressing  12/11/2023 0105 by Alejandrina Cade  Outcome: Progressing

## 2023-12-11 NOTE — PLAN OF CARE
Problem: PAIN - ADULT  Goal: Verbalizes/displays adequate comfort level or baseline comfort level  Description: Interventions:  - Encourage patient to monitor pain and request assistance  - Assess pain using appropriate pain scale  - Administer analgesics based on type and severity of pain and evaluate response  - Implement non-pharmacological measures as appropriate and evaluate response  - Consider cultural and social influences on pain and pain management  - Notify physician/advanced practitioner if interventions unsuccessful or patient reports new pain  Outcome: Progressing     Problem: INFECTION - ADULT  Goal: Absence or prevention of progression during hospitalization  Description: INTERVENTIONS:  - Assess and monitor for signs and symptoms of infection  - Monitor lab/diagnostic results  - Monitor all insertion sites, i.e. indwelling lines, tubes, and drains  - Monitor endotracheal if appropriate and nasal secretions for changes in amount and color  - [place] appropriate cooling/warming therapies per order  - Administer medications as ordered  - Instruct and encourage patient and family to use good hand hygiene technique  - Identify and instruct in appropriate isolation precautions for identified infection/condition  Outcome: Progressing     Problem: DISCHARGE PLANNING  Goal: Discharge to home or other facility with appropriate resources  Description: INTERVENTIONS:  - Identify barriers to discharge w/patient and caregiver  - Arrange for needed discharge resources and transportation as appropriate  - Identify discharge learning needs (meds, wound care, etc.)  - Arrange for interpretive services to assist at discharge as needed  - Refer to Case Management Department for coordinating discharge planning if the patient needs post-hospital services based on physician/advanced practitioner order or complex needs related to functional status, cognitive ability, or social support system  Outcome: Progressing

## 2023-12-11 NOTE — PROGRESS NOTES
Cardiology Progress Note - Lilibeth Ayoub 48 y.o. male MRN: [id number]    Unit/Bed#: 7T [facility] 713-01 Encounter: [id number]      Assessment & Plan:    Acute on chronic combined systolic and diastolic CHF  -Nonischemic cardiomyopathy  -TTE 8/7/2023 showed EF 40%, LVIDD 5.5 cm, grade 2 DD, moderate to severe MR, mild TR  - Cardiac MRI/4/2022 showed EF 34%, LVIDD 6.1 cm, normal RV size and function, no evidence of myocardial scarring, inflammation or infiltrative disease  - Chest x-ray on admission showed moderate pulmonary edema  -  on admission  - Outpatient Rx furosemide 60 mg p.o. daily with spironolactone 25 mg daily  - Currently on furosemide 40 mg IV twice daily with spironolactone 25 mg daily  - Also on Jardiance 10 mg daily    Influenza A  -Patient tested positive for influenza A on 12/9/2023  - Currently on Tamiflu  - Continue with supportive care    Hypertension  - Outpatient Rx Toprol-XL 50 mg in a.m. and 75 mg in p.m., losartan 50 mg twice daily, and spironolactone 25 mg daily  - Currently holding losartan due to HEIKE  - Continue with Toprol-XL 50 mg in a.m. and 75 mg in p.m., spironolactone 25 mg daily, hydralazine 10 mg every 8 hours and Isordil 5 mg every 8 hours    HEIKE (acute kidney injury) ([address])  - Baseline creatinine around 1.2-1.4  - Creatinine elevated to 1.53 today    Chest pain  - Troponins negative x 2 on admission  - No acute ischemic changes on ECG    Hyperlipidemia  - Continue with atorvastatin 40 mg daily    Cocaine abuse (HCC)  - U tox positive for cocaine on admission on 12/4/2023  - History of daily cocaine use for many years reportedly quit in 2011  - Patient denies cocaine use prior to admission    Alcohol abuse    Left leg cellulitis    UTI (urinary tract infection)      Summary:  - Patient's weight trended up 1 pound today, however his furosemide was held yesterday and restarted this morning  - His urine output is just over 2 L so far today  -Continue with furosemide 40 mg IV twice daily with spironolactone 25 mg daily  - Check daily standing weights  - Monitor creatinine electrolytes closely      Subjective:   No significant events overnight. He reports feeling well today and reports improvement in his lower extremity edema since admission. He also feels his breathing is improved, but he still having shortness of breath at night. Denies chest pain, abdominal pain, nausea, vomiting, fever, chills, headache, dizziness or palpitations. Objective:     Vitals: Blood pressure 122/83, pulse 79, temperature (!) 97.2 °F (36.2 °C), temperature source Temporal, resp. rate 20, height 6' (1.829 m), weight 98.8 kg (217 lb 14.4 oz), SpO2 97 %. , Body mass index is 29.55 kg/m².,   Orthostatic Blood Pressures      Flowsheet Row Most Recent Value   Blood Pressure 122/83 filed at 12/11/2023 6437   Patient Position - Orthostatic VS Sitting filed at 12/11/2023 5713              Intake/Output Summary (Last 24 hours) at 12/11/2023 1518  Last data filed at 12/11/2023 1201  Gross per 24 hour   Intake 960 ml   Output 2650 ml   Net -1690 ml           Physical Exam:    GEN: Vidal Naylor appears well, alert and oriented x 3, pleasant and cooperative   HEENT: Mucous membranes moist, no scleral icterus, no conjunctival pallor  NECK: + JVD  HEART: Regular rate and rhythm, normal S1 and S2, no murmurs or rubs   LUNGS: Decreased breath sounds at bases bilaterally, otherwise clear to auscultation  ABDOMEN: normal bowel sounds, soft, no tenderness, no distention  EXTREMITIES: peripheral pulses normal; 1-2+ bilateral lower extremity edema  NEURO: no focal findings   SKIN: No lesions or rashes on exposed skin        Current Facility-Administered Medications:     acetaminophen (TYLENOL) tablet 975 mg, 975 mg, Oral, Q8H [address], Brencarolny Padilla MD, 975 mg at 12/11/23 1432    benzonatate (TESSALON PERLES) capsule 100 mg, 100 mg, Oral, BID, Rashmi Larios MD, 100 mg at 12/11/23 0851    cephalexin (Mosetta Olney) capsule 500 mg, 500 mg, Oral, Q12H [address], Julianna Mahoney MD, 500 mg at 12/11/23 0850    dextromethorphan-guaiFENesin (ROBITUSSIN DM) oral syrup 5 mL, 5 mL, Oral, Q4H, Julianna Mahoney MD, 5 mL at 12/11/23 1237    Diclofenac Sodium (VOLTAREN) 1 % topical gel 2 g, 2 g, Topical, 4x Daily, Bren Davila MD, 2 g at 12/11/23 0904    enoxaparin (LOVENOX) subcutaneous injection 40 mg, 40 mg, Subcutaneous, Daily, Julianna Mahoney MD, 40 mg at 12/11/23 0851    furosemide (LASIX) injection 40 mg, 40 mg, Intravenous, BID (diuretic), Francois Pérez MD, 40 mg at 12/11/23 0851    hydrALAZINE (APRESOLINE) tablet 10 mg, 10 mg, Oral, Q8H [address], Bren Davila MD, 10 mg at 12/11/23 1436    ipratropium (ATROVENT) 0.02 % inhalation solution 0.5 mg, 0.5 mg, Nebulization, Q6H, Julianna Mahoney MD, 0.5 mg at 12/11/23 1443    isosorbide dinitrate (ISORDIL) tablet 5 mg, 5 mg, Oral, TID after meals, Bren Davila MD, 5 mg at 12/11/23 0905    levalbuterol (Renard Gregg) inhalation solution 1.25 mg, 1.25 mg, Nebulization, Q6H, Julianna Mahoney MD, 1.25 mg at 12/11/23 1443    methocarbamol (ROBAXIN) tablet 500 mg, 500 mg, Oral, Q8H [address], Julianna Mahoney MD, 500 mg at 12/11/23 1440    metoprolol succinate (TOPROL-XL) 24 hr tablet 50 mg, 50 mg, Oral, Daily, Bren Davila MD, 50 mg at 12/11/23 0851    metoprolol succinate (TOPROL-XL) 24 hr tablet 75 mg, 75 mg, Oral, HS, Bren Davila MD, 75 mg at 12/10/23 2217    oseltamivir (TAMIFLU) capsule 75 mg, 75 mg, Oral, Q12H [address], Julianna Mahoney MD, 75 mg at 12/11/23 0851    pantoprazole (PROTONIX) EC tablet 40 mg, 40 mg, Oral, Early Morning, Bren Davila MD, 40 mg at 12/11/23 0301    saccharomyces boulardii (FLORASTOR) capsule 250 mg, 250 mg, Oral, BID, Korin Hull MD, 250 mg at 12/11/23 0574    spironolactone (ALDACTONE) tablet 25 mg, 25 mg, Oral, Daily, Bren Davila MD, 25 mg at 12/11/23 [address] Results:    No results found for: "CKTOTAL", "CKMB", "CKMBINDEX", "TROPONINI"    Lab Results   Component Value Date    CALCIUM 8.6 12/11/2023    K 4.5 12/11/2023    CO2 27 12/11/2023     12/11/2023    BUN 39 (H) 12/11/2023    CREATININE 1.53 (H) 12/11/2023       Lab Results   Component Value Date    WBC 10.28 (H) 12/11/2023    HGB 13.5 12/11/2023    HCT 43.2 12/11/2023    MCV 89 12/11/2023     12/11/2023           No results found for: "CHOL"  Lab Results   Component Value Date    HDL 44 03/31/2022     Lab Results   Component Value Date    LDLCALC 47 03/31/2022     Lab Results   Component Value Date    TRIG 205 (H) 03/31/2022       Lab Results   Component Value Date    ALT 30 12/05/2023    AST 28 12/05/2023    ALKPHOS 210 (H) 12/05/2023         EKG personally reviewed by )Teressa Chandler MD. No acute changes   TELE: No significant arrhythmias seen on telemetry review.

## 2023-12-11 NOTE — PROGRESS NOTES
CARDIOLOGY INPATIENT FOLLOW-UP PROGRESS NOTE  *-*-*-*-*-*-*-*-*-*-*-*-*-*-*-*-*-*-*-*-*-*-*-*-*-*-*-*-*-*-*-*-*-*-*-*-*-*-*-*-*-*-*-*-*-*-*-*-*-*-*-*-*-*-  Andrew Gutierrez DATE: 12/10/23 7:31 PM   AUTHOR: Madisyn Hunter MD  PATIENT: Jad Leonard   1970    [id number]   48 y.o.   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[employer]*  DATE OF ADMISSION: 12/4/2023 12:49 PM; LENGTH OF STAY: 6 days  *-*-*-*-*-*-*-*-*-*-*-*-*-*-*-*-*-*-*-*-*-*-*-*-*-*-*-*-*-*-*-*-*-*-*-*-*-*-*-*-*-*-*-*-*-*-*-*-*-*-*-*-*-*-    CARDIOLOGY ASSESSMENT:  Acute decompensated heart failure, combined heart failure with reduced ejection fraction and diastolic heart failure  Acute kidney injury  Hypertension  Dyslipidemia  Cocaine and alcohol dependence and abuse  UTI  Moderate to severe mitral valve regurgitation    *-*-*-*-*-*-*-*-*-*-*-*-*-*-*-*-*-*-*-*-*-*-*-*-*-*-*-*-*-*-*-*-*-*-*-*-*-*-*-*-*-*-*-*-*-*-*-*-*-*-*-*-*-*-  CURRENT SCHEDULED MEDICATIONS:    Current Facility-Administered Medications:     acetaminophen (TYLENOL) tablet 975 mg, 975 mg, Oral, Q8H [facility], Bren olive Palafox MD, 975 mg at 12/10/23 1406    benzonatate (TESSALON PERLES) capsule 100 mg, 100 mg, Oral, BID, Arlen Blood MD, 100 mg at 12/10/23 0908    cephalexin (KEFLEX) capsule 500 mg, 500 mg, Oral, Q12H [facility], Navid Ruvalcaba MD, 500 mg at 12/10/23 0909    dextromethorphan-guaiFENesin (ROBITUSSIN DM) oral syrup 5 mL, 5 mL, Oral, Q4H, Navid Ruvalcaba MD, 5 mL at 12/10/23 1703    Diclofenac Sodium (VOLTAREN) 1 % topical gel 2 g, 2 g, Topical, 4x Daily, Bren Cardona MD, 2 g at 12/10/23 1703    enoxaparin (LOVENOX) subcutaneous injection 40 mg, 40 mg, Subcutaneous, Daily, Navid Ruvalcaba MD, 40 mg at 12/10/23 2289    furosemide (LASIX) tablet 60 mg, 60 mg, Oral, Daily, Luis Dyson DO, 60 mg at 12/10/23 0908    ipratropium (ATROVENT) 0.02 % inhalation solution 0.5 mg, 0.5 mg, Nebulization, Q6H, Navid Ruvalcaba MD, 0.5 mg at 12/10/23 1405    levalbuterol (XOPENEX) inhalation solution 1.25 mg, 1.25 mg, Nebulization, Q6H, Jojo Ramon MD, 1.25 mg at 12/10/23 1405    metoprolol succinate (TOPROL-XL) 24 hr tablet 50 mg, 50 mg, Oral, Daily, Bren Beltran MD, 50 mg at 12/10/23 0908    metoprolol succinate (TOPROL-XL) 24 hr tablet 75 mg, 75 mg, Oral, HS, Bren Beltran MD, 75 mg at 12/09/23 2108    oseltamivir (TAMIFLU) capsule 75 mg, 75 mg, Oral, Q12H [address], Jojo Ramon MD, 75 mg at 12/10/23 0909    pantoprazole (PROTONIX) EC tablet 40 mg, 40 mg, Oral, Early Morning, Bren Beltran MD, 40 mg at 12/10/23 4629    saccharomyces boulardii (FLORASTOR) capsule 250 mg, 250 mg, Oral, BID, Almas Ruvalcaba MD, 250 mg at 12/10/23 1703    spironolactone (ALDACTONE) tablet 25 mg, 25 mg, Oral, Daily, Bren Beltran MD, 25 mg at 12/10/23 0909    Current Core Cardiac medications:   Furosemide 60 mg oral daily metoprolol succinate 50 mg daily morning and 75 mg in evening, spironolactone 25 mg daily, Jardiance 10 mg daily. PERTINENT LABS AND OTHER INVESTIGATIONS:    Labs 12/10/23 : Sodium 135 potassium 4.4 chloride 98 bicarb 22 BUN 32 creatinine 1.50 GFR 53 normal CBC except for 85% neutrophils  on 12/4/2023    ECHOCARDIOGRAM AND OTHER CARDIAC TESTS RESULTS:   Echocardiogram 8/7/2023 showed EF 40% with global hypokinesis, grade 2 diastolic dysfunction, mild dilated right ventricle with normal function mild left atrial enlargement normal aortic valve without stenosis or regurgitation moderate to severe mitral valve regurgitation mild tricuspid valve regurgitation borderline pulmonary hypertension no pericardial effusion    *-*-*-*-*-*-*-*-*-*-*-*-*-*-*-*-*-*-*-*-*-*-*-*-*-*-*-*-*-*-*-*-*-*-*-*-*-*-*-*-*-*-*-*-*-*-*-*-*-*-*-*-*-*-  Patient is here bunamidine stable but remains in a volume overloaded state. He did have rising BUN/creatinine but it is improving.   He has been started on oral diuretic therapy reports that he is feeling urinating frequently. PLAN:  -- If patient is not 1 to 1.5 L negative by tomorrow recommend reinitiating diuretic therapy with furosemide 40 mg IV twice daily for another 24 to 48 hours before transitioning back to oral Lasix. .  Patient has significant lower extremity edema and his net weight is still significantly high. He may have some bump in creatinine. -- If his systolic blood pressure is greater than 372 mmHg and diastolic blood pressure greater than 80 mmHg then add isosorbide dinitrate 5 mg 3 times daily and hydralazine 10 mg p.o. 3 times daily to the regimen tomorrow. -- Continue treatment for  influenza. -- Repeat electrolytes and renal function in AM.  -- Encourage patient about use of incentive spirometer regularly. -- Daily standing weight and reliable measurement of intake and output if possible. Continued heart failure diet and other precautions. *-*-*-*-*-*-*-*-*-*-*-*-*-*-*-*-*-*-*-*-*-*-*-*-*-*-*-*-*-*-*-*-*-*-*-*-*-*-*-*-*-*-*-*-*-*-*-*-*-*-*-*-*-*-  INTERVAL CHANGES / HISTORY OF PRESENT ILLNESS:   Interval developments noted. Patient nowInfluenza A positive. Has been started on Tamiflu. Transition to oral diuretic today. From a symptom perspective reports that his breathing is much better. Denies chest pain shortness of breath or dizziness. Also reports that lower extremity swelling is better. Reports that he is urinating frequently.       *-*-*-*-*-*-*-*-*-*-*-*-*-*-*-*-*-*-*-*-*-*-*-*-*-*-*-*-*-*-*-*-*-*-*-*-*-*-*-*-*-*-*-*-*-*-*-*-*-*-*-*-*-*    PERTINENT REVIEW OF SYMPTOMS: As noted above in HPI    *-*-*-*-*-*-*-*-*-*-*-*-*-*-*-*-*-*-*-*-*-*-*-*-*-*-*-*-*-*-*-*-*-*-*-*-*-*-*-*-*-*-*-*-*-*-*-*-*-*-*-*-*-*-  VITAL SIGNS:  Vitals:    12/10/23 0751 12/10/23 0926 12/10/23 1104 12/10/23 1535   BP:    125/75   BP Location:    Left arm   Pulse:    81   Resp:    18   Temp:    (!) 97.3 °F (36.3 °C)   TempSrc:    Temporal   SpO2: 99% 98% 97% 97%   Weight:       Height:          Temp (24hrs), Av.3 °F (36.8 °C), Min:97.3 °F (36.3 °C), Max:99.4 °F (37.4 °C)  Current: Temperature: (!) 97.3 °F (36.3 °C)  Body mass index is 29.3 kg/m². Body surface area is 2.2 meters squared. Intake/Output Summary (Last 24 hours) at 12/10/2023 1931  Last data filed at 12/10/2023 1801  Gross per 24 hour   Intake 1200 ml   Output 1875 ml   Net -675 ml       Weight    23 0532 23 0550 23 0551 23 0537   Weight: 107 kg (234 lb 12.6 oz) 101 kg (221 lb 9 oz) 101 kg (221 lb 9 oz) 99.4 kg (219 lb 2.2 oz)    23 0600 12/10/23 0600   Weight: 98.6 kg (217 lb 6 oz) 98 kg (216 lb 0.8 oz)       Wt Readings from Last 3 Encounters:   12/10/23 98 kg (216 lb 0.8 oz)   23 98.4 kg (217 lb)   23 93.9 kg (207 lb)        PREVIOUS WEIGHTS:   Wt Readings from Last 25 Encounters:   12/10/23 98 kg (216 lb 0.8 oz)   23 98.4 kg (217 lb)   23 93.9 kg (207 lb)   23 93.9 kg (207 lb)   23 93.4 kg (206 lb)   23 92.8 kg (204 lb 9.4 oz)   22 98 kg (216 lb)   22 96.2 kg (212 lb)   22 96.7 kg (213 lb 3.2 oz)   22 95.6 kg (210 lb 11.2 oz)   22 89.9 kg (198 lb 1.6 oz)     *-*-*-*-*-*-*-*-*-*-*-*-*-*-*-*-*-*-*-*-*-*-*-*-*-*-*-*-*-*-*-*-*-*-*-*-*-*-*-*-*-*-*-*-*-*-*-*-*-*-*-*-*-*-  PHYSICAL EXAM:  General Appearance:    Alert, cooperative, in no respiratory distress, appears stated age, large build   Head, Eyes, ENT:    No obvious abnormality, moist mucous mebranes. Neck:   Supple, no carotid bruit or JVD   Back:     Symmetric, no curvature. Lungs:     Respirations unlabored. Slightly decreased breath sounds at bases without crackles. Chest wall:    No tenderness or deformity   Heart:    Regular rate and rhythm, S1 and S2 normal, no murmur, rub  or gallop.    Abdomen:     Soft, non-tender, No obvious masses, or organomegaly   Extremities:   Extremities warm,, grade 2-3 light bilateral pitting pedal edema.   Skin: Moderate venous static abnormalities noted. *-*-*-*-*-*-*-*-*-*-*-*-*-*-*-*-*-*-*-*-*-*-*-*-*-*-*-*-*-*-*-*-*-*-*-*-*-*-*-*-*-*-*-*-*-*-*-*-*-*-*-*-*-*-  TELEMETRY, LAST ECG:  Telemetry reviewed. ... Is not on telemetry     Results for orders placed or performed during the hospital encounter of 12/04/23   ECG 12 lead   Result Value    Ventricular Rate 87    Atrial Rate 87    [place] Interval 164    QRSD Interval 70    QT Interval 386    QTC Interval 464    P Axis 81    QRS Axis -9    T Wave Axis 4    Narrative    Normal sinus rhythm  Possible Left atrial enlargement  Nonspecific T wave abnormality  Prolonged QT  Abnormal ECG  When compared with ECG of 04-DEC-2023 12:47,  No significant change was found  Confirmed by Bharti Scott ([id number]) on 12/6/2023 8:05:15 AM        *-*-*-*-*-*-*-*-*-*-*-*-*-*-*-*-*-*-*-*-*-*-*-*-*-*-*-*-*-*-*-*-*-*-*-*-*-*-*-*-*-*-*-*-*-*-*-*-*-*-*-*-*-*  LABORATORY DATA:  I have personally reviewed pertinent labs. CMP:  Results from last 7 days   Lab Units 12/10/23  0612 12/09/23  0431 12/08/23  0536 12/06/23  0445 12/05/23  0541 12/04/23  1304   SODIUM mmol/L 135 140 141   < > 142 141   POTASSIUM mmol/L 4.4 3.9 4.1   < > 4.0 4.2   CHLORIDE mmol/L 98 102 103   < > 103 105   CO2 mmol/L 22 25 26   < > 25 26   BUN mg/dL 32* 38* 34*   < > 25 19   CREATININE mg/dL 1.50* 1.68* 1.57*   < > 1.48* 1.19   CALCIUM mg/dL 8.4 8.8 8.8   < > 9.6 8.9   ALK PHOS U/L  --   --   --   --  210* 179*   ALT U/L  --   --   --   --  30 30   AST U/L  --   --   --   --  28 25    < > = values in this interval not displayed.         Results from last 7 days   Lab Units 12/09/23  0431 12/08/23  0536   MAGNESIUM mg/dL 2.1 2.4     Results from last 7 days   Lab Units 12/09/23  0431 12/08/23  0536   PHOSPHORUS mg/dL 4.4 4.2    Cardiac Profile:   Results from last 7 days   Lab Units 12/04/23  1506 12/04/23  1304   HS TNI 0HR ng/L  --  25   HS TNI 2HR ng/L 22  --    LACTIC ACID mmol/L  --  1.1 Arterial Blood Gas Analysis:    Venous Blood Gas Analysis:       Invalid input(s): "PCOVEN"     CBC:   Results from last 7 days   Lab Units 12/10/23  0612 12/09/23  0431 12/08/23  0536   WBC Thousand/uL 5.47 6.74 7.00   HEMOGLOBIN g/dL 13.2 13.1 13.4   HEMATOCRIT % 41.6 41.7 42.2   PLATELETS Thousands/uL 189 224 263     PT/INR: No results found for: "PT", "INR", Microbiology:   Results from last 7 days   Lab Units 12/06/23  1250 12/04/23  1322 12/04/23  1304   BLOOD CULTURE   --  No Growth After 5 Days. No Growth After 5 Days. URINE CULTURE  No Growth <1000 cfu/mL  --   --         *-*-*-*-*-*-*-*-*-*-*-*-*-*-*-*-*-*-*-*-*-*-*-*-*-*-*-*-*-*-*-*-*-*-*-*-*-*-*-*-*-*-*-*-*-*-*-*-*-*-*-*-*-*-  IMAGING STUDIES REPORTS: Imaging studies results reviewed. ... No valid procedures specified. No Chest XR results available for this patient. *-*-*-*-*-*-*-*-*-*-*-*-*-*-*-*-*-*-*-*-*-*-*-*-*-*-*-*-*-*-*-*-*-*-*-*-*-*-*-*-*-*-*-*-*-*-*-*-*-*-*-*-*-*-  AVAILABLE OLD CARDIAC TESTS REPORTS:   No results found for this or any previous visit. No results found for this or any previous visit. No results found for this or any previous visit. No results found for this or any previous visit.        *-*-*-*-*-*-*-*-*-*-*-*-*-*-*-*-*-*-*-*-*-*-*-*-*-*-*-*-*-*-*-*-*-*-*-*-*-*-*-*-*-*-*-*-*-*-*-*-*-*-*-*-*-*-  SIGNATURES:   [unfilled]   Lelia Gabriel MD

## 2023-12-12 LAB
ANION GAP SERPL CALCULATED.3IONS-SCNC: 12 MMOL/L
BASOPHILS # BLD AUTO: 0.03 THOUSANDS/ÂΜL (ref 0–0.1)
BASOPHILS NFR BLD AUTO: 1 % (ref 0–1)
BUN SERPL-MCNC: 36 MG/DL (ref 5–25)
CALCIUM SERPL-MCNC: 8.3 MG/DL (ref 8.4–10.2)
CHLORIDE SERPL-SCNC: 101 MMOL/L (ref 96–108)
CO2 SERPL-SCNC: 26 MMOL/L (ref 21–32)
CREAT SERPL-MCNC: 1.35 MG/DL (ref 0.6–1.3)
EOSINOPHIL # BLD AUTO: 0 THOUSAND/ÂΜL (ref 0–0.61)
EOSINOPHIL NFR BLD AUTO: 0 % (ref 0–6)
ERYTHROCYTE [DISTWIDTH] IN BLOOD BY AUTOMATED COUNT: 14.7 % (ref 11.6–15.1)
GFR SERPL CREATININE-BSD FRML MDRD: 60 ML/MIN/1.73SQ M
GLUCOSE SERPL-MCNC: 103 MG/DL (ref 65–140)
HCT VFR BLD AUTO: 42.1 % (ref 36.5–49.3)
HGB BLD-MCNC: 13.5 G/DL (ref 12–17)
IMM GRANULOCYTES # BLD AUTO: 0.02 THOUSAND/UL (ref 0–0.2)
IMM GRANULOCYTES NFR BLD AUTO: 0 % (ref 0–2)
LYMPHOCYTES # BLD AUTO: 1.13 THOUSANDS/ÂΜL (ref 0.6–4.47)
LYMPHOCYTES NFR BLD AUTO: 19 % (ref 14–44)
MCH RBC QN AUTO: 27.7 PG (ref 26.8–34.3)
MCHC RBC AUTO-ENTMCNC: 32.1 G/DL (ref 31.4–37.4)
MCV RBC AUTO: 86 FL (ref 82–98)
MONOCYTES # BLD AUTO: 0.41 THOUSAND/ÂΜL (ref 0.17–1.22)
MONOCYTES NFR BLD AUTO: 7 % (ref 4–12)
NEUTROPHILS # BLD AUTO: 4.51 THOUSANDS/ÂΜL (ref 1.85–7.62)
NEUTS SEG NFR BLD AUTO: 73 % (ref 43–75)
NRBC BLD AUTO-RTO: 0 /100 WBCS
PLATELET # BLD AUTO: 201 THOUSANDS/UL (ref 149–390)
PMV BLD AUTO: 12.2 FL (ref 8.9–12.7)
POTASSIUM SERPL-SCNC: 3.8 MMOL/L (ref 3.5–5.3)
RBC # BLD AUTO: 4.87 MILLION/UL (ref 3.88–5.62)
SODIUM SERPL-SCNC: 139 MMOL/L (ref 135–147)
WBC # BLD AUTO: 6.1 THOUSAND/UL (ref 4.31–10.16)

## 2023-12-12 PROCEDURE — 94640 AIRWAY INHALATION TREATMENT: CPT

## 2023-12-12 PROCEDURE — 94760 N-INVAS EAR/PLS OXIMETRY 1: CPT

## 2023-12-12 PROCEDURE — 94762 N-INVAS EAR/PLS OXIMTRY CONT: CPT

## 2023-12-12 PROCEDURE — 99232 SBSQ HOSP IP/OBS MODERATE 35: CPT | Performed by: INTERNAL MEDICINE

## 2023-12-12 PROCEDURE — 85025 COMPLETE CBC W/AUTO DIFF WBC: CPT

## 2023-12-12 PROCEDURE — 99233 SBSQ HOSP IP/OBS HIGH 50: CPT | Performed by: FAMILY MEDICINE

## 2023-12-12 PROCEDURE — 80048 BASIC METABOLIC PNL TOTAL CA: CPT

## 2023-12-12 RX ORDER — POTASSIUM CHLORIDE 20 MEQ/1
20 TABLET, EXTENDED RELEASE ORAL ONCE
Status: COMPLETED | OUTPATIENT
Start: 2023-12-13 | End: 2023-12-13

## 2023-12-12 RX ADMIN — GUAIFENESIN AND DEXTROMETHORPHAN 5 ML: 100; 10 SYRUP ORAL at 23:19

## 2023-12-12 RX ADMIN — LEVALBUTEROL HYDROCHLORIDE 1.25 MG: 1.25 SOLUTION RESPIRATORY (INHALATION) at 08:56

## 2023-12-12 RX ADMIN — FUROSEMIDE 40 MG: 10 INJECTION, SOLUTION INTRAVENOUS at 08:37

## 2023-12-12 RX ADMIN — CEPHALEXIN 500 MG: 500 CAPSULE ORAL at 08:38

## 2023-12-12 RX ADMIN — Medication 250 MG: at 17:06

## 2023-12-12 RX ADMIN — GUAIFENESIN AND DEXTROMETHORPHAN 5 ML: 100; 10 SYRUP ORAL at 01:40

## 2023-12-12 RX ADMIN — LEVALBUTEROL HYDROCHLORIDE 1.25 MG: 1.25 SOLUTION RESPIRATORY (INHALATION) at 19:48

## 2023-12-12 RX ADMIN — BENZONATATE 100 MG: 100 CAPSULE ORAL at 08:38

## 2023-12-12 RX ADMIN — GUAIFENESIN AND DEXTROMETHORPHAN 5 ML: 100; 10 SYRUP ORAL at 16:39

## 2023-12-12 RX ADMIN — OSELTAMIVIR PHOSPHATE 75 MG: 75 CAPSULE ORAL at 23:02

## 2023-12-12 RX ADMIN — METHOCARBAMOL TABLETS 500 MG: 500 TABLET, COATED ORAL at 13:49

## 2023-12-12 RX ADMIN — METOPROLOL SUCCINATE 75 MG: 50 TABLET, EXTENDED RELEASE ORAL at 23:11

## 2023-12-12 RX ADMIN — ISOSORBIDE DINITRATE 5 MG: 10 TABLET ORAL at 16:39

## 2023-12-12 RX ADMIN — FUROSEMIDE 40 MG: 10 INJECTION, SOLUTION INTRAVENOUS at 16:39

## 2023-12-12 RX ADMIN — GUAIFENESIN AND DEXTROMETHORPHAN 5 ML: 100; 10 SYRUP ORAL at 05:17

## 2023-12-12 RX ADMIN — ISOSORBIDE DINITRATE 5 MG: 10 TABLET ORAL at 12:00

## 2023-12-12 RX ADMIN — OSELTAMIVIR PHOSPHATE 75 MG: 75 CAPSULE ORAL at 08:38

## 2023-12-12 RX ADMIN — ACETAMINOPHEN 975 MG: 325 TABLET ORAL at 23:00

## 2023-12-12 RX ADMIN — PANTOPRAZOLE SODIUM 40 MG: 40 TABLET, DELAYED RELEASE ORAL at 05:17

## 2023-12-12 RX ADMIN — HYDRALAZINE HYDROCHLORIDE 10 MG: 10 TABLET, FILM COATED ORAL at 13:50

## 2023-12-12 RX ADMIN — ACETAMINOPHEN 975 MG: 325 TABLET ORAL at 05:17

## 2023-12-12 RX ADMIN — BENZONATATE 100 MG: 100 CAPSULE ORAL at 23:04

## 2023-12-12 RX ADMIN — SPIRONOLACTONE 25 MG: 25 TABLET, FILM COATED ORAL at 08:38

## 2023-12-12 RX ADMIN — Medication 2 G: at 17:06

## 2023-12-12 RX ADMIN — METOPROLOL SUCCINATE 50 MG: 50 TABLET, EXTENDED RELEASE ORAL at 08:37

## 2023-12-12 RX ADMIN — HYDRALAZINE HYDROCHLORIDE 10 MG: 10 TABLET, FILM COATED ORAL at 23:10

## 2023-12-12 RX ADMIN — IPRATROPIUM BROMIDE 0.5 MG: 0.5 SOLUTION RESPIRATORY (INHALATION) at 19:48

## 2023-12-12 RX ADMIN — IPRATROPIUM BROMIDE 0.5 MG: 0.5 SOLUTION RESPIRATORY (INHALATION) at 08:56

## 2023-12-12 RX ADMIN — Medication 2 G: at 09:02

## 2023-12-12 RX ADMIN — HYDRALAZINE HYDROCHLORIDE 10 MG: 10 TABLET, FILM COATED ORAL at 05:17

## 2023-12-12 RX ADMIN — METHOCARBAMOL TABLETS 500 MG: 500 TABLET, COATED ORAL at 05:17

## 2023-12-12 RX ADMIN — Medication 2 G: at 12:55

## 2023-12-12 RX ADMIN — GUAIFENESIN AND DEXTROMETHORPHAN 5 ML: 100; 10 SYRUP ORAL at 12:55

## 2023-12-12 RX ADMIN — ENOXAPARIN SODIUM 40 MG: 40 INJECTION SUBCUTANEOUS at 08:37

## 2023-12-12 RX ADMIN — ACETAMINOPHEN 975 MG: 325 TABLET ORAL at 13:50

## 2023-12-12 RX ADMIN — GUAIFENESIN AND DEXTROMETHORPHAN 5 ML: 100; 10 SYRUP ORAL at 08:37

## 2023-12-12 RX ADMIN — ISOSORBIDE DINITRATE 5 MG: 10 TABLET ORAL at 08:37

## 2023-12-12 RX ADMIN — Medication 250 MG: at 08:38

## 2023-12-12 RX ADMIN — METHOCARBAMOL TABLETS 500 MG: 500 TABLET, COATED ORAL at 22:59

## 2023-12-12 NOTE — RESPIRATORY THERAPY NOTE
12/11/23 2145   Oxygen Therapy/Pulse Ox   O2 Device None (Room air)   $ Overnight Pulse Ox Started  (Overnite pulse ox initiated @ 2145)     Patient is in bed ready to rest for the night. Sp02 initial 96% on R/A, HR 84 b/m. Recommended artificial tears to use: 1 drop 4x a day in both eyes.

## 2023-12-12 NOTE — PROGRESS NOTES
Progress Note    Jerardo Bertrand 48 y.o. male MRN: [id number]  Unit/Bed#: 7T [facility] 713-01 Encounter: [id number]  Admitting Physician: Thea Escamilla MD  PCP: Jose Alberto Salcedo MD  Date of Admission:  12/4/2023 12:49 PM    Assessment and Plan    * Acute exacerbation of CHF (congestive heart failure) (HCC)  Assessment & Plan  - Negative net I/O over the last 24 hours   - Currently on IV Lasix 40 mg BID  - S/p oxygen study overnight  - Improvement in STEPHANIE  - Adriana color urine (?mild dehydration)    Plan:   - Continue IV Lasix 40 mg this morning; consider switching to oral in the afternoon  - Continue isosorbide dinitrate 5 mg TID and Hydralazine 10 mg TID, as per Cardiology recommendations  - continue spironolactone 25mg qd po and beta-blocker  - HOLD jardiance and Losartan in view of kidney function  - continous pulse oximetry   - Monitor I/O and daily weight  - Change fluid restriction to 2 L daily in view of dehydration  - Telemetry  - Monitor electrolytes  - Cardiology following  - Away RT report for need of O2 at home    Chronic combined systolic and diastolic congestive heart failure [facility])  Assessment & Plan  See A/P for acute exacerbation of CHF       Influenza A  Assessment & Plan  Positive influenza swab 12/9/2023. Vitals stable. Plan:   Tamiflu 75 mg BID 5 days  Contact precautions   Monitor V/S       Left leg cellulitis  Assessment & Plan  - Continued erythema in LLE though no pain to palpation.  - On Keflex 500 mg BID  - Persistent erythema likely due to venous stasis dermatitis    Plan:  - Day 7/7 of Keflex  - Continue to monitor vitals   - Continue to monitor erythema   - Trend WBC    Acute respiratory failure with hypoxia (HCC)  Assessment & Plan  - Likely in the setting of CHF exacerbation  - currently on RA saturating appropriately on RA.      PLAN:  - Continue O2 as needed and titrate as appropriate  - Continue diuresis    Cocaine abuse ([address])  Assessment & Plan  - Patient denies cocaine use despite positive UDS  - In view of denial, unsure about last use  - No evident signs of withdrawal during this admission     Plan:   - Provide education    Hyperlipidemia  Assessment & Plan  - Home medication: atorvastatin 40 mg QD    PLAN:  - Continue statin    Chest pain  Assessment & Plan  - Troponnins: 25 > 22   - Non-MI related tropinin elevation most likely 2/2 to CHF exacerbation   - ECG sinus tachycardia; Possible Left atrial enlargement, QTC: 451  - Chest pain controlled with voltaren gel    Plan:  - Apply Voltaren gel  - Tylenol 650 mg q8h  - Monitor symptoms      HEIKE (acute kidney injury) ([address])  Assessment & Plan  Lab Results   Component Value Date    CREATININE 1.35 (H) 12/12/2023        - Creatinine is down-trending from yesterday's value (baseline 1.2 - 1.3)  - Likely prerenal in the setting of fluid overload  - No electrolyte derrangements    PLAN:  - Monitor renal function  - Hold ARB and Jardiance  - Caution with nephrotoxic agent  - Continue diuresis    Hypertension  Assessment & Plan  Blood Pressure: 122/83 ; well controlled  - Home medications: furosemide 60 mg daily (using BID), losartan 50 BID, metoprolol 50 mg in morning, metoprolol 75 mg at night, spironolactone 25 mg daily    Plan:   - Hold ARB in view of elevated creatinine  - Continue Metoprolol and Spironolactone  - Isosorbide dinitrate and Hydralazine added today  - IV diuresis for CHF exacerbation  - Low sodium diet  - Monitor VS    UTI (urinary tract infection)-resolved as of 12/8/2023  Assessment & Plan  UA + bacteria, + leukocytes  UC no growth    Plan:   Continue keflex for management of cellulitis      SIRS (systemic inflammatory response syndrome) (HCC)-resolved as of 12/6/2023  Assessment & Plan  - Fever resolved  - S/P Tylenol 650 in ED IV Ceftriaxone x 1 in the ED  - No evidence of bacterial infection  - Flu/RSV/Covid not done overnight    Plan:   Monitor vitals             VTE Pharmacologic Prophylaxis: VTE Score: 6 Moderate Risk (Score 3-4) - Pharmacological DVT Prophylaxis Ordered: enoxaparin (Lovenox). Patient Centered Rounds: I have performed bedside rounds with nursing staff today. Discussions with Specialists or Other Care Team Provider: Cardiology       Time Spent for Care: 45 minutes. More than 50% of total time spent on counseling and coordination of care as described above. Current Length of Stay: 8 day(s)    Current Patient Status: Inpatient   Certification Statement: The patient will continue to require additional inpatient hospital stay due to CHF exacerbation     Discharge Plan: When patient is adequately diuresed and no longer requiring oxygen. Code Status: Level 1 - Full Code      Subjective:   Patient was seen eating breakfast with attending and senior and nursing staff at bedside. Patient is feeling SOB, due to pain in the Left lower ribs. The pain is 6/10 and painful to palpation. His pain and swelling in his leg is mostly resolved. He is peeing adequately, and pee is clear reported to be clear but appears marek this morning. He denies changes in appetite, fever, chills, nausea, vomiting, diarrhea or constipation. Objective:     Vitals:   Temp (24hrs), Av.5 °F (36.4 °C), Min:97.2 °F (36.2 °C), Max:97.8 °F (36.6 °C)    Temp:  [97.2 °F (36.2 °C)-97.8 °F (36.6 °C)] 97.8 °F (36.6 °C)  HR:  [78-81] 80  Resp:  [18-20] 18  BP: (127-134)/(80-93) 130/86  SpO2:  [94 %-99 %] 98 %  Body mass index is 29.21 kg/m². Input and Output Summary (last 24 hours): Intake/Output Summary (Last 24 hours) at 2023 1023  Last data filed at 2023 0848  Gross per 24 hour   Intake 1440 ml   Output 3075 ml   Net -1635 ml       Physical Exam:     Physical Exam  Constitutional:       General: He is not in acute distress. Appearance: Normal appearance. He is obese. He is not ill-appearing, toxic-appearing or diaphoretic. HENT:      Head: Normocephalic.       Mouth/Throat:      Mouth: Mucous membranes are moist.   Eyes:      Conjunctiva/sclera: Conjunctivae normal.   Cardiovascular:      Rate and Rhythm: Normal rate and regular rhythm. Heart sounds: Normal heart sounds. Pulmonary:      Effort: Pulmonary effort is normal. No respiratory distress. Breath sounds: No wheezing or rales. Chest:      Chest wall: No tenderness. Abdominal:      Palpations: Abdomen is soft. Tenderness: There is no abdominal tenderness. Musculoskeletal:      Right lower le+ Edema present. Left lower le+ Edema present. Comments: Edema, pain and erythema of left leg improving. Skin:     General: Skin is warm. Capillary Refill: Capillary refill takes less than 2 seconds. Neurological:      General: No focal deficit present. Mental Status: He is alert and oriented to person, place, and time. Psychiatric:         Mood and Affect: Mood normal.         Behavior: Behavior normal.         Thought Content: Thought content normal.         Judgment: Judgment normal.     All Labs For Current Hospital Admission Reviewed    Additional Data:     Labs:    Results from last 7 days   Lab Units 23  0513   WBC Thousand/uL 6.10   HEMOGLOBIN g/dL 13.5   HEMATOCRIT % 42.1   PLATELETS Thousands/uL 201   NEUTROS PCT % 73   LYMPHS PCT % 19   MONOS PCT % 7   EOS PCT % 0     Results from last 7 days   Lab Units 23  0513   POTASSIUM mmol/L 3.8   CHLORIDE mmol/L 101   CO2 mmol/L 26   BUN mg/dL 36*   CREATININE mg/dL 1.35*   CALCIUM mg/dL 8.3*                       * I Have Reviewed All Lab Data Listed Above. * Additional Pertinent Lab Tests Reviewed:  [facility] Admission Reviewed    Imaging:    Imaging Reports Reviewed Today Include: none  Imaging Personally Reviewed by Myself Includes:  none    Recent Cultures (last 7 days):     Results from last 7 days   Lab Units 23  1250   URINE CULTURE  No Growth <1000 cfu/mL       Last 24 Hours Medication List:   Current Facility-Administered Medications   Medication Dose Route Frequency Provider Last Rate    acetaminophen  975 mg Oral [facility] Bren Drake MD      benzonatate  100 mg Oral BID Merly Romero MD      dextromethorphan-guaiFENesin  5 mL Oral Q4H Alfred Jordan MD      Diclofenac Sodium  2 g Topical 4x Daily Bren Drake MD      enoxaparin  40 mg Subcutaneous Daily Alfred Jordan MD      furosemide  40 mg Intravenous BID (diuretic) Joy Barraza MD      hydrALAZINE  10 mg Oral [facility] Joy Barraza MD      ipratropium  0.5 mg Nebulization Zia Romero MD      isosorbide dinitrate  5 mg Oral TID after meals Joy Barraza MD      levalbuterol  1.25 mg Nebulization Q6H Alfred Jordan MD      methocarbamol  500 mg Oral [facility] Alfred Jordan MD      metoprolol succinate  50 mg Oral Daily Bren Drake MD      metoprolol succinate  75 mg Oral HS Joy Barraza MD      oseltamivir  75 mg Oral Q12H Raúl Matson MD      pantoprazole  40 mg Oral Early Morning Bren Drake MD      saccharomyces boulardii  250 mg Oral BID Merly Romero MD      spironolactone  25 mg Oral Daily MD Alfred Alejandro MD  12/12/23  10:23 AM

## 2023-12-12 NOTE — PLAN OF CARE
Problem: PAIN - ADULT  Goal: Verbalizes/displays adequate comfort level or baseline comfort level  Description: Interventions:  - Encourage patient to monitor pain and request assistance  - Assess pain using appropriate pain scale  - Administer analgesics based on type and severity of pain and evaluate response  - Implement non-pharmacological measures as appropriate and evaluate response  - Consider cultural and social influences on pain and pain management  - Notify physician/advanced practitioner if interventions unsuccessful or patient reports new pain  Outcome: Progressing     Problem: INFECTION - ADULT  Goal: Absence or prevention of progression during hospitalization  Description: INTERVENTIONS:  - Assess and monitor for signs and symptoms of infection  - Monitor lab/diagnostic results  - Monitor all insertion sites, i.e. indwelling lines, tubes, and drains  - Monitor endotracheal if appropriate and nasal secretions for changes in amount and color  - [place] appropriate cooling/warming therapies per order  - Administer medications as ordered  - Instruct and encourage patient and family to use good hand hygiene technique  - Identify and instruct in appropriate isolation precautions for identified infection/condition  Outcome: Progressing  Goal: Absence of fever/infection during neutropenic period  Description: INTERVENTIONS:  - Monitor WBC    Outcome: Progressing     Problem: SAFETY ADULT  Goal: Patient will remain free of falls  Description: INTERVENTIONS:  - Educate patient/family on patient safety including physical limitations  - Instruct patient to call for assistance with activity   - Consult OT/PT to assist with strengthening/mobility   - Keep Call bell within reach  - Keep bed low and locked with side rails adjusted as appropriate  - Keep care items and personal belongings within reach  - Initiate and maintain comfort rounds  - Make Fall Risk Sign visible to staff  - Offer Toileting every 2 Hours, in advance of need  - Apply yellow socks and bracelet for high fall risk patients  - Consider moving patient to room near nurses station  Outcome: Progressing  Goal: Maintain or return to baseline ADL function  Description: INTERVENTIONS:  -  Assess patient's ability to carry out ADLs; assess patient's baseline for ADL function and identify physical deficits which impact ability to perform ADLs (bathing, care of mouth/teeth, toileting, grooming, dressing, etc.)  - Assess/evaluate cause of self-care deficits   - Assess range of motion  - Assess patient's mobility; develop plan if impaired  - Assess patient's need for assistive devices and provide as appropriate  - Encourage maximum independence but intervene and supervise when necessary  - Involve family in performance of ADLs  - Assess for home care needs following discharge   - Consider OT consult to assist with ADL evaluation and planning for discharge  - Provide patient education as appropriate  Outcome: Progressing  Goal: Maintains/Returns to pre admission functional level  Description: INTERVENTIONS:  - Perform AM-PAC 6 Click Basic Mobility/ Daily Activity assessment daily.  - Set and communicate daily mobility goal to care team and patient/family/caregiver. - Collaborate with rehabilitation services on mobility goals if consulted  - Perform Range of Motion 2 times a day. - Reposition patient every 2 hours.   - Dangle patient 2 times a day  - Stand patient 2 times a day  - Ambulate patient 2 times a day  - Out of bed to chair 2 times a day   - Out of bed for meals 2 times a day  - Out of bed for toileting  - Record patient progress and toleration of activity level   Outcome: Progressing     Problem: DISCHARGE PLANNING  Goal: Discharge to home or other facility with appropriate resources  Description: INTERVENTIONS:  - Identify barriers to discharge w/patient and caregiver  - Arrange for needed discharge resources and transportation as appropriate  - Identify discharge learning needs (meds, wound care, etc.)  - Arrange for interpretive services to assist at discharge as needed  - Refer to Case Management Department for coordinating discharge planning if the patient needs post-hospital services based on physician/advanced practitioner order or complex needs related to functional status, cognitive ability, or social support system  Outcome: Progressing     Problem: Knowledge Deficit  Goal: Patient/family/caregiver demonstrates understanding of disease process, treatment plan, medications, and discharge instructions  Description: Complete learning assessment and assess knowledge base.   Interventions:  - Provide teaching at level of understanding  - Provide teaching via preferred learning methods  Outcome: Progressing

## 2023-12-12 NOTE — PLAN OF CARE
Problem: PAIN - ADULT  Goal: Verbalizes/displays adequate comfort level or baseline comfort level  Description: Interventions:  - Encourage patient to monitor pain and request assistance  - Assess pain using appropriate pain scale  - Administer analgesics based on type and severity of pain and evaluate response  - Implement non-pharmacological measures as appropriate and evaluate response  - Consider cultural and social influences on pain and pain management  - Notify physician/advanced practitioner if interventions unsuccessful or patient reports new pain  Outcome: Progressing     Problem: INFECTION - ADULT  Goal: Absence or prevention of progression during hospitalization  Description: INTERVENTIONS:  - Assess and monitor for signs and symptoms of infection  - Monitor lab/diagnostic results  - Monitor all insertion sites, i.e. indwelling lines, tubes, and drains  - Monitor endotracheal if appropriate and nasal secretions for changes in amount and color  - [place] appropriate cooling/warming therapies per order  - Administer medications as ordered  - Instruct and encourage patient and family to use good hand hygiene technique  - Identify and instruct in appropriate isolation precautions for identified infection/condition  Outcome: Progressing     Problem: Knowledge Deficit  Goal: Patient/family/caregiver demonstrates understanding of disease process, treatment plan, medications, and discharge instructions  Description: Complete learning assessment and assess knowledge base.   Interventions:  - Provide teaching at level of understanding  - Provide teaching via preferred learning methods  Outcome: Progressing     Problem: DISCHARGE PLANNING  Goal: Discharge to home or other facility with appropriate resources  Description: INTERVENTIONS:  - Identify barriers to discharge w/patient and caregiver  - Arrange for needed discharge resources and transportation as appropriate  - Identify discharge learning needs (meds, wound care, etc.)  - Arrange for interpretive services to assist at discharge as needed  - Refer to Case Management Department for coordinating discharge planning if the patient needs post-hospital services based on physician/advanced practitioner order or complex needs related to functional status, cognitive ability, or social support system  Outcome: Progressing

## 2023-12-13 VITALS
OXYGEN SATURATION: 98 % | WEIGHT: 212.52 LBS | SYSTOLIC BLOOD PRESSURE: 143 MMHG | HEIGHT: 72 IN | RESPIRATION RATE: 20 BRPM | HEART RATE: 79 BPM | BODY MASS INDEX: 28.79 KG/M2 | DIASTOLIC BLOOD PRESSURE: 90 MMHG | TEMPERATURE: 97.5 F

## 2023-12-13 DIAGNOSIS — J10.1 INFLUENZA A: ICD-10-CM

## 2023-12-13 PROBLEM — L03.116 LEFT LEG CELLULITIS: Status: RESOLVED | Noted: 2023-12-05 | Resolved: 2023-12-13

## 2023-12-13 PROBLEM — J96.01 ACUTE RESPIRATORY FAILURE WITH HYPOXIA (HCC): Status: RESOLVED | Noted: 2023-12-05 | Resolved: 2023-12-13

## 2023-12-13 LAB
ANION GAP SERPL CALCULATED.3IONS-SCNC: 11 MMOL/L
BUN SERPL-MCNC: 30 MG/DL (ref 5–25)
CALCIUM SERPL-MCNC: 8.9 MG/DL (ref 8.4–10.2)
CHLORIDE SERPL-SCNC: 101 MMOL/L (ref 96–108)
CO2 SERPL-SCNC: 29 MMOL/L (ref 21–32)
CREAT SERPL-MCNC: 1.39 MG/DL (ref 0.6–1.3)
GFR SERPL CREATININE-BSD FRML MDRD: 58 ML/MIN/1.73SQ M
GLUCOSE SERPL-MCNC: 96 MG/DL (ref 65–140)
MAGNESIUM SERPL-MCNC: 2 MG/DL (ref 1.9–2.7)
POTASSIUM SERPL-SCNC: 3.9 MMOL/L (ref 3.5–5.3)
SODIUM SERPL-SCNC: 141 MMOL/L (ref 135–147)

## 2023-12-13 PROCEDURE — 94760 N-INVAS EAR/PLS OXIMETRY 1: CPT

## 2023-12-13 PROCEDURE — 94664 DEMO&/EVAL PT USE INHALER: CPT

## 2023-12-13 PROCEDURE — 83735 ASSAY OF MAGNESIUM: CPT

## 2023-12-13 PROCEDURE — 94640 AIRWAY INHALATION TREATMENT: CPT

## 2023-12-13 PROCEDURE — 99238 HOSP IP/OBS DSCHRG MGMT 30/<: CPT | Performed by: FAMILY MEDICINE

## 2023-12-13 PROCEDURE — 99232 SBSQ HOSP IP/OBS MODERATE 35: CPT

## 2023-12-13 PROCEDURE — 80048 BASIC METABOLIC PNL TOTAL CA: CPT

## 2023-12-13 RX ORDER — ATORVASTATIN CALCIUM 40 MG/1
40 TABLET, FILM COATED ORAL
Qty: 30 TABLET | Refills: 0 | Status: SHIPPED | OUTPATIENT
Start: 2023-12-13

## 2023-12-13 RX ORDER — ISOSORBIDE DINITRATE 5 MG/1
5 TABLET ORAL
Qty: 90 TABLET | Refills: 0 | Status: SHIPPED | OUTPATIENT
Start: 2023-12-13 | End: 2024-01-12

## 2023-12-13 RX ORDER — PANTOPRAZOLE SODIUM 40 MG/1
40 TABLET, DELAYED RELEASE ORAL
Qty: 30 TABLET | Refills: 0 | Status: SHIPPED | OUTPATIENT
Start: 2023-12-13

## 2023-12-13 RX ORDER — ACETAMINOPHEN 325 MG/1
975 TABLET ORAL EVERY 6 HOURS PRN
Status: DISCONTINUED | OUTPATIENT
Start: 2023-12-13 | End: 2023-12-13 | Stop reason: HOSPADM

## 2023-12-13 RX ORDER — METOPROLOL SUCCINATE 50 MG/1
50 TABLET, EXTENDED RELEASE ORAL
Qty: 30 TABLET | Refills: 0 | Status: SHIPPED | OUTPATIENT
Start: 2023-12-13

## 2023-12-13 RX ORDER — LEVALBUTEROL INHALATION SOLUTION 1.25 MG/3ML
1.25 SOLUTION RESPIRATORY (INHALATION) EVERY 6 HOURS PRN
Status: DISCONTINUED | OUTPATIENT
Start: 2023-12-13 | End: 2023-12-13 | Stop reason: HOSPADM

## 2023-12-13 RX ORDER — SPIRONOLACTONE 25 MG/1
25 TABLET ORAL DAILY
Qty: 30 TABLET | Refills: 0 | Status: SHIPPED | OUTPATIENT
Start: 2023-12-13

## 2023-12-13 RX ORDER — SUCRALFATE 1 G/1
1 TABLET ORAL
Qty: 120 TABLET | Refills: 0 | Status: SHIPPED | OUTPATIENT
Start: 2023-12-13

## 2023-12-13 RX ORDER — HYDRALAZINE HYDROCHLORIDE 10 MG/1
10 TABLET, FILM COATED ORAL EVERY 8 HOURS SCHEDULED
Qty: 90 TABLET | Refills: 0 | Status: SHIPPED | OUTPATIENT
Start: 2023-12-13 | End: 2024-01-12

## 2023-12-13 RX ORDER — OSELTAMIVIR PHOSPHATE 75 MG/1
75 CAPSULE ORAL EVERY 12 HOURS SCHEDULED
Qty: 1 CAPSULE | Refills: 0 | Status: SHIPPED | OUTPATIENT
Start: 2023-12-13 | End: 2023-12-14

## 2023-12-13 RX ORDER — METOPROLOL SUCCINATE 25 MG/1
75 TABLET, EXTENDED RELEASE ORAL
Qty: 90 TABLET | Refills: 0 | Status: SHIPPED | OUTPATIENT
Start: 2023-12-13

## 2023-12-13 RX ORDER — OSELTAMIVIR PHOSPHATE 75 MG/1
75 CAPSULE ORAL EVERY 12 HOURS SCHEDULED
Qty: 1 CAPSULE | Refills: 0 | Status: SHIPPED | OUTPATIENT
Start: 2023-12-13 | End: 2023-12-18

## 2023-12-13 RX ORDER — TORSEMIDE 20 MG/1
20 TABLET ORAL 2 TIMES DAILY
Qty: 60 TABLET | Refills: 0 | Status: SHIPPED | OUTPATIENT
Start: 2023-12-13

## 2023-12-13 RX ORDER — ATORVASTATIN CALCIUM 40 MG/1
40 TABLET, FILM COATED ORAL
Qty: 30 TABLET | Refills: 0 | Status: SHIPPED | OUTPATIENT
Start: 2023-12-13 | End: 2023-12-13

## 2023-12-13 RX ORDER — FUROSEMIDE 40 MG/1
60 TABLET ORAL DAILY
Qty: 45 TABLET | Refills: 0 | Status: CANCELLED | OUTPATIENT
Start: 2023-12-13 | End: 2024-04-11

## 2023-12-13 RX ORDER — LOSARTAN POTASSIUM 50 MG/1
50 TABLET ORAL 2 TIMES DAILY
Qty: 60 TABLET | Refills: 0 | Status: SHIPPED | OUTPATIENT
Start: 2023-12-13 | End: 2023-12-13

## 2023-12-13 RX ADMIN — Medication 250 MG: at 08:30

## 2023-12-13 RX ADMIN — BENZONATATE 100 MG: 100 CAPSULE ORAL at 08:30

## 2023-12-13 RX ADMIN — Medication 2 G: at 11:49

## 2023-12-13 RX ADMIN — POTASSIUM CHLORIDE 20 MEQ: 1500 TABLET, EXTENDED RELEASE ORAL at 01:31

## 2023-12-13 RX ADMIN — SPIRONOLACTONE 25 MG: 25 TABLET, FILM COATED ORAL at 08:30

## 2023-12-13 RX ADMIN — PANTOPRAZOLE SODIUM 40 MG: 40 TABLET, DELAYED RELEASE ORAL at 05:58

## 2023-12-13 RX ADMIN — ACETAMINOPHEN 975 MG: 325 TABLET ORAL at 05:57

## 2023-12-13 RX ADMIN — GUAIFENESIN AND DEXTROMETHORPHAN 5 ML: 100; 10 SYRUP ORAL at 11:43

## 2023-12-13 RX ADMIN — HYDRALAZINE HYDROCHLORIDE 10 MG: 10 TABLET, FILM COATED ORAL at 05:59

## 2023-12-13 RX ADMIN — ENOXAPARIN SODIUM 40 MG: 40 INJECTION SUBCUTANEOUS at 08:29

## 2023-12-13 RX ADMIN — LEVALBUTEROL HYDROCHLORIDE 1.25 MG: 1.25 SOLUTION RESPIRATORY (INHALATION) at 07:25

## 2023-12-13 RX ADMIN — METHOCARBAMOL TABLETS 500 MG: 500 TABLET, COATED ORAL at 05:57

## 2023-12-13 RX ADMIN — ISOSORBIDE DINITRATE 5 MG: 10 TABLET ORAL at 11:48

## 2023-12-13 RX ADMIN — FUROSEMIDE 40 MG: 10 INJECTION, SOLUTION INTRAVENOUS at 08:30

## 2023-12-13 RX ADMIN — IPRATROPIUM BROMIDE 0.5 MG: 0.5 SOLUTION RESPIRATORY (INHALATION) at 07:25

## 2023-12-13 RX ADMIN — GUAIFENESIN AND DEXTROMETHORPHAN 5 ML: 100; 10 SYRUP ORAL at 03:33

## 2023-12-13 RX ADMIN — Medication 2 G: at 08:31

## 2023-12-13 RX ADMIN — HYDRALAZINE HYDROCHLORIDE 10 MG: 10 TABLET, FILM COATED ORAL at 13:00

## 2023-12-13 RX ADMIN — METHOCARBAMOL TABLETS 500 MG: 500 TABLET, COATED ORAL at 13:00

## 2023-12-13 RX ADMIN — OSELTAMIVIR PHOSPHATE 75 MG: 75 CAPSULE ORAL at 08:30

## 2023-12-13 RX ADMIN — GUAIFENESIN AND DEXTROMETHORPHAN 5 ML: 100; 10 SYRUP ORAL at 08:29

## 2023-12-13 RX ADMIN — ISOSORBIDE DINITRATE 5 MG: 10 TABLET ORAL at 08:30

## 2023-12-13 RX ADMIN — METOPROLOL SUCCINATE 50 MG: 50 TABLET, EXTENDED RELEASE ORAL at 08:30

## 2023-12-13 NOTE — DISCHARGE INSTRUCTIONS
Please call to make an appointment with cardiology within 1 week   [facility] until your hospital follow-up visit

## 2023-12-13 NOTE — CASE MANAGEMENT
Case Management Discharge Planning Note    Patient name Neville Womack  Location [facility] MRN [id number]  : 1970 Date 2023       Current Admission Date: 2023  Current Admission Diagnosis:Acute exacerbation of CHF (congestive heart failure) [facility])   Patient Active Problem List    Diagnosis Date Noted    Influenza A 2023    Acute exacerbation of CHF (congestive heart failure) ([address]) 2023    Chest pain 2023    Hyperlipidemia 2023    Cocaine abuse ([address]) 2023    Nonischemic cardiomyopathy ([address]) 2023    Does not have health insurance 2023    Encounter to establish care 2023    Chest pain, unspecified 2023    HEIKE (acute kidney injury) ([address]) 2023    Abnormal CT scan 2023    Chronic combined systolic and diastolic congestive heart failure ([address]) 2022    Pleural effusion, left 2022    Hypertension 2022    Left leg pain 2022    Elevated serum creatinine 2022      LOS (days): 9  Geometric Mean LOS (GMLOS) (days): 3.90  Days to GMLOS:-5     OBJECTIVE:  Risk of Unplanned Readmission Score: 20.28         Current admission status: Inpatient   Preferred Pharmacy:   [address] [place] [address]  Phone: [phone] Fax: [phone]    [facility]/pharmacy #[id number] - [address] [address]  Phone: [phone] Fax: [phone]    Primary Care Provider: Brijesh Reese MD    Primary Insurance: [employer]  Secondary Insurance:     DISCHARGE DETAILS:    Discharge planning discussed with[de-identified] Patient  Freedom of Choice: Yes      Treatment Team Recommendation: Home with [address]  Discharge Destination Plan[de-identified] Home with [address]    Additional Comments: Met with patient and confirmed HHC.

## 2023-12-13 NOTE — PLAN OF CARE
Problem: SAFETY ADULT  Goal: Patient will remain free of falls  Description: INTERVENTIONS:  - Educate patient/family on patient safety including physical limitations  - Instruct patient to call for assistance with activity   - Consult OT/PT to assist with strengthening/mobility   - Keep Call bell within reach  - Keep bed low and locked with side rails adjusted as appropriate  - Keep care items and personal belongings within reach  - Initiate and maintain comfort rounds  - Make Fall Risk Sign visible to staff  - Apply yellow socks and bracelet for high fall risk patients  - Consider moving patient to room near nurses station  Outcome: Progressing  Goal: Maintain or return to baseline ADL function  Description: INTERVENTIONS:  -  Assess patient's ability to carry out ADLs; assess patient's baseline for ADL function and identify physical deficits which impact ability to perform ADLs (bathing, care of mouth/teeth, toileting, grooming, dressing, etc.)  - Assess/evaluate cause of self-care deficits   - Assess range of motion  - Assess patient's mobility; develop plan if impaired  - Assess patient's need for assistive devices and provide as appropriate  - Encourage maximum independence but intervene and supervise when necessary  - Involve family in performance of ADLs  - Assess for home care needs following discharge   - Consider OT consult to assist with ADL evaluation and planning for discharge  - Provide patient education as appropriate  Outcome: Progressing  Goal: Maintains/Returns to pre admission functional level  Description: INTERVENTIONS:  - Perform AM-PAC 6 Click Basic Mobility/ Daily Activity assessment daily.  - Set and communicate daily mobility goal to care team and patient/family/caregiver.    - Collaborate with rehabilitation services on mobility goals if consulted  - Out of bed for toileting  - Record patient progress and toleration of activity level   Outcome: Progressing     Problem: DISCHARGE PLANNING  Goal: Discharge to home or other facility with appropriate resources  Description: INTERVENTIONS:  - Identify barriers to discharge w/patient and caregiver  - Arrange for needed discharge resources and transportation as appropriate  - Identify discharge learning needs (meds, wound care, etc.)  - Arrange for interpretive services to assist at discharge as needed  - Refer to Case Management Department for coordinating discharge planning if the patient needs post-hospital services based on physician/advanced practitioner order or complex needs related to functional status, cognitive ability, or social support system  Outcome: Progressing     Problem: Knowledge Deficit  Goal: Patient/family/caregiver demonstrates understanding of disease process, treatment plan, medications, and discharge instructions  Description: Complete learning assessment and assess knowledge base.   Interventions:  - Provide teaching at level of understanding  - Provide teaching via preferred learning methods  Outcome: Progressing

## 2023-12-13 NOTE — PLAN OF CARE
Problem: PAIN - ADULT  Goal: Verbalizes/displays adequate comfort level or baseline comfort level  Description: Interventions:  - Encourage patient to monitor pain and request assistance  - Assess pain using appropriate pain scale  - Administer analgesics based on type and severity of pain and evaluate response  - Implement non-pharmacological measures as appropriate and evaluate response  - Consider cultural and social influences on pain and pain management  - Notify physician/advanced practitioner if interventions unsuccessful or patient reports new pain  Outcome: Progressing     Problem: INFECTION - ADULT  Goal: Absence or prevention of progression during hospitalization  Description: INTERVENTIONS:  - Assess and monitor for signs and symptoms of infection  - Monitor lab/diagnostic results  - Monitor all insertion sites, i.e. indwelling lines, tubes, and drains  - Monitor endotracheal if appropriate and nasal secretions for changes in amount and color  - [place] appropriate cooling/warming therapies per order  - Administer medications as ordered  - Instruct and encourage patient and family to use good hand hygiene technique  - Identify and instruct in appropriate isolation precautions for identified infection/condition  Outcome: Progressing  Goal: Absence of fever/infection during neutropenic period  Description: INTERVENTIONS:  - Monitor WBC    Outcome: Progressing     Problem: SAFETY ADULT  Goal: Patient will remain free of falls  Description: INTERVENTIONS:  - Educate patient/family on patient safety including physical limitations  - Instruct patient to call for assistance with activity   - Consult OT/PT to assist with strengthening/mobility   - Keep Call bell within reach  - Keep bed low and locked with side rails adjusted as appropriate  - Keep care items and personal belongings within reach  - Initiate and maintain comfort rounds  - Make Fall Risk Sign visible to staff  - Offer Toileting every 2 Hours, in advance of need  - Apply yellow socks and bracelet for high fall risk patients  - Consider moving patient to room near nurses station  Outcome: Progressing  Goal: Maintain or return to baseline ADL function  Description: INTERVENTIONS:  -  Assess patient's ability to carry out ADLs; assess patient's baseline for ADL function and identify physical deficits which impact ability to perform ADLs (bathing, care of mouth/teeth, toileting, grooming, dressing, etc.)  - Assess/evaluate cause of self-care deficits   - Assess range of motion  - Assess patient's mobility; develop plan if impaired  - Assess patient's need for assistive devices and provide as appropriate  - Encourage maximum independence but intervene and supervise when necessary  - Involve family in performance of ADLs  - Assess for home care needs following discharge   - Consider OT consult to assist with ADL evaluation and planning for discharge  - Provide patient education as appropriate  Outcome: Progressing  Goal: Maintains/Returns to pre admission functional level  Description: INTERVENTIONS:  - Perform AM-PAC 6 Click Basic Mobility/ Daily Activity assessment daily.  - Set and communicate daily mobility goal to care team and patient/family/caregiver. - Collaborate with rehabilitation services on mobility goals if consulted  - Perform Range of Motion 2 times a day. - Reposition patient every 2 hours.   - Dangle patient 2 times a day  - Stand patient 2 times a day  - Ambulate patient 2 times a day  - Out of bed to chair 2 times a day   - Out of bed for meals 2 times a day  - Out of bed for toileting  - Record patient progress and toleration of activity level   Outcome: Progressing     Problem: DISCHARGE PLANNING  Goal: Discharge to home or other facility with appropriate resources  Description: INTERVENTIONS:  - Identify barriers to discharge w/patient and caregiver  - Arrange for needed discharge resources and transportation as appropriate  - Identify discharge learning needs (meds, wound care, etc.)  - Arrange for interpretive services to assist at discharge as needed  - Refer to Case Management Department for coordinating discharge planning if the patient needs post-hospital services based on physician/advanced practitioner order or complex needs related to functional status, cognitive ability, or social support system  Outcome: Progressing     Problem: Knowledge Deficit  Goal: Patient/family/caregiver demonstrates understanding of disease process, treatment plan, medications, and discharge instructions  Description: Complete learning assessment and assess knowledge base.   Interventions:  - Provide teaching at level of understanding  - Provide teaching via preferred learning methods  Outcome: Progressing

## 2023-12-13 NOTE — PROGRESS NOTES
Progress Note - Cardiology   Sharron Crigler 48 y.o. male MRN: [id number]  Unit/Bed#: [facility]-01 Encounter: [id number]      Assessment/Recommendations/Discussion:   Acute on chronic combined systolic and diastolic heart failure  LVEF 36%, grade 2 diastolic dysfunction, mild RV dilatation, mild LA dilatation, moderate to severe MR, mild TR with PASP 35 mmHg, August 2023  Influenza A infection  Precordial chest pain likely secondary to influenza infection/heart failure  Nonischemic cardiomyopathy  Normal coronary arteries by cardiac catheterization, April 2022  Acute kidney injury  Hypertension  Dyslipidemia  History of cocaine abuse - UDS positive on admission  Alcohol abuse  Left leg cellulitis  UTI      PLAN  He continues to improve, lungs are clear today  Still with lower extremity edema but he states this is about at his baseline. He is adamant about going home today  Can stop IV Lasix  Would place on p.o. torsemide for total of 40 mg/day, continue 20 mg twice daily  Continue hydralazine/isosorbide, spironolactone, Toprol-XL  Will need close outpatient follow-up with cardiology        Subjective:   HPI  Doing well today, no shortness of breath, no chest pain      Review of Systems: As noted in HPI. Rest of ROS is negative. Vitals:   /90 (BP Location: Left arm)   Pulse 79   Temp 97.5 °F (36.4 °C) (Temporal)   Resp 20   Ht 6' (1.829 m)   Wt 96.4 kg (212 lb 8.4 oz)   SpO2 98%   BMI 28.82 kg/m²   I/O         12/11 0701 12/12 0700 12/12 0701  12/13 0700 12/13 0701  12/14 0700    P. O. 720 1680 240    Total Intake(mL/kg) 720 (7.4) 1680 (17.4) 240 (2.5)    Urine (mL/kg/hr) 3700 (1.6) 3350 (1.4) 1600 (2.4)    Stool       Total Output 3700 3350 1600    Net -2980 -1670 -1360           Unmeasured Urine Occurrence  1 x           Weight (last 2 days)       Date/Time Weight    12/13/23 0600 96.4 (212.52)    12/12/23 0538 97.7 (215.39)    12/11/23 0800 98.8 (217.9)            Physical Exam Constitutional: awake, alert and oriented, in no acute distress, no obvious deformities  Head: Normocephalic, without obvious abnormality, atraumatic  Eyes: conjunctivae clear and moist. Sclera anicteric. No xanthelasmas. Pupils equal bilaterally. Extraocular motions are full. Ear nose mouth and throat: ears are symmetrical bilaterally, hearing appears to be equal bilaterally, no nasal discharge or epistaxis, oropharynx is clear with moist mucous membranes  Neck: Trachea is midline, neck is supple, no thyromegaly or significant lymphadenopathy, there is full range of motion. Lungs: clear to auscultation bilaterally, no wheezes, no rales, no rhonchi, no accessory muscle use, breathing is nonlabored  Heart: regular rate and rhythm, S1, S2 normal, no murmur, no click, no rub and no gallop, 1-2+ lower extremity edema  Abdomen: soft, non-tender; bowel sounds normal; no masses, no organomegaly  Psychiatric: Patient is oriented to time, place, person, mood/affect is negative for depression, anxiety, agitation, appears to have appropriate insight  Skin: Skin is warm, dry, intact. No obvious rashes or lesions on exposed extremities. Nail beds are pink with no cyanosis or clubbing.     TELEMETRY:   Lab Results:  Results from last 7 days   Lab Units 12/12/23  0513   WBC Thousand/uL 6.10   HEMOGLOBIN g/dL 13.5   HEMATOCRIT % 42.1   PLATELETS Thousands/uL 201     Results from last 7 days   Lab Units 12/13/23  0456   POTASSIUM mmol/L 3.9   CHLORIDE mmol/L 101   CO2 mmol/L 29   BUN mg/dL 30*   CREATININE mg/dL 1.39*   CALCIUM mg/dL 8.9     Results from last 7 days   Lab Units 12/13/23  0456   POTASSIUM mmol/L 3.9   CHLORIDE mmol/L 101   CO2 mmol/L 29   BUN mg/dL 30*   CREATININE mg/dL 1.39*   CALCIUM mg/dL 8.9           Medications:    Current Facility-Administered Medications:     acetaminophen (TYLENOL) tablet 975 mg, 975 mg, Oral, Q6H PRN, Zuri Manuel MD    benzonatate (TESSALON PERLES) capsule 100 mg, 100 mg, Oral, BID, Marias Garcia MD, 100 mg at 12/13/23 0830    dextromethorphan-guaiFENesin (ROBITUSSIN DM) oral syrup 5 mL, 5 mL, Oral, Q4H, Zuri Manuel MD, 5 mL at 12/13/23 1143    Diclofenac Sodium (VOLTAREN) 1 % topical gel 2 g, 2 g, Topical, 4x Daily, Bren Jaquez MD, 2 g at 12/13/23 1149    enoxaparin (LOVENOX) subcutaneous injection 40 mg, 40 mg, Subcutaneous, Daily, Zuri Manuel MD, 40 mg at 12/13/23 3696    furosemide (LASIX) injection 40 mg, 40 mg, Intravenous, BID (diuretic), Luis Verdugo MD, 40 mg at 12/13/23 0830    hydrALAZINE (APRESOLINE) tablet 10 mg, 10 mg, Oral, Q8H [address], Bren Jaquez MD, 10 mg at 12/13/23 1300    ipratropium (ATROVENT) 0.02 % inhalation solution 0.5 mg, 0.5 mg, Nebulization, Q6H PRN, Kaye Lee MD    isosorbide dinitrate (ISORDIL) tablet 5 mg, 5 mg, Oral, TID after meals, Bren Jaquez MD, 5 mg at 12/13/23 1148    levalbuterol (XOPENEX) inhalation solution 1.25 mg, 1.25 mg, Nebulization, Q6H PRN, Kaye Lee MD    methocarbamol (ROBAXIN) tablet 500 mg, 500 mg, Oral, Q8H [address], Zuri Manuel MD, 500 mg at 12/13/23 1300    metoprolol succinate (TOPROL-XL) 24 hr tablet 50 mg, 50 mg, Oral, Daily, Bren Jaquez MD, 50 mg at 12/13/23 0830    metoprolol succinate (TOPROL-XL) 24 hr tablet 75 mg, 75 mg, Oral, HS, Bren Jaquez MD, 75 mg at 12/12/23 2311    oseltamivir (TAMIFLU) capsule 75 mg, 75 mg, Oral, Q12H [address], Zuri Manuel MD, 75 mg at 12/13/23 0830    pantoprazole (PROTONIX) EC tablet 40 mg, 40 mg, Oral, Early Morning, Bren Jaquez MD, 40 mg at 12/13/23 2566    saccharomyces boulardii (FLORASTOR) capsule 250 mg, 250 mg, Oral, BID, Marisa Garcia MD, 250 mg at 12/13/23 0830    spironolactone (ALDACTONE) tablet 25 mg, 25 mg, Oral, Daily, Bren Jaquez MD, 25 mg at 12/13/23 0830    Portions of the record may have been created with voice recognition software. Occasional wrong word or "sound a like" substitutions may have occurred due to the inherent limitations of voice recognition software. Read the chart carefully and recognize, using context, where substitutions have occurred.     Dandre Dumas DO, [facility]  12/13/2023 1:51 PM

## 2023-12-13 NOTE — DISCHARGE SUMMARY
Discharge Summary - [facility]    Patient Information: Jerardo Bertrand 48 y.o. male MRN: [id number]  Unit/Bed#: 7T [facility] 713-01 Encounter: [id number]    Discharging Physician / Practitioner: Dr. Refugio Lundborg  PCP: Jose Alberto Salcedo MD  Admission Date:   Admission Orders (From admission, onward)       Ordered        12/04/23 1546  INPATIENT ADMISSION  Once                          Discharge Date: 12/23/2023    Reason for Admission: CHF    Discharge Diagnoses:     Principal Problem:    Acute exacerbation of CHF (congestive heart failure) ([address])  Active Problems:    Chronic combined systolic and diastolic congestive heart failure ([address])    Hypertension    HEIKE (acute kidney injury) ([address])    Chest pain    Hyperlipidemia    Cocaine abuse ([address])    Influenza A  Resolved Problems:    SIRS (systemic inflammatory response syndrome) ([address])    Acute respiratory failure with hypoxia (HCC)    Left leg cellulitis    UTI (urinary tract infection)        * Acute exacerbation of CHF (congestive heart failure) ([address])  Assessment & Plan  - Negative net I/O over the last 24 hours   - S/p oxygen study overnight- no oxygen needed   - Improvement in STEPHANIE  Patient was managed with IV diuresis during his hospital stay, he improved overall and was transitioned to oral medication. However during his hospital stay he contracted influenza A, and his shortness of breath had come back. He needed to be placed on IV diuresis again. Today cardio cleared him and he will be continued on oral diuresis at home with torsemide 20 mg twice daily. He will continue to hold Jardiance and losartan, and he will continue the isosorbide dinitrate 5 mg 3 times a day and hydralazine 10 mg 3 times per day, spironolactone 25 mg daily and metoprolol.       Plan:   - Continue isosorbide dinitrate 5 mg TID and Hydralazine 10 mg TID, as per Cardiology recommendations  - continue spironolactone 25mg qd po and beta-blocker  - HOLD jardiance and Losartan in view of kidney function        Chronic combined systolic and diastolic congestive heart failure [facility])  Assessment & Plan  See A/P for acute exacerbation of CHF       Influenza A  Assessment & Plan  Positive influenza swab 12/9/2023. Vitals stable. Plan:   1 more dose of tamiflu  remain  Contact precautions   Monitor V/S       Cocaine abuse ([address])  Assessment & Plan  - Patient denies cocaine use despite positive UDS  - In view of denial, unsure about last use  - No evident signs of withdrawal during this admission       Plan:   - Provide education    Hyperlipidemia  Assessment & Plan  Patient was continued on his home medication atorvastatin 40 mg once daily during his hospital stay. He may continue his home medication after discharge          Chest pain  Assessment & Plan  During hospitalization his troponins were elevated most likely secondary to CHF exacerbation, ECG on admission was found to be sinus tachycardic with possible left atrial enlargement with normal QTc. Patient endorses chest pain but it is well-controlled during the admission with topical Voltaren gel        HEIKE (acute kidney injury) [facility])  Assessment & Plan  Lab Results   Component Value Date    CREATININE 1.35 (H) 12/12/2023      During hospitalization creatinine had been trending up and has currently been trending down towards baseline. Will start on and Jardiance were held during admission due to deteriorating creatinine. Did not display any electrolyte derangements. Most likely due to prerenal in the setting of fluid overload. Discharge he will continue to hold losartan and Jardiance until creatinine reaches baseline.       Hypertension  Assessment & Plan  Blood Pressure: 122/83 ; well controlled  - Home medications: furosemide 60 mg daily (using BID), losartan 50 BID, metoprolol 50 mg in morning, metoprolol 75 mg at night, spironolactone 25 mg daily    On admission patient was given his home medications aside from his diuretic, which included losartan 50 mg twice daily, metoprolol 50 mg in the morning, metoprolol 75 mg at night, spironolactone 25 mg daily. Due to elevated creatinine levels losartan and Jardiance were held. Blood pressure control was not adequate and patient required additional blood pressure control with hydralazine 10 mg 3 times daily and high isosorbide dinitrate 5 mg 3 times per day after meals. Patient will continue to hold losartan and Jardiance until creatinine stabilizes to baseline. He will continue losartan, metoprolol, spironolactone, hydralazine Sorbide dinitrate at home after discharge. UTI (urinary tract infection)-resolved as of 12/8/2023  Assessment & Plan  UA + bacteria, + leukocytes  UC no growth    Plan:   Continue keflex for management of cellulitis      Left leg cellulitis-resolved as of 12/13/2023  Assessment & Plan  - Continued erythema in LLE though no pain to palpation.  - On Keflex 500 mg BID  - Persistent erythema likely due to venous stasis dermatitis    Plan:  - Day 7/7 of Keflex  - Continue to monitor vitals   - Continue to monitor erythema   - Trend WBC    Acute respiratory failure with hypoxia (HCC)-resolved as of 12/13/2023  Assessment & Plan  - Likely in the setting of CHF exacerbation  - currently on RA saturating appropriately on RA.        PLAN:  - Continue O2 as needed and titrate as appropriate  - Continue diuresis    SIRS (systemic inflammatory response syndrome) (HCC)-resolved as of 12/6/2023  Assessment & Plan  - Fever resolved  - S/P Tylenol 650 in ED IV Ceftriaxone x 1 in the ED  - No evidence of bacterial infection  - Flu/RSV/Covid not done overnight    Plan:   Monitor vitals              Consultations During Hospital Stay:  cardiology    Procedures Performed:   Home O2 eval - O2 not needed     Significant Findings / Test Results:   Elevated creatinine  Elevated BUN  Elevated WBC resolved  Influenza A positive  BNP elevated  Bacteriuria  Positive cocaine in urine    Incidental Findings:   None    Test Results Pending at Discharge (will require follow up):   none    Outpatient Tests Requested:  [facility]    Outpatient follow-up Requested:  Cardiology  PCP    Complications: None    Hospital Course: Laz Bacon is a 48 y.o. male patient who originally presented to the hospital on 12/4/2023 due to CHF and presented with cough, shortness of breath, leg swelling and abdominal pain. In the ED BNP was found to be elevated, and moderate edema was seen on chest x-ray. He was given nitroglycerin, ceftriaxone, ketorolac and Tylenol for which he was admitted to the family medicine service for further management of CHF exacerbation. On the floor he was not maintained on IV Lasix twice daily and initially improved and was transitioned to oral diuretics. Patient was found to have cellulitis secondary to Venous statis and was started on keflex for 7 days. Patient began having elevated creatinine for which losartan was held. While in the hospital patient contracted influenza A, and was requiring oxygen in the evening as well as having increased shortness of breath during the day. He was also found to have a UTI also for which keflex was adequate. Due to lack of improvement as well as influenza infection, cardiology was consulted for further medical management. He was placed back on IV Lasix and isosorbide nitrate and hydralazine were added for additional blood pressure control. Judythe Juan C was held due to deteriorating kidney function. Review of Systems   Constitutional:  Negative for chills and fever. HENT:  Positive for congestion, sinus pressure and sinus pain. Negative for sore throat and trouble swallowing. Respiratory:  Positive for cough. Negative for chest tightness and shortness of breath. Cardiovascular:  Negative for chest pain, palpitations and leg swelling.    Gastrointestinal:  Negative for abdominal distention, abdominal pain, blood in stool, diarrhea and nausea. Genitourinary:  Negative for dysuria and hematuria. Neurological:  Negative for dizziness. Condition at Discharge: stable     Discharge Day Visit / Exam:     Vitals: Blood Pressure: 143/90 (23 1257)  Pulse: 79 (23 1257)  Temperature: 97.5 °F (36.4 °C) (23 07)  Temp Source: Temporal (23)  Respirations: 20 (23 07)  Height: 6' (182.9 cm) (23 0800)  Weight - Scale: 96.4 kg (212 lb 8.4 oz) (23 06)  SpO2: 98 % (23 0727)  Exam:   Physical Exam  Constitutional:       General: He is not in acute distress. Appearance: Normal appearance. He is not ill-appearing, toxic-appearing or diaphoretic. HENT:      Head: Normocephalic. Mouth/Throat:      Mouth: Mucous membranes are moist.   Eyes:      Conjunctiva/sclera: Conjunctivae normal.   Cardiovascular:      Rate and Rhythm: Normal rate and regular rhythm. Heart sounds: Normal heart sounds. Pulmonary:      Effort: Pulmonary effort is normal. No respiratory distress. Breath sounds: No wheezing or rales. Chest:      Chest wall: No tenderness. Abdominal:      Palpations: Abdomen is soft. Tenderness: There is no abdominal tenderness. Musculoskeletal:      Right lower le+ Edema present. Left lower le+ Edema present. Comments: Edema, pain and erythema of left leg improving. Skin:     General: Skin is warm. Capillary Refill: Capillary refill takes less than 2 seconds. Neurological:      General: No focal deficit present. Mental Status: He is alert and oriented to person, place, and time. Psychiatric:         Mood and Affect: Mood normal.         Behavior: Behavior normal.         Thought Content: Thought content normal.         Judgment: Judgment normal.               Discharge instructions/Information to patient and family:   See after visit summary for information provided to patient and family.       Discharge Medications:  Atorvastatin Calcium 40 mg Oral Daily with dinner    hydrALAZINE HCl 10 mg Oral Every 8 hours scheduled    Ibuprofen 600 mg TAKE 1 TABLET (600 MG TOTAL) BY MOUTH EVERY 6 (SIX) HOURS AS NEEDED FOR MILD PAIN (PAIN SCORE 1-3). Isosorbide Dinitrate 5 mg Oral 3 times daily after meals    Metoprolol Succinate      50 mg Oral Daily (early morning)    75 mg Oral Daily at bedtime    Oseltamivir Phosphate      75 mg Oral Every 12 hours scheduled    Pantoprazole Sodium 40 mg Oral Daily (early morning)    Spironolactone 25 mg Oral Daily    Sucralfate 1 g Oral 4 times daily (before meals and at bedtime)    Torsemide 20 mg Oral 2 times daily Provisions for Follow-Up Care:  See after visit summary for information related to follow-up care and any pertinent home health orders. Disposition:     Home    For Discharges to [facility] SNF:   Not Applicable to this Patient - Not Applicable to this Patient    Planned Readmission: no     Discharge Statement:  I spent 60 minutes discharging the patient. This time was spent on the day of discharge. I had direct contact with the patient on the day of discharge. Greater than 50% of the total time was spent examining patient, answering all patient questions, arranging and discussing plan of care with patient as well as directly providing post-discharge instructions. Additional time then spent on discharge activities.         Jojo Ramon MD  12/13/23  4:19 PM

## 2023-12-13 NOTE — PROGRESS NOTES
Cardiology Progress Note   MD Anne Burton MD, Rowdy Fierro DO, [facility]  MD Deshawn Lombardi DO, Vijay Merida DO, [facility]  ----------------------------------------------------------------  [address] [place] [address]    Amber Poster 48 y.o. male MRN: [id number]  Unit/Bed#: 7T [facility] 713-01 Encounter: [id number]      ASSESSMENT:   Acute on chronic combined systolic and diastolic heart failure  LVEF 48%, grade 2 diastolic dysfunction, mild RV dilatation, mild LA dilatation, moderate to severe MR, mild TR with PASP 35 mmHg, August 2023  Influenza A infection  Precordial chest pain likely secondary to influenza infection/heart failure  Nonischemic cardiomyopathy  Normal coronary arteries by cardiac catheterization, April 2022  Acute kidney injury  Hypertension  Dyslipidemia  History of cocaine abuse - UDS positive on admission  Alcohol abuse  Left leg cellulitis  UTI    PLAN:  Patient's volume status continues to improve  Weights continue to trend downward, but patient still somewhat edematous  Continue Lasix 40 mg IV twice daily  Strict I's/O's and daily weights  Keep potassium greater than 4 magnesium greater than 2  Hopeful transition to oral diuretic in the next 24 to 48 hours  Continue hydralazine/isosorbide, Toprol-XL and spironolactone  Hopeful discharge from CV perspective in the next 48 to 72 hours    Signed: Allen Mcmahon DO, FACC, OLIVIA, FACP      History of Present Illness:  Patient seen and examined. Patient's breathing slowly improving. Denies any chest pain, pressure, tightness or squeezing. Denies lightheadedness, dizziness or palpitations. Admits to improving lower extremity swelling. Denies orthopnea or paroxysmal nocturnal dyspnea. Review of Systems:  Review of Systems   Constitutional: Negative for decreased appetite, fever, weight gain and weight loss.    HENT:  Negative for congestion and sore throat. Eyes:  Negative for visual disturbance. Cardiovascular:  Positive for dyspnea on exertion. Negative for chest pain, leg swelling, near-syncope and palpitations. Respiratory:  Positive for shortness of breath. Negative for cough. Hematologic/Lymphatic: Negative for bleeding problem. Skin:  Negative for rash. Musculoskeletal:  Negative for myalgias and neck pain. Gastrointestinal:  Negative for abdominal pain and nausea. Neurological:  Negative for light-headedness and weakness. Psychiatric/Behavioral:  Negative for depression. No Known Allergies    No current facility-administered medications on file prior to encounter. Current Outpatient Medications on File Prior to Encounter   Medication Sig    atorvastatin (LIPITOR) 40 mg tablet Take 1 tablet (40 mg total) by mouth daily with dinner    Empagliflozin (JARDIANCE) 10 MG TABS tablet Take 1 tablet (10 mg total) by mouth daily    furosemide (LASIX) 40 mg tablet Take 1.5 tablets (60 mg total) by mouth daily    ibuprofen (MOTRIN) 600 mg tablet TAKE 1 TABLET (600 MG TOTAL) BY MOUTH EVERY 6 (SIX) HOURS AS NEEDED FOR MILD PAIN (PAIN SCORE 1-3). losartan (COZAAR) 50 mg tablet Take 1 tablet (50 mg total) by mouth 2 (two) times a day    metoprolol succinate (TOPROL-XL) 25 mg 24 hr tablet Take 3 tablets (75 mg total) by mouth daily at bedtime    metoprolol succinate (TOPROL-XL) 50 mg 24 hr tablet Take 1 tablet (50 mg total) by mouth daily in the early morning    pantoprazole (PROTONIX) 40 mg tablet Take 1 tablet (40 mg total) by mouth daily in the early morning Do not start before August 9, 2023.     spironolactone (ALDACTONE) 25 mg tablet Take 1 tablet (25 mg total) by mouth daily    sucralfate (CARAFATE) 1 g tablet Take 1 tablet (1 g total) by mouth 4 (four) times a day (before meals and at bedtime)        Current Facility-Administered Medications   Medication Dose Route Frequency Provider Last Rate    acetaminophen  975 mg Oral [facility] Enrike Downey MD      benzonatate  100 mg Oral BID Harshad Baig MD      dextromethorphan-guaiFENesin  5 mL Oral Q4H Tin Cordoba MD      Diclofenac Sodium  2 g Topical 4x Daily Bren Brady MD      enoxaparin  40 mg Subcutaneous Daily Tin Cordoba MD      furosemide  40 mg Intravenous BID (diuretic) Enrike Downey MD      hydrALAZINE  10 mg Oral [facility] Enrike Downey MD      ipratropium  0.5 mg Nebulization Laina Fernandez MD      isosorbide dinitrate  5 mg Oral TID after meals Enrike Downey MD      levalbuterol  1.25 mg Nebulization Q6H Tin Cordoba MD      methocarbamol  500 mg Oral [facility] [address] Tin Cordoba MD      metoprolol succinate  50 mg Oral Daily Bren Brady MD      metoprolol succinate  75 mg Oral HS Bren Brady MD      oseltamivir  75 mg Oral Q12H Cristel Chapman MD      pantoprazole  40 mg Oral Early Morning Bren Brady MD      saccharomyces boulardii  250 mg Oral BID Harshad Baig MD      spironolactone  25 mg Oral Daily Enrike Downey MD              Vitals:    12/12/23 1300 12/12/23 1350 12/12/23 1516 12/12/23 1948   BP: 137/86  121/87    BP Location:   Right arm    Pulse: 80  80    Resp:   18    Temp:   97.7 °F (36.5 °C)    TempSrc:   Temporal    SpO2: 98% 98% 98% 98%   Weight:       Height:         Body mass index is 29.21 kg/m². Intake/Output Summary (Last 24 hours) at 12/12/2023 2249  Last data filed at 12/12/2023 2207  Gross per 24 hour   Intake 1560 ml   Output 3900 ml   Net -2340 ml       Weight change:     PHYSICAL EXAMINATION:  Gen: Awake, Alert, NAD  Head/eyes: AT/NC, pupils equal and round, Anicteric  ENT: mmm  Neck: Supple, +elevated JVP, trachea midline  Resp: CTA bilaterally no w/r/r  CV: RRR +S1, S2, No m/r/g  Abd: Soft, NT/ND + BS  Ext: +1 pitting LE edema bilaterally  Neuro:  Follows commands, moves all extermities  Psych: Appropriate affect, normal mood, pleasant attitude, non-combative  Skin: warm; no rash, erythema or venous stasis changes on exposed skin    Lab Results:  Results from last 7 days   Lab Units 12/12/23  0513   WBC Thousand/uL 6.10   HEMOGLOBIN g/dL 13.5   HEMATOCRIT % 42.1   PLATELETS Thousands/uL 201     Results from last 7 days   Lab Units 12/12/23  0513   POTASSIUM mmol/L 3.8   CHLORIDE mmol/L 101   CO2 mmol/L 26   BUN mg/dL 36*   CREATININE mg/dL 1.35*   CALCIUM mg/dL 8.3*     No results found for: "TROPONINT"                  This note was completed in part utilizing M-Modal Fluency Direct Software. Grammatical errors, random word insertions, spelling mistakes, and incomplete sentences may be an occasional consequence of this system secondary to software limitations, ambient noise, and hardware issues. If you have any questions or concerns about the content, text, or information contained within the body of this dictation, please contact the provider for clarification.

## 2023-12-13 NOTE — NURSING NOTE
PT was D./C to home D/C instruction was given to PT and Pt verbalized understanding of D/C instruction. IV was D/C no distress noted. All personal belonging was given to PT.  7 T staff accompany PT to lobby.

## 2023-12-14 DIAGNOSIS — J10.1 INFLUENZA A: ICD-10-CM

## 2023-12-14 NOTE — UTILIZATION REVIEW
NOTIFICATION OF ADMISSION DISCHARGE   This is a Notification of Discharge from [address]. Please be advised that this patient has been discharge from our facility. Below you will find the admission and discharge date and time including the patient’s disposition. UTILIZATION REVIEW CONTACT:  Sapphire Cool MA  Utilization   Network Utilization Review Department  Phone: [phone] x carefully listen to the prompts. All voicemails are confidential.  Email: [email or web address]     ADMISSION INFORMATION  PRESENTATION DATE: 12/4/2023 12:49 PM  OBERVATION ADMISSION DATE:   INPATIENT ADMISSION DATE: 12/4/23  3:47 PM   DISCHARGE DATE: 12/13/2023  3:34 PM   DISPOSITION:Home/Self Care    Network Utilization Review Department  ATTENTION: Please call with any questions or concerns to [phone] and carefully listen to the prompts so that you are directed to the right person. All voicemails are confidential.   For Discharge needs, contact Care Management DC Support Team at [phone] opt. 2  Send all requests for admission clinical reviews, approved or denied determinations and any other requests to dedicated fax number below belonging to the campus where the patient is receiving treatment.  List of dedicated fax numbers for the Facilities:  [place] DENIALS (Administrative/Medical Necessity) [phone]   DISCHARGE SUPPORT TEAM (Network) [phone] [facility] [phone]   [address] [facility] [address] [place] [address] [address]  [place] [phone]

## 2023-12-15 ENCOUNTER — TRANSITIONAL CARE MANAGEMENT (OUTPATIENT)
Dept: FAMILY MEDICINE CLINIC | Facility: CLINIC | Age: 53
End: 2023-12-15

## 2023-12-18 RX ORDER — OSELTAMIVIR PHOSPHATE 75 MG/1
75 CAPSULE ORAL EVERY 12 HOURS SCHEDULED
Qty: 1 CAPSULE | Refills: 0 | Status: SHIPPED | OUTPATIENT
Start: 2023-12-18 | End: 2023-12-19

## 2023-12-18 NOTE — TELEPHONE ENCOUNTER
Patient completed 9 doses while hospitalized. When he was discharged, he was still due for 1 dose, reason why I prescribed 1 tablet only.

## 2023-12-27 ENCOUNTER — TELEPHONE (OUTPATIENT)
Dept: FAMILY MEDICINE CLINIC | Facility: CLINIC | Age: 53
End: 2023-12-27

## 2023-12-27 NOTE — TELEPHONE ENCOUNTER
PCP SIGNATURE NEEDED FOR [facility] HOME HEALTH CARE FORM RECEIVED VIA FAX AND PLACED IN PCP FOLDER TO BE DELIVERED AT ASSIGNED TIMES.          ORDER# [id number]

## 2023-12-27 NOTE — TELEPHONE ENCOUNTER
PCP SIGNATURE NEEDED FOR [facility] HOME HEALTH CARE FORM RECEIVED VIA FAX AND PLACED IN PCP FOLDER TO BE DELIVERED AT ASSIGNED TIMES.      ORDER# [id number]

## 2024-01-03 ENCOUNTER — TELEPHONE (OUTPATIENT)
Dept: FAMILY MEDICINE CLINIC | Facility: CLINIC | Age: 54
End: 2024-01-03

## 2024-01-03 RX ORDER — OSELTAMIVIR PHOSPHATE 75 MG/1
75 CAPSULE ORAL EVERY 12 HOURS SCHEDULED
Qty: 1 CAPSULE | Refills: 0 | Status: SHIPPED | OUTPATIENT
Start: 2024-01-03 | End: 2024-01-04

## 2024-01-08 DIAGNOSIS — I50.22 CHRONIC HFREF (HEART FAILURE WITH REDUCED EJECTION FRACTION) (HCC): ICD-10-CM

## 2024-01-08 DIAGNOSIS — I42.8 NONISCHEMIC CARDIOMYOPATHY (HCC): ICD-10-CM

## 2024-01-08 RX ORDER — SPIRONOLACTONE 25 MG/1
25 TABLET ORAL DAILY
Qty: 30 TABLET | Refills: 2 | Status: SHIPPED | OUTPATIENT
Start: 2024-01-08

## 2024-02-07 ENCOUNTER — HOSPITAL ENCOUNTER (INPATIENT)
Facility: HOSPITAL | Age: 54
LOS: 2 days | Discharge: HOME | DRG: 194 | End: 2024-02-09
Attending: EMERGENCY MEDICINE | Admitting: FAMILY MEDICINE
Payer: COMMERCIAL

## 2024-02-07 ENCOUNTER — APPOINTMENT (EMERGENCY)
Dept: RADIOLOGY | Facility: HOSPITAL | Age: 54
DRG: 194 | End: 2024-02-07
Payer: COMMERCIAL

## 2024-02-07 DIAGNOSIS — I42.8 NONISCHEMIC CARDIOMYOPATHY (HCC): ICD-10-CM

## 2024-02-07 DIAGNOSIS — M79.605 LEFT LEG PAIN: ICD-10-CM

## 2024-02-07 DIAGNOSIS — I50.22 CHRONIC SYSTOLIC CONGESTIVE HEART FAILURE (HCC): ICD-10-CM

## 2024-02-07 DIAGNOSIS — R06.00 DYSPNEA: ICD-10-CM

## 2024-02-07 DIAGNOSIS — R07.9 CHEST PAIN: ICD-10-CM

## 2024-02-07 DIAGNOSIS — I10 PRIMARY HYPERTENSION: ICD-10-CM

## 2024-02-07 DIAGNOSIS — I50.9 CHF (CONGESTIVE HEART FAILURE) (HCC): Primary | ICD-10-CM

## 2024-02-07 DIAGNOSIS — I50.20 SYSTOLIC CHF (HCC): ICD-10-CM

## 2024-02-07 LAB
2HR DELTA HS TROPONIN: 3 NG/L
ALBUMIN SERPL BCP-MCNC: 3.5 G/DL (ref 3.5–5)
ALP SERPL-CCNC: 238 U/L (ref 34–104)
ALT SERPL W P-5'-P-CCNC: 19 U/L (ref 7–52)
AMPHETAMINES SERPL QL SCN: NEGATIVE
ANION GAP SERPL CALCULATED.3IONS-SCNC: 7 MMOL/L
AST SERPL W P-5'-P-CCNC: 22 U/L (ref 13–39)
BARBITURATES UR QL: NEGATIVE
BASOPHILS # BLD AUTO: 0.02 THOUSANDS/ÂΜL (ref 0–0.1)
BASOPHILS NFR BLD AUTO: 0 % (ref 0–1)
BENZODIAZ UR QL: NEGATIVE
BILIRUB SERPL-MCNC: 1.09 MG/DL (ref 0.2–1)
BNP SERPL-MCNC: 697 PG/ML (ref 0–100)
BUN SERPL-MCNC: 15 MG/DL (ref 5–25)
CALCIUM SERPL-MCNC: 8.7 MG/DL (ref 8.4–10.2)
CARDIAC TROPONIN I PNL SERPL HS: 24 NG/L
CARDIAC TROPONIN I PNL SERPL HS: 27 NG/L
CHLORIDE SERPL-SCNC: 106 MMOL/L (ref 96–108)
CO2 SERPL-SCNC: 25 MMOL/L (ref 21–32)
COCAINE UR QL: NEGATIVE
CREAT SERPL-MCNC: 1.16 MG/DL (ref 0.6–1.3)
EOSINOPHIL # BLD AUTO: 0.14 THOUSAND/ÂΜL (ref 0–0.61)
EOSINOPHIL NFR BLD AUTO: 2 % (ref 0–6)
ERYTHROCYTE [DISTWIDTH] IN BLOOD BY AUTOMATED COUNT: 17.7 % (ref 11.6–15.1)
ETHANOL SERPL-MCNC: <10 MG/DL
GFR SERPL CREATININE-BSD FRML MDRD: 71 ML/MIN/1.73SQ M
GLUCOSE SERPL-MCNC: 161 MG/DL (ref 65–140)
HCT VFR BLD AUTO: 37.9 % (ref 36.5–49.3)
HGB BLD-MCNC: 11.6 G/DL (ref 12–17)
IMM GRANULOCYTES # BLD AUTO: 0.03 THOUSAND/UL (ref 0–0.2)
IMM GRANULOCYTES NFR BLD AUTO: 0 % (ref 0–2)
LYMPHOCYTES # BLD AUTO: 1 THOUSANDS/ÂΜL (ref 0.6–4.47)
LYMPHOCYTES NFR BLD AUTO: 14 % (ref 14–44)
MCH RBC QN AUTO: 26.1 PG (ref 26.8–34.3)
MCHC RBC AUTO-ENTMCNC: 30.6 G/DL (ref 31.4–37.4)
MCV RBC AUTO: 85 FL (ref 82–98)
METHADONE UR QL: NEGATIVE
MONOCYTES # BLD AUTO: 0.36 THOUSAND/ÂΜL (ref 0.17–1.22)
MONOCYTES NFR BLD AUTO: 5 % (ref 4–12)
NEUTROPHILS # BLD AUTO: 5.56 THOUSANDS/ÂΜL (ref 1.85–7.62)
NEUTS SEG NFR BLD AUTO: 79 % (ref 43–75)
NRBC BLD AUTO-RTO: 0 /100 WBCS
OPIATES UR QL SCN: NEGATIVE
OXYCODONE+OXYMORPHONE UR QL SCN: NEGATIVE
PCP UR QL: NEGATIVE
PLATELET # BLD AUTO: 260 THOUSANDS/UL (ref 149–390)
PMV BLD AUTO: 10.4 FL (ref 8.9–12.7)
POTASSIUM SERPL-SCNC: 4.6 MMOL/L (ref 3.5–5.3)
PROT SERPL-MCNC: 6.7 G/DL (ref 6.4–8.4)
RBC # BLD AUTO: 4.45 MILLION/UL (ref 3.88–5.62)
SODIUM SERPL-SCNC: 138 MMOL/L (ref 135–147)
THC UR QL: NEGATIVE
WBC # BLD AUTO: 7.11 THOUSAND/UL (ref 4.31–10.16)

## 2024-02-07 PROCEDURE — 80053 COMPREHEN METABOLIC PANEL: CPT

## 2024-02-07 PROCEDURE — 80307 DRUG TEST PRSMV CHEM ANLYZR: CPT | Performed by: STUDENT IN AN ORGANIZED HEALTH CARE EDUCATION/TRAINING PROGRAM

## 2024-02-07 PROCEDURE — 71045 X-RAY EXAM CHEST 1 VIEW: CPT

## 2024-02-07 PROCEDURE — 93005 ELECTROCARDIOGRAM TRACING: CPT

## 2024-02-07 PROCEDURE — 99285 EMERGENCY DEPT VISIT HI MDM: CPT

## 2024-02-07 PROCEDURE — 83880 ASSAY OF NATRIURETIC PEPTIDE: CPT

## 2024-02-07 PROCEDURE — 96374 THER/PROPH/DIAG INJ IV PUSH: CPT

## 2024-02-07 PROCEDURE — 99223 1ST HOSP IP/OBS HIGH 75: CPT | Performed by: FAMILY MEDICINE

## 2024-02-07 PROCEDURE — 99223 1ST HOSP IP/OBS HIGH 75: CPT | Performed by: INTERNAL MEDICINE

## 2024-02-07 PROCEDURE — 82077 ASSAY SPEC XCP UR&BREATH IA: CPT | Performed by: STUDENT IN AN ORGANIZED HEALTH CARE EDUCATION/TRAINING PROGRAM

## 2024-02-07 PROCEDURE — 85025 COMPLETE CBC W/AUTO DIFF WBC: CPT

## 2024-02-07 PROCEDURE — 36415 COLL VENOUS BLD VENIPUNCTURE: CPT

## 2024-02-07 PROCEDURE — 84484 ASSAY OF TROPONIN QUANT: CPT

## 2024-02-07 RX ORDER — FUROSEMIDE 10 MG/ML
80 INJECTION INTRAMUSCULAR; INTRAVENOUS ONCE
Status: COMPLETED | OUTPATIENT
Start: 2024-02-07 | End: 2024-02-07

## 2024-02-07 RX ORDER — PANTOPRAZOLE SODIUM 40 MG/1
40 TABLET, DELAYED RELEASE ORAL
Status: DISCONTINUED | OUTPATIENT
Start: 2024-02-07 | End: 2024-02-09 | Stop reason: HOSPADM

## 2024-02-07 RX ORDER — HYDRALAZINE HYDROCHLORIDE 25 MG/1
25 TABLET, FILM COATED ORAL 3 TIMES DAILY
Status: DISCONTINUED | OUTPATIENT
Start: 2024-02-07 | End: 2024-02-09 | Stop reason: HOSPADM

## 2024-02-07 RX ORDER — FUROSEMIDE 10 MG/ML
60 INJECTION INTRAMUSCULAR; INTRAVENOUS
Status: DISCONTINUED | OUTPATIENT
Start: 2024-02-07 | End: 2024-02-09

## 2024-02-07 RX ORDER — ISOSORBIDE DINITRATE 10 MG/1
5 TABLET ORAL
Status: DISCONTINUED | OUTPATIENT
Start: 2024-02-07 | End: 2024-02-07

## 2024-02-07 RX ORDER — SPIRONOLACTONE 25 MG/1
25 TABLET ORAL ONCE
Status: COMPLETED | OUTPATIENT
Start: 2024-02-07 | End: 2024-02-07

## 2024-02-07 RX ORDER — HYDRALAZINE HYDROCHLORIDE 10 MG/1
10 TABLET, FILM COATED ORAL 3 TIMES DAILY
Status: DISCONTINUED | OUTPATIENT
Start: 2024-02-07 | End: 2024-02-07

## 2024-02-07 RX ORDER — ENOXAPARIN SODIUM 100 MG/ML
40 INJECTION SUBCUTANEOUS DAILY
Status: DISCONTINUED | OUTPATIENT
Start: 2024-02-07 | End: 2024-02-09 | Stop reason: HOSPADM

## 2024-02-07 RX ORDER — HYDRALAZINE HYDROCHLORIDE 10 MG/1
10 TABLET, FILM COATED ORAL EVERY 8 HOURS SCHEDULED
Status: DISCONTINUED | OUTPATIENT
Start: 2024-02-07 | End: 2024-02-07

## 2024-02-07 RX ORDER — METOPROLOL SUCCINATE 50 MG/1
50 TABLET, EXTENDED RELEASE ORAL
Status: DISCONTINUED | OUTPATIENT
Start: 2024-02-07 | End: 2024-02-07

## 2024-02-07 RX ORDER — CARVEDILOL 12.5 MG/1
12.5 TABLET ORAL 2 TIMES DAILY WITH MEALS
Status: DISCONTINUED | OUTPATIENT
Start: 2024-02-07 | End: 2024-02-09 | Stop reason: HOSPADM

## 2024-02-07 RX ORDER — SPIRONOLACTONE 25 MG/1
25 TABLET ORAL DAILY
Status: DISCONTINUED | OUTPATIENT
Start: 2024-02-08 | End: 2024-02-07

## 2024-02-07 RX ORDER — ISOSORBIDE DINITRATE 10 MG/1
10 TABLET ORAL
Status: DISCONTINUED | OUTPATIENT
Start: 2024-02-07 | End: 2024-02-09 | Stop reason: HOSPADM

## 2024-02-07 RX ORDER — ATORVASTATIN CALCIUM 40 MG/1
40 TABLET, FILM COATED ORAL
Status: DISCONTINUED | OUTPATIENT
Start: 2024-02-07 | End: 2024-02-09 | Stop reason: HOSPADM

## 2024-02-07 RX ADMIN — ISOSORBIDE DINITRATE 5 MG: 10 TABLET ORAL at 07:46

## 2024-02-07 RX ADMIN — PANTOPRAZOLE SODIUM 40 MG: 40 TABLET, DELAYED RELEASE ORAL at 07:46

## 2024-02-07 RX ADMIN — FUROSEMIDE 60 MG: 10 INJECTION, SOLUTION INTRAMUSCULAR; INTRAVENOUS at 17:11

## 2024-02-07 RX ADMIN — HYDRALAZINE HYDROCHLORIDE 25 MG: 25 TABLET, FILM COATED ORAL at 20:28

## 2024-02-07 RX ADMIN — SACUBITRIL AND VALSARTAN 1 TABLET: 24; 26 TABLET, FILM COATED ORAL at 17:09

## 2024-02-07 RX ADMIN — FUROSEMIDE 80 MG: 10 INJECTION, SOLUTION INTRAVENOUS at 05:51

## 2024-02-07 RX ADMIN — HYDRALAZINE HYDROCHLORIDE 10 MG: 10 TABLET, FILM COATED ORAL at 07:47

## 2024-02-07 RX ADMIN — METOPROLOL SUCCINATE 50 MG: 50 TABLET, EXTENDED RELEASE ORAL at 07:46

## 2024-02-07 RX ADMIN — ISOSORBIDE DINITRATE 5 MG: 10 TABLET ORAL at 11:51

## 2024-02-07 RX ADMIN — SPIRONOLACTONE 25 MG: 25 TABLET, FILM COATED ORAL at 07:15

## 2024-02-07 RX ADMIN — ATORVASTATIN CALCIUM 40 MG: 40 TABLET, FILM COATED ORAL at 15:50

## 2024-02-07 RX ADMIN — EMPAGLIFLOZIN 10 MG: 10 TABLET, FILM COATED ORAL at 17:10

## 2024-02-07 RX ADMIN — CARVEDILOL 12.5 MG: 12.5 TABLET, FILM COATED ORAL at 15:51

## 2024-02-07 RX ADMIN — ENOXAPARIN SODIUM 40 MG: 40 INJECTION SUBCUTANEOUS at 07:47

## 2024-02-07 RX ADMIN — ISOSORBIDE DINITRATE 10 MG: 10 TABLET ORAL at 17:09

## 2024-02-07 RX ADMIN — HYDRALAZINE HYDROCHLORIDE 25 MG: 25 TABLET, FILM COATED ORAL at 15:50

## 2024-02-07 NOTE — CASE MANAGEMENT
Case Management Assessment & Discharge Planning Note    Patient name Cristian Quijano  Location 7T [facility] 709/7T [facility] 709-01 MRN [id number]  : 1970 Date 2024       Current Admission Date: 2024  Current Admission Diagnosis:Acute exacerbation of CHF (congestive heart failure) (HCC)   Patient Active Problem List    Diagnosis Date Noted    Influenza A 2023    Acute exacerbation of CHF (congestive heart failure) (HCC) 2023    Chest pain 2023    Hyperlipidemia 2023    Cocaine abuse (HCC) 2023    Nonischemic cardiomyopathy (HCC) 2023    Does not have health insurance 2023    Encounter to establish care 2023    Chest pain, unspecified 2023    HEIKE (acute kidney injury) ([facility]) 2023    Abnormal CT scan 2023    Chronic combined systolic and diastolic congestive heart failure ([facility]) 2022    Pleural effusion, left 2022    Hypertension 2022    Left leg pain 2022    Elevated serum creatinine 2022      LOS (days): 0  Geometric Mean LOS (GMLOS) (days):   Days to GMLOS:     OBJECTIVE:    Risk of Unplanned Readmission Score: 17.77         Current admission status: Inpatient       Preferred Pharmacy:   [id number] - [place] - [address]  [address] [address]  Phone: [phone] Fax: [phone]    [facility] #[id number] - [place] - [address]  [address] [address]  Phone: [phone] Fax: [phone]    Primary Care Provider: Robert Fraga MD    Primary Insurance: [employer]  Secondary Insurance:     ASSESSMENT:  Active Health Care Proxies       Mali Mahmood [employer] [facility] Representative - Significant Other   Primary Phone: [phone] (Home)                 Advance Directives  Does patient have a Health Care POA?: No  Was patient offered paperwork?: Yes (declines)  Does patient have Advance Directives?: No  Was patient  offered paperwork?: Yes (declines)  Primary Contact: Mali Mahmood (Significant Other)  [phone] (Home Phone)      Readmission Root Cause  30 Day Readmission: No    Patient Information  Admitted from:: Home  During Assessment patient was accompanied by: Not accompanied during assessment  Assessment information provided by:: Patient  Primary Caregiver: Self  Support Systems: Family members, Self, Spouse/significant other  County of Residence: [place]  What [facility] do you live in?: [place]  Home entry access options. Select all that apply.: Stairs  Number of steps to enter home.: 3  Do the steps have railings?: Yes  Type of Current Residence: 2 story home  Upon entering residence, is there a bedroom on the main floor (no further steps)?: No  A bedroom is located on the following floor levels of residence (select all that apply):: 2nd Floor  Upon entering residence, is there a bathroom on the main floor (no further steps)?: Yes  Number of steps to 2nd floor from main floor: One Flight  Living Arrangements: Lives w/ Spouse/significant other  Is patient a ?: No    Activities of Daily Living Prior to Admission  Functional Status: Independent  Completes ADLs independently?: Yes  Ambulates independently?: Yes  Does patient use assisted devices?: No  Does patient currently own DME?: No  Does patient have a history of Outpatient Therapy (PT/OT)?: No  Does the patient have a history of Short-Term Rehab?: No  Does patient have a history of HHC?: No  Does patient currently have HHC?: No      Patient Information Continued  Income Source: SSI/SSD  Does patient have prescription coverage?: Yes  Does patient receive dialysis treatments?: No  Does patient have a history of substance abuse?: Yes  Historical substance use preference: Cocaine  Is patient currently in treatment for substance abuse?: No. Patient declined treatment information. (declines substance use)  Does patient have a history of Mental Health Diagnosis?:  No      Means of Transportation  Means of Transport to Appts:: Drives Self      Housing Stability: Low Risk  (2/7/2024)    Housing Stability Vital Sign     Unable to Pay for Housing in the Last Year: No     Number of Places Lived in the Last Year: 1     Unstable Housing in the Last Year: No   Food Insecurity: No Food Insecurity (2/7/2024)    Hunger Vital Sign     Worried About Running Out of Food in the Last Year: Never true     Ran Out of Food in the Last Year: Never true   Transportation Needs: No Transportation Needs (2/7/2024)    PRAPARE - Transportation     Lack of Transportation (Medical): No     Lack of Transportation (Non-Medical): No   Utilities: Not At Risk (2/7/2024)    [facility] Utilities     Threatened with loss of utilities: No       DISCHARGE DETAILS:    Discharge planning discussed with:: Patient  Freedom of Choice: Yes      Requested Home Health Care         Is the patient interested in HHC at discharge?: No    DME Referral Provided  Referral made for DME?: No    Other Referral/Resources/Interventions Provided:  Interventions: None Indicated      Treatment Team Recommendation: Home       Additional Comments: Met with patient at bedside and CM assessment completed.  CM department follwoing thru discharge

## 2024-02-07 NOTE — ASSESSMENT & PLAN NOTE
Wt Readings from Last 3 Encounters:   02/08/24 107 kg (235 lb 3.7 oz)   12/13/23 96.4 kg (212 lb 8.4 oz)   11/16/23 98.4 kg (217 lb)   Euvolemic.    on admission  CXR showed mild to moderate pulmonary congestion  This is likely secondary to medication nonadherence.    Regimen on discharge: Carvedilol 12.5 mg BID, Jardiance 10 mg daily, Lasix 60 mg BID, Hydralazine 25 mg TID, Imdur 30 mg daily, Spironolactone 25 mg daily and Entresto 24-26 mg BID.   Cardiac diet with 2 g sodium restriction  Follow up with Cardiology outpatient

## 2024-02-07 NOTE — PLAN OF CARE
Problem: CARDIOVASCULAR - ADULT  Goal: Maintains optimal cardiac output and hemodynamic stability  Description: INTERVENTIONS:  - Monitor I/O, vital signs and rhythm  - Monitor for S/S and trends of decreased cardiac output  - Administer and titrate ordered vasoactive medications to optimize hemodynamic stability  - Assess quality of pulses, skin color and temperature  - Assess for signs of decreased coronary artery perfusion  - Instruct patient to report change in severity of symptoms  Outcome: Progressing  Goal: Absence of cardiac dysrhythmias or at baseline rhythm  Description: INTERVENTIONS:  - Continuous cardiac monitoring, vital signs, obtain 12 lead EKG if ordered  - Administer antiarrhythmic and heart rate control medications as ordered  - Monitor electrolytes and administer replacement therapy as ordered  Outcome: Progressing     Problem: METABOLIC, FLUID AND ELECTROLYTES - ADULT  Goal: Fluid balance maintained  Description: INTERVENTIONS:  - Monitor labs   - Monitor I/O and WT  - Instruct patient on fluid and nutrition as appropriate  - Assess for signs & symptoms of volume excess or deficit  Outcome: Progressing     Problem: Knowledge Deficit  Goal: Patient/family/caregiver demonstrates understanding of disease process, treatment plan, medications, and discharge instructions  Description: Complete learning assessment and assess knowledge base.  Interventions:  - Provide teaching at level of understanding  - Provide teaching via preferred learning methods  Outcome: Progressing     Problem: INFECTION - ADULT  Goal: Absence or prevention of progression during hospitalization  Description: INTERVENTIONS:  - Assess and monitor for signs and symptoms of infection  - Monitor lab/diagnostic results  - Monitor all insertion sites, i.e. indwelling lines, tubes, and drains  - Monitor endotracheal if appropriate and nasal secretions for changes in amount and color  - [place] appropriate cooling/warming therapies  per order  - Administer medications as ordered  - Instruct and encourage patient and family to use good hand hygiene technique  - Identify and instruct in appropriate isolation precautions for identified infection/condition  Outcome: Progressing

## 2024-02-07 NOTE — ASSESSMENT & PLAN NOTE
>>ASSESSMENT AND PLAN FOR SYSTOLIC CHF (HCC) WRITTEN ON 2/9/2024  3:01 PM BY CHEPE SHAW MD    Wt Readings from Last 3 Encounters:   02/08/24 107 kg (235 lb 3.7 oz)   12/13/23 96.4 kg (212 lb 8.4 oz)   11/16/23 98.4 kg (217 lb)   Euvolemic.    on admission  CXR showed mild to moderate pulmonary congestion  This is likely secondary to medication nonadherence.    Regimen on discharge: Carvedilol 12.5 mg BID, Jardiance 10 mg daily, Lasix 60 mg BID, Hydralazine 25 mg TID, Imdur 30 mg daily, Spironolactone 25 mg daily and Entresto 24-26 mg BID.   Cardiac diet with 2 g sodium restriction  Follow up with Cardiology outpatient

## 2024-02-07 NOTE — ASSESSMENT & PLAN NOTE
Blood Pressure: 113/74 diuretic medication as above.  Non-adherence to losartan 50 mg bid. Hx of HEIKE.     As above, initiate Jardiance 10 mg and Entresto 24/26 BID. Transition from metoprolol to carvedilol 12.5 mg BID. Increase isosorbide from 5 mg to 10 mg TID. Increase hydralazine from 10 mg to 25 mg TID, per cardiology recommendations and continue Spironolactone 25 mg daily   See A/P CHF

## 2024-02-07 NOTE — CONSULTS
ENCOUNTER DATE: 02/07/24 2:03 PM  PATIENT NAME: Cristian Quijano   1970    [id number]  Age: 54 y.o.      Sex: male  ENCOUNTER PROVIDER & AUTHOR: Danielle Law MD  INPATIENT ATTENDING PHYSICIAN: Craig Freeman*; PRIMARYCARE PHYSICIAN: Robert Fraga MD  DATE OF ADMISSION: 2/7/2024  5:06 AM; LENGTH OF STAY: 0 days  *-*-*-*-*-*-*-*-*-*-*-*-*-*-*-*-*-*-*-*-*-*-*-*-*-*-*-*-*-*-*-*-*-*-*-*-*-*-*-*-*-*-*-*-*-*-*-*-*-*-*-*-*-*-   REASON FOR CONSULTATION:   Acute decompensated heart failure    *-*-*-*-*-*-*-*-*-*-*-*-*-*-*-*-*-*-*-*-*-*-*-*-*-*-*-*-*-*-*-*-*-*-*-*-*-*-*-*-*-*-*-*-*-*-*-*-*-*-*-*-*-*-  CARDIAC ASSESSMENT:     Acute decompensated heart failure, heart failure with reduced ejection fraction and diastolic heart failure.  Nonischemic cardiomyopathy with previously moderately reduced left ventricular systolic function  Dyslipidemia  History of substance abuse, currently in remission  Obesity  Left-sided pleural effusion       Patient Active Problem List   Diagnosis    Chronic combined systolic and diastolic congestive heart failure (HCC)    Pleural effusion, left    Hypertension    Left leg pain    Elevated serum creatinine    Chest pain, unspecified    HEIKE (acute kidney injury) (HCC)    Abnormal CT scan    Does not have health insurance    Encounter to establish care    Nonischemic cardiomyopathy (HCC)    Acute exacerbation of CHF (congestive heart failure) (HCC)    Chest pain    Hyperlipidemia    Cocaine abuse (HCC)    Influenza A        Patient has AHA class C heart failure with NYHA class III to IV symptoms.  He is noted to have daily 40 pound weight gain since December 2023.  His renal function is stable.  He is on reasonable medications but was not on ARB ACE inhibitor/ARNI therapy probably because of his renal dysfunction in the past.    CARDIAC PLAN:     -- Will continue current diuretic regimen of 60 mg IV twice daily.  -- Changing metoprolol succinate to carvedilol 12.5 mg twice  daily.  -- Increasing isosorbide to 10 mg 3 times daily and hydralazine to 25 mg 3 times daily.  -- Can add losartan 25 mg once daily from tomorrow.  -- Requesting case management to look into coverage for Jardiance 10 mg daily and Entresto 24/26 mg twice daily and let the medical team know.  Continue spironolactone at 25 mg daily.  -- I continue telemetry till tomorrow morning and if there are no acute events then can be discontinued after rounds by medical team.  -- Request echocardiogram tomorrow to reassess LV and RV function.  -- Request incentive spirometer.  -- Request reliable standing weight measurement every morning and monitoring of intake and output.  -- Heart failure precautions and monitoring and dietary restrictions are advised.  *-*-*-*-*-*-*-*-*-*-*-*-*-*-*-*-*-*-*-*-*-*-*-*-*-*-*-*-*-*-*-*-*-*-*-*-*-*-*-*-*-*-*-*-*-*-*-*-*-*-*-*-*-*-  HISTORY OF PRESENT ILLNESS     Patient is a 54-year-old man with prior medical history significant for:    1.  Nonischemic cardiomyopathy, initially diagnosed April 2022, most recent EF of 30 to 35%, LVIDD 5.2 cm, AHA stage C heart failure, NYHA class II symptoms  2.  Primary hypertension  3.  Dyslipidemia  4.  Stage III chronic kidney disease  5.  Polysubstance abuse with history of cocaine use    He was last hospitalized at [facility] between 12/4/2023 and 12/13/2023 for decompensated heart failure.      He presented to hospital early this morning after experiencing increasing shortness of breath for 1 day approximately.  History is obtained with the help of Romanian translation using Nex[employer] software ( [id number]).  He reports that he ran out of his diuretic medication about 1 week back and ran out of some of the heart medicine 3 days back.  He started feeling significantly short of breath with mild activity and was experiencing orthopnea and PND.  He also noted abdominal distention.  In the emergency room he was noted to be tachycardic, tachypneic  and had edema and abdominal distention.  Blood pressure was slightly elevated.  BNP level was elevated at 697.  His urine toxicology screen was negative for tested substances.    Chest x-ray showed pulmonary vascular congestion.  He was admitted.  He has been started on IV diuresis with furosemide 60 mg IV twice daily.  His other cardiac medications include hydralazine 10 mg 3 times daily isosorbide dinitrate 5 mg 3 times daily metoprolol succinate 50 mg in the morning and 75 mg in the evening spironolactone 25 mg daily and atorvastatin 40 mg daily.    Patient reports that prior to this  recent illness he was feeling all right and was compliant with medications.  He reports that he did not have any appointment with cardiologist so he did not follow-up but he was following with primary care physician.    He reports that he does not smoke and he does not drink alcohol.  He is currently unemployed.  His previous cardiac workup includes Echocardiogram on 8/7/2023 and this showed EF 40% with global hypokinesis, grade 2 diastolic dysfunction, mild dilated right ventricle with normal function mild left atrial enlargement normal aortic valve without stenosis or regurgitation moderate to severe mitral valve regurgitation mild tricuspid valve regurgitation borderline pulmonary hypertension no pericardial effusion     Cardiac catheterization 4/4/2022: Large, widely patent coronary arteries    *-*-*-*-*-*-*-*-*-*-*-*-*-*-*-*-*-*-*-*-*-*-*-*-*-*-*-*-*-*-*-*-*-*-*-*-*-*-*-*-*-*-*-*-*-*-*-*-*-*-*-*-*-*  PAST MEDICAL HISTORY:     Past Medical History:   Diagnosis Date    Chronic HFrEF (heart failure with reduced ejection fraction) ([facility]) 04/01/2022    PAST SURGICAL HISTORY     Past Surgical History:   Procedure Laterality Date    CARDIAC CATHETERIZATION N/A 4/4/2022    Procedure: CARDIAC CORONARY ANGIOGRAM;  Surgeon: Flex Hunt MD;  Location: [facility];  Service: Cardiology    CARDIAC CATHETERIZATION Left 4/4/2022     Procedure: Cardiac Left Heart Cath;  Surgeon: Flex Hunt MD;  Location: [facility];  Service: Cardiology          FAMILY HISTORY     History reviewed. No pertinent family history.  SOCIAL HISTORY     Social History     Tobacco Use   Smoking Status Never    Passive exposure: Never   Smokeless Tobacco Never      Social History     Substance and Sexual Activity   Alcohol Use Never     Social History     Substance and Sexual Activity   Drug Use Never    [unfilled]     *-*-*-*-*-*-*-*-*-*-*-*-*-*-*-*-*-*-*-*-*-*-*-*-*-*-*-*-*-*-*-*-*-*-*-*-*-*-*-*-*-*-*-*-*-*-*-*-*-*-*-*-*-*  ALLERGIES     No Known Allergies  CURRENT SCHEDULED MEDICATIONS       Current Facility-Administered Medications:     atorvastatin (LIPITOR) tablet 40 mg, 40 mg, Oral, Daily With Dinner, Wellington Dyson DO    enoxaparin (LOVENOX) subcutaneous injection 40 mg, 40 mg, Subcutaneous, Daily, Wellington Dyson, DO, 40 mg at 02/07/24 0747    furosemide (LASIX) injection 60 mg, 60 mg, Intravenous, BID (diuretic), Wellington Dyson DO    hydrALAZINE (APRESOLINE) tablet 10 mg, 10 mg, Oral, TID, Wellington Dyson, DO, 10 mg at 02/07/24 0747    isosorbide dinitrate (ISORDIL) tablet 5 mg, 5 mg, Oral, TID after meals, Wellington Dyson, DO, 5 mg at 02/07/24 1151    metoprolol succinate (TOPROL-XL) 24 hr tablet 50 mg, 50 mg, Oral, Early Morning, Wellington Dyson, DO, 50 mg at 02/07/24 0746    metoprolol succinate (TOPROL-XL) 24 hr tablet 75 mg, 75 mg, Oral, HS, Wellington Dyson DO    pantoprazole (PROTONIX) EC tablet 40 mg, 40 mg, Oral, Early Morning, Wellington Dyson, DO, 40 mg at 02/07/24 0746     *-*-*-*-*-*-*-*-*-*-*-*-*-*-*-*-*-*-*-*-*-*-*-*-*-*-*-*-*-*-*-*-*-*-*-*-*-*-*-*-*-*-*-*-*-*-*-*-*-*-*-*-*-*   REVIEW OF SYSTEMS     Positive for: As noted above in HPI  Negative for: All remaining as reviewed below and in HPI. SYSTEM SYMPTOMS REVIEWED:  General--weight change, fever, night  sweats  Respiratoryl-- Wheezing, shortness of breath, cough, URI symptoms, sputum, blood  Cardiovascular--chest pain, syncope, dyspnea on exertion, edema, decline in exercise tolerance, claudication   Gastrointestinal--persistent vomiting, diarrhea, abdominal distention, blood in stool   Muscular or skeletal--joint pain or swelling   Neurologic--headaches, syncope, abnormal movement  Hematologic--history of easy bruising and bleeding   Endocrine--thyroid enlargement, heat or cold intolerance, polyuria   Psychiatric--anxiety, depression      *-*-*-*-*-*-*-*-*-*-*-*-*-*-*-*-*-*-*-*-*-*-*-*-*-*-*-*-*-*-*-*-*-*-*-*-*-*-*-*-*-*-*-*-*-*-*-*-*-*-*-*-*-*-   VITAL SIGNS       Vitals:    24 0721 24 0750 24 0818 24 1101   BP: (!) 164/104 (!) 163/101  (!) 162/101   BP Location: Left arm Left arm  Left arm   Pulse: (!) 108 (!) 114  99   Resp:  20  19   Temp:  98.3 °F (36.8 °C)  99 °F (37.2 °C)   TempSrc:  Temporal  Temporal   SpO2: 97% 95%  94%   Weight: 112 kg (247 lb 9.2 oz)  112 kg (247 lb 9.2 oz)    Height: 6' (1.829 m) 6' (1.829 m)       TEMPERATURE HISTORY 24H: Temp (24hrs), Av.3 °F (36.8 °C), Min:97.7 °F (36.5 °C), Max:99 °F (37.2 °C)   . BLOOD PRESSURE HISTORY: Systolic (36hrs), Avg:160 , Min:151 , Max:164    Diastolic (36hrs), Av, Min:93, Max:104      Weight    24 0516 24 0721 24 0818   Weight: 113 kg (248 lb 1.6 oz) 112 kg (247 lb 9.2 oz) 112 kg (247 lb 9.2 oz)      Body mass index is 33.58 kg/m². Wt Readings from Last 10 Encounters:   24 112 kg (247 lb 9.2 oz)   23 96.4 kg (212 lb 8.4 oz)   23 98.4 kg (217 lb)   23 93.9 kg (207 lb)   23 93.9 kg (207 lb)   23 93.4 kg (206 lb)   23 92.8 kg (204 lb 9.4 oz)   22 98 kg (216 lb)   22 96.2 kg (212 lb)   22 96.7 kg (213 lb 3.2 oz)      Intake/Output Summary (Last 24 hours) at 2024 1403  Last data filed at 2024 1229  Gross per 24 hour   Intake 1580 ml   Output  "1600 ml   Net -20 ml        *-*-*-*-*-*-*-*-*-*-*-*-*-*-*-*-*-*-*-*-*-*-*-*-*-*-*-*-*-*-*-*-*-*-*-*-*-*-*-*-*-*-*-*-*-*-*-*-*-*-*-*-*-*-   PHYSICAL EXAMINATION:     General Appearance:    Alert, cooperative, has conversational dyspnea, large build, slightly obese   Head, Eyes, ENT:    No obvious abnormality, moist mucous mebranes.   Neck:   Supple, no carotid bruit or JVD    Back:     Symmetric, no curvature.   Lungs:     Respirations unlabored. decreased breath sounds bilaterally with some crepitations at bases worse on the right side,    Chest wall:    No tenderness or deformity   Heart:    Regular rate and rhythm, S1 and S2 normal, no murmur, rub  or gallop.   Abdomen:     Soft, non-tender, No obvious masses, or organomegaly   Extremities:   Extremities warm, no cyanosis grade 2 bilateral lower extremity edema   Skin:   Skin color, texture, turgor normal, no rashes or lesions     *-*-*-*-*-*-*-*-*-*-*-*-*-*-*-*-*-*-*-*-*-*-*-*-*-*-*-*-*-*-*-*-*-*-*-*-*-*-*-*-*-*-*-*-*-*-*-*-*-*-*-*-*-*-   LABORATORY DATA:   I have personally reviewed pertinent labs.    CMP:  Results from last 7 days   Lab Units 02/07/24  0514   SODIUM mmol/L 138   POTASSIUM mmol/L 4.6   CHLORIDE mmol/L 106   CO2 mmol/L 25   BUN mg/dL 15   CREATININE mg/dL 1.16   CALCIUM mg/dL 8.7   ALK PHOS U/L 238*   ALT U/L 19   AST U/L 22               Cardiac Profile:   Results from last 7 days   Lab Units 02/07/24  0716 02/07/24  0514   HS TNI 0HR ng/L  --  24   HS TNI 2HR ng/L 27  --                     Blood Gas Analysis:             CBC:   Results from last 7 days   Lab Units 02/07/24  0514   WBC Thousand/uL 7.11   HEMOGLOBIN g/dL 11.6*   HEMATOCRIT % 37.9   PLATELETS Thousands/uL 260     PT/INR: No results found for: \"PT\", \"INR\", Microbiology:            *-*-*-*-*-*-*-*-*-*-*-*-*-*-*-*-*-*-*-*-*-*-*-*-*-*-*-*-*-*-*-*-*-*-*-*-*-*-*-*-*-*-*-*-*-*-*-*-*-*-*-*-*-*-  TELEMETRY, LAST ECG:  Telemetry reviewed. ... Sinus rhythm, narrow QRS complexes, no " significant ectopy.     No results found for this visit on 02/07/24.     *-*-*-*-*-*-*-*-*-*-*-*-*-*-*-*-*-*-*-*-*-*-*-*-*-*-*-*-*-*-*-*-*-*-*-*-*-*-*-*-*-*-*-*-*-*-*-*-*-*-*-*-*-*-  IMAGING STUDIES REPORTS: Imaging studies results reviewed....  No valid procedures specified.  No Chest XR results available for this patient.    *-*-*-*-*-*-*-*-*-*-*-*-*-*-*-*-*-*-*-*-*-*-*-*-*-*-*-*-*-*-*-*-*-*-*-*-*-*-*-*-*-*-*-*-*-*-*-*-*-*-*-*-*-*-  AVAILABLE OLD CARDIAC TESTS REPORTS:   No results found for this or any previous visit.    No results found for this or any previous visit.    No results found for this or any previous visit.    No results found for this or any previous visit.       *-*-*-*-*-*-*-*-*-*-*-*-*-*-*-*-*-*-*-*-*-*-*-*-*-*-*-*-*-*-*-*-*-*-*-*-*-*-*-*-*-*-*-*-*-*-*-*-*-*-*-*-*-*-            *-*-*-*-*-*-*-*-*-*-*-*-*-*-*-*-*-*-*-*-*-*-*-*-*-*-*-*-*-*-*-*-*-*-*-*-*-*-*-*-*-*-*-*-*-*-*-*-*-*-*-*-*-*-  SIGNATURES:   @SIG@   Danielle Law MD     CC:   Robert Fraga MD   No ref. provider found

## 2024-02-07 NOTE — ED PROVIDER NOTES
History  Chief Complaint   Patient presents with    Medical Problem     Reports running out of diuretics a week ago, swelling bilat legs and SOB      The patient is a 54-year-old male with a past medical history of CAD, nonischemic cardiomyopathy, CHF, hypertension, hyperlipidemia, and former polysubstance use disorder, who presents for evaluation of shortness of breath.  The patient is prescribed Lasix for his CHF, however patient states he ran out of his medication approximately 1 week ago.  Since that time he has noticed increased swelling in his legs and abdomen.  Associated symptoms included bilateral leg pain left worse than right, and generalized abdominal pain.  Tonight he also developed shortness of breath that is exacerbated when lying flat.  He also reports a headache and mild chest pain.  No other URI symptoms, nausea, vomiting, diarrhea, lightheadedness, or F/C.        Prior to Admission Medications   Prescriptions Last Dose Informant Patient Reported? Taking?   atorvastatin (LIPITOR) 40 mg tablet   No No   Sig: Take 1 tablet (40 mg total) by mouth daily with dinner   hydrALAZINE (APRESOLINE) 10 mg tablet   No No   Sig: Take 1 tablet (10 mg total) by mouth every 8 (eight) hours   ibuprofen (MOTRIN) 600 mg tablet  Self Yes No   Sig: TAKE 1 TABLET (600 MG TOTAL) BY MOUTH EVERY 6 (SIX) HOURS AS NEEDED FOR MILD PAIN (PAIN SCORE 1-3).   isosorbide dinitrate (ISORDIL) 5 mg tablet   No No   Sig: Take 1 tablet (5 mg total) by mouth 3 (three) times daily after meals   metoprolol succinate (TOPROL-XL) 25 mg 24 hr tablet   No No   Sig: Take 3 tablets (75 mg total) by mouth daily at bedtime   metoprolol succinate (TOPROL-XL) 50 mg 24 hr tablet   No No   Sig: Take 1 tablet (50 mg total) by mouth daily in the early morning   pantoprazole (PROTONIX) 40 mg tablet   No No   Sig: Take 1 tablet (40 mg total) by mouth daily in the early morning   spironolactone (ALDACTONE) 25 mg tablet   No No   Sig: Take 1 tablet (25 mg  total) by mouth daily   sucralfate (CARAFATE) 1 g tablet   No No   Sig: Take 1 tablet (1 g total) by mouth 4 (four) times a day (before meals and at bedtime)   torsemide (DEMADEX) 20 mg tablet   No No   Sig: Take 1 tablet (20 mg total) by mouth 2 (two) times a day      Facility-Administered Medications: None       Past Medical History:   Diagnosis Date    Chronic HFrEF (heart failure with reduced ejection fraction) ([facility]) 04/01/2022       Past Surgical History:   Procedure Laterality Date    CARDIAC CATHETERIZATION N/A 4/4/2022    Procedure: CARDIAC CORONARY ANGIOGRAM;  Surgeon: Flex Hunt MD;  Location: [facility];  Service: Cardiology    CARDIAC CATHETERIZATION Left 4/4/2022    Procedure: Cardiac Left Heart Cath;  Surgeon: Flex Hunt MD;  Location: [facility];  Service: Cardiology       History reviewed. No pertinent family history.  I have reviewed and agree with the history as documented.    E-Cigarette/Vaping    E-Cigarette Use Never User      E-Cigarette/Vaping Substances    Nicotine No     THC No     CBD No     Flavoring No     Other No     Unknown No      Social History     Tobacco Use    Smoking status: Never     Passive exposure: Never    Smokeless tobacco: Never   Vaping Use    Vaping status: Never Used   Substance Use Topics    Alcohol use: Never    Drug use: Never       Review of Systems   Constitutional:  Negative for chills and fever.   HENT:  Negative for congestion, ear pain, rhinorrhea and sore throat.    Eyes:  Negative for pain and visual disturbance.   Respiratory:  Positive for shortness of breath. Negative for cough.    Cardiovascular:  Positive for chest pain and leg swelling. Negative for palpitations.   Gastrointestinal:  Positive for abdominal distention and abdominal pain. Negative for constipation, diarrhea, nausea and vomiting.   Genitourinary:  Negative for dysuria and hematuria.   Musculoskeletal:  Positive for myalgias. Negative for arthralgias and back  pain.   Skin:  Negative for color change and rash.   Neurological:  Positive for headaches. Negative for seizures, syncope and light-headedness.   All other systems reviewed and are negative.      Physical Exam  Physical Exam  Vitals and nursing note reviewed.   Constitutional:       General: He is awake. He is in acute distress.      Appearance: He is well-developed and overweight. He is not toxic-appearing or diaphoretic.   HENT:      Head: Normocephalic and atraumatic.      Right Ear: External ear normal.      Left Ear: External ear normal.      Nose: Nose normal.      Mouth/Throat:      Lips: Pink.      Mouth: Mucous membranes are moist.      Pharynx: Oropharynx is clear. Uvula midline.   Eyes:      General: Lids are normal. Vision grossly intact. Gaze aligned appropriately.      Conjunctiva/sclera: Conjunctivae normal.      Pupils: Pupils are equal, round, and reactive to light.   Cardiovascular:      Rate and Rhythm: Regular rhythm. Tachycardia present.      Pulses:           Posterior tibial pulses are 2+ on the right side and 2+ on the left side.      Heart sounds: S1 normal and S2 normal. No murmur heard.     No friction rub. No gallop.   Pulmonary:      Effort: Tachypnea and respiratory distress present. No accessory muscle usage or retractions.      Breath sounds: Normal air entry. No stridor or transmitted upper airway sounds. Examination of the right-lower field reveals rhonchi. Examination of the left-lower field reveals rhonchi. Rhonchi present. No decreased breath sounds or wheezing.      Comments: Faint rhonchi auscultated at the bases bilaterally.  Abdominal:      General: Abdomen is flat. There is distension.      Palpations: Abdomen is soft. There is no mass.      Tenderness: There is generalized abdominal tenderness. There is no right CVA tenderness, left CVA tenderness, guarding or rebound.   Musculoskeletal:      Cervical back: Normal, full passive range of motion without pain and neck supple.  No rigidity or crepitus. No spinous process tenderness or muscular tenderness.      Thoracic back: Normal. No spasms, tenderness or bony tenderness.      Lumbar back: Normal. No spasms, tenderness or bony tenderness.      Right lower le+ Pitting Edema present.      Left lower le+ Pitting Edema present.      Comments: Generalized tenderness to palpation of the bilateral lower extremities, left worse than right.  No significant difference in the swelling of the left leg compared to the right.   Skin:     General: Skin is warm and dry.      Capillary Refill: Capillary refill takes less than 2 seconds.      Coloration: Skin is not pale.      Findings: Rash present.      Comments: Venous stasis rash on bilateral lower extremities.   Neurological:      Mental Status: He is alert and oriented to person, place, and time.   Psychiatric:         Behavior: Behavior is cooperative.         Vital Signs  ED Triage Vitals   Temperature Pulse Respirations Blood Pressure SpO2   24 0516 24 0516 24 0516 24 0516 24 0516   97.7 °F (36.5 °C) (!) 109 (!) 26 151/93 96 %      Temp Source Heart Rate Source Patient Position - Orthostatic VS BP Location FiO2 (%)   24 0516 24 0516 24 0516 24 0516 --   Oral Monitor Sitting Left arm       Pain Score       24 0750       No Pain           Vitals:    24 0516 24 0721 24 0750   BP: 151/93 (!) 164/104 (!) 163/101   Pulse: (!) 109 (!) 108 (!) 114   Patient Position - Orthostatic VS: Sitting Sitting Sitting         ED Medications  Medications   atorvastatin (LIPITOR) tablet 40 mg (has no administration in time range)   isosorbide dinitrate (ISORDIL) tablet 5 mg (5 mg Oral Given 24 0746)   metoprolol succinate (TOPROL-XL) 24 hr tablet 75 mg (has no administration in time range)   metoprolol succinate (TOPROL-XL) 24 hr tablet 50 mg (50 mg Oral Given 24 0746)   pantoprazole (PROTONIX) EC tablet 40 mg (40 mg Oral  Given 2/7/24 0746)   enoxaparin (LOVENOX) subcutaneous injection 40 mg (40 mg Subcutaneous Not Given 2/7/24 0802)   furosemide (LASIX) injection 60 mg (has no administration in time range)   hydrALAZINE (APRESOLINE) tablet 10 mg (10 mg Oral Not Given 2/7/24 0802)   furosemide (LASIX) injection 80 mg (80 mg Intravenous Given 2/7/24 0551)   spironolactone (ALDACTONE) tablet 25 mg (25 mg Oral Given 2/7/24 0715)         Diagnostic Studies  Results Reviewed       Procedure Component Value Units Date/Time    HS Troponin I 2hr [[id number]]  (Normal) Collected: 02/07/24 0716    Lab Status: Final result Specimen: Blood from Arm, Right Updated: 02/07/24 0744     hs TnI 2hr 27 ng/L      Delta 2hr hsTnI 3 ng/L     Ethanol [[id number]]  (Normal) Collected: 02/07/24 0716    Lab Status: Final result Specimen: Blood from Arm, Right Updated: 02/07/24 0740     Ethanol Lvl <10 mg/dL     Rapid drug screen, urine [[id number]] Collected: 02/07/24 0716    Lab Status: In process Specimen: Urine, Clean Catch Updated: 02/07/24 0722    HS Troponin 0hr (reflex protocol) [[id number]]  (Normal) Collected: 02/07/24 0514    Lab Status: Final result Specimen: Blood from Arm, Right Updated: 02/07/24 0549     hs TnI 0hr 24 ng/L     B-Type Natriuretic Peptide(BNP) [[id number]]  (Abnormal) Collected: 02/07/24 0514    Lab Status: Final result Specimen: Blood from Arm, Right Updated: 02/07/24 0548      pg/mL     Comprehensive metabolic panel [[id number]]  (Abnormal) Collected: 02/07/24 0514    Lab Status: Final result Specimen: Blood from Arm, Right Updated: 02/07/24 0543     Sodium 138 mmol/L      Potassium 4.6 mmol/L      Chloride 106 mmol/L      CO2 25 mmol/L      ANION GAP 7 mmol/L      BUN 15 mg/dL      Creatinine 1.16 mg/dL      Glucose 161 mg/dL      Calcium 8.7 mg/dL      AST 22 U/L      ALT 19 U/L      Alkaline Phosphatase 238 U/L      Total Protein 6.7 g/dL      Albumin 3.5 g/dL      Total Bilirubin 1.09 mg/dL      eGFR 71 ml/min/1.73sq m      Narrative:      [employer] guidelines for Chronic Kidney Disease (CKD):     Stage 1 with normal or high GFR (GFR > 90 mL/min/1.73 square meters)    Stage 2 Mild CKD (GFR = 60-89 mL/min/1.73 square meters)    Stage 3A Moderate CKD (GFR = 45-59 mL/min/1.73 square meters)    Stage 3B Moderate CKD (GFR = 30-44 mL/min/1.73 square meters)    Stage 4 Severe CKD (GFR = 15-29 mL/min/1.73 square meters)    Stage 5 End Stage CKD (GFR <15 mL/min/1.73 square meters)  Note: GFR calculation is accurate only with a steady state creatinine    CBC and differential [[id number]]  (Abnormal) Collected: 02/07/24 0514    Lab Status: Final result Specimen: Blood from Arm, Right Updated: 02/07/24 0522     WBC 7.11 Thousand/uL      RBC 4.45 Million/uL      Hemoglobin 11.6 g/dL      Hematocrit 37.9 %      MCV 85 fL      MCH 26.1 pg      MCHC 30.6 g/dL      RDW 17.7 %      MPV 10.4 fL      Platelets 260 Thousands/uL      nRBC 0 /100 WBCs      Neutrophils Relative 79 %      Immat GRANS % 0 %      Lymphocytes Relative 14 %      Monocytes Relative 5 %      Eosinophils Relative 2 %      Basophils Relative 0 %      Neutrophils Absolute 5.56 Thousands/µL      Immature Grans Absolute 0.03 Thousand/uL      Lymphocytes Absolute 1.00 Thousands/µL      Monocytes Absolute 0.36 Thousand/µL      Eosinophils Absolute 0.14 Thousand/µL      Basophils Absolute 0.02 Thousands/µL                    XR chest 1 view portable   ED Interpretation by Chanell Sainz PA-C (02/07 0557)   Mild to moderate pulmonary vascular congestion                 Procedures  ECG 12 Lead Documentation Only    Date/Time: 2/7/2024 5:03 AM    Performed by: Chanell Sainz PA-C  Authorized by: Chanell Sainz PA-C    ECG reviewed by me, the ED Provider: yes    Patient location:  ED  Previous ECG:     Previous ECG:  Compared to current    Comparison ECG info:  12/6/23    Similarity:  No change    Comparison to cardiac monitor: Yes    Interpretation:      Interpretation: non-specific    Rate:     ECG rate:  110    ECG rate assessment: tachycardic    Rhythm:     Rhythm: sinus tachycardia    Ectopy:     Ectopy: none    QRS:     QRS axis:  Normal    QRS intervals:  Normal  Conduction:     Conduction: normal    ST segments:     ST segments:  Normal  T waves:     T waves: non-specific    Comments:      TN interval: 164ms  QRS duration: 66ms  QT/QTc: 336/454ms           ED Course  ED Course as of 02/07/24 0858   Wed Feb 07, 2024   0550 hs TnI 0hr: 24   0550 BNP(!): 697   0550 Hemoglobin(!): 11.6         HEART score:  History 0=Slightly or non-suspicious   ECG 0=Normal   Age 1= > 45 - <65 years   Risk Factors 2= > 3 risk factors, or history of atherosclerotic disease   Troponin 1= Between 13-35 ng/L   Total 4             Medical Decision Making  Patient presents for shortness of breath, leg swelling, and chest discomfort.  On arrival he is tachycardic and tachypneic.  Faint rhonchi appreciated in the lower lung fields bilaterally.  He has 2+ pitting edema in the lower extremities as well as abdominal distention.  Differential diagnosis includes but is not limited to CHF exacerbation, ascites, pleural effusion, renal failure, anemia, metabolic abnormality, cardiac dysrhythmia, ACS, or MI.  On my personal interpretation of the EKG, the patient is in sinus tachycardia without significant ST changes or ectopic beats.  Troponin was 24, giving the patient a heart score of 4.  BNP is elevated at 697 and there is pulmonary vascular congestion noted on the chest x-ray.  Patient given his home doses of Lasix and spironolactone and admitted to family medicine for further management.    Problems Addressed:  Chest pain: acute illness or injury  CHF (congestive heart failure) (HCC): acute illness or injury  Dyspnea: acute illness or injury    Amount and/or Complexity of Data Reviewed  External Data Reviewed: labs, radiology, ECG and notes.  Labs: ordered. Decision-making details  documented in ED Course.  Radiology: ordered and independent interpretation performed.  ECG/medicine tests: ordered and independent interpretation performed.    Risk  Prescription drug management.  Decision regarding hospitalization.             Disposition  Final diagnoses:   CHF (congestive heart failure) (HCC)   Dyspnea   Chest pain     Time reflects when diagnosis was documented in both MDM as applicable and the Disposition within this note       Time User Action Codes Description Comment    2/7/2024  5:51 AM Chanell Sainz Add [I50.9] CHF (congestive heart failure) (HCC)     2/7/2024  5:54 AM Chanell Sainz Add [R06.00] Dyspnea     2/7/2024  5:54 AM Chanell Sainz Add [R07.9] Chest pain           ED Disposition       ED Disposition   Admit    Condition   Stable    Date/Time   Wed Feb 7, 2024  5:52 AM    Comment   Case was discussed with family medicine and the patient's admission status was agreed to be Admission Status: inpatient status to the service of Dr. Roman.               Follow-up Information    None         Current Discharge Medication List        CONTINUE these medications which have NOT CHANGED    Details   atorvastatin (LIPITOR) 40 mg tablet Take 1 tablet (40 mg total) by mouth daily with dinner  Qty: 30 tablet, Refills: 0    Associated Diagnoses: Acute CHF (congestive heart failure) (HCC)      hydrALAZINE (APRESOLINE) 10 mg tablet Take 1 tablet (10 mg total) by mouth every 8 (eight) hours  Qty: 90 tablet, Refills: 0    Associated Diagnoses: Primary hypertension      ibuprofen (MOTRIN) 600 mg tablet TAKE 1 TABLET (600 MG TOTAL) BY MOUTH EVERY 6 (SIX) HOURS AS NEEDED FOR MILD PAIN (PAIN SCORE 1-3).      isosorbide dinitrate (ISORDIL) 5 mg tablet Take 1 tablet (5 mg total) by mouth 3 (three) times daily after meals  Qty: 90 tablet, Refills: 0    Associated Diagnoses: Primary hypertension      !! metoprolol succinate (TOPROL-XL) 25 mg 24 hr tablet Take 3 tablets (75 mg total) by mouth daily at  bedtime  Qty: 90 tablet, Refills: 0    Associated Diagnoses: Acute CHF (congestive heart failure) (HCC)      !! metoprolol succinate (TOPROL-XL) 50 mg 24 hr tablet Take 1 tablet (50 mg total) by mouth daily in the early morning  Qty: 30 tablet, Refills: 0    Associated Diagnoses: Acute CHF (congestive heart failure) (HCC)      pantoprazole (PROTONIX) 40 mg tablet Take 1 tablet (40 mg total) by mouth daily in the early morning  Qty: 30 tablet, Refills: 0    Associated Diagnoses: GERD (gastroesophageal reflux disease)      spironolactone (ALDACTONE) 25 mg tablet Take 1 tablet (25 mg total) by mouth daily  Qty: 30 tablet, Refills: 2    Associated Diagnoses: Chronic HFrEF (heart failure with reduced ejection fraction) (HCC); Nonischemic cardiomyopathy (HCC)      sucralfate (CARAFATE) 1 g tablet Take 1 tablet (1 g total) by mouth 4 (four) times a day (before meals and at bedtime)  Qty: 120 tablet, Refills: 0    Associated Diagnoses: GERD (gastroesophageal reflux disease)      torsemide (DEMADEX) 20 mg tablet Take 1 tablet (20 mg total) by mouth 2 (two) times a day  Qty: 60 tablet, Refills: 0    Associated Diagnoses: CHF, acute on chronic (HCC)       !! - Potential duplicate medications found. Please discuss with provider.          No discharge procedures on file.    PDMP Review       None            ED Provider  Electronically Signed by             Chanell Sainz PA-C  02/07/24 0949

## 2024-02-07 NOTE — H&P
History and Physical - [facility]    Patient Information: Cristian Quijano 54 y.o. male MRN: [id number]  Unit/Bed#: 7T [facility] 709-01 Encounter: [id number]  Admitting Physician: Wellington Dyson DO  PCP: Robert Fraga MD  Date of Admission:  02/07/24    Assessment and Plan    Acute exacerbation of CHF (congestive heart failure) (HCC)  Assessment & Plan  Wt Readings from Last 3 Encounters:   02/07/24 113 kg (248 lb 1.6 oz)   12/13/23 96.4 kg (212 lb 8.4 oz)   11/16/23 98.4 kg (217 lb)     Dry weight 96 kg.  Current weight 113 kg.  Has not been on his diuretic for over a week.    Prehospital medication includes furosemide 20 mg twice daily, metoprolol 50 mg a.m., 75 mg p.m., spironolactone 25 mg daily, and isosorbide dinitrate 3 times daily, Jardiance 10 mg daily  Echo done in August 2023 showed EF of 40%  Endorses shortness of breath, orthopnea on review of systems.   on admission  Chest x-ray showed mild to moderate pulmonary congestion  Differential including ischemic cardiomyopathy less likely given troponin 24 and EKG showing only sinus tachycardia.    This is likely secondary to medication nonadherence.    Plan  -Lasix 60 mg twice daily  -Continue goal-directed therapy with metoprolol, spironolactone and isosorbide dinitrate  -Jardiance held for now  -Cardiac diet with 2 g sodium restriction  -1.5 L fluid restriction  -Daily weights and I's and O's  -Daily CMP's  -Cardiology consult, appreciate recommendations            Hypertension  Assessment & Plan  Blood Pressure: 151/93 diuretic medication as above.  Per chart review patient was previously on losartan 50 mg bid however that was held during the admission due to HEIKE.  Discussion was had that he would resume this medication in the outpatient setting.  However, patient did not follow-up.  In addition, patient is on hydralazine 10 mg 3 times daily with isosorbide dinitrate 5 mg 3 times daily.    Plan  Will continue  to hold losartan can consider adding agent if patient continues to be hypertensive  Continue heart failure goal-directed therapy medications as above  Continue isosorbide dinitrate with hydralazine 3 times daily      Cocaine abuse (HCC)  Assessment & Plan  History of cocaine abuse.    Negative this admission on review of systems, will obtain urine drug screen nonetheless with EtOH.        Hyperlipidemia  Assessment & Plan  Prehospital medication includes atorvastatin 40 mg daily.  Continue home medication regiment.        VTE Prophylaxis: VTE Score: 3 Moderate Risk (Score 3-4) - Pharmacological DVT Prophylaxis Ordered: enoxaparin (Lovenox).  Code Status: Level 1 - Full Code  Anticipated Length of Stay:  Patient will be admitted on an Inpatient basis with an anticipated length of stay of  less than 2 midnights.     Justification for Hospital Stay: CHF exacerbation  Total Time for Visit, including Counseling / Coordination of Care: 45 mins. Greater than 50% of this total time spent on direct patient counseling and coordination of care.      Chief Complaint:     Chief Complaint   Patient presents with    Medical Problem     Reports running out of diuretics a week ago, swelling bilat legs and SOB      History of Present Illness:    Cristian Quijano is a 54 y.o. male past medical history notable for hypertension, hyperlipidemia, prior cocaine and alcohol use, CHF last echo done showed EF of 40% on furosemide 20 mg twice daily who presents with shortness of breath.  Patient reports that over the last few days he has been short of breath, now with rest.  He states that he has had lower extremity swelling associated with his symptoms.  Patient notes that he supposed to be on diuretic medication however he has not had it for the last week due to running out of his medication and inability to get a ride to and from the pharmacy.  Review of system is positive for orthopnea.  Review of system is negative for chest pain,  fevers, chills or systemic signs of infection.    In the ED, patient vitals were notable for tachycardia 109, respiration 26.  His labs were notable for , alk phos 238.   EKG was notable for sinus tachycardia, troponin was 24.  His weight was 113 kg which is up from 97 during his last admission. Patient was given 1 dose of 80 mg IV Lasix.    Patient was admitted to inpatient service for CHF exacerbation management.    Review of Systems:  Review of Systems   Constitutional:  Positive for activity change and unexpected weight change. Negative for chills and fever.   HENT:  Negative for ear pain and sore throat.    Eyes:  Negative for pain and visual disturbance.   Respiratory:  Positive for chest tightness and shortness of breath. Negative for cough and wheezing.    Cardiovascular:  Positive for leg swelling. Negative for chest pain and palpitations.   Gastrointestinal:  Negative for abdominal pain, blood in stool and vomiting.   Genitourinary:  Negative for dysuria and hematuria.   Musculoskeletal:  Negative for arthralgias and back pain.   Skin:  Negative for color change and rash.   Neurological:  Negative for seizures and syncope.   All other systems reviewed and are negative.      Past Medical and Surgical History:   Past Medical History:   Diagnosis Date    Chronic HFrEF (heart failure with reduced ejection fraction) ([facility]) 04/01/2022     Past Surgical History:   Procedure Laterality Date    CARDIAC CATHETERIZATION N/A 4/4/2022    Procedure: CARDIAC CORONARY ANGIOGRAM;  Surgeon: Flex Hunt MD;  Location: [facility]ARDIAC [facility]ATH [facility];  Service: Cardiology    CARDIAC CATHETERIZATION Left 4/4/2022    Procedure: Cardiac Left Heart Cath;  Surgeon: Flex Hunt MD;  Location: [facility]ARD[facility];  Service: Cardiology     Meds/Allergies:  Allergies: No Known Allergies  Prior to Admission Medications   Prescriptions Last Dose Informant Patient Reported? Taking?   atorvastatin (LIPITOR) 40 mg tablet   No No   Sig:  Take 1 tablet (40 mg total) by mouth daily with dinner   hydrALAZINE (APRESOLINE) 10 mg tablet   No No   Sig: Take 1 tablet (10 mg total) by mouth every 8 (eight) hours   ibuprofen (MOTRIN) 600 mg tablet  Self Yes No   Sig: TAKE 1 TABLET (600 MG TOTAL) BY MOUTH EVERY 6 (SIX) HOURS AS NEEDED FOR MILD PAIN (PAIN SCORE 1-3).   isosorbide dinitrate (ISORDIL) 5 mg tablet   No No   Sig: Take 1 tablet (5 mg total) by mouth 3 (three) times daily after meals   metoprolol succinate (TOPROL-XL) 25 mg 24 hr tablet   No No   Sig: Take 3 tablets (75 mg total) by mouth daily at bedtime   metoprolol succinate (TOPROL-XL) 50 mg 24 hr tablet   No No   Sig: Take 1 tablet (50 mg total) by mouth daily in the early morning   pantoprazole (PROTONIX) 40 mg tablet   No No   Sig: Take 1 tablet (40 mg total) by mouth daily in the early morning   spironolactone (ALDACTONE) 25 mg tablet   No No   Sig: Take 1 tablet (25 mg total) by mouth daily   sucralfate (CARAFATE) 1 g tablet   No No   Sig: Take 1 tablet (1 g total) by mouth 4 (four) times a day (before meals and at bedtime)   torsemide (DEMADEX) 20 mg tablet   No No   Sig: Take 1 tablet (20 mg total) by mouth 2 (two) times a day      Facility-Administered Medications: None     Social History:     Social History     Socioeconomic History    Marital status: Single     Spouse name: Not on file    Number of children: Not on file    Years of education: Not on file    Highest education level: Not on file   Occupational History    Not on file   Tobacco Use    Smoking status: Never     Passive exposure: Never    Smokeless tobacco: Never   Vaping Use    Vaping status: Never Used   Substance and Sexual Activity    Alcohol use: Never    Drug use: Never    Sexual activity: Not on file   Other Topics Concern    Not on file   Social History Narrative    Not on file     Social Determinants of Health     Financial Resource Strain: Not on file   Food Insecurity: Patient Declined (8/14/2023)    Hunger Vital  Sign     Worried About Running Out of Food in the Last Year: Patient declined     Ran Out of Food in the Last Year: Patient declined   Transportation Needs: Patient Declined (2023)    PRAPARE - Transportation     Lack of Transportation (Medical): Patient declined     Lack of Transportation (Non-Medical): Patient declined   Physical Activity: Not on file   Stress: Not on file   Social Connections: Not on file   Intimate Partner Violence: Not on file   Housing Stability: Low Risk  (2023)    Housing Stability Vital Sign     Unable to Pay for Housing in the Last Year: No     Number of Places Lived in the Last Year: 1     Unstable Housing in the Last Year: No     Patient Pre-hospital Living Situation: Lives alone  Patient Pre-hospital Level of Mobility: Able to ambulate on his own  Patient Pre-hospital Diet Restrictions: Cardiac diet    Family History:  History reviewed. No pertinent family history.    Physical Exam:   Vitals:   Blood Pressure: (!) 164/104 (24)  Pulse: (!) 108 (24)  Temperature: 97.7 °F (36.5 °C) (24)  Temp Source: Oral (24)  Respirations: (!) 26 (24)  Height: 6' (182.9 cm) (24)  Weight - Scale: 112 kg (247 lb 9.2 oz) (24)  SpO2: 97 % (24)    Physical Exam  Constitutional:       General: He is not in acute distress.     Appearance: Normal appearance. He is obese. He is not ill-appearing, toxic-appearing or diaphoretic.   HENT:      Mouth/Throat:      Mouth: Mucous membranes are moist.   Eyes:      Conjunctiva/sclera: Conjunctivae normal.   Cardiovascular:      Rate and Rhythm: Normal rate and regular rhythm.      Heart sounds: Normal heart sounds.   Pulmonary:      Breath sounds: No wheezing or rales.   Abdominal:      General: There is distension.      Palpations: Abdomen is soft.      Tenderness: There is no abdominal tenderness.   Musculoskeletal:      Right lower le+ Edema present.      Left lower  "le+ Edema present.   Skin:     General: Skin is warm.          Neurological:      General: No focal deficit present.      Mental Status: He is alert and oriented to person, place, and time.   Psychiatric:         Mood and Affect: Mood normal.         Behavior: Behavior normal.         Thought Content: Thought content normal.         Judgment: Judgment normal.           Lab Results: I have personally reviewed pertinent reports.    Results from last 7 days   Lab Units 24  0514   WBC Thousand/uL 7.11   HEMOGLOBIN g/dL 11.6*   HEMATOCRIT % 37.9   PLATELETS Thousands/uL 260   NEUTROS PCT % 79*   LYMPHS PCT % 14   MONOS PCT % 5   EOS PCT % 2     Results from last 7 days   Lab Units 24  0514   POTASSIUM mmol/L 4.6   CHLORIDE mmol/L 106   CO2 mmol/L 25   BUN mg/dL 15   CREATININE mg/dL 1.16   CALCIUM mg/dL 8.7   ALK PHOS U/L 238*   ALT U/L 19   AST U/L 22   EGFR ml/min/1.73sq m 71                                Invalid input(s): \"URIBILINOGEN\"          Imaging:    Chest x-ray:  Mild to moderate pulmonary congestion    EKG, Pathology, and Other Studies Reviewed on Admission:   EKG  Result Date: 24  Impression: Sinus tachycardia    Entire H&P was discussed with Dr. Roman who agreed to what is noted above    Wellingtno Dyson DO  24  7:42 AM    "

## 2024-02-07 NOTE — ASSESSMENT & PLAN NOTE
History of cocaine abuse.  Negative this admission on review of systems.  UDS and etoh levels negative

## 2024-02-08 ENCOUNTER — APPOINTMENT (INPATIENT)
Dept: NON INVASIVE DIAGNOSTICS | Facility: HOSPITAL | Age: 54
DRG: 194 | End: 2024-02-08
Payer: COMMERCIAL

## 2024-02-08 PROBLEM — R17 ELEVATED BILIRUBIN: Status: ACTIVE | Noted: 2024-02-08

## 2024-02-08 PROBLEM — J10.1 INFLUENZA A: Status: RESOLVED | Noted: 2023-12-09 | Resolved: 2024-02-08

## 2024-02-08 LAB
ALBUMIN SERPL BCP-MCNC: 3.3 G/DL (ref 3.5–5)
ALP SERPL-CCNC: 211 U/L (ref 34–104)
ALT SERPL W P-5'-P-CCNC: 18 U/L (ref 7–52)
ANION GAP SERPL CALCULATED.3IONS-SCNC: 10 MMOL/L
AORTIC ROOT: 2.9 CM
APICAL FOUR CHAMBER EJECTION FRACTION: 46 %
AST SERPL W P-5'-P-CCNC: 17 U/L (ref 13–39)
ATRIAL RATE: 110 BPM
BILIRUB SERPL-MCNC: 1.48 MG/DL (ref 0.2–1)
BSA FOR ECHO PROCEDURE: 2.28 M2
BUN SERPL-MCNC: 16 MG/DL (ref 5–25)
CALCIUM ALBUM COR SERPL-MCNC: 9.6 MG/DL (ref 8.3–10.1)
CALCIUM SERPL-MCNC: 9 MG/DL (ref 8.4–10.2)
CHLORIDE SERPL-SCNC: 103 MMOL/L (ref 96–108)
CO2 SERPL-SCNC: 29 MMOL/L (ref 21–32)
CREAT SERPL-MCNC: 1.27 MG/DL (ref 0.6–1.3)
E WAVE DECELERATION TIME: 195 MS
E/A RATIO: 1.24
FRACTIONAL SHORTENING: 25 (ref 28–44)
GFR SERPL CREATININE-BSD FRML MDRD: 64 ML/MIN/1.73SQ M
GLUCOSE SERPL-MCNC: 78 MG/DL (ref 65–140)
INTERVENTRICULAR SEPTUM IN DIASTOLE (PARASTERNAL SHORT AXIS VIEW): 1 CM
INTERVENTRICULAR SEPTUM: 1 CM (ref 0.6–1.1)
LAAS-AP2: 28.2 CM2
LAAS-AP4: 27.9 CM2
LEFT ATRIUM SIZE: 5.4 CM
LEFT ATRIUM VOLUME (MOD BIPLANE): 100 ML
LEFT ATRIUM VOLUME INDEX (MOD BIPLANE): 43.9 ML/M2
LEFT INTERNAL DIMENSION IN SYSTOLE: 4 CM (ref 2.1–4)
LEFT VENTRICULAR INTERNAL DIMENSION IN DIASTOLE: 5.3 CM (ref 3.5–6)
LEFT VENTRICULAR POSTERIOR WALL IN END DIASTOLE: 1 CM
LEFT VENTRICULAR STROKE VOLUME: 68 ML
LVSV (TEICH): 68 ML
MAGNESIUM SERPL-MCNC: 1.9 MG/DL (ref 1.9–2.7)
MV E'TISSUE VEL-SEP: 7 CM/S
MV PEAK A VEL: 0.58 M/S
MV PEAK E VEL: 72 CM/S
MV STENOSIS PRESSURE HALF TIME: 57 MS
MV VALVE AREA P 1/2 METHOD: 3.86
P AXIS: 68 DEGREES
PHOSPHATE SERPL-MCNC: 3.8 MG/DL (ref 2.7–4.5)
POTASSIUM SERPL-SCNC: 3.9 MMOL/L (ref 3.5–5.3)
PR INTERVAL: 164 MS
PROT SERPL-MCNC: 6.5 G/DL (ref 6.4–8.4)
QRS AXIS: 17 DEGREES
QRSD INTERVAL: 66 MS
QT INTERVAL: 336 MS
QTC INTERVAL: 454 MS
RIGHT ATRIUM AREA SYSTOLE A4C: 30.9 CM2
RIGHT VENTRICLE ID DIMENSION: 4.8 CM
SL CV LEFT ATRIUM LENGTH A2C: 6.3 CM
SL CV LV EF: 50
SL CV PED ECHO LEFT VENTRICLE DIASTOLIC VOLUME (MOD BIPLANE) 2D: 136 ML
SL CV PED ECHO LEFT VENTRICLE SYSTOLIC VOLUME (MOD BIPLANE) 2D: 68 ML
SODIUM SERPL-SCNC: 142 MMOL/L (ref 135–147)
T WAVE AXIS: 71 DEGREES
TR MAX PG: 32 MMHG
TR PEAK VELOCITY: 2.9 M/S
TRICUSPID ANNULAR PLANE SYSTOLIC EXCURSION: 1.6 CM
TRICUSPID VALVE PEAK REGURGITATION VELOCITY: 2.85 M/S
VENTRICULAR RATE: 110 BPM

## 2024-02-08 PROCEDURE — 80053 COMPREHEN METABOLIC PANEL: CPT | Performed by: STUDENT IN AN ORGANIZED HEALTH CARE EDUCATION/TRAINING PROGRAM

## 2024-02-08 PROCEDURE — 93970 EXTREMITY STUDY: CPT | Performed by: SURGERY

## 2024-02-08 PROCEDURE — 84100 ASSAY OF PHOSPHORUS: CPT | Performed by: STUDENT IN AN ORGANIZED HEALTH CARE EDUCATION/TRAINING PROGRAM

## 2024-02-08 PROCEDURE — 83735 ASSAY OF MAGNESIUM: CPT | Performed by: STUDENT IN AN ORGANIZED HEALTH CARE EDUCATION/TRAINING PROGRAM

## 2024-02-08 PROCEDURE — 93306 TTE W/DOPPLER COMPLETE: CPT | Performed by: INTERNAL MEDICINE

## 2024-02-08 PROCEDURE — 93010 ELECTROCARDIOGRAM REPORT: CPT

## 2024-02-08 PROCEDURE — 99233 SBSQ HOSP IP/OBS HIGH 50: CPT | Performed by: FAMILY MEDICINE

## 2024-02-08 PROCEDURE — 99232 SBSQ HOSP IP/OBS MODERATE 35: CPT | Performed by: INTERNAL MEDICINE

## 2024-02-08 PROCEDURE — 93306 TTE W/DOPPLER COMPLETE: CPT

## 2024-02-08 PROCEDURE — 93970 EXTREMITY STUDY: CPT

## 2024-02-08 RX ADMIN — ATORVASTATIN CALCIUM 40 MG: 40 TABLET, FILM COATED ORAL at 15:36

## 2024-02-08 RX ADMIN — ISOSORBIDE DINITRATE 10 MG: 10 TABLET ORAL at 08:03

## 2024-02-08 RX ADMIN — HYDRALAZINE HYDROCHLORIDE 25 MG: 25 TABLET, FILM COATED ORAL at 21:24

## 2024-02-08 RX ADMIN — HYDRALAZINE HYDROCHLORIDE 25 MG: 25 TABLET, FILM COATED ORAL at 15:27

## 2024-02-08 RX ADMIN — FUROSEMIDE 60 MG: 10 INJECTION, SOLUTION INTRAMUSCULAR; INTRAVENOUS at 08:03

## 2024-02-08 RX ADMIN — PANTOPRAZOLE SODIUM 40 MG: 40 TABLET, DELAYED RELEASE ORAL at 05:18

## 2024-02-08 RX ADMIN — HYDRALAZINE HYDROCHLORIDE 25 MG: 25 TABLET, FILM COATED ORAL at 08:04

## 2024-02-08 RX ADMIN — ISOSORBIDE DINITRATE 10 MG: 10 TABLET ORAL at 17:27

## 2024-02-08 RX ADMIN — EMPAGLIFLOZIN 10 MG: 10 TABLET, FILM COATED ORAL at 09:34

## 2024-02-08 RX ADMIN — CARVEDILOL 12.5 MG: 12.5 TABLET, FILM COATED ORAL at 08:04

## 2024-02-08 RX ADMIN — CARVEDILOL 12.5 MG: 12.5 TABLET, FILM COATED ORAL at 15:36

## 2024-02-08 RX ADMIN — ENOXAPARIN SODIUM 40 MG: 40 INJECTION SUBCUTANEOUS at 08:03

## 2024-02-08 RX ADMIN — FUROSEMIDE 60 MG: 10 INJECTION, SOLUTION INTRAMUSCULAR; INTRAVENOUS at 15:27

## 2024-02-08 RX ADMIN — SACUBITRIL AND VALSARTAN 1 TABLET: 24; 26 TABLET, FILM COATED ORAL at 17:27

## 2024-02-08 RX ADMIN — SACUBITRIL AND VALSARTAN 1 TABLET: 24; 26 TABLET, FILM COATED ORAL at 08:03

## 2024-02-08 NOTE — PLAN OF CARE
Problem: SAFETY ADULT  Goal: Patient will remain free of falls  Description: INTERVENTIONS:  - Educate patient/family on patient safety including physical limitations  - Instruct patient to call for assistance with activity   - Consult OT/PT to assist with strengthening/mobility   - Keep Call bell within reach  - Keep bed low and locked with side rails adjusted as appropriate  - Keep care items and personal belongings within reach  - Initiate and maintain comfort rounds  - Make Fall Risk Sign visible to staff  - Apply yellow socks and bracelet for high fall risk patients  - Consider moving patient to room near nurses station  Outcome: Progressing  Goal: Maintain or return to baseline ADL function  Description: INTERVENTIONS:  -  Assess patient's ability to carry out ADLs; assess patient's baseline for ADL function and identify physical deficits which impact ability to perform ADLs (bathing, care of mouth/teeth, toileting, grooming, dressing, etc.)  - Assess/evaluate cause of self-care deficits   - Assess range of motion  - Assess patient's mobility; develop plan if impaired  - Assess patient's need for assistive devices and provide as appropriate  - Encourage maximum independence but intervene and supervise when necessary  - Involve family in performance of ADLs  - Assess for home care needs following discharge   - Consider OT consult to assist with ADL evaluation and planning for discharge  - Provide patient education as appropriate  Outcome: Progressing  Goal: Maintains/Returns to pre admission functional level  Description: INTERVENTIONS:  - Perform AM-PAC 6 Click Basic Mobility/ Daily Activity assessment daily.  - Set and communicate daily mobility goal to care team and patient/family/caregiver.   - Collaborate with rehabilitation services on mobility goals if consulted  - Out of bed for toileting  - Record patient progress and toleration of activity level   Outcome: Progressing     Problem: DISCHARGE  PLANNING  Goal: Discharge to home or other facility with appropriate resources  Description: INTERVENTIONS:  - Identify barriers to discharge w/patient and caregiver  - Arrange for needed discharge resources and transportation as appropriate  - Identify discharge learning needs (meds, wound care, etc.)  - Arrange for interpretive services to assist at discharge as needed  - Refer to Case Management Department for coordinating discharge planning if the patient needs post-hospital services based on physician/advanced practitioner order or complex needs related to functional status, cognitive ability, or social support system  Outcome: Progressing     Problem: Knowledge Deficit  Goal: Patient/family/caregiver demonstrates understanding of disease process, treatment plan, medications, and discharge instructions  Description: Complete learning assessment and assess knowledge base.  Interventions:  - Provide teaching at level of understanding  - Provide teaching via preferred learning methods  Outcome: Progressing     Problem: CARDIOVASCULAR - ADULT  Goal: Maintains optimal cardiac output and hemodynamic stability  Description: INTERVENTIONS:  - Monitor I/O, vital signs and rhythm  - Monitor for S/S and trends of decreased cardiac output  - Administer and titrate ordered vasoactive medications to optimize hemodynamic stability  - Assess quality of pulses, skin color and temperature  - Assess for signs of decreased coronary artery perfusion  - Instruct patient to report change in severity of symptoms  Outcome: Progressing  Goal: Absence of cardiac dysrhythmias or at baseline rhythm  Description: INTERVENTIONS:  - Continuous cardiac monitoring, vital signs, obtain 12 lead EKG if ordered  - Administer antiarrhythmic and heart rate control medications as ordered  - Monitor electrolytes and administer replacement therapy as ordered  Outcome: Progressing     Problem: METABOLIC, FLUID AND ELECTROLYTES - ADULT  Goal: Fluid  balance maintained  Description: INTERVENTIONS:  - Monitor labs   - Monitor I/O and WT  - Instruct patient on fluid and nutrition as appropriate  - Assess for signs & symptoms of volume excess or deficit  Outcome: Progressing

## 2024-02-08 NOTE — PROGRESS NOTES
Progress Note    Cristian Quijano 54 y.o. male MRN: [id number]  Unit/Bed#: 7T [facility] 709-01 Encounter: [id number]  Admitting Physician: Craig Roman MD  PCP: Robert Fraga MD  Date of Admission:  2/7/2024  5:06 AM    Assessment and Plan    * Acute exacerbation of CHF (congestive heart failure) (HCC)  Assessment & Plan  Wt Readings from Last 3 Encounters:   02/08/24 107 kg (235 lb 3.7 oz)   12/13/23 96.4 kg (212 lb 8.4 oz)   11/16/23 98.4 kg (217 lb)     Dry weight 96 kg.  Current weight 107 kg.  Prehospital medication: torsemide 20 mg twice daily, metoprolol 50 mg a.m., 75 mg p.m., spironolactone 25 mg daily, and isosorbide dinitrate 5 mg 3 times daily, Jardiance 10 mg daily, hydralazine 10 mg TID, per cardiology recommendations  Echo August 2023 showed EF of 40%   on admission  CXR showed mild to moderate pulmonary congestion  Differential including ischemic cardiomyopathy less likely given troponin 24 and EKG showing only sinus tachycardia.    This is likely secondary to medication nonadherence.    Lasix 60 mg twice daily  Initiate Jardiance 10 mg and Entresto 24/26 two times daily. Transition from metoprolol to carvedilol 12.5 mg twice daily. Increase isosorbide from 5 mg to 10 mg TID. Increase hydralazine from 10 mg to 25 mg TID  Discontinued telemetry  Pending echo   Cardiac diet with 2 g sodium restriction  1.5 L fluid restriction, daily weights, I/O  Daily CMP    Hypertension  Assessment & Plan  Blood Pressure: 151/93 diuretic medication as above.  Non-adherence to losartan 50 mg bid. Hx of HEIKE.     As above, initiate Jardiance 10 mg and Entresto 24/26 BID. Transition from metoprolol to carvedilol 12.5 mg BID. Increase isosorbide from 5 mg to 10 mg TID. Increase hydralazine from 10 mg to 25 mg TID, per cardiology recommendations  See A/P acute exacerbation of CHF        Elevated bilirubin  Assessment & Plan  Likely due to acute exacerbation of CHF    Monitor with daily  CMP    Cocaine abuse (HCC)  Assessment & Plan  History of cocaine abuse.  Negative this admission on review of systems.  UDS and etoh levels negative    Hyperlipidemia  Assessment & Plan  Prehospital medication includes atorvastatin 40 mg daily.  Continue home medication regiment.         VTE Pharmacologic Prophylaxis: VTE Score: 3 Moderate Risk (Score 3-4) - Pharmacological DVT Prophylaxis Ordered: enoxaparin (Lovenox).    Patient Centered Rounds: I have performed bedside rounds with nursing staff today.    Discussions with Specialists or Other Care Team Provider: Nil    Education and Discussions with Family / Patient: Patient    Time Spent for Care: 20 minutes.  More than 50% of total time spent on counseling and coordination of care as described above.    Current Length of Stay: 1 day(s)    Current Patient Status: Inpatient   Certification Statement: The patient will continue to require additional inpatient hospital stay due to management of CHF exacerbation.    Discharge Plan: once medically optimized.    Code Status: Level 1 - Full Code      Subjective:   Patient evaluated at bedside. Laying comfortably in bed. Endorses new right leg pain. Tenderness to palpation of right calf and shin. 1+ pitting edema of right leg. Reports using the foot pumps last night. No difference in circumference of calf or warmth when compared to left leg. Denies SOB, chest pain, abdominal pain, palpitations, n/v/d/c. Patient amendable for ultrasound of lower limb.     Objective:     Vitals:   Temp (24hrs), Av.1 °F (36.7 °C), Min:97.1 °F (36.2 °C), Max:99 °F (37.2 °C)    Temp:  [97.1 °F (36.2 °C)-99 °F (37.2 °C)] 98 °F (36.7 °C)  HR:  [] 93  Resp:  [18-20] 20  BP: (118-162)/() 145/76  SpO2:  [92 %-98 %] 92 %  Body mass index is 31.9 kg/m².     Input and Output Summary (last 24 hours):       Intake/Output Summary (Last 24 hours) at 2024 0900  Last data filed at 2024 0534  Gross per 24 hour   Intake 2180 ml    Output 3750 ml   Net -1570 ml       Physical Exam:     Physical Exam  Constitutional:       General: He is not in acute distress.     Appearance: Normal appearance. He is obese. He is not ill-appearing or toxic-appearing.   HENT:      Head: Normocephalic and atraumatic.      Right Ear: External ear normal.      Left Ear: External ear normal.      Nose: Nose normal. No congestion or rhinorrhea.      Mouth/Throat:      Mouth: Mucous membranes are moist.      Pharynx: Oropharynx is clear.   Eyes:      General: No scleral icterus.        Right eye: No discharge.         Left eye: No discharge.      Extraocular Movements: Extraocular movements intact.   Cardiovascular:      Rate and Rhythm: Normal rate and regular rhythm.      Pulses: Normal pulses.      Heart sounds: Normal heart sounds.   Pulmonary:      Effort: Pulmonary effort is normal. No respiratory distress.      Breath sounds: Normal breath sounds.   Chest:      Chest wall: No tenderness.   Abdominal:      General: Bowel sounds are normal. There is distension.      Palpations: Abdomen is soft.      Tenderness: There is no abdominal tenderness.   Musculoskeletal:      Cervical back: Normal range of motion.      Comments: Tenderness to light palpation of right lower calf and shin. No significant erythema or warmth. Trace edema of bilateral lower limbs. No difference in circumference of legs.    Skin:     Capillary Refill: Capillary refill takes less than 2 seconds.   Neurological:      General: No focal deficit present.      Mental Status: He is alert and oriented to person, place, and time.         Additional Data:     Labs:    Results from last 7 days   Lab Units 02/07/24  0514   WBC Thousand/uL 7.11   HEMOGLOBIN g/dL 11.6*   HEMATOCRIT % 37.9   PLATELETS Thousands/uL 260   NEUTROS PCT % 79*   LYMPHS PCT % 14   MONOS PCT % 5   EOS PCT % 2     Results from last 7 days   Lab Units 02/08/24  0503   POTASSIUM mmol/L 3.9   CHLORIDE mmol/L 103   CO2 mmol/L 29   BUN  mg/dL 16   CREATININE mg/dL 1.27   CALCIUM mg/dL 9.0   ALK PHOS U/L 211*   ALT U/L 18   AST U/L 17                     * I Have Reviewed All Lab Data Listed Above.  * Additional Pertinent Lab Tests Reviewed: All Labs For Current Hospital Admission Reviewed    Imaging:    Imaging Reports Reviewed Today Include: Nil  Imaging Personally Reviewed by Myself Includes:  Nil    Recent Cultures (last 7 days):           Last 24 Hours Medication List:   Current Facility-Administered Medications   Medication Dose Route Frequency Provider Last Rate    atorvastatin  40 mg Oral Daily With Dinner Wellington Dyson DO      carvedilol  12.5 mg Oral BID With Meals Danielle Law MD      Empagliflozin  10 mg Oral Daily Houston Moreno MD      enoxaparin  40 mg Subcutaneous Daily Wellington Dyson DO      furosemide  60 mg Intravenous BID (diuretic) Wellington Dyson DO      hydrALAZINE  25 mg Oral TID Danielle Law MD      isosorbide dinitrate  10 mg Oral TID after meals Danielle Law MD      pantoprazole  40 mg Oral Early Morning Wellington Dyson DO      sacubitril-valsartan  1 tablet Oral BID Houston Moreno MD          ** Please Note: Dictation voice to text software may have been used in the creation of this document. **    Houston Moreno MD  02/08/24  9:00 AM

## 2024-02-08 NOTE — QUICK NOTE
Patient seen and assessed at bedside during night rounds.  Currently on room air with adequate saturation with most recent being 95%.  Bilateral faint crackles on auscultation.  Continues to endorse MELENDREZ although significantly improved since admission per patient.  Vital signs reviewed and is currently stable with no concerns.  Will continue to monitor closely overnight.    Patient aware of current medical management.  All questions answered during rounds.

## 2024-02-08 NOTE — UTILIZATION REVIEW
Initial Clinical Review    Admission: Date/Time/Statement:   Admission Orders (From admission, onward)       Ordered        02/07/24 0555  INPATIENT ADMISSION  Once                          Orders Placed This Encounter   Procedures    INPATIENT ADMISSION     Standing Status:   Standing     Number of Occurrences:   1     Order Specific Question:   Level of Care     Answer:   Med Surg [16]     Order Specific Question:   Estimated length of stay     Answer:   More than 2 Midnights     Order Specific Question:   Certification     Answer:   I certify that inpatient services are medically necessary for this patient for a duration of greater than two midnights. See H&P and MD Progress Notes for additional information about the patient's course of treatment.     ED Arrival Information       Expected   -    Arrival   2/7/2024 04:52    Acuity   Urgent              Means of arrival   Walk-In    Escorted by   Self    Service   Family Medicine    Admission type   Emergency              Arrival complaint   difficulty breathing/cough/chest pain             Chief Complaint   Patient presents with    Medical Problem     Reports running out of diuretics a week ago, swelling bilat legs and SOB        Initial Presentation: 54 y.o. male to ED presents for shortness of breath for last few days, now at rest. Pt states that he has had lower extremity swelling associated with his symptoms.  Pt notes that he supposed to be on diuretic medication however he has not had it for the last week due to running out of his medication and inability to get a ride to and from the pharmacy. In ED, notable for tachycardia 109, 109, respiration 26. notable for , alk phos 238.   EKG was notable for sinus tachycardia, troponin was 24.  His weight was 113 kg which is up from 97 during his last admission. Given 1 dose of 80 mg IV Lasix. PMH for hypertension, hyperlipidemia, prior cocaine and alcohol use, CHF last echo done showed EF of 40% on furosemide  20 mg twice daily.  Admit Inpatient level of care for Acute exacerbation of CHF. Iv Lasix 60 mg bid. Hold Jardiance. 1.5 L Fluid restriction. Daily CMP's. Continue goal-directed therapy with metoprolol, spironolactone and isosorbide dinitrate. Cardiology consult.   On exam; +2 edema to BLLE. Abdominal distention.    2/7  Cardiology cons; Acute decompensated heart failure, heart failure with reduced ejection fraction and diastolic heart failure. Nonischemic cardiomyopathy.  Continue current diuretic regimen of 60 mg IV twice daily. Changing metoprolol succinate to carvedilol 12.5 mg twice daily. Increasing isosorbide to 10 mg 3 times daily and hydralazine to 25 mg 3 times daily.  Can add losartan 25 mg once daily from tomorrow. diuretic regimen of 60 mg IV twice daily. Continue spironolactone at 25 mg daily.    Continue tele monitoring. Echo. On exam; +2 edema to BLLE.          Wt Readings from Last 3 Encounters:   02/07/24 113 kg (248 lb 1.6 oz)   12/13/23 96.4 kg (212 lb 8.4 oz)   11/16/23 98.4 kg (217 lb)       Date: 2/8   Day 2:   Progress notes; Continue Iv Lasix bid. Echo pending. Continue 1.5 fluid restriction. Daily CMP.  US of lower limb.   Pt c/o new right leg pain. Tenderness to palpation of right calf and shin. 1+ pitting edema of right leg. Reports using the foot pumps last night.     Per Cardiology; Continue Iv Lasix today. Volume status improving. Continue cardiomyopathy regimen including carvedilol, Entresto, hydralazine, isosorbide, blood pressure controlled       ED Triage Vitals   Temperature Pulse Respirations Blood Pressure SpO2   02/07/24 0516 02/07/24 0516 02/07/24 0516 02/07/24 0516 02/07/24 0516   97.7 °F (36.5 °C) (!) 109 (!) 26 151/93 96 %      Temp Source Heart Rate Source Patient Position - Orthostatic VS BP Location FiO2 (%)   02/07/24 0516 02/07/24 0516 02/07/24 0516 02/07/24 0516 --   Oral Monitor Sitting Left arm       Pain Score       02/07/24 0750       No Pain          Wt Readings  from Last 1 Encounters:   02/08/24 107 kg (235 lb)     Additional Vital Signs:   2/08/24 1047 -- 84 -- 145/76 -- -- -- --   02/08/24 0739 98 °F (36.7 °C) 93 20 145/76 87 92 % None (Room air) Lying   02/08/24 0300 98.4 °F (36.9 °C) -- 18 145/82 100 97 % None (Room air) Lying   02/07/24 2319 97.1 °F (36.2 °C) Abnormal  88 18 151/95 107 98 % None (Room air) Lying   02/07/24 1914 97.7 °F (36.5 °C) 85 18 118/76 83 95 % None (Room air) Lying   02/07/24 1500 98.3 °F (36.8 °C) 100 18 149/100 112 95 % None (Room air) Sitting   02/07/24 1101 99 °F (37.2 °C) 99 19 162/101 Abnormal  112 94 % None (Room air) Lying   02/07/24 0750 98.3 °F (36.8 °C) 114 Abnormal  20 163/101 Abnormal  115 95 % None (Room air) Sitting     Pertinent Labs/Diagnostic Test Results:   XR chest 1 view portable   ED Interpretation by Chanell Sainz PA-C (02/07 0557)   Mild to moderate pulmonary vascular congestion      Final Result by Nidia Mendoza MD (02/07 1143)   Pulmonary vascular congestion. Diffuse interstitial reticulonodular opacities, possibly edema but atypical pneumonia cannot be excluded.         Workstation performed: [id number]          VAS VENOUS DUPLEX - LOWER LIMB BILATERAL (2/8) - RIGHT LOWER LIMB:  No evidence of acute or chronic deep vein thrombosis.  No evidence of superficial thrombophlebitis noted.  Doppler evaluation shows a normal response to augmentation maneuvers.  Popliteal, posterior tibial and anterior tibial arterial Doppler waveforms are  triphasic.     LEFT LOWER LIMB:  No evidence of acute or chronic deep vein thrombosis.  No evidence of superficial thrombophlebitis noted.  Doppler evaluation shows a normal response to augmentation maneuvers.  Popliteal, posterior tibial and anterior tibial arterial Doppler waveforms are  triphasic.            Results from last 7 days   Lab Units 02/07/24  0514   WBC Thousand/uL 7.11   HEMOGLOBIN g/dL 11.6*   HEMATOCRIT % 37.9   PLATELETS Thousands/uL 260   NEUTROS ABS Thousands/µL  5.56         Results from last 7 days   Lab Units 02/08/24  0503 02/07/24  0514   SODIUM mmol/L 142 138   POTASSIUM mmol/L 3.9 4.6   CHLORIDE mmol/L 103 106   CO2 mmol/L 29 25   ANION GAP mmol/L 10 7   BUN mg/dL 16 15   CREATININE mg/dL 1.27 1.16   EGFR ml/min/1.73sq m 64 71   CALCIUM mg/dL 9.0 8.7   MAGNESIUM mg/dL 1.9  --    PHOSPHORUS mg/dL 3.8  --      Results from last 7 days   Lab Units 02/08/24  0503 02/07/24  0514   AST U/L 17 22   ALT U/L 18 19   ALK PHOS U/L 211* 238*   TOTAL PROTEIN g/dL 6.5 6.7   ALBUMIN g/dL 3.3* 3.5   TOTAL BILIRUBIN mg/dL 1.48* 1.09*         Results from last 7 days   Lab Units 02/08/24  0503 02/07/24  0514   GLUCOSE RANDOM mg/dL 78 161*       Results from last 7 days   Lab Units 02/07/24  0716 02/07/24  0514   HS TNI 0HR ng/L  --  24   HS TNI 2HR ng/L 27  --    HSTNI D2 ng/L 3  --        Results from last 7 days   Lab Units 02/07/24  0514   BNP pg/mL 697*           Results from last 7 days   Lab Units 02/07/24  0716   AMPH/METH  Negative   BARBITURATE UR  Negative   BENZODIAZEPINE UR  Negative   COCAINE UR  Negative   METHADONE URINE  Negative   OPIATE UR  Negative   PCP UR  Negative   THC UR  Negative     Results from last 7 days   Lab Units 02/07/24  0716   ETHANOL LVL mg/dL <10         ED Treatment:   Medication Administration from 02/07/2024 0451 to 02/07/2024 0735         Date/Time Order Dose Route Action     02/07/2024 0551 EST furosemide (LASIX) injection 80 mg 80 mg Intravenous Given     02/07/2024 0715 EST spironolactone (ALDACTONE) tablet 25 mg 25 mg Oral Given          Past Medical History:   Diagnosis Date    Chronic HFrEF (heart failure with reduced ejection fraction) ([facility]) 04/01/2022     Present on Admission:   Acute exacerbation of CHF (congestive heart failure) ([facility])   Cocaine abuse ([facility])   Hypertension   Hyperlipidemia   Elevated bilirubin      Admitting Diagnosis: CHF (congestive heart failure) ([facility]) [I50.9]  Chest pain [R07.9]  Dyspnea [R06.00]  Age/Sex: 54 y.o.  male    Admission Orders:  Scheduled Medications:  atorvastatin, 40 mg, Oral, Daily With Dinner  carvedilol, 12.5 mg, Oral, BID With Meals  Empagliflozin, 10 mg, Oral, Daily  enoxaparin, 40 mg, Subcutaneous, Daily  furosemide, 60 mg, Intravenous, BID (diuretic)  hydrALAZINE, 25 mg, Oral, TID  isosorbide dinitrate, 10 mg, Oral, TID after meals  pantoprazole, 40 mg, Oral, Early Morning  sacubitril-valsartan, 1 tablet, Oral, BID      Continuous IV Infusions: None     PRN Meds: None       IP CONSULT TO CARDIOLOGY    Network Utilization Review Department  ATTENTION: Please call with any questions or concerns to [phone] and carefully listen to the prompts so that you are directed to the right person. All voicemails are confidential.   For Discharge needs, contact Care Management DC Support Team at [phone] opt. 2  Send all requests for admission clinical reviews, approved or denied determinations and any other requests to dedicated fax number below belonging to the [facility] where the patient is receiving treatment. List of dedicated fax numbers for the Facilities:  FACILITY NAME UR FAX NUMBER   ADMISSION DENIALS (Administrative/Medical Necessity) [phone]   DISCHARGE SUPPORT TEAM (NETWORK) [phone]   PARENT CHILD HEALTH (Maternity/NICU/Pediatrics) [phone]   [facility] [phone]   [facility] [phone]   [facility] [phone]   [facility] [phone]   [facility] [phone]   [facility] [phone]   [facility] [phone]   [facility] [phone]   [facility] [phone]   [facility] [phone]   [facility] [phone]   [facility] [phone]

## 2024-02-08 NOTE — CASE MANAGEMENT
Case Management Discharge Planning Note    Patient name Cristian Quijano  Location 7T [facility] 709/7T [facility] 709-01 MRN [id number]  : 1970 Date 2024       Current Admission Date: 2024  Current Admission Diagnosis:Acute exacerbation of CHF (congestive heart failure) (HCC)   Patient Active Problem List    Diagnosis Date Noted    Elevated bilirubin 2024    Acute exacerbation of CHF (congestive heart failure) ([facility]) 2023    Chest pain 2023    Hyperlipidemia 2023    Cocaine abuse (HCC) 2023    Nonischemic cardiomyopathy ([facility]) 2023    Does not have health insurance 2023    Encounter to establish care 2023    Chest pain, unspecified 2023    HEIKE (acute kidney injury) ([facility]) 2023    Abnormal CT scan 2023    Chronic combined systolic and diastolic congestive heart failure ([facility]) 2022    Pleural effusion, left 2022    Hypertension 2022    Left leg pain 2022    Elevated serum creatinine 2022      LOS (days): 1  Geometric Mean LOS (GMLOS) (days):   Days to GMLOS:     OBJECTIVE:  Risk of Unplanned Readmission Score: 17.85         Current admission status: Inpatient   Preferred Pharmacy:   [id number]  [place] - [address]  [address] [facility] [address]  Phone: [phone] Fax: [phone]    [facility]/pharmacy #2020-Closed  [place] - [address] [address]  Phone: [phone] Fax: [phone]    Primary Care Provider: Robert Fraga MD    Primary Insurance: [employer]  Secondary Insurance:     DISCHARGE DETAILS:                                                                                                 Additional Comments: CM met with the pt.  [facility] Interpretor used -interpretor #[id number] assisted.     He exporessed concern about getting applied for disability.  OP CM referral was done.  Also stated that he can not afford his  medications for his heart and diuretics.  He stated that he had run out of the meds about 1 week before being admitted and could not afford to get them.  Resources provided via . [place]'s find help.  This CM did review CHF teaching.  He stated he has a scale at home.  we reviewed the importance of daily weights, monitoring for increased SOB and swelling.  Weight gain of 1-2 lbs with sx overnight or 3-5 lbs with sx over a 3-5 day period should prompt a call to his PCP.  Chest pain should prompt a 911 call.  He verbalized understanding.

## 2024-02-08 NOTE — PROGRESS NOTES
Cardiology         Progress Note - Cardiology   Cristian Quijano 54 y.o. male MRN: [id number]  Unit/Bed#: 7T [facility] 709-01 Encounter: [id number]          Assessment/Recommendations/Discussion:   Acute on chronic systolic CHF  Nonischemic cardiomyopathy, now with improved left ejection fraction at 50%  Dyslipidemia  History of substance abuse    Overall volume status seems to have improved although residual lower extremity edema remains.  Okay to continue IV Lasix today, but suspect can transition to p.o. furosemide in 24 to 48 hours.  IVC normal size on today's echocardiogram.  Left ventricular systolic function improved.  Continue cardiomyopathy regimen including carvedilol, Entresto, hydralazine, isosorbide, blood pressure controlled            Subjective: Patient seen and examined, feels well, offers no complaints other than residual lower extremity edema                Physical Exam:  GEN:  NAD  HEENT:  MMM, NCAT, pink conjunctiva, EOMI, nonicteric sclera  CV:  NO JVD/HJR, RR, NO M/R/G, +S1/S2, NO PARASTERNAL HEAVE/THRILL, + LE EDEMA, NO HEPATIC SYSTOLIC PULSATION, WARM EXTREMITIES  RESP:  CTAB/L  ABD:  SOFT, NT, NO GROSS ORGANOMEGALY        Vitals:   /74 (BP Location: Left arm)   Pulse 85   Temp (!) 97.2 °F (36.2 °C) (Temporal)   Resp 18   Ht 6' (1.829 m)   Wt 107 kg (235 lb)   SpO2 96%   BMI 31.87 kg/m²   Vitals:    02/08/24 0534 02/08/24 1047   Weight: 107 kg (235 lb 3.7 oz) 107 kg (235 lb)       Intake/Output Summary (Last 24 hours) at 2/8/2024 1523  Last data filed at 2/8/2024 1300  Gross per 24 hour   Intake 1080 ml   Output 3450 ml   Net -2370 ml       TELEMETRY: Sinus rhythm  Lab Results:  Results from last 7 days   Lab Units 02/07/24  0514   WBC Thousand/uL 7.11   HEMOGLOBIN g/dL 11.6*   HEMATOCRIT % 37.9   PLATELETS Thousands/uL 260     Results from last 7 days   Lab Units 02/08/24  0503   POTASSIUM mmol/L 3.9   CHLORIDE mmol/L 103   CO2 mmol/L 29   BUN mg/dL 16   CREATININE mg/dL  1.27   CALCIUM mg/dL 9.0   ALK PHOS U/L 211*   ALT U/L 18   AST U/L 17     Results from last 7 days   Lab Units 02/08/24  0503   POTASSIUM mmol/L 3.9   CHLORIDE mmol/L 103   CO2 mmol/L 29   BUN mg/dL 16   CREATININE mg/dL 1.27   CALCIUM mg/dL 9.0           Medications:    Current Facility-Administered Medications:     atorvastatin (LIPITOR) tablet 40 mg, 40 mg, Oral, Daily With Dinner, Wellington Dyson DO, 40 mg at 02/07/24 1550    carvedilol (COREG) tablet 12.5 mg, 12.5 mg, Oral, BID With Meals, Danielle Law MD, 12.5 mg at 02/08/24 0804    Empagliflozin (JARDIANCE) tablet 10 mg, 10 mg, Oral, Daily, Houston Moreno MD, 10 mg at 02/08/24 0934    enoxaparin (LOVENOX) subcutaneous injection 40 mg, 40 mg, Subcutaneous, Daily, Wellington Dyson DO, 40 mg at 02/08/24 0803    furosemide (LASIX) injection 60 mg, 60 mg, Intravenous, BID (diuretic), Wellington Dyson DO, 60 mg at 02/08/24 0803    hydrALAZINE (APRESOLINE) tablet 25 mg, 25 mg, Oral, TID, Danielle Law MD, 25 mg at 02/08/24 0804    isosorbide dinitrate (ISORDIL) tablet 10 mg, 10 mg, Oral, TID after meals, Danielle Law MD, 10 mg at 02/08/24 0803    pantoprazole (PROTONIX) EC tablet 40 mg, 40 mg, Oral, Early Morning, Wellington Dyson DO, 40 mg at 02/08/24 0518    sacubitril-valsartan (ENTRESTO) 24-26 MG per tablet 1 tablet, 1 tablet, Oral, BID, Houston Moreno MD, 1 tablet at 02/08/24 0803    This note was completed in part utilizing [employer] Direct Software.  Grammatical errors, random word insertions, spelling mistakes, and incomplete sentences may be an occasional consequence of this system secondary to software limitations, ambient noise, and hardware issues.  If you have any questions or concerns about the content, text, or information contained within the body of this dictation, please contact the provider for clarification.

## 2024-02-08 NOTE — PLAN OF CARE
Problem: PAIN - ADULT  Goal: Verbalizes/displays adequate comfort level or baseline comfort level  Description: Interventions:  - Encourage patient to monitor pain and request assistance  - Assess pain using appropriate pain scale  - Administer analgesics based on type and severity of pain and evaluate response  - Implement non-pharmacological measures as appropriate and evaluate response  - Consider cultural and social influences on pain and pain management  - Notify physician/advanced practitioner if interventions unsuccessful or patient reports new pain  Outcome: Progressing     Problem: INFECTION - ADULT  Goal: Absence or prevention of progression during hospitalization  Description: INTERVENTIONS:  - Assess and monitor for signs and symptoms of infection  - Monitor lab/diagnostic results  - Monitor all insertion sites, i.e. indwelling lines, tubes, and drains  - Monitor endotracheal if appropriate and nasal secretions for changes in amount and color  - [place] appropriate cooling/warming therapies per order  - Administer medications as ordered  - Instruct and encourage patient and family to use good hand hygiene technique  - Identify and instruct in appropriate isolation precautions for identified infection/condition  Outcome: Progressing     Problem: SAFETY ADULT  Goal: Patient will remain free of falls  Description: INTERVENTIONS:  - Educate patient/family on patient safety including physical limitations  - Instruct patient to call for assistance with activity   - Consult OT/PT to assist with strengthening/mobility   - Keep Call bell within reach  - Keep bed low and locked with side rails adjusted as appropriate  - Keep care items and personal belongings within reach  - Initiate and maintain comfort rounds  - Make Fall Risk Sign visible to staff  - Offer Toileting every 2 Hours, in advance of need  - Apply yellow socks and bracelet for high fall risk patients  - Consider moving patient to room near nurses  station  Outcome: Progressing  Goal: Maintain or return to baseline ADL function  Description: INTERVENTIONS:  -  Assess patient's ability to carry out ADLs; assess patient's baseline for ADL function and identify physical deficits which impact ability to perform ADLs (bathing, care of mouth/teeth, toileting, grooming, dressing, etc.)  - Assess/evaluate cause of self-care deficits   - Assess range of motion  - Assess patient's mobility; develop plan if impaired  - Assess patient's need for assistive devices and provide as appropriate  - Encourage maximum independence but intervene and supervise when necessary  - Involve family in performance of ADLs  - Assess for home care needs following discharge   - Consider OT consult to assist with ADL evaluation and planning for discharge  - Provide patient education as appropriate  Outcome: Progressing  Goal: Maintains/Returns to pre admission functional level  Description: INTERVENTIONS:  - Perform AM-PAC 6 Click Basic Mobility/ Daily Activity assessment daily.  - Set and communicate daily mobility goal to care team and patient/family/caregiver.   - Collaborate with rehabilitation services on mobility goals if consulted  - Perform Range of Motion 2 times a day.  - Reposition patient every 2 hours.  - Dangle patient 2 times a day  - Stand patient 2 times a day  - Ambulate patient 2 times a day  - Out of bed to chair 2 times a day   - Out of bed for meals 2 times a day  - Out of bed for toileting  - Record patient progress and toleration of activity level   Outcome: Progressing     Problem: DISCHARGE PLANNING  Goal: Discharge to home or other facility with appropriate resources  Description: INTERVENTIONS:  - Identify barriers to discharge w/patient and caregiver  - Arrange for needed discharge resources and transportation as appropriate  - Identify discharge learning needs (meds, wound care, etc.)  - Arrange for interpretive services to assist at discharge as needed  - Refer  to Case Management Department for coordinating discharge planning if the patient needs post-hospital services based on physician/advanced practitioner order or complex needs related to functional status, cognitive ability, or social support system  Outcome: Progressing     Problem: Knowledge Deficit  Goal: Patient/family/caregiver demonstrates understanding of disease process, treatment plan, medications, and discharge instructions  Description: Complete learning assessment and assess knowledge base.  Interventions:  - Provide teaching at level of understanding  - Provide teaching via preferred learning methods  Outcome: Progressing     Problem: CARDIOVASCULAR - ADULT  Goal: Maintains optimal cardiac output and hemodynamic stability  Description: INTERVENTIONS:  - Monitor I/O, vital signs and rhythm  - Monitor for S/S and trends of decreased cardiac output  - Administer and titrate ordered vasoactive medications to optimize hemodynamic stability  - Assess quality of pulses, skin color and temperature  - Assess for signs of decreased coronary artery perfusion  - Instruct patient to report change in severity of symptoms  Outcome: Progressing  Goal: Absence of cardiac dysrhythmias or at baseline rhythm  Description: INTERVENTIONS:  - Continuous cardiac monitoring, vital signs, obtain 12 lead EKG if ordered  - Administer antiarrhythmic and heart rate control medications as ordered  - Monitor electrolytes and administer replacement therapy as ordered  Outcome: Progressing     Problem: METABOLIC, FLUID AND ELECTROLYTES - ADULT2  Goal: Fluid balance maintained  Description: INTERVENTIONS:  - Monitor labs   - Monitor I/O and WT  - Instruct patient on fluid and nutrition as appropriate  - Assess for signs & symptoms of volume excess or deficit  Outcome: Progressing

## 2024-02-09 VITALS
HEART RATE: 81 BPM | RESPIRATION RATE: 18 BRPM | SYSTOLIC BLOOD PRESSURE: 97 MMHG | HEIGHT: 72 IN | TEMPERATURE: 97.3 F | DIASTOLIC BLOOD PRESSURE: 62 MMHG | WEIGHT: 235.01 LBS | OXYGEN SATURATION: 95 % | BODY MASS INDEX: 31.83 KG/M2

## 2024-02-09 PROBLEM — I50.20 SYSTOLIC CHF (HCC): Status: ACTIVE | Noted: 2023-12-04

## 2024-02-09 PROBLEM — M79.604 RIGHT LEG PAIN: Status: ACTIVE | Noted: 2024-02-09

## 2024-02-09 LAB
ALBUMIN SERPL BCP-MCNC: 3.3 G/DL (ref 3.5–5)
ALP SERPL-CCNC: 195 U/L (ref 34–104)
ALT SERPL W P-5'-P-CCNC: 14 U/L (ref 7–52)
ANION GAP SERPL CALCULATED.3IONS-SCNC: 9 MMOL/L
AST SERPL W P-5'-P-CCNC: 13 U/L (ref 13–39)
BASOPHILS # BLD AUTO: 0.04 THOUSANDS/ÂΜL (ref 0–0.1)
BASOPHILS NFR BLD AUTO: 1 % (ref 0–1)
BILIRUB SERPL-MCNC: 1.06 MG/DL (ref 0.2–1)
BUN SERPL-MCNC: 21 MG/DL (ref 5–25)
CALCIUM ALBUM COR SERPL-MCNC: 9.3 MG/DL (ref 8.3–10.1)
CALCIUM SERPL-MCNC: 8.7 MG/DL (ref 8.4–10.2)
CHLORIDE SERPL-SCNC: 101 MMOL/L (ref 96–108)
CO2 SERPL-SCNC: 30 MMOL/L (ref 21–32)
CREAT SERPL-MCNC: 1.41 MG/DL (ref 0.6–1.3)
EOSINOPHIL # BLD AUTO: 0.27 THOUSAND/ÂΜL (ref 0–0.61)
EOSINOPHIL NFR BLD AUTO: 6 % (ref 0–6)
ERYTHROCYTE [DISTWIDTH] IN BLOOD BY AUTOMATED COUNT: 17.2 % (ref 11.6–15.1)
GFR SERPL CREATININE-BSD FRML MDRD: 56 ML/MIN/1.73SQ M
GLUCOSE SERPL-MCNC: 86 MG/DL (ref 65–140)
HCT VFR BLD AUTO: 37.5 % (ref 36.5–49.3)
HGB BLD-MCNC: 11.6 G/DL (ref 12–17)
IMM GRANULOCYTES # BLD AUTO: 0.01 THOUSAND/UL (ref 0–0.2)
IMM GRANULOCYTES NFR BLD AUTO: 0 % (ref 0–2)
LYMPHOCYTES # BLD AUTO: 0.47 THOUSANDS/ÂΜL (ref 0.6–4.47)
LYMPHOCYTES NFR BLD AUTO: 10 % (ref 14–44)
MCH RBC QN AUTO: 25.3 PG (ref 26.8–34.3)
MCHC RBC AUTO-ENTMCNC: 30.9 G/DL (ref 31.4–37.4)
MCV RBC AUTO: 82 FL (ref 82–98)
MONOCYTES # BLD AUTO: 0.43 THOUSAND/ÂΜL (ref 0.17–1.22)
MONOCYTES NFR BLD AUTO: 9 % (ref 4–12)
NEUTROPHILS # BLD AUTO: 3.72 THOUSANDS/ÂΜL (ref 1.85–7.62)
NEUTS SEG NFR BLD AUTO: 74 % (ref 43–75)
NRBC BLD AUTO-RTO: 0 /100 WBCS
PLATELET # BLD AUTO: 259 THOUSANDS/UL (ref 149–390)
PMV BLD AUTO: 10.8 FL (ref 8.9–12.7)
POTASSIUM SERPL-SCNC: 3.4 MMOL/L (ref 3.5–5.3)
PROT SERPL-MCNC: 6.3 G/DL (ref 6.4–8.4)
RBC # BLD AUTO: 4.58 MILLION/UL (ref 3.88–5.62)
SODIUM SERPL-SCNC: 140 MMOL/L (ref 135–147)
WBC # BLD AUTO: 4.94 THOUSAND/UL (ref 4.31–10.16)

## 2024-02-09 PROCEDURE — 85025 COMPLETE CBC W/AUTO DIFF WBC: CPT

## 2024-02-09 PROCEDURE — 99238 HOSP IP/OBS DSCHRG MGMT 30/<: CPT | Performed by: FAMILY MEDICINE

## 2024-02-09 PROCEDURE — 99232 SBSQ HOSP IP/OBS MODERATE 35: CPT

## 2024-02-09 PROCEDURE — 80053 COMPREHEN METABOLIC PANEL: CPT

## 2024-02-09 RX ORDER — TORSEMIDE 20 MG/1
20 TABLET ORAL 2 TIMES DAILY
Status: DISCONTINUED | OUTPATIENT
Start: 2024-02-09 | End: 2024-02-09

## 2024-02-09 RX ORDER — ISOSORBIDE MONONITRATE 30 MG/1
30 TABLET, EXTENDED RELEASE ORAL DAILY
Qty: 30 TABLET | Refills: 3 | Status: SHIPPED | OUTPATIENT
Start: 2024-02-09

## 2024-02-09 RX ORDER — POTASSIUM CHLORIDE 20 MEQ/1
40 TABLET, EXTENDED RELEASE ORAL ONCE
Status: COMPLETED | OUTPATIENT
Start: 2024-02-09 | End: 2024-02-09

## 2024-02-09 RX ORDER — ISOSORBIDE DINITRATE 10 MG/1
10 TABLET ORAL
Qty: 90 TABLET | Refills: 3 | Status: SHIPPED | OUTPATIENT
Start: 2024-02-09 | End: 2024-02-09

## 2024-02-09 RX ORDER — ACETAMINOPHEN 325 MG/1
650 TABLET ORAL EVERY 6 HOURS PRN
Qty: 90 TABLET | Refills: 0 | Status: SHIPPED | OUTPATIENT
Start: 2024-02-09

## 2024-02-09 RX ORDER — FUROSEMIDE 20 MG/1
60 TABLET ORAL 2 TIMES DAILY
Qty: 60 TABLET | Refills: 3 | Status: SHIPPED | OUTPATIENT
Start: 2024-02-09

## 2024-02-09 RX ORDER — CARVEDILOL 12.5 MG/1
12.5 TABLET ORAL 2 TIMES DAILY WITH MEALS
Qty: 60 TABLET | Refills: 3 | Status: SHIPPED | OUTPATIENT
Start: 2024-02-09

## 2024-02-09 RX ORDER — ACETAMINOPHEN 325 MG/1
650 TABLET ORAL EVERY 6 HOURS PRN
Status: DISCONTINUED | OUTPATIENT
Start: 2024-02-09 | End: 2024-02-09 | Stop reason: HOSPADM

## 2024-02-09 RX ORDER — HYDRALAZINE HYDROCHLORIDE 25 MG/1
25 TABLET, FILM COATED ORAL 3 TIMES DAILY
Qty: 90 TABLET | Refills: 3 | Status: SHIPPED | OUTPATIENT
Start: 2024-02-09

## 2024-02-09 RX ADMIN — ATORVASTATIN CALCIUM 40 MG: 40 TABLET, FILM COATED ORAL at 16:54

## 2024-02-09 RX ADMIN — POTASSIUM CHLORIDE 40 MEQ: 1500 TABLET, EXTENDED RELEASE ORAL at 08:31

## 2024-02-09 RX ADMIN — ACETAMINOPHEN 650 MG: 325 TABLET ORAL at 08:31

## 2024-02-09 RX ADMIN — ISOSORBIDE DINITRATE 10 MG: 10 TABLET ORAL at 08:31

## 2024-02-09 RX ADMIN — FUROSEMIDE 60 MG: 40 TABLET ORAL at 08:31

## 2024-02-09 RX ADMIN — PANTOPRAZOLE SODIUM 40 MG: 40 TABLET, DELAYED RELEASE ORAL at 05:38

## 2024-02-09 RX ADMIN — CARVEDILOL 12.5 MG: 12.5 TABLET, FILM COATED ORAL at 08:31

## 2024-02-09 RX ADMIN — SACUBITRIL AND VALSARTAN 1 TABLET: 24; 26 TABLET, FILM COATED ORAL at 08:31

## 2024-02-09 RX ADMIN — HYDRALAZINE HYDROCHLORIDE 25 MG: 25 TABLET, FILM COATED ORAL at 08:31

## 2024-02-09 RX ADMIN — EMPAGLIFLOZIN 10 MG: 10 TABLET, FILM COATED ORAL at 08:32

## 2024-02-09 RX ADMIN — ISOSORBIDE DINITRATE 10 MG: 10 TABLET ORAL at 14:33

## 2024-02-09 RX ADMIN — ENOXAPARIN SODIUM 40 MG: 40 INJECTION SUBCUTANEOUS at 08:32

## 2024-02-09 NOTE — NURSING NOTE
1825 Pleasant; offers no complaints; tolerated dinner tray well; voices readiness for d/c; d/c instructions reviewed; verbalizes understanding; danis d/c'd intact; site free from trauma; dsd applied.   1835 D/c'd via ambulation with PCA & all belongings without incident.

## 2024-02-09 NOTE — QUICK NOTE
Patient seen and assessed at bedside during night rounds.  Overall doing well with no complaints and currently resting comfortably in chair.  Denies shortness of breath, chest pain, abdominal discomfort, headache or visual disturbance.  Clear breath sounds auscultated bilaterally.  Vital signs reviewed and stable with adequate saturations of 97% on room air.    Will continue to monitor vital signs closely overnight.

## 2024-02-09 NOTE — CASE MANAGEMENT
Case Management Progress Note    Patient name Cristian Quijano  Location 7T U 709/7T U 709-01 MRN [id number]  : 1970 Date 2024       LOS (days): 2  Geometric Mean LOS (GMLOS) (days):   Days to GMLOS:        OBJECTIVE:        Current admission status: Inpatient  Preferred Pharmacy[facility]   [id number] [place] [place] [facility] [address] [facility] [address] [facility]  [address] [id number]  Phone: [phone] Fax: [phone]    [facility]/pharmacy #2020-Closed [place], [place] - [address] [address]  Phone: [phone] Fax: [phone]     [facility] [place], [place] - [address] [place], [place] [address]  Phone: [phone] Fax: [phone]    Primary Care Provider: Robert Fraga MD    Primary Insurance: [employer]  Secondary Insurance:     PROGRESS NOTE:  .  Meds to beds picked up at [facility] pharmacy No co-pays.  Meds given to bedside nurse Dianna.

## 2024-02-09 NOTE — UTILIZATION REVIEW
NOTIFICATION OF INPATIENT MEDICAL ADMISSION   AUTHORIZATION REQUEST   SERVICING FACILITY:   [address] [address]  Tax ID: [id number]  NPI: [id number] ATTENDING PROVIDER:  Attending Name and NPI#: Kaye Lee Md [[id number]]  Address: [address] [place] [address]  Phone: [phone]     ADMISSION INFORMATION:  Place of Service: Inpatient [facility]  Place of Service Code: 21  Inpatient Admission Date/Time: 2/7/24  5:55 AM  Discharge Date/Time: No discharge date for patient encounter.  Admitting Diagnosis Code/Description:  CHF (congestive heart failure) (HCC) [I50.9]  Chest pain [R07.9]  Dyspnea [R06.00]     UTILIZATION REVIEW CONTACT:  Mali [employer] Utilization   Network Utilization Review Department  Phone: [phone]  Fax [phone]  Email: [email or web address]  Contact for approvals/pending authorizations, clinical reviews, and discharge.     PHYSICIAN ADVISORY SERVICES:  Medical Necessity Denial & Mqcw-we-Muej Review  Phone: [phone]  Fax: [phone]  Email: [email or web address]     DISCHARGE SUPPORT TEAM:  For Patients Discharge Needs & Updates  Phone: [phone] opt. 2 Fax: [phone]  Email: [email or web address]

## 2024-02-09 NOTE — ASSESSMENT & PLAN NOTE
Chronic concern, however worsening on admission.   Venous duplex of bilateral lower extremities unremarkable. No suspicion for DVT at this time  Right extremity slightly erythematous on exam, does not seem warm.   VS stable, WBC within normal limits, with a low suspicion for cellulitis.       As the pain seems to be most likely in the setting of chronic venous stasis, continue with Tylenol 650 mg q6h PRN   Discussed the importance of informing PCP if the extremity becomes swollen, tender and painful, or notices skin breakage   Consider compression stockings outpatient

## 2024-02-09 NOTE — DISCHARGE SUMMARY
Discharge Summary - [facility]    Patient Information: Cristian Quijano 54 y.o. male MRN: [id number]  Unit/Bed#: 7T [facility] 709-01 Encounter: [id number]    Discharging Physician / Practitioner: Kaye Lee MD   PCP: Robert Fraga MD  Admission Date:   Admission Orders (From admission, onward)       Ordered        02/07/24 0555  INPATIENT ADMISSION  Once                          Discharge Date: 2/9/2024    Reason for Admission: Acute on chronic CHF exacerbation     Discharge Diagnoses:     Principal Problem:    Systolic CHF (HCC)  Active Problems:    Hypertension    Hyperlipidemia    Cocaine abuse (HCC)    Elevated bilirubin    Right leg pain  Resolved Problems:    * No resolved hospital problems. *        * Systolic CHF (HCC)  Assessment & Plan  Wt Readings from Last 3 Encounters:   02/08/24 107 kg (235 lb 3.7 oz)   12/13/23 96.4 kg (212 lb 8.4 oz)   11/16/23 98.4 kg (217 lb)   Euvolemic.    on admission  CXR showed mild to moderate pulmonary congestion  This is likely secondary to medication nonadherence.    Regimen on discharge: Carvedilol 12.5 mg BID, Jardiance 10 mg daily, Lasix 60 mg BID, Hydralazine 25 mg TID, Imdur 30 mg daily, Spironolactone 25 mg daily and Entresto 24-26 mg BID.   Cardiac diet with 2 g sodium restriction  Follow up with Cardiology outpatient     Hypertension  Assessment & Plan  Blood Pressure: 113/74 diuretic medication as above.  Non-adherence to losartan 50 mg bid. Hx of HEIKE.     As above, initiate Jardiance 10 mg and Entresto 24/26 BID. Transition from metoprolol to carvedilol 12.5 mg BID. Increase isosorbide from 5 mg to 10 mg TID. Increase hydralazine from 10 mg to 25 mg TID, per cardiology recommendations and continue Spironolactone 25 mg daily   See A/P CHF      Right leg pain  Assessment & Plan  Chronic concern, however worsening on admission.   Venous duplex of bilateral lower extremities unremarkable. No suspicion for DVT at this  time  Right extremity slightly erythematous on exam, does not seem warm.   VS stable, WBC within normal limits, with a low suspicion for cellulitis.       As the pain seems to be most likely in the setting of chronic venous stasis, continue with Tylenol 650 mg q6h PRN   Discussed the importance of informing PCP if the extremity becomes swollen, tender and painful, or notices skin breakage   Consider compression stockings outpatient     Elevated bilirubin  Assessment & Plan  Improving, likely in the setting of CHF exacerbation    Cocaine abuse (HCC)  Assessment & Plan  History of cocaine abuse.  Negative this admission on review of systems.  UDS and etoh levels negative    Hyperlipidemia  Assessment & Plan  Prehospital medication includes atorvastatin 40 mg daily.  Continue home medication regiment.          Consultations During Hospital Stay:  Cardiology     Procedures Performed:   None     Significant Findings / Test Results:   Ethanol level on admission within normal limits   UDS negative   BNP - 697   Troponin within normal limits     Incidental Findings:   None     Test Results Pending at Discharge (will require follow up):   None      Outpatient Tests Requested:  Repeat CMP in 1 week     Outpatient follow-up Requested:  Follow up with PCP  Follow up with Cardiology     Complications:  None     Hospital Course:     Cristian Aguilar Tseringkeysha is a 54 y.o. male with a PMH of hypertension, hyperlipidemia, prior cocaine and alcohol use, CHF last echo done showed EF of 40% on torsemide 20 mg twice daily who presents with shortness of breath  who originally presented to the hospital on 2/7/2024 due to SOB a couple of days prior to presentation. He noted that his lower extremities have become more swollen as well. He had not been taking his medication for 1-2 weeks prior to admission due to transportation issues and obtaining the medication from the pharmacy.    In the ED, his vitals were notable for tachycardia 109,  respiration 26.  His labs were notable for , alk phos 238.   EKG was notable for sinus tachycardia, troponin was 24.  His weight was 113 kg which is up from 97 during his last admission. Patient was given 1 dose of 80 mg IV Lasix.     Patient was admitted to the service due to acute on chronic CHF exacerbation. He was treated with IV diuresis and followed by Cardiology with recommendations to home regimen adjustment - Carvedilol 12.5 mg BID, Jardiance 10 mg daily, Lasix 60 mg BID, Hydralazine 25 mg TID, Imdur 30 mg daily, Spironolactone 25 mg daily and addition of Entresto 24-26 mg BID.   Patients repeat echo did not show significant difference from previous. On the day of discharge, the patiens VS remained stable, patient SOB had improved since admission and patient was medically optimized for discharge home with recommendation to follow up with PCP and Cardiology outpatient.      Review of Systems   Constitutional:  Negative for fatigue and fever.   HENT:  Negative for congestion, rhinorrhea, sinus pressure, sinus pain, sore throat and tinnitus.    Eyes:  Negative for visual disturbance.   Respiratory:  Negative for cough, chest tightness and shortness of breath.    Cardiovascular:  Negative for chest pain and leg swelling.   Gastrointestinal:  Negative for abdominal pain, constipation, diarrhea, nausea and vomiting.   Genitourinary:  Negative for difficulty urinating, frequency and urgency.   Musculoskeletal:  Negative for arthralgias.   Skin:  Negative for rash.   Neurological:  Negative for seizures, syncope, light-headedness and headaches.   All other systems reviewed and are negative.      Condition at Discharge: stable     Discharge Day Visit / Exam:     Vitals: Blood Pressure: 108/66 (02/09/24 1359)  Pulse: 79 (02/09/24 1359)  Temperature: (!) 97.3 °F (36.3 °C) (02/09/24 0741)  Temp Source: Temporal (02/09/24 0741)  Respirations: 18 (02/09/24 1359)  Height: 6' (182.9 cm) (02/08/24 1047)  Weight -  Scale: 107 kg (235 lb 0.2 oz) (02/09/24 1457)  SpO2: 92 % (02/09/24 1359)  Exam:   Physical Exam  Constitutional:       General: He is not in acute distress.     Appearance: He is normal weight. He is not ill-appearing or toxic-appearing.   HENT:      Head: Normocephalic and atraumatic.      Right Ear: External ear normal. There is no impacted cerumen.      Left Ear: External ear normal. There is no impacted cerumen.      Nose: Nose normal. No congestion or rhinorrhea.      Mouth/Throat:      Mouth: Mucous membranes are moist.      Pharynx: Oropharynx is clear. No posterior oropharyngeal erythema.   Eyes:      General: No scleral icterus.     Extraocular Movements: Extraocular movements intact.   Neck:      Vascular: No carotid bruit.   Cardiovascular:      Rate and Rhythm: Normal rate and regular rhythm.      Pulses: Normal pulses.      Heart sounds: Normal heart sounds. No murmur heard.     No friction rub.   Pulmonary:      Effort: Pulmonary effort is normal. No respiratory distress.      Breath sounds: Normal breath sounds. No wheezing.   Abdominal:      General: Abdomen is flat. Bowel sounds are normal. There is no distension.      Palpations: Abdomen is soft.      Tenderness: There is no abdominal tenderness.   Musculoskeletal:         General: Normal range of motion.      Cervical back: No rigidity.      Right lower leg: No edema.      Left lower leg: No edema.      Comments: Chronic venous stasis changes on lower extremities bilaterally.   Lymphadenopathy:      Cervical: No cervical adenopathy.   Skin:     General: Skin is warm.      Coloration: Skin is not jaundiced.      Findings: No erythema or rash.   Neurological:      General: No focal deficit present.      Mental Status: He is alert.   Psychiatric:         Mood and Affect: Mood normal.         Discussion with Family: No     Discharge instructions/Information to patient and family:   See after visit summary for information provided to patient and  family.      Discharge Medications:  STOP taking these medications  STOP taking these medications   ibuprofen 600 mg tablet  Commonly known as: MOTRIN    isosorbide dinitrate 5 mg tablet  Commonly known as: ISORDIL    metoprolol succinate 25 mg 24 hr tablet  Commonly known as: TOPROL-XL    metoprolol succinate 50 mg 24 hr tablet  Commonly known as: TOPROL-XL    torsemide 20 mg tablet  Commonly known as: DEMADEX     START taking these medications  START taking these medications   acetaminophen 325 mg tablet  Commonly known as: TYLENOL  Take 2 tablets (650 mg total) by mouth every 6 (six) hours as needed for mild pain  Refills: 0  Last time this was given: 650 mg on February 9, 2024  8:31 AM  Signed by: Rajiv Parra MD    carvedilol 12.5 mg tablet  Commonly known as: COREG  Take 1 tablet (12.5 mg total) by mouth 2 (two) times a day with meals  Refills: 3  Last time this was given: 12.5 mg on February 9, 2024  8:31 AM  Signed by: Rajiv Parra MD    Empagliflozin 10 MG Tabs tablet  Commonly known as: JARDIANCE  Start taking on: February 10, 2024  Take 1 tablet (10 mg total) by mouth daily  Refills: 3  Last time this was given: 10 mg on February 9, 2024  8:32 AM  Signed by: Rajiv Parra MD    furosemide 20 mg tablet  Commonly known as: LASIX  Take 3 tablets (60 mg total) by mouth 2 (two) times a day  Refills: 3  Last time this was given: February 9, 2024  8:31 AM  Signed by: Rajiv Parra MD    isosorbide mononitrate 30 mg 24 hr tablet  Commonly known as: IMDUR  Take 1 tablet (30 mg total) by mouth daily  Refills: 3  Signed by: Rajiv Parra MD    sacubitril-valsartan 24-26 MG Tabs  Commonly known as: ENTRESTO  Take 1 tablet by mouth 2 (two) times a day  Refills: 3  Last time this was given: 1 tablet on February 9, 2024  8:31 AM  Signed by: Rajiv Parra MD    very important  Ask your doctor or other care provider to help you with any instructions you don't understand.     CHANGE how you  take these medications  CHANGE how you take these medications   hydrALAZINE 25 mg tablet  Commonly known as: APRESOLINE  Take 1 tablet (25 mg total) by mouth 3 (three) times a day  Refills: 3  Last time this was given: 25 mg on February 9, 2024  8:31 AM  Signed by: Rajiv Parra MD  What changed:  medication strength  how much to take  when to take this     CONTINUE taking these medications  CONTINUE taking these medications   atorvastatin 40 mg tablet  Commonly known as: LIPITOR  Take 1 tablet (40 mg total) by mouth daily with dinner  Refills: 0  Last time this was given: 40 mg on February 8, 2024  3:36 PM  Signed by: Aster Sarmiento MD    pantoprazole 40 mg tablet  Commonly known as: PROTONIX  Take 1 tablet (40 mg total) by mouth daily in the early morning  Refills: 0  Last time this was given: 40 mg on February 9, 2024  5:38 AM  Signed by: Aster Sarmiento MD    spironolactone 25 mg tablet  Commonly known as: ALDACTONE  Take 1 tablet (25 mg total) by mouth daily  Refills: 2  Last time this was given: 25 mg on February 7, 2024  7:15 AM  Signed by: Robert Fraga MD    sucralfate 1 g tablet  Commonly known as: CARAFATE  Take 1 tablet (1 g total) by mouth 4 (four) times a day (before meals and at bedtime)  Refills: 0  Signed by: Aster Sarmiento MD       Provisions for Follow-Up Care:  See after visit summary for information related to follow-up care and any pertinent home health orders.      Disposition:     Home    For Discharges to [facility]:   Not Applicable to this Patient - Not Applicable to this Patient    Planned Readmission: No      Discharge Statement:  I spent 45 minutes discharging the patient. This time was spent on the day of discharge. I had direct contact with the patient on the day of discharge. Greater than 50% of the total time was spent examining patient, answering all patient questions, arranging and discussing plan of care with patient as well as directly providing post-discharge  instructions.  Additional time then spent on discharge activities.    ** Please Note: This note has been constructed using a voice recognition system **    Houston Moreno MD  02/09/24  3:02 PM

## 2024-02-09 NOTE — PLAN OF CARE
Problem: INFECTION - ADULT  Goal: Absence or prevention of progression during hospitalization  Description: INTERVENTIONS:  - Assess and monitor for signs and symptoms of infection  - Monitor lab/diagnostic results  - Monitor all insertion sites, i.e. indwelling lines, tubes, and drains  - Monitor endotracheal if appropriate and nasal secretions for changes in amount and color  - [place] appropriate cooling/warming therapies per order  - Administer medications as ordered  - Instruct and encourage patient and family to use good hand hygiene technique  - Identify and instruct in appropriate isolation precautions for identified infection/condition  Outcome: Progressing     Problem: SAFETY ADULT  Goal: Maintain or return to baseline ADL function  Description: INTERVENTIONS:  -  Assess patient's ability to carry out ADLs; assess patient's baseline for ADL function and identify physical deficits which impact ability to perform ADLs (bathing, care of mouth/teeth, toileting, grooming, dressing, etc.)  - Assess/evaluate cause of self-care deficits   - Assess range of motion  - Assess patient's mobility; develop plan if impaired  - Assess patient's need for assistive devices and provide as appropriate  - Encourage maximum independence but intervene and supervise when necessary  - Involve family in performance of ADLs  - Assess for home care needs following discharge   - Consider OT consult to assist with ADL evaluation and planning for discharge  - Provide patient education as appropriate  Outcome: Progressing     Problem: METABOLIC, FLUID AND ELECTROLYTES - ADULT  Goal: Fluid balance maintained  Description: INTERVENTIONS:  - Monitor labs   - Monitor I/O and WT  - Instruct patient on fluid and nutrition as appropriate  - Assess for signs & symptoms of volume excess or deficit  Outcome: Progressing     Problem: CARDIOVASCULAR - ADULT  Goal: Maintains optimal cardiac output and hemodynamic stability  Description:  INTERVENTIONS:  - Monitor I/O, vital signs and rhythm  - Monitor for S/S and trends of decreased cardiac output  - Administer and titrate ordered vasoactive medications to optimize hemodynamic stability  - Assess quality of pulses, skin color and temperature  - Assess for signs of decreased coronary artery perfusion  - Instruct patient to report change in severity of symptoms  Outcome: Progressing

## 2024-02-09 NOTE — PROGRESS NOTES
Progress Note    Cristian Quijano 54 y.o. male MRN: [id number]  Unit/Bed#: 7T [facility] 709-01 Encounter: [id number]  Admitting Physician: Kaye Lee MD  PCP: Robert Fraga MD  Date of Admission:  2/7/2024  5:06 AM    Assessment and Plan    * Systolic CHF (HCC)  Assessment & Plan  Wt Readings from Last 3 Encounters:   02/08/24 107 kg (235 lb 3.7 oz)   12/13/23 96.4 kg (212 lb 8.4 oz)   11/16/23 98.4 kg (217 lb)   Euvolemic.   Prehospital medication: torsemide 20 mg twice daily, metoprolol 50 mg a.m., 75 mg p.m., spironolactone 25 mg daily, and isosorbide dinitrate 5 mg 3 times daily, Jardiance 10 mg daily, hydralazine 10 mg TID, per cardiology recommendations   on admission  CXR showed mild to moderate pulmonary congestion  This is likely secondary to medication nonadherence.    Transition to PO Lasix 60 mg BID   Initiate Jardiance 10 mg and Entresto 24/26 two times daily. Transition from metoprolol to carvedilol 12.5 mg twice daily. Increase isosorbide from 5 mg to 10 mg TID. Increase hydralazine from 10 mg to 25 mg TID and continue Spironolactone 25 mg daily   Cardiac diet with 2 g sodium restriction  1.5 L fluid restriction  Follow up with Cardiology outpatient     Hypertension  Assessment & Plan  Blood Pressure: 151/93 diuretic medication as above.  Non-adherence to losartan 50 mg bid. Hx of HEIKE.     As above, initiate Jardiance 10 mg and Entresto 24/26 BID. Transition from metoprolol to carvedilol 12.5 mg BID. Increase isosorbide from 5 mg to 10 mg TID. Increase hydralazine from 10 mg to 25 mg TID, per cardiology recommendations and continue Spironolactone 25 mg daily   See A/P CHF          Elevated bilirubin  Assessment & Plan  Improving, likely in the setting of CHF exacerbation    Cocaine abuse (HCC)  Assessment & Plan  History of cocaine abuse.  Negative this admission on review of systems.  UDS and etoh levels negative    Hyperlipidemia  Assessment & Plan  Prehospital medication  includes atorvastatin 40 mg daily.  Continue home medication regiment.         VTE Pharmacologic Prophylaxis: VTE Score: 3 {VTE Risk:48784}    Patient Centered Rounds: {Patient Centered Rounds:06712}    Discussions with Specialists or Other Care Team Provider: ***    Education and Discussions with Family / Patient: ***  Patient Information Sharing: With the consent of Cristian Quijano , their loved ones (insert name(s) here***) were notified today by inpatient team of the patient’s condition and current plan.  All questions answered. *** Cristian Quijano does not wish inpatient team to notify their loved ones of their condition and current plan. ***    Time Spent for Care: {Time; Time 15 min - 1 hour:90727}.  More than 50% of total time spent on counseling and coordination of care as described above.    Current Length of Stay: 2 day(s)    Current Patient Status: Inpatient   Certification Statement: {Certification Statement:20595}    Discharge Plan: ***    Code Status: Level 1 - Full Code      Subjective:   ***    Objective:     Vitals:   Temp (24hrs), Av.7 °F (36.5 °C), Min:97.2 °F (36.2 °C), Max:98.3 °F (36.8 °C)    Temp:  [97.2 °F (36.2 °C)-98.3 °F (36.8 °C)] 97.3 °F (36.3 °C)  HR:  [82-94] 84  Resp:  [18] 18  BP: (104-145)/(64-87) 113/74  SpO2:  [92 %-97 %] 92 %  Body mass index is 31.87 kg/m².     Input and Output Summary (last 24 hours):       Intake/Output Summary (Last 24 hours) at 2024 0832  Last data filed at 2024 2101  Gross per 24 hour   Intake 600 ml   Output 3500 ml   Net -2900 ml       Physical Exam:     Physical Exam  Vitals reviewed.   Constitutional:       General: He is not in acute distress.     Appearance: He is obese. He is not ill-appearing or diaphoretic.   HENT:      Head: Normocephalic and atraumatic.      Right Ear: External ear normal.      Left Ear: External ear normal.      Nose: Nose normal. No congestion or rhinorrhea.      Mouth/Throat:      Mouth: Mucous  membranes are moist.      Pharynx: Oropharynx is clear.   Eyes:      General:         Right eye: No discharge.         Left eye: No discharge.      Extraocular Movements: Extraocular movements intact.   Cardiovascular:      Rate and Rhythm: Normal rate and regular rhythm.      Pulses: Normal pulses.           Dorsalis pedis pulses are 2+ on the right side and 2+ on the left side.        Posterior tibial pulses are 2+ on the right side and 2+ on the left side.      Heart sounds: Normal heart sounds.   Pulmonary:      Effort: Pulmonary effort is normal.      Breath sounds: Normal breath sounds.   Abdominal:      General: There is distension.      Palpations: Abdomen is soft.   Musculoskeletal:      Cervical back: Normal range of motion.      Right lower le+ Edema present.      Left lower le+ Edema present.   Skin:     General: Skin is warm.   Neurological:      General: No focal deficit present.      Mental Status: He is alert and oriented to person, place, and time.       ( *** Be Sure to Include Physical Exam: Delete this entire line when you have entered your exam)    Additional Data:     Labs:    Results from last 7 days   Lab Units 24  0514   WBC Thousand/uL 7.11   HEMOGLOBIN g/dL 11.6*   HEMATOCRIT % 37.9   PLATELETS Thousands/uL 260   NEUTROS PCT % 79*   LYMPHS PCT % 14   MONOS PCT % 5   EOS PCT % 2     Results from last 7 days   Lab Units 24  0451   POTASSIUM mmol/L 3.4*   CHLORIDE mmol/L 101   CO2 mmol/L 30   BUN mg/dL 21   CREATININE mg/dL 1.41*   CALCIUM mg/dL 8.7   ALK PHOS U/L 195*   ALT U/L 14   AST U/L 13                     * I Have Reviewed All Lab Data Listed Above.  * Additional Pertinent Lab Tests Reviewed: {Labreview:13347}    Imaging:    Imaging Reports Reviewed Today Include: ***  Imaging Personally Reviewed by Myself Includes:  ***    Recent Cultures (last 7 days):           Last 24 Hours Medication List:   Current Facility-Administered Medications   Medication Dose Route  Frequency Provider Last Rate    acetaminophen  650 mg Oral Q6H PRN Rajiv Parra MD      atorvastatin  40 mg Oral Daily With Dinner Wellington Dyson DO      carvedilol  12.5 mg Oral BID With Meals Danielle Law MD      Empagliflozin  10 mg Oral Daily Houston Moreno MD      enoxaparin  40 mg Subcutaneous Daily Wellington Dyson DO      furosemide  60 mg Oral BID (diuretic) Wellington Dyson DO      hydrALAZINE  25 mg Oral TID Danielle Law MD      isosorbide dinitrate  10 mg Oral TID after meals Danielle Law MD      pantoprazole  40 mg Oral Early Morning Wellington Dyson DO      sacubitril-valsartan  1 tablet Oral BID Houston Moreno MD          ** Please Note: Dictation voice to text software may have been used in the creation of this document. **    Houston Moreno MD  02/09/24  8:32 AM

## 2024-02-09 NOTE — PROGRESS NOTES
Progress Note - Cardiology   Cristian Quijano 54 y.o. male MRN: [id number]  Unit/Bed#: 7T [facility] 709-01 Encounter: [id number]      Assessment/Recommendations/Discussion:   Acute on chronic systolic heart failure  Nonischemic cardiomyopathy, now with improved ejection fraction to 50%  Dyslipidemia  History of substance abuse    Results from last 7 days   Lab Units 02/08/24  1051   SL CV LV EF  50        PLAN  Appears euvolemic  Okay to transition to p.o. Lasix today, has been placed on 60 mg p.o. twice daily  Continue carvedilol 12.5 mg twice a day, Jardiance, hydralazine, Imdur, Entresto, Lipitor  Okay for DC today        Subjective:   HPI  Doing well, no shortness of breath, net -2.9 L from yesterday      Review of Systems: As noted in HPI. Rest of ROS is negative.    Vitals:   /74 (BP Location: Left arm)   Pulse 84   Temp (!) 97.3 °F (36.3 °C) (Temporal)   Resp 18   Ht 6' (1.829 m)   Wt 107 kg (235 lb 0.2 oz)   SpO2 92%   BMI 31.87 kg/m²   I/O         02/07 0701 02/08 0700 02/08 0701 02/09 0700 02/09 0701  02/10 0700    P.O. 2180 600 240    Total Intake(mL/kg) 2180 (20.4) 600 (5.6) 240 (2.2)    Urine (mL/kg/hr) 4050 (1.6) 3500 (1.4) 600 (0.9)    Total Output 4050 3500 600    Net -1870 -2900 -360                 Weight (last 2 days)       Date/Time Weight    02/09/24 0534 107 (235.01)    02/08/24 1047 107 (235)    02/08/24 0534 107 (235.23)    02/07/24 0818 112 (247.58)    02/07/24 0721 112 (247.58)    02/07/24 0516 113 (248.1)            Physical Exam   Constitutional: awake, alert and oriented, in no acute distress, no obvious deformities  Head: Normocephalic, without obvious abnormality, atraumatic  Eyes: conjunctivae clear and moist. Sclera anicteric. No xanthelasmas. Pupils equal bilaterally. Extraocular motions are full.  Ear nose mouth and throat: ears are symmetrical bilaterally, hearing appears to be equal bilaterally, no nasal discharge or epistaxis, oropharynx is clear with moist  mucous membranes  Neck: Trachea is midline, neck is supple, no thyromegaly or significant lymphadenopathy, there is full range of motion.  Lungs: clear to auscultation bilaterally, no wheezes, no rales, no rhonchi, no accessory muscle use, breathing is nonlabored  Heart: regular rate and rhythm, S1, S2 normal, no murmur, no click, no rub and no gallop, no lower extremity edema  Abdomen: soft, non-tender; bowel sounds normal; no masses, no organomegaly  Psychiatric: Patient is oriented to time, place, person, mood/affect is negative for depression, anxiety, agitation, appears to have appropriate insight  Skin: Skin is warm, dry, intact. No obvious rashes or lesions on exposed extremities. Nail beds are pink with no cyanosis or clubbing.    TELEMETRY:   Lab Results:  Results from last 7 days   Lab Units 02/07/24  0514   WBC Thousand/uL 7.11   HEMOGLOBIN g/dL 11.6*   HEMATOCRIT % 37.9   PLATELETS Thousands/uL 260     Results from last 7 days   Lab Units 02/09/24  0451   POTASSIUM mmol/L 3.4*   CHLORIDE mmol/L 101   CO2 mmol/L 30   BUN mg/dL 21   CREATININE mg/dL 1.41*   CALCIUM mg/dL 8.7   ALK PHOS U/L 195*   ALT U/L 14   AST U/L 13     Results from last 7 days   Lab Units 02/09/24  0451   POTASSIUM mmol/L 3.4*   CHLORIDE mmol/L 101   CO2 mmol/L 30   BUN mg/dL 21   CREATININE mg/dL 1.41*   CALCIUM mg/dL 8.7           Medications:    Current Facility-Administered Medications:     acetaminophen (TYLENOL) tablet 650 mg, 650 mg, Oral, Q6H PRN, Rajiv Parra MD, 650 mg at 02/09/24 0831    atorvastatin (LIPITOR) tablet 40 mg, 40 mg, Oral, Daily With Dinner, Wellington Dyson DO, 40 mg at 02/08/24 1536    carvedilol (COREG) tablet 12.5 mg, 12.5 mg, Oral, BID With Meals, Danielle Law MD, 12.5 mg at 02/09/24 0831    Empagliflozin (JARDIANCE) tablet 10 mg, 10 mg, Oral, Daily, Houston Moreno MD, 10 mg at 02/09/24 0832    enoxaparin (LOVENOX) subcutaneous injection 40 mg, 40 mg, Subcutaneous, Daily, Wellington  "Ryne Dyson DO, 40 mg at 02/09/24 0832    furosemide (LASIX) tablet 60 mg, 60 mg, Oral, BID (diuretic), Wellington Dyson DO, 60 mg at 02/09/24 0831    hydrALAZINE (APRESOLINE) tablet 25 mg, 25 mg, Oral, TID, Danielle Law MD, 25 mg at 02/09/24 0831    isosorbide dinitrate (ISORDIL) tablet 10 mg, 10 mg, Oral, TID after meals, Danielle Law MD, 10 mg at 02/09/24 0831    pantoprazole (PROTONIX) EC tablet 40 mg, 40 mg, Oral, Early Morning, Wellington Dyson DO, 40 mg at 02/09/24 0538    sacubitril-valsartan (ENTRESTO) 24-26 MG per tablet 1 tablet, 1 tablet, Oral, BID, Houston Moreno MD, 1 tablet at 02/09/24 0831    Portions of the record may have been created with voice recognition software. Occasional wrong word or \"sound a like\" substitutions may have occurred due to the inherent limitations of voice recognition software. Read the chart carefully and recognize, using context, where substitutions have occurred.    Jose Roberto Jon DO, [facility], FASNC  2/9/2024 1:29 PM      "

## 2024-02-12 ENCOUNTER — PATIENT OUTREACH (OUTPATIENT)
Dept: FAMILY MEDICINE CLINIC | Facility: CLINIC | Age: 54
End: 2024-02-12

## 2024-02-12 DIAGNOSIS — Z71.89 COMPLEX CARE COORDINATION: Primary | ICD-10-CM

## 2024-02-12 NOTE — UTILIZATION REVIEW
NOTIFICATION OF ADMISSION DISCHARGE   This is a Notification of Discharge from [facility]. Please be advised that this patient has been discharge from our facility. Below you will find the admission and discharge date and time including the patient’s disposition.   UTILIZATION REVIEW CONTACT:  Mali Calix MA  Utilization   Network Utilization Review Department  Phone: [phone] x carefully listen to the prompts. All voicemails are confidential.  Email: [email or web address]     ADMISSION INFORMATION  PRESENTATION DATE: 2/7/2024  5:06 AM  OBERVATION ADMISSION DATE:   INPATIENT ADMISSION DATE: 2/7/24  5:55 AM   DISCHARGE DATE: 2/9/2024  6:35 PM   DISPOSITION:Home/Self Care    Network Utilization Review Department  ATTENTION: Please call with any questions or concerns to [phone] and carefully listen to the prompts so that you are directed to the right person. All voicemails are confidential.   For Discharge needs, contact Care Management DC Support Team at [phone] opt. 2  Send all requests for admission clinical reviews, approved or denied determinations and any other requests to dedicated fax number below belonging to the campus where the patient is receiving treatment. List of dedicated fax numbers for the Facilities:  FACILITY NAME UR FAX NUMBER   ADMISSION DENIALS (Administrative/Medical Necessity) [phone]   DISCHARGE SUPPORT TEAM ([facility]) [phone]   PARENT CHILD HEALTH (Maternity/NICU/Pediatrics) [phone]   [facility] [phone]   [facility] [phone]   [facility] [phone]   [facility] [phone]   [facility] [phone]   [facility] [phone]   [facility] [phone]   [facility] [phone]    [facility] [phone]   [facility] [phone]   [facility] [phone]   [facility] [phone]

## 2024-02-12 NOTE — PROGRESS NOTES
VIOLETTA JUAN did receive a new referral by Jose Roberto LEE CM. Pt third admission for the same diagnosis since 8/23. Having trouble affording his medications. He is also asking for help to apply for disability.     After chart review VIOLETTA JUAN did place a call to Pt but he did not  the phone, was able to leave a detailed VM requesting a return call. VIOLETTA JUAN will attempt to reach out Pt a later time.     VIOLETTA JUAN is remain available for further assistance as needed.

## 2024-02-15 ENCOUNTER — PATIENT OUTREACH (OUTPATIENT)
Dept: FAMILY MEDICINE CLINIC | Facility: CLINIC | Age: 54
End: 2024-02-15

## 2024-02-15 NOTE — PROGRESS NOTES
Per chart review Pt has an upcoming appointment in our clinic on 2/19/24. VIOLETTA CM will meet up with Pt at his visit if shows up.    VIOLETTA JUAN is remain available for further assistance as needed.

## 2024-02-19 ENCOUNTER — OFFICE VISIT (OUTPATIENT)
Dept: FAMILY MEDICINE CLINIC | Facility: CLINIC | Age: 54
End: 2024-02-19

## 2024-02-19 VITALS
WEIGHT: 214 LBS | SYSTOLIC BLOOD PRESSURE: 112 MMHG | HEIGHT: 72 IN | DIASTOLIC BLOOD PRESSURE: 70 MMHG | TEMPERATURE: 98.3 F | RESPIRATION RATE: 18 BRPM | BODY MASS INDEX: 28.99 KG/M2 | HEART RATE: 80 BPM | OXYGEN SATURATION: 97 %

## 2024-02-19 DIAGNOSIS — Z23 ENCOUNTER FOR IMMUNIZATION: ICD-10-CM

## 2024-02-19 DIAGNOSIS — I10 PRIMARY HYPERTENSION: ICD-10-CM

## 2024-02-19 DIAGNOSIS — N50.812 PAIN IN BOTH TESTICLES: ICD-10-CM

## 2024-02-19 DIAGNOSIS — N17.9 AKI (ACUTE KIDNEY INJURY) (HCC): ICD-10-CM

## 2024-02-19 DIAGNOSIS — I50.42 CHRONIC COMBINED SYSTOLIC AND DIASTOLIC CONGESTIVE HEART FAILURE (HCC): Primary | ICD-10-CM

## 2024-02-19 DIAGNOSIS — Z11.59 NEED FOR HEPATITIS C SCREENING TEST: ICD-10-CM

## 2024-02-19 DIAGNOSIS — N50.811 PAIN IN BOTH TESTICLES: ICD-10-CM

## 2024-02-19 DIAGNOSIS — Z12.11 COLON CANCER SCREENING: ICD-10-CM

## 2024-02-19 DIAGNOSIS — E87.6 HYPOKALEMIA: ICD-10-CM

## 2024-02-19 PROCEDURE — 90686 IIV4 VACC NO PRSV 0.5 ML IM: CPT | Performed by: FAMILY MEDICINE

## 2024-02-19 PROCEDURE — 90472 IMMUNIZATION ADMIN EACH ADD: CPT | Performed by: FAMILY MEDICINE

## 2024-02-19 PROCEDURE — 99213 OFFICE O/P EST LOW 20 MIN: CPT | Performed by: FAMILY MEDICINE

## 2024-02-19 PROCEDURE — 90632 HEPA VACCINE ADULT IM: CPT | Performed by: FAMILY MEDICINE

## 2024-02-19 PROCEDURE — 90471 IMMUNIZATION ADMIN: CPT | Performed by: FAMILY MEDICINE

## 2024-02-19 PROCEDURE — 90677 PCV20 VACCINE IM: CPT | Performed by: FAMILY MEDICINE

## 2024-02-19 NOTE — ASSESSMENT & PLAN NOTE
Unknown etiology and physical exam relatively benign other than mild tenderness on palpation. Will send for US and adjust as warranted.

## 2024-02-19 NOTE — ASSESSMENT & PLAN NOTE
Wt Readings from Last 3 Encounters:   02/19/24 97.1 kg (214 lb)   02/09/24 107 kg (235 lb 0.2 oz)   12/13/23 96.4 kg (212 lb 8.4 oz)     Have reviewed recent hospital admission, 2/7/24-2/9/2024.

## 2024-02-19 NOTE — ASSESSMENT & PLAN NOTE
BP Readings from Last 3 Encounters:   02/19/24 112/70   02/09/24 97/62   12/13/23 143/90      BP at goal per JNC 8 <140/90. Continue with current CHF and HTN medication regimen.

## 2024-02-19 NOTE — PROGRESS NOTES
Name: Cristian Quijano      : 1970      MRN: [id number]  Encounter Provider: Robert Fraga MD  Encounter Date: 2024   Encounter department: [facility]    Assessment & Plan     1. Chronic combined systolic and diastolic congestive heart failure (HCC)  Assessment & Plan:    Wt Readings from Last 3 Encounters:   24 97.1 kg (214 lb)   24 107 kg (235 lb 0.2 oz)   23 96.4 kg (212 lb 8.4 oz)     Have reviewed recent hospital admission, . Per last note pt current medication regimen should be:     Carvedilol 12.5 mg BID, Jardiance 10 mg daily, Lasix 60 mg BID, Hydralazine 25mg TID, Imdur 30 mg daily, Spironolactone 25 mg daily and Entresto 24-26 mg BID.     Pt endorsing compliance. Pt physical exam significant for some edema but otherwise no other signs of fluid overload, weight stable. Pt should be following with Cardiology however this has not yet been scheduled, will send message to referral staff for assistance with this. Reviewed ED precautions and all questions answered.        Orders:  -     Comprehensive metabolic panel; Future    2. Primary hypertension  Assessment & Plan:  BP Readings from Last 3 Encounters:   24 112/70   24 97/62   23 143/90      BP at goal per JNC 8 <140/90. Continue with current CHF and HTN medication regimen.      3. Hypokalemia  Comments:  CMP ordered    4. HEIKE (acute kidney injury) (HCC)  Comments:  CMP order    5. Pain in both testicles  Assessment & Plan:  Unknown etiology and physical exam relatively benign other than mild tenderness on palpation. Will send for US and adjust as warranted.    Orders:  -     US scrotum and testicles; Future; Expected date: 2024    6. Colon cancer screening  -     Ambulatory referral to Gastroenterology; Future    7. Need for hepatitis C screening test  -     Hepatitis C antibody; Future    8. Encounter for immunization  -     influenza  vaccine, quadrivalent, 0.5 mL, preservative-free, for adult and pediatric patients 6 mos+ (AFLURIA, FLUARIX, FLULAVAL, FLUZONE)  -     HEPATITIS A VACCINE ADULT IM  -     Pneumococcal Conjugate Vaccine 20-valent (Pcv20)           Subjective      53yo male presenting to clinic for follow up of CHF. Pt was recently admitted to  due to CHF exacerbation and had medical regimen adjusted. He is endorsing medication compliance and has had notable decrease in fluid. Pt does have some SOB however this is much improved since admission and he believes he is now at baseline. Pt endorses some intermittent testicular pain that began shortly after discharge from hospital. Stat this pain comes and goes and tends to occur when he about to urinate (during the action of taking our penis for urination). He is denying any dysuria or hematuria. Remaining ROS as noted below.      Review of Systems   Constitutional:  Negative for chills and fever.   Respiratory:  Positive for shortness of breath (baseline). Negative for cough.    Cardiovascular:  Positive for leg swelling. Negative for chest pain.   Gastrointestinal:  Negative for abdominal pain, blood in stool, constipation, diarrhea, nausea and vomiting.   Genitourinary:  Positive for testicular pain. Negative for dysuria, frequency, hematuria, penile discharge, penile pain, penile swelling and scrotal swelling.   Neurological:  Negative for dizziness.       Current Outpatient Medications on File Prior to Visit   Medication Sig    acetaminophen (TYLENOL) 325 mg tablet Take 2 tablets (650 mg total) by mouth every 6 (six) hours as needed for mild pain    atorvastatin (LIPITOR) 40 mg tablet Take 1 tablet (40 mg total) by mouth daily with dinner    carvedilol (COREG) 12.5 mg tablet Take 1 tablet (12.5 mg total) by mouth 2 (two) times a day with meals    Empagliflozin (JARDIANCE) 10 MG TABS tablet Take 1 tablet (10 mg total) by mouth daily    furosemide (LASIX) 20 mg tablet Take 3 tablets  (60 mg total) by mouth 2 (two) times a day    hydrALAZINE (APRESOLINE) 25 mg tablet Take 1 tablet (25 mg total) by mouth 3 (three) times a day    isosorbide mononitrate (IMDUR) 30 mg 24 hr tablet Take 1 tablet (30 mg total) by mouth daily    pantoprazole (PROTONIX) 40 mg tablet Take 1 tablet (40 mg total) by mouth daily in the early morning    sacubitril-valsartan (ENTRESTO) 24-26 MG TABS Take 1 tablet by mouth 2 (two) times a day    spironolactone (ALDACTONE) 25 mg tablet Take 1 tablet (25 mg total) by mouth daily    sucralfate (CARAFATE) 1 g tablet Take 1 tablet (1 g total) by mouth 4 (four) times a day (before meals and at bedtime)       Objective     /70 (BP Location: Right arm, Patient Position: Sitting, Cuff Size: Standard)   Pulse 80   Temp 98.3 °F (36.8 °C) (Temporal)   Resp 18   Ht 6' (1.829 m)   Wt 97.1 kg (214 lb)   SpO2 97%   BMI 29.02 kg/m²     Physical Exam  Vitals reviewed.   Constitutional:       Appearance: Normal appearance.   Eyes:      Conjunctiva/sclera: Conjunctivae normal.   Cardiovascular:      Rate and Rhythm: Normal rate and regular rhythm.      Heart sounds: Normal heart sounds.   Pulmonary:      Effort: Pulmonary effort is normal.      Breath sounds: Normal breath sounds and air entry. No decreased breath sounds, wheezing, rhonchi or rales.   Genitourinary:     Pubic Area: No rash or pubic lice.       Penis: Normal.       Testes: Cremasteric reflex is present.         Right: Tenderness (5/10) present. Mass, swelling, testicular hydrocele or varicocele not present. Right testis is descended.         Left: Tenderness (5/10) present. Mass, swelling, testicular hydrocele or varicocele not present. Left testis is descended.      Epididymis:      Right: Normal.      Left: Normal.   Musculoskeletal:      Cervical back: Normal range of motion.      Right lower le+ Edema present.      Left lower le+ Edema present.   Skin:     General: Skin is warm and dry.      Capillary  Refill: Capillary refill takes less than 2 seconds.   Neurological:      General: No focal deficit present.      Mental Status: He is alert and oriented to person, place, and time.   Psychiatric:         Mood and Affect: Mood normal.         Behavior: Behavior normal.       Robert Fraga MD

## 2024-02-19 NOTE — ASSESSMENT & PLAN NOTE
Wt Readings from Last 3 Encounters:   02/19/24 97.1 kg (214 lb)   02/09/24 107 kg (235 lb 0.2 oz)   12/13/23 96.4 kg (212 lb 8.4 oz)     Have reviewed recent hospital admission, 2/7/24-2/9/2024. Per last note pt current medication regimen should be:     Carvedilol 12.5 mg BID, Jardiance 10 mg daily, Lasix 60 mg BID, Hydralazine 25mg TID, Imdur 30 mg daily, Spironolactone 25 mg daily and Entresto 24-26 mg BID.     Pt endorsing compliance. Pt physical exam significant for some edema but otherwise no other signs of fluid overload, weight stable. Pt should be following with Cardiology however this has not yet been scheduled, will send message to referral staff for assistance with this. Reviewed ED precautions and all questions answered.

## 2024-02-20 ENCOUNTER — PATIENT OUTREACH (OUTPATIENT)
Dept: FAMILY MEDICINE CLINIC | Facility: CLINIC | Age: 54
End: 2024-02-20

## 2024-02-20 NOTE — PROGRESS NOTES
Per chart review Pt this seems that Pt did show up for his appointment 2/19/2024 but VIOLETTA JUAN was unable to meet with Pt. After chart review VIOLETTA JUAN did place a call to Pt at the phone numbers listed in Pt's chart. VIOLETTA JUAN was unable to contact Pt but a detailed VM left requesting a return call. VIOLETTA JUAN will attempt to reach out Pt again.    VIOLETTA JUAN is remain available for further assistance as needed.

## 2024-02-23 ENCOUNTER — HOSPITAL ENCOUNTER (OUTPATIENT)
Dept: ULTRASOUND IMAGING | Facility: HOSPITAL | Age: 54
End: 2024-02-23
Payer: COMMERCIAL

## 2024-02-23 DIAGNOSIS — N50.811 PAIN IN BOTH TESTICLES: ICD-10-CM

## 2024-02-23 DIAGNOSIS — N50.812 PAIN IN BOTH TESTICLES: ICD-10-CM

## 2024-02-23 PROCEDURE — 76870 US EXAM SCROTUM: CPT

## 2024-02-28 ENCOUNTER — PATIENT OUTREACH (OUTPATIENT)
Dept: FAMILY MEDICINE CLINIC | Facility: CLINIC | Age: 54
End: 2024-02-28

## 2024-02-28 NOTE — PROGRESS NOTES
VIOLETTA JUAN did place a second call to Pt, but he did not , VM left. VIOLETTA JUAN will send Unable to Reach Letter by mail since per chart review this seems that Pt never login through [employer].     VIOLETTA JUAN is closing case today due to unable to contact Pt.  VIOLETTA JUAN is remain available for further assistance as needed.

## 2024-02-28 NOTE — LETTER
02/28/24    Estimado/a Zeeshan Arellano un coordinador de la atención médica en [facility]  [facility]  [address] [address]  [facility] [place] [address] [phone].     Intentamos comunicarnos con usted por teléfono varias veces. Es importante que se comunique con nosotros al [phone] para que podamos ofrecerle ayuda con stepan necesidades de atención médica.     Atentamente.       Rafaelina Reyes, MSW  ([employer])

## 2024-03-18 ENCOUNTER — OFFICE VISIT (OUTPATIENT)
Dept: FAMILY MEDICINE CLINIC | Facility: CLINIC | Age: 54
End: 2024-03-18

## 2024-03-18 VITALS
OXYGEN SATURATION: 98 % | TEMPERATURE: 97.8 F | HEART RATE: 89 BPM | HEIGHT: 72 IN | DIASTOLIC BLOOD PRESSURE: 82 MMHG | WEIGHT: 217 LBS | SYSTOLIC BLOOD PRESSURE: 156 MMHG | BODY MASS INDEX: 29.39 KG/M2

## 2024-03-18 DIAGNOSIS — Z12.5 SCREENING PSA (PROSTATE SPECIFIC ANTIGEN): ICD-10-CM

## 2024-03-18 DIAGNOSIS — Z12.11 COLON CANCER SCREENING: ICD-10-CM

## 2024-03-18 DIAGNOSIS — R79.89 ELEVATED SERUM CREATININE: ICD-10-CM

## 2024-03-18 DIAGNOSIS — Z00.00 ANNUAL PHYSICAL EXAM: Primary | ICD-10-CM

## 2024-03-18 DIAGNOSIS — Z59.86 FINANCIAL INSECURITY: ICD-10-CM

## 2024-03-18 DIAGNOSIS — Z65.8 SOCIAL DISCORD: ICD-10-CM

## 2024-03-18 DIAGNOSIS — F32.1 CURRENT MODERATE EPISODE OF MAJOR DEPRESSIVE DISORDER, UNSPECIFIED WHETHER RECURRENT (HCC): ICD-10-CM

## 2024-03-18 PROBLEM — Z59.71 DOES NOT HAVE HEALTH INSURANCE: Status: RESOLVED | Noted: 2023-08-14 | Resolved: 2024-03-18

## 2024-03-18 PROBLEM — R07.9 CHEST PAIN: Status: ACTIVE | Noted: 2023-08-05

## 2024-03-18 PROBLEM — N50.812 PAIN IN BOTH TESTICLES: Status: RESOLVED | Noted: 2024-02-19 | Resolved: 2024-03-18

## 2024-03-18 PROBLEM — N17.9 AKI (ACUTE KIDNEY INJURY) (HCC): Status: RESOLVED | Noted: 2023-08-05 | Resolved: 2024-03-18

## 2024-03-18 PROBLEM — N50.811 PAIN IN BOTH TESTICLES: Status: RESOLVED | Noted: 2024-02-19 | Resolved: 2024-03-18

## 2024-03-18 PROBLEM — Z59.89 DOES NOT HAVE HEALTH INSURANCE: Status: RESOLVED | Noted: 2023-08-14 | Resolved: 2024-03-18

## 2024-03-18 PROCEDURE — 99396 PREV VISIT EST AGE 40-64: CPT | Performed by: FAMILY MEDICINE

## 2024-03-18 PROCEDURE — 99213 OFFICE O/P EST LOW 20 MIN: CPT | Performed by: FAMILY MEDICINE

## 2024-03-18 RX ORDER — FLUOXETINE 10 MG/1
10 CAPSULE ORAL DAILY
Qty: 60 CAPSULE | Refills: 0 | Status: SHIPPED | OUTPATIENT
Start: 2024-03-18 | End: 2024-03-18

## 2024-03-18 RX ORDER — FLUOXETINE 10 MG/1
10 CAPSULE ORAL DAILY
Qty: 60 CAPSULE | Refills: 0 | Status: SHIPPED | OUTPATIENT
Start: 2024-03-18 | End: 2024-05-17

## 2024-03-18 SDOH — ECONOMIC STABILITY - INCOME SECURITY: FINANCIAL INSECURITY: Z59.86

## 2024-03-18 NOTE — PROGRESS NOTES
ADULT ANNUAL PHYSICAL  [facility]    NAME: Cristian Quijano  AGE: 54 y.o. SEX: male  : 1970     DATE: 3/18/2024     Assessment and Plan:     Problem List Items Addressed This Visit          Behavioral Health    Current moderate episode of major depressive disorder (HCC)     PHQ9 score of 18 (moderate to severe depression). Second to financial insecurity. No auditory or visual hallucinations. Though pt had plan of self harm in the past I do not believe he is of danger to self or others at this time. Will begin fluoxetine 10mg daily and have pt return in one month for repeat eval. Reviewed ED precautions for SI/HI. Pt in agreement. Have also placed referral to  services.         Relevant Medications    FLUoxetine (PROzac) 10 mg capsule    Other Relevant Orders    Ambulatory referral to Psych Services       Other    Elevated serum creatinine     Lab Results   Component Value Date    CREATININE 1.41 (H) 2024      Reminded to repeat CMP for follow up of Creatinine level.         Annual physical exam - Primary     VS with elevated BP and physical examination as noted below. Age appropriate screenings and vaccinations reviewed and ordered as noted. Healthy diet and exercise counseling provided.         Social discord     Following with  for assistance with financial and housing problems.          Other Visit Diagnoses       Financial insecurity        Screening PSA (prostate specific antigen)        Relevant Orders    PSA, Total Screen    Colon cancer screening        Reminded to schedule GI appointment for evaluation with colonscopy.            Immunizations and preventive care screenings were discussed with patient today. Appropriate education was printed on patient's after visit summary.    Discussed risks and benefits of prostate cancer screening. We discussed the controversial history of PSA screening for prostate cancer  in the [place] as well as the risk of over detection and over treatment of prostate cancer by way of PSA screening.  The patient understands that PSA blood testing is an imperfect way to screen for prostate cancer and that elevated PSA levels in the blood may also be caused by infection, inflammation, prostatic trauma or manipulation, urological procedures, or by benign prostatic enlargement.    The role of the digital rectal examination in prostate cancer screening was also discussed and I discussed with him that there is large interobserver variability in the findings of digital rectal examination.    Counseling:  Alcohol/drug use: discussed moderation in alcohol intake, the recommendations for healthy alcohol use, and avoidance of illicit drug use.  Dental Health: discussed importance of regular tooth brushing, flossing, and dental visits.  Injury prevention: discussed safety/seat belts, safety helmets, smoke detectors, carbon dioxide detectors, and smoking near bedding or upholstery.  Sexual health: discussed sexually transmitted diseases, partner selection, use of condoms, avoidance of unintended pregnancy, and contraceptive alternatives.  Exercise: the importance of regular exercise/physical activity was discussed. Recommend exercise 3-5 times per week for at least 30 minutes.          Return in about 4 weeks (around 4/15/2024) for Depression.     Chief Complaint:     Chief Complaint   Patient presents with    Follow-up     Pt states Testicular pain but no pain at the moment       History of Present Illness:     Adult Annual Physical   Patient here for a comprehensive physical exam. The patient reports problems - depression. Pt states he had SI with plan to crash vehicle into street post at high speed after receiving news that he might lose his home for inability to pay rent/mortgage. Pt has unfortunately fallen on hard times and has lost multiple jobs due to medical conditions. He is currently working  on getting assistance to apply for disability. Though initially he had above mentioned plans, he is denying SI since that singular episode. He does not have hx of self harm. He has since had some relief with payments however is still worried about the future as he cannot maintain a job. .    Diet and Physical Activity  Diet/Nutrition: well balanced diet and cardiac diet .   Exercise: moderate cardiovascular exercise and 3-4 times a week on average.      Depression Screening  PHQ-2/9 Depression Screening    Little interest or pleasure in doing things: 2 - more than half the days  Feeling down, depressed, or hopeless: 2 - more than half the days  Trouble falling or staying asleep, or sleeping too much: 3 - nearly every day  Feeling tired or having little energy: 1 - several days  Poor appetite or overeatin - several days  Feeling bad about yourself - or that you are a failure or have let yourself or your family down: 3 - nearly every day  Trouble concentrating on things, such as reading the newspaper or watching television: 3 - nearly every day  Moving or speaking so slowly that other people could have noticed. Or the opposite - being so fidgety or restless that you have been moving around a lot more than usual: 0 - not at all  Thoughts that you would be better off dead, or of hurting yourself in some way: 3 - nearly every day  PHQ-2 Score: 4  PHQ-2 Interpretation: POSITIVE depression screen  PHQ-9 Score: 18  PHQ-9 Interpretation: Moderately severe depression       General Health  Sleep: sleeps poorly, gets 1-3 hours of sleep on average, and unrefreshing sleep.   Hearing: normal - bilateral.  Vision: no vision problems, most recent eye exam >1 year ago, and wears glasses.   Dental: no dental visits for >1 year, brushes teeth once daily, and does not floss.        Health  Symptoms include: none         Review of Systems:     Review of Systems   Constitutional:  Negative for chills and fever.   Respiratory:   Negative for cough and shortness of breath.    Cardiovascular:  Positive for leg swelling. Negative for chest pain.   Gastrointestinal:  Negative for abdominal pain, blood in stool, constipation, diarrhea, nausea and vomiting.   Genitourinary:  Negative for dysuria and hematuria.   Neurological:  Negative for dizziness.   Psychiatric/Behavioral:  Negative for suicidal ideas.       Past Medical History:     Past Medical History:   Diagnosis Date    HEIKE (acute kidney injury) ([facility]) 08/05/2023    Chronic HFrEF (heart failure with reduced ejection fraction) ([facility]) 04/01/2022    Does not have health insurance 08/14/2023    Pain in both testicles       Past Surgical History:     Past Surgical History:   Procedure Laterality Date    CARDIAC CATHETERIZATION N/A 4/4/2022    Procedure: CARDIAC CORONARY ANGIOGRAM;  Surgeon: Flex Hunt MD;  Location: [facility];  Service: Cardiology    CARDIAC CATHETERIZATION Left 4/4/2022    Procedure: Cardiac Left Heart Cath;  Surgeon: Flex Hunt MD;  Location: [facility];  Service: Cardiology      Family History:     History reviewed. No pertinent family history.   Social History:     Social History     Socioeconomic History    Marital status: Single     Spouse name: None    Number of children: None    Years of education: None    Highest education level: None   Occupational History    None   Tobacco Use    Smoking status: Never     Passive exposure: Never    Smokeless tobacco: Never   Vaping Use    Vaping status: Never Used   Substance and Sexual Activity    Alcohol use: Never    Drug use: Never    Sexual activity: None   Other Topics Concern    None   Social History Narrative    None     Social Determinants of Health     Financial Resource Strain: High Risk (2/19/2024)    Overall Financial Resource Strain (CARDIA)     Difficulty of Paying Living Expenses: Very hard   Food Insecurity: No Food Insecurity (2/7/2024)    Hunger Vital Sign     Worried About Running Out  of Food in the Last Year: Never true     Ran Out of Food in the Last Year: Never true   Transportation Needs: No Transportation Needs (2/7/2024)    PRAPARE - Transportation     Lack of Transportation (Medical): No     Lack of Transportation (Non-Medical): No   Physical Activity: Not on file   Stress: Not on file   Social Connections: Not on file   Intimate Partner Violence: Not on file   Housing Stability: Low Risk  (2/7/2024)    Housing Stability Vital Sign     Unable to Pay for Housing in the Last Year: No     Number of Places Lived in the Last Year: 1     Unstable Housing in the Last Year: No      Current Medications:     Current Outpatient Medications   Medication Sig Dispense Refill    FLUoxetine (PROzac) 10 mg capsule Take 1 capsule (10 mg total) by mouth daily 60 capsule 0    acetaminophen (TYLENOL) 325 mg tablet Take 2 tablets (650 mg total) by mouth every 6 (six) hours as needed for mild pain 90 tablet 0    atorvastatin (LIPITOR) 40 mg tablet Take 1 tablet (40 mg total) by mouth daily with dinner 30 tablet 0    carvedilol (COREG) 12.5 mg tablet Take 1 tablet (12.5 mg total) by mouth 2 (two) times a day with meals 60 tablet 3    Empagliflozin (JARDIANCE) 10 MG TABS tablet Take 1 tablet (10 mg total) by mouth daily 30 tablet 3    furosemide (LASIX) 20 mg tablet Take 3 tablets (60 mg total) by mouth 2 (two) times a day 60 tablet 3    hydrALAZINE (APRESOLINE) 25 mg tablet Take 1 tablet (25 mg total) by mouth 3 (three) times a day 90 tablet 3    isosorbide mononitrate (IMDUR) 30 mg 24 hr tablet Take 1 tablet (30 mg total) by mouth daily 30 tablet 3    pantoprazole (PROTONIX) 40 mg tablet Take 1 tablet (40 mg total) by mouth daily in the early morning 30 tablet 0    sacubitril-valsartan (ENTRESTO) 24-26 MG TABS Take 1 tablet by mouth 2 (two) times a day 60 tablet 3    spironolactone (ALDACTONE) 25 mg tablet Take 1 tablet (25 mg total) by mouth daily 30 tablet 2    sucralfate (CARAFATE) 1 g tablet Take 1 tablet (1  g total) by mouth 4 (four) times a day (before meals and at bedtime) 120 tablet 0     No current facility-administered medications for this visit.      Allergies:     No Known Allergies   Physical Exam:     /82 (BP Location: Left arm, Patient Position: Sitting, Cuff Size: Standard)   Pulse 89   Temp 97.8 °F (36.6 °C) (Temporal)   Ht 6' (1.829 m)   Wt 98.4 kg (217 lb)   SpO2 98%   BMI 29.43 kg/m²     Physical Exam  Vitals reviewed.   Constitutional:       Appearance: Normal appearance.   HENT:      Head: Normocephalic.      Right Ear: Tympanic membrane, ear canal and external ear normal.      Left Ear: Tympanic membrane, ear canal and external ear normal.      Nose: Nose normal.      Mouth/Throat:      Mouth: Mucous membranes are moist.      Pharynx: Oropharynx is clear.   Eyes:      Extraocular Movements: Extraocular movements intact.      Conjunctiva/sclera: Conjunctivae normal.   Cardiovascular:      Rate and Rhythm: Normal rate and regular rhythm.      Pulses: Normal pulses.      Heart sounds: Normal heart sounds.   Pulmonary:      Effort: Pulmonary effort is normal.      Breath sounds: Normal breath sounds.   Abdominal:      General: There is no distension.      Palpations: Abdomen is soft.      Tenderness: There is no abdominal tenderness. There is no guarding or rebound.   Musculoskeletal:         General: Normal range of motion.      Cervical back: Normal range of motion and neck supple.      Right lower le+ Pitting Edema present.      Left lower le+ Pitting Edema present.   Skin:     General: Skin is warm and dry.      Capillary Refill: Capillary refill takes less than 2 seconds.   Neurological:      General: No focal deficit present.      Mental Status: He is alert.   Psychiatric:         Mood and Affect: Mood and affect normal.         Speech: Speech normal.         Behavior: Behavior normal. Behavior is cooperative.         Thought Content: Thought content normal. Thought content does  not include homicidal or suicidal ideation. Thought content does not include homicidal or suicidal plan.          Robert Fraga MD  [facility]

## 2024-03-18 NOTE — ASSESSMENT & PLAN NOTE
PHQ9 score of 18 (moderate to severe depression). Second to financial insecurity. No auditory or visual hallucinations. Though pt had plan of self harm in the past I do not believe he is of danger to self or others at this time. Will begin fluoxetine 10mg daily and have pt return in one month for repeat eval. Reviewed ED precautions for SI/HI. Pt in agreement. Have also placed referral to  services.

## 2024-03-18 NOTE — ASSESSMENT & PLAN NOTE
VS with elevated BP and physical examination as noted below. Age appropriate screenings and vaccinations reviewed and ordered as noted. Healthy diet and exercise counseling provided.

## 2024-03-18 NOTE — ASSESSMENT & PLAN NOTE
Lab Results   Component Value Date    CREATININE 1.41 (H) 02/09/2024      Reminded to repeat CMP for follow up of Creatinine level.

## 2024-04-08 ENCOUNTER — PREP FOR PROCEDURE (OUTPATIENT)
Dept: GASTROENTEROLOGY | Facility: MEDICAL CENTER | Age: 54
End: 2024-04-08

## 2024-04-08 ENCOUNTER — OFFICE VISIT (OUTPATIENT)
Dept: GASTROENTEROLOGY | Facility: MEDICAL CENTER | Age: 54
End: 2024-04-08
Payer: COMMERCIAL

## 2024-04-08 ENCOUNTER — TELEPHONE (OUTPATIENT)
Dept: GASTROENTEROLOGY | Facility: MEDICAL CENTER | Age: 54
End: 2024-04-08

## 2024-04-08 VITALS
BODY MASS INDEX: 29.58 KG/M2 | SYSTOLIC BLOOD PRESSURE: 137 MMHG | HEIGHT: 72 IN | TEMPERATURE: 98.7 F | HEART RATE: 71 BPM | DIASTOLIC BLOOD PRESSURE: 79 MMHG | WEIGHT: 218.4 LBS

## 2024-04-08 DIAGNOSIS — D64.9 ANEMIA, UNSPECIFIED TYPE: ICD-10-CM

## 2024-04-08 DIAGNOSIS — R11.0 NAUSEA: Primary | ICD-10-CM

## 2024-04-08 DIAGNOSIS — R79.89 ELEVATED LFTS: ICD-10-CM

## 2024-04-08 DIAGNOSIS — Z12.11 COLON CANCER SCREENING: ICD-10-CM

## 2024-04-08 DIAGNOSIS — K21.9 GASTROESOPHAGEAL REFLUX DISEASE, UNSPECIFIED WHETHER ESOPHAGITIS PRESENT: ICD-10-CM

## 2024-04-08 PROCEDURE — 99244 OFF/OP CNSLTJ NEW/EST MOD 40: CPT | Performed by: NURSE PRACTITIONER

## 2024-04-08 RX ORDER — POLYETHYLENE GLYCOL 3350, SODIUM SULFATE ANHYDROUS, SODIUM BICARBONATE, SODIUM CHLORIDE, POTASSIUM CHLORIDE 236; 22.74; 6.74; 5.86; 2.97 G/4L; G/4L; G/4L; G/4L; G/4L
4000 POWDER, FOR SOLUTION ORAL ONCE
Qty: 4000 ML | Refills: 0 | Status: SHIPPED | OUTPATIENT
Start: 2024-04-08 | End: 2024-04-08

## 2024-04-08 NOTE — TELEPHONE ENCOUNTER
Procedure: EGD/Colon  Date: 05/14/2024  Physician performing: Dr. Hummel  Location of procedure:    Instructions given to patient: Yes  Diabetic: Jardiance  Clearances: N/A

## 2024-04-08 NOTE — PATIENT INSTRUCTIONS
Metamucil, Benefiber or Fibercon daily        Moderate Sedation   AMBULATORY CARE:   What you need to know about moderate sedation:  Moderate sedation, or conscious sedation, is medicine used during procedures to help you feel relaxed and calm. You will be awake and able to follow directions without anxiety or pain. You will remember little to none of the procedure. Moderate sedation can be used for procedures such as a colonoscopy, wound repair, cataract removal, or dental work. The medicine is given as a pill, shot, inhaled solution, or injection through an IV.  How to prepare for moderate sedation:  Your healthcare provider will talk to you about how to prepare for moderate sedation. You may be told not to eat or drink anything for 8 hours before moderate sedation. You may be able to drink clear liquids up until 2 hours before moderate sedation. Tell healthcare providers if you have any allergies, heart problems, or breathing problems. Arrange for someone to drive you home and stay with you for 24 hours. You may feel sleepy and need help doing things at home. Another person may need to call 911 if you cannot be woken.  What will happen during moderate sedation:  Your healthcare provider will give you enough medicine to keep you relaxed and calm. Your healthcare provider will monitor your blood pressure, heart rate, and breathing. You will be on a heart monitor and a pulse oximeter. A heart monitor is a safety device that stays on continuously to record your heart's electrical activity. A pulse oximeter is a device that measures the amount of oxygen in your blood. You may get oxygen through a mask placed over your nose and mouth or through small tubes placed in your nostrils.  What will happen after moderate sedation:  Healthcare providers will monitor you until you are awake. You may need extra oxygen if your blood oxygen level is lower than it should be. Ask your healthcare provider before you take off the mask  or oxygen tubing. You may be able to go home when you are alert and can stand up. This may take 1 to 2 hours after you have received moderate sedation. You may feel tired, weak, or unsteady on your feet after you get sedation. You may also have trouble concentrating or short-term memory loss. These symptoms should go away in 24 hours or less.  Risks of moderate sedation:   You may get a headache or nausea from the medicine. You may have problems with your short-term memory. Your skin may itch or your eyes may water. You may not get enough sedation, or it may wear off quickly. You may feel restless during the procedure or as you wake up.    Too much medicine can cause deep sedation. Your healthcare provider may have trouble waking you, and you may need medicine to help you wake up. Your breathing may not be regular, or it may stop. You may need a ventilator to help you breathe. Your risk for problems with sedation is higher if you have heart or lung disease, a head injury, or drink alcohol.    Call 911 or have someone else call for any of the following:   You have sudden trouble breathing.    You cannot be woken.    Seek care immediately if:   You have a severe headache or dizziness.    Your heart is beating faster than usual.    Contact your healthcare provider if:   You have a fever.    You have nausea or are vomiting for more than 8 hours after the procedure.    Your skin is itchy, swollen, or you have a rash.    You have questions or concerns about your condition or care.    Self-care:   Have someone stay with you for 24 hours.  This person can drive you to errands and help you do things around the house. This person can also watch for problems.    Rest and do quiet activities for 24 hours.  Do not exercise, ride a bike, or play sports. Stand up slowly to prevent dizziness and falls. Take short walks around the house with another person. Slowly return to your usual activities the next day.    Do not drive or use  dangerous machines or tools for 24 hours.  You may injure yourself or others. Examples include a lawnmower, saw, or drill. Do not return to work for 24 hours if you use dangerous machines or tools for work.    Do not make important decisions for 24 hours.  For example, do not sign important papers or invest money.    Drink liquids as directed.  Liquids help flush the sedation medicine out of your body. Ask how much liquid to drink each day and which liquids are best for you.    Eat small, frequent meals to prevent nausea and vomiting.  Start with clear liquids such as juice or broth. If you do not vomit after clear liquids, you can eat your usual foods.    Do not drink alcohol or take medicines that make you drowsy.  This includes medicines that help you sleep and anxiety medicines. Ask your healthcare provider if it is safe for you to take pain medicine.    Follow up with your healthcare provider as directed:  Write down your questions so you remember to ask them during your visits.  © Copyright [employer] 2023 Information is for End User's use only and may not be sold, redistributed or otherwise used for commercial purposes.  The above information is an  only. It is not intended as medical advice for individual conditions or treatments. Talk to your doctor, nurse or pharmacist before following any medical regimen to see if it is safe and effective for you.  Colonoscopy   WHAT YOU NEED TO KNOW:   What do I need to know about a colonoscopy?  A colonoscopy is a procedure to examine the inside of your colon (intestine) with a scope. A scope is a flexible tube with a small light and camera on the end. Polyps or tissue growths may be removed during your colonoscopy.       What do I need to do the week before my colonoscopy?  Give your healthcare provider a list of all the medicines, supplements, and herbs you take. You will need to stop taking medicines that contain aspirin or iron for 7 days before your  colonoscopy. If you take a blood thinner, such as warfarin, ask when you should stop taking it. Make plans for someone to drive you home after your procedure.  How do I prepare for my colonoscopy?  Your healthcare provider will have you prepare your bowels before your procedure. It is important for your bowels to be empty before your procedure to allow him or her to see your colon clearly. You will need to do the following:  Have only clear liquids for the entire day before your colonoscopy.  Clear liquids include plain gelatin, unsweetened fruit juices, clear soup, and broth. Do not drink any liquid that is blue, red, or purple.    Follow your bowel prep as directed.  There are many different preparations that can be given before a colonoscopy. With any bowel prep, stay close to the bathroom. This prep will cause your bowels to move often.    Use an enema if directed.  Your healthcare provider may tell you to use an enema to help clean out your bowels.    Do not eat or drink anything after midnight.  This will help prevent problems that can happen if you vomit while under anesthesia.    What will happen during my colonoscopy?   You will be given medicine to help you relax. You will lie on your left side and raise one or both knees toward your chest. Your healthcare provider will examine your anus and use a gloved finger to check your rectum. You may need another enema if your bowel is not empty. The scope will be lubricated and gently placed into your anus. It will then be passed through your rectum and into your colon. Water or air will be put into your colon to help clean or expand it. This is done so your healthcare provider can see your colon clearly.     Tissue samples may be taken from the walls of your bowel and sent to a lab for tests. If you have a polyp, your healthcare provider will pass a wire loop through the scope and use it to hold the polyp. The polyp is then removed from the wall of your colon. You  should not feel this. The polyps are sent to a lab for tests. Pictures of your colon may be taken during the procedure.    What will happen after my colonoscopy?  You may feel bloated or have some gas and abdominal discomfort. You may need to lie on your left side with a heating pad on your abdomen. Eat small meals until your bloating has improved.  What are the risks of a colonoscopy?  You may have pain or bleeding. You may also have a slow heartbeat, decreased blood pressure, or increased sweating. Your colon may tear due to the increased pressure from the scope and other instruments. This may cause bowel contents to leak out of your colon and into your abdomen. If this happens, you will need to stay in the hospital and have surgery on your colon.  CARE AGREEMENT:   You have the right to help plan your care. Learn about your health condition and how it may be treated. Discuss treatment options with your healthcare providers to decide what care you want to receive. You always have the right to refuse treatment. The above information is an  only. It is not intended as medical advice for individual conditions or treatments. Talk to your doctor, nurse or pharmacist before following any medical regimen to see if it is safe and effective for you.  © Copyright [employer] 2023 Information is for End User's use only and may not be sold, redistributed or otherwise used for commercial purposes.

## 2024-04-08 NOTE — PROGRESS NOTES
[facility] - Outpatient Consultation  Cristian Quijano 54 y.o. male MRN: [id number]  Encounter: [id number]          ASSESSMENT AND PLAN:    Cristian Quijano is a 54 y.o. male who presents for screening colonoscopy.    1. Colon cancer screening     BMs are brown and formed daily.  Has occasional harder stools alternating with softer stools.  Denies any melena or hematochezia.  No weight loss or abdominal pain.  Denies any family history of any colon cancers or polyps.    -Fiber supplement daily  -Colonoscopy GoLytely/Dulcolax prep  -Follow-up in office after testing        2.  Anemia    Decreased hemoglobin noted since 2/24.  Hemoglobin 11.6.  MCH 25.3.  Denies any melena or hematochezia.  Does have some nausea at times but cannot pinpoint triggers.  No vomiting.  Does have a history of GERD.  Never had any EGD or colonoscopy.  Will rule out GI source for anemia.      -Iron studies, celiac panel, CBC  -EGD and colonoscopy          3.  Elevated LFTs    Elevated alkaline phosphatase and total bilirubin also noted on recent labs.  T. bili 1.06, alk phos 195.  No recent imaging of abdomen.  No alcohol for 5 years.  No herbal supplement use.  Denies any family history of any liver disease.  Will obtain a full liver serology workup.  Will also obtain an ultrasound of the abdomen.    -CHACHA, AMA, ASMA, ceruloplasmin, alpha-1 antitrypsin, iron studies, celiac panel, hepatitis profile  -Ultrasound of the abdomen    I obtained informed consent from the patient. The risks/benefits/alternatives of the procedure were discussed with the patient. Risks included, but not limited to, infection, bleeding, perforation, injury to organs in the abdomen, missed lesion and incomplete procedure were discussed. Patient was agreeable and electronic signature was obtained.         ______________________________________________________________________    HPI:    Cristian Quijano is a 54 y.o.  male who presents with complaint of . e has a past medical history of CHF, HTN, nonischemic cardiomyopathy and depression.    Here to set up screening colonoscopy.  BMs are brown and formed daily.  Has occasional harder stools alternating with softer stools.  Denies any melena or hematochezia.  No weight loss or abdominal pain.  Denies any family history of any colon cancers or polyps.    Upon reviewing his labs, hemoglobin 11.6 with MCH 25.3.  It appears he has been anemic for several months.  Does have a history of GERD.  He is currently taking pantoprazole 40 mg daily which controls his reflux.  Does get some nausea at times.  Denies any vomiting or dysphagia.  Never had any EGD.  Quit alcohol 5 years ago.  Was drinking daily.  Drinks 1 cup of caffeine daily.  Occasional NSAID use.      Elevated alkaline phosphatase and total bilirubin also noted on recent labs.  T. bili 1.06, alk phos 195.  No recent imaging of abdomen.  No alcohol for 5 years.  No herbal supplement use.  Denies any family history of any liver disease.            Labs 2/24-creatinine 1.41, alk phos 195, total protein 6.3, BUN 3.3, total bilirubin 1.06, hemoglobin 11.6, MCH 25.3.    No recent imaging to review        Previous EGD/colonoscopy    Never had any EGD or colonoscopy.        REVIEW OF SYSTEMS:    CONSTITUTIONAL: Denies any fever, chills, rigors, and weight loss.  HEENT: No earache or tinnitus. Denies hearing loss or visual disturbances.  CARDIOVASCULAR: No chest pain or palpitations.   RESPIRATORY: Denies any cough, hemoptysis, shortness of breath or dyspnea on exertion.  GASTROINTESTINAL: As noted in the History of Present Illness.   GENITOURINARY: No problems with urination. Denies any hematuria or dysuria.  NEUROLOGIC: No dizziness or vertigo, denies headaches.   MUSCULOSKELETAL: Denies any muscle or joint pain.   SKIN: Denies skin rashes or itching.   ENDOCRINE: Denies excessive thirst. Denies intolerance to heat or  cold.  PSYCHOSOCIAL: Denies depression or anxiety. Denies any recent memory loss.       Historical Information   Past Medical History:   Diagnosis Date   • HEIKE (acute kidney injury) ([facility]) 08/05/2023   • Chronic HFrEF (heart failure with reduced ejection fraction) ([facility]) 04/01/2022   • Does not have health insurance 08/14/2023   • Pain in both testicles      Past Surgical History:   Procedure Laterality Date   • CARDIAC CATHETERIZATION N/A 4/4/2022    Procedure: CARDIAC CORONARY ANGIOGRAM;  Surgeon: Flex Hunt MD;  Location: [facility];  Service: Cardiology   • CARDIAC CATHETERIZATION Left 4/4/2022    Procedure: Cardiac Left Heart Cath;  Surgeon: Flex Hunt MD;  Location: [facility];  Service: Cardiology     Social History   Social History     Substance and Sexual Activity   Alcohol Use Never     Social History     Substance and Sexual Activity   Drug Use Never     Social History     Tobacco Use   Smoking Status Never   • Passive exposure: Never   Smokeless Tobacco Never     Family History   Problem Relation Age of Onset   • Bone cancer Mother        Meds/Allergies       Current Outpatient Medications:   •  acetaminophen (TYLENOL) 325 mg tablet  •  atorvastatin (LIPITOR) 40 mg tablet  •  carvedilol (COREG) 12.5 mg tablet  •  Empagliflozin (JARDIANCE) 10 MG TABS tablet  •  FLUoxetine (PROzac) 10 mg capsule  •  furosemide (LASIX) 20 mg tablet  •  hydrALAZINE (APRESOLINE) 25 mg tablet  •  isosorbide mononitrate (IMDUR) 30 mg 24 hr tablet  •  pantoprazole (PROTONIX) 40 mg tablet  •  polyethylene glycol (Golytely) 4000 mL solution  •  sacubitril-valsartan (ENTRESTO) 24-26 MG TABS  •  spironolactone (ALDACTONE) 25 mg tablet  •  sucralfate (CARAFATE) 1 g tablet    No Known Allergies        Objective     Blood pressure 137/79, pulse 71, temperature 98.7 °F (37.1 °C), temperature source Tympanic, height 6' (1.829 m), weight 99.1 kg (218 lb 6.4 oz). Body mass index is 29.62 kg/m².        PHYSICAL  EXAM:      General Appearance:   Alert, cooperative, no distress   HEENT:   Normocephalic, atraumatic, anicteric.     Neck:  Supple, symmetrical, trachea midline   Lungs:   Clear to auscultation bilaterally; no rales, rhonchi or wheezing; respirations unlabored    Heart::   Regular rate and rhythm; no murmur, rub, or gallop.   Abdomen:   Soft, non-tender, non-distended; normal bowel sounds; no masses, no organomegaly    Genitalia:   Deferred    Rectal:   Deferred    Extremities:  No cyanosis, clubbing or edema    Pulses:  2+ and symmetric    Skin:  No jaundice, rashes, or lesions    Lymph nodes:  No palpable cervical lymphadenopathy        Lab Results:   No visits with results within 1 Day(s) from this visit.   Latest known visit with results is:   Admission on 02/07/2024, Discharged on 02/09/2024   Component Date Value   • WBC 02/07/2024 7.11    • RBC 02/07/2024 4.45    • Hemoglobin 02/07/2024 11.6 (L)    • Hematocrit 02/07/2024 37.9    • MCV 02/07/2024 85    • MCH 02/07/2024 26.1 (L)    • MCHC 02/07/2024 30.6 (L)    • RDW 02/07/2024 17.7 (H)    • MPV 02/07/2024 10.4    • Platelets 02/07/2024 260    • nRBC 02/07/2024 0    • Neutrophils Relative 02/07/2024 79 (H)    • Immature Grans % 02/07/2024 0    • Lymphocytes Relative 02/07/2024 14    • Monocytes Relative 02/07/2024 5    • Eosinophils Relative 02/07/2024 2    • Basophils Relative 02/07/2024 0    • Neutrophils Absolute 02/07/2024 5.56    • Absolute Immature Grans 02/07/2024 0.03    • Absolute Lymphocytes 02/07/2024 1.00    • Absolute Monocytes 02/07/2024 0.36    • Eosinophils Absolute 02/07/2024 0.14    • Basophils Absolute 02/07/2024 0.02    • Sodium 02/07/2024 138    • Potassium 02/07/2024 4.6    • Chloride 02/07/2024 106    • CO2 02/07/2024 25    • ANION GAP 02/07/2024 7    • BUN 02/07/2024 15    • Creatinine 02/07/2024 1.16    • Glucose 02/07/2024 161 (H)    • Calcium 02/07/2024 8.7    • AST 02/07/2024 22    • ALT 02/07/2024 19    • Alkaline Phosphatase  02/07/2024 238 (H)    • Total Protein 02/07/2024 6.7    • Albumin 02/07/2024 3.5    • Total Bilirubin 02/07/2024 1.09 (H)    • eGFR 02/07/2024 71    • hs TnI 0hr 02/07/2024 24    • BNP 02/07/2024 697 (H)    • hs TnI 2hr 02/07/2024 27    • Delta 2hr hsTnI 02/07/2024 3    • Amph/Meth UR 02/07/2024 Negative    • Barbiturate Ur 02/07/2024 Negative    • Benzodiazepine Urine 02/07/2024 Negative    • Cocaine Urine 02/07/2024 Negative    • Methadone Urine 02/07/2024 Negative    • Opiate Urine 02/07/2024 Negative    • PCP Ur 02/07/2024 Negative    • THC Urine 02/07/2024 Negative    • Oxycodone Urine 02/07/2024 Negative    • Ethanol Lvl 02/07/2024 <10    • BSA 02/08/2024 2.28    • A4C EF 02/08/2024 46    • LVIDd 02/08/2024 5.30    • LVIDS 02/08/2024 4.00    • IVSd 02/08/2024 1.00    • LVPWd 02/08/2024 1.00    • FS 02/08/2024 25    • MV E' Tissue Velocity Se* 02/08/2024 7    • LA Volume Index (BP) 02/08/2024 43.9    • E/A ratio 02/08/2024 1.24    • E wave deceleration time 02/08/2024 195    • MV Peak E Efrain 02/08/2024 72    • MV Peak A Efrain 02/08/2024 0.58    • RVID d 02/08/2024 4.8    • Tricuspid annular plane * 02/08/2024 1.60    • LA size 02/08/2024 5.4    • LA length (A2C) 02/08/2024 6.30    • LA volume (BP) 02/08/2024 100    • RAA A4C 02/08/2024 30.9    • MV stenosis pressure 1/2* 02/08/2024 57    • MV valve area p 1/2 meth* 02/08/2024 3.86    • TR Peak Efrain 02/08/2024 2.9    • Triscuspid Valve Regurgi* 02/08/2024 32.0    • Ao root 02/08/2024 2.90    • Tricuspid valve peak reg* 02/08/2024 2.85    • Left ventricular stroke * 02/08/2024 68.00    • IVS 02/08/2024 1    • LEFT VENTRICLE SYSTOLIC * 02/08/2024 68    • LV DIASTOLIC VOLUME (MOD* 02/08/2024 136    • Left Atrium Area-systoli* 02/08/2024 27.9    • Left Atrium Area-systoli* 02/08/2024 28.2    • LVSV, 2D 02/08/2024 68    • LV EF 02/08/2024 50    • Sodium 02/08/2024 142    • Potassium 02/08/2024 3.9    • Chloride 02/08/2024 103    • CO2 02/08/2024 29    • ANION GAP  02/08/2024 10    • BUN 02/08/2024 16    • Creatinine 02/08/2024 1.27    • Glucose 02/08/2024 78    • Calcium 02/08/2024 9.0    • Corrected Calcium 02/08/2024 9.6    • AST 02/08/2024 17    • ALT 02/08/2024 18    • Alkaline Phosphatase 02/08/2024 211 (H)    • Total Protein 02/08/2024 6.5    • Albumin 02/08/2024 3.3 (L)    • Total Bilirubin 02/08/2024 1.48 (H)    • eGFR 02/08/2024 64    • Magnesium 02/08/2024 1.9    • Phosphorus 02/08/2024 3.8    • Ventricular Rate 02/07/2024 110    • Atrial Rate 02/07/2024 110    • MS Interval 02/07/2024 164    • QRSD Interval 02/07/2024 66    • QT Interval 02/07/2024 336    • QTC Interval 02/07/2024 454    • P Axis 02/07/2024 68    • QRS Axis 02/07/2024 17    • T Wave Axis 02/07/2024 71    • Sodium 02/09/2024 140    • Potassium 02/09/2024 3.4 (L)    • Chloride 02/09/2024 101    • CO2 02/09/2024 30    • ANION GAP 02/09/2024 9    • BUN 02/09/2024 21    • Creatinine 02/09/2024 1.41 (H)    • Glucose 02/09/2024 86    • Calcium 02/09/2024 8.7    • Corrected Calcium 02/09/2024 9.3    • AST 02/09/2024 13    • ALT 02/09/2024 14    • Alkaline Phosphatase 02/09/2024 195 (H)    • Total Protein 02/09/2024 6.3 (L)    • Albumin 02/09/2024 3.3 (L)    • Total Bilirubin 02/09/2024 1.06 (H)    • eGFR 02/09/2024 56    • WBC 02/09/2024 4.94    • RBC 02/09/2024 4.58    • Hemoglobin 02/09/2024 11.6 (L)    • Hematocrit 02/09/2024 37.5    • MCV 02/09/2024 82    • MCH 02/09/2024 25.3 (L)    • MCHC 02/09/2024 30.9 (L)    • RDW 02/09/2024 17.2 (H)    • MPV 02/09/2024 10.8    • Platelets 02/09/2024 259    • nRBC 02/09/2024 0    • Neutrophils Relative 02/09/2024 74    • Immature Grans % 02/09/2024 0    • Lymphocytes Relative 02/09/2024 10 (L)    • Monocytes Relative 02/09/2024 9    • Eosinophils Relative 02/09/2024 6    • Basophils Relative 02/09/2024 1    • Neutrophils Absolute 02/09/2024 3.72    • Absolute Immature Grans 02/09/2024 0.01    • Absolute Lymphocytes 02/09/2024 0.47 (L)    • Absolute Monocytes  02/09/2024 0.43    • Eosinophils Absolute 02/09/2024 0.27    • Basophils Absolute 02/09/2024 0.04        Lab Results   Component Value Date    WBC 4.94 02/09/2024    HGB 11.6 (L) 02/09/2024    HCT 37.5 02/09/2024    MCV 82 02/09/2024     02/09/2024       Lab Results   Component Value Date    SODIUM 140 02/09/2024    K 3.4 (L) 02/09/2024     02/09/2024    CO2 30 02/09/2024    AGAP 9 02/09/2024    BUN 21 02/09/2024    CREATININE 1.41 (H) 02/09/2024    GLUC 86 02/09/2024    CALCIUM 8.7 02/09/2024    AST 13 02/09/2024    ALT 14 02/09/2024    ALKPHOS 195 (H) 02/09/2024    TP 6.3 (L) 02/09/2024    TBILI 1.06 (H) 02/09/2024    EGFR 56 02/09/2024       Lab Results   Component Value Date    CRP 11.9 (H) 08/05/2023       Lab Results   Component Value Date    [id number] 0.729 03/31/2022       Lab Results   Component Value Date    IRON 86 04/02/2022    TIBC 403 04/02/2022    FERRITIN 52 04/02/2022       Radiology Results:   No results found.

## 2024-04-10 ENCOUNTER — HOSPITAL ENCOUNTER (OUTPATIENT)
Dept: ULTRASOUND IMAGING | Facility: HOSPITAL | Age: 54
Discharge: HOME | End: 2024-04-10
Payer: COMMERCIAL

## 2024-04-10 DIAGNOSIS — R79.89 ELEVATED LFTS: ICD-10-CM

## 2024-04-10 PROCEDURE — 76700 US EXAM ABDOM COMPLETE: CPT

## 2024-04-18 ENCOUNTER — TELEPHONE (OUTPATIENT)
Dept: FAMILY MEDICINE CLINIC | Facility: CLINIC | Age: 54
End: 2024-04-18

## 2024-04-18 ENCOUNTER — TELEPHONE (OUTPATIENT)
Dept: GASTROENTEROLOGY | Facility: MEDICAL CENTER | Age: 54
End: 2024-04-18

## 2024-04-18 NOTE — TELEPHONE ENCOUNTER
Called Pt 2 times and left messages to give ultrasound results. Sent Pt a letter asking Pt to call back for their ultrasound results.

## 2024-04-18 NOTE — TELEPHONE ENCOUNTER
04/18/24    CALLED PT     NO ANSWER    LEFT MESSAGE     APPT CANCELED     PCP NOT AVAILABLE      IF PT CONTACTS OFFICE, PLEASE ASSIST PT WITH [facility]IR 04/18/24    NOTE: LETTER SENT TO [facility]    67yo with multiple sclerosis and h/o urinary incontinence. Last seen 5/2022, has failed ditropan, mirabegron and trospium here for botox, lasts 5-6 months    baseline: daytime voids 6-8 voids more after coffee in the am, nocturia 1 occ 2, will have urgency/urge incontinence, wearing a depends, happening more than 1x/day, changing pads/depends 1-2/day  Drinking 3-4 cups coffee    UDS 8/2020: PVR 50cc, evidence of voiding dysfunction not DSD, end filling pressure 6mmHg, no GISELLE or UUI seen, capacity 375cc, Storage bladder pressures stay under 15mmHg    Had been on sanctura, then ditropan 5mg, tried mirabegorn w/ good results (60-70% improvement) but had to stop 2' cost, then on trospium and back to ditropan     Urodynamics October 2016:  Intermittent incomplete bladder emptying   No detrusor overactivity or stress urinary incontinence detected  Bladder capacity over 500cc  Storage bladder pressures stay under 15mmHg       Vitals:    01/18/23 1351   BP: (!) 149/66   Pulse: 82     Patient consent obtained, 100U botox in 10mL of NS (normal saline), cystoscope placed, 30 degree scope, UOs identified, needle set for 3mm, 10 injections of 1.0cc were placed throughout the bladder, taking care to avoid the trigone. The needle was flushed with 1cc during the last injection. Patient tolerated the procedure well          A/ urge incontinence, neurogenic bladder     P/  botox 100U today, warning signs reviewed, repeat in 6 months

## 2024-04-21 LAB
ANA SER QL IF: NEGATIVE
HAV AB SER QL IA: REACTIVE
HBV SURFACE AB SERPL IA-ACNC: <5 MIU/ML
HCV AB SERPL QL IA: NORMAL
IRON SATN MFR SERPL: 20 % (CALC) (ref 20–48)
IRON SERPL-MCNC: 77 MCG/DL (ref 50–180)
TIBC SERPL-MCNC: 381 MCG/DL (CALC) (ref 250–425)

## 2024-04-22 LAB
A1AT SERPL-MCNC: 131 MG/DL (ref 83–199)
ALBUMIN SERPL-MCNC: 3.9 G/DL (ref 3.6–5.1)
ALBUMIN/GLOB SERPL: 1.6 (CALC) (ref 1–2.5)
ALP SERPL-CCNC: 101 U/L (ref 35–144)
ALT SERPL-CCNC: 15 U/L (ref 9–46)
AST SERPL-CCNC: 17 U/L (ref 10–35)
BILIRUB DIRECT SERPL-MCNC: 0.1 MG/DL
BILIRUB INDIRECT SERPL-MCNC: 0.3 MG/DL (CALC) (ref 0.2–1.2)
BILIRUB SERPL-MCNC: 0.4 MG/DL (ref 0.2–1.2)
CERULOPLASMIN SERPL-MCNC: 21 MG/DL (ref 18–36)
GLOBULIN SER CALC-MCNC: 2.5 G/DL (CALC) (ref 1.9–3.7)
IGA SERPL-MCNC: 227 MG/DL (ref 47–310)
MITOCHONDRIA AB SER QL IF: NEGATIVE
PROT SERPL-MCNC: 6.4 G/DL (ref 6.1–8.1)
SMOOTH MUSCLE AB SER QL IF: NEGATIVE
TTG IGA SER-ACNC: <1 U/ML

## 2024-04-29 ENCOUNTER — TELEPHONE (OUTPATIENT)
Dept: FAMILY MEDICINE CLINIC | Facility: CLINIC | Age: 54
End: 2024-04-29

## 2024-04-29 NOTE — TELEPHONE ENCOUNTER
Patient was left a VM on 4/29 in regards to rescheduling appointment, due to provider not being in office.    Please assist in rescheduling patient!!!

## 2024-05-02 ENCOUNTER — TELEPHONE (OUTPATIENT)
Dept: GASTROENTEROLOGY | Facility: CLINIC | Age: 54
End: 2024-05-02

## 2024-05-02 ENCOUNTER — PATIENT MESSAGE (OUTPATIENT)
Dept: GASTROENTEROLOGY | Facility: CLINIC | Age: 54
End: 2024-05-02

## 2024-05-06 ENCOUNTER — OFFICE VISIT (OUTPATIENT)
Dept: FAMILY MEDICINE CLINIC | Facility: CLINIC | Age: 54
End: 2024-05-06

## 2024-05-06 ENCOUNTER — PATIENT OUTREACH (OUTPATIENT)
Dept: FAMILY MEDICINE CLINIC | Facility: CLINIC | Age: 54
End: 2024-05-06

## 2024-05-06 ENCOUNTER — NURSE TRIAGE (OUTPATIENT)
Age: 54
End: 2024-05-06

## 2024-05-06 VITALS
DIASTOLIC BLOOD PRESSURE: 85 MMHG | HEIGHT: 72 IN | OXYGEN SATURATION: 98 % | SYSTOLIC BLOOD PRESSURE: 163 MMHG | RESPIRATION RATE: 18 BRPM | HEART RATE: 86 BPM | BODY MASS INDEX: 30.34 KG/M2 | TEMPERATURE: 98.3 F | WEIGHT: 224 LBS

## 2024-05-06 DIAGNOSIS — L03.115 CELLULITIS OF RIGHT LOWER EXTREMITY: Primary | ICD-10-CM

## 2024-05-06 DIAGNOSIS — F32.1 CURRENT MODERATE EPISODE OF MAJOR DEPRESSIVE DISORDER, UNSPECIFIED WHETHER RECURRENT (HCC): ICD-10-CM

## 2024-05-06 DIAGNOSIS — I50.42 CHRONIC COMBINED SYSTOLIC AND DIASTOLIC CONGESTIVE HEART FAILURE (HCC): ICD-10-CM

## 2024-05-06 DIAGNOSIS — Z78.9 NEEDS ASSISTANCE WITH COMMUNITY RESOURCES: ICD-10-CM

## 2024-05-06 DIAGNOSIS — Z75.4 INAVAILABILITY OF COMMUNITY RESOURCES: Primary | ICD-10-CM

## 2024-05-06 PROCEDURE — 99213 OFFICE O/P EST LOW 20 MIN: CPT | Performed by: FAMILY MEDICINE

## 2024-05-06 RX ORDER — CEFUROXIME AXETIL 500 MG/1
500 TABLET ORAL EVERY 12 HOURS SCHEDULED
Qty: 14 TABLET | Refills: 0 | Status: SHIPPED | OUTPATIENT
Start: 2024-05-06 | End: 2024-05-13

## 2024-05-06 NOTE — TELEPHONE ENCOUNTER
----- Message from Luz Salazar sent at 5/6/2024  2:19 PM EDT -----  Per recepitionist at [facility], Dr. Robert Fraga would like this patient seen this week for leg swelling and labs.  He has been seen in the past by Heart Failure Team, both [facility] and [facility] in the past.  He was seen by [place] providers while hospitalized.  He is experiencing leg swelling.  Urgency visits are available for both [facility] and [facility], but unable to schedule without being triaged.  Attempted to contact nurses during call from [facility].  Please call patient  back @ [phone] to determine how soon patient needs to be seen. Patient is Syriac speaking.

## 2024-05-06 NOTE — TELEPHONE ENCOUNTER
Called significant other [place]'s number however voicemail in Arabic. Phone did not ring, went right to voicemail.

## 2024-05-06 NOTE — PROGRESS NOTES
VIOLETTA JUAN received in person consult from provider Robert Fraga. Provider explained VIOLETTA JUAN that Pt would like to speak with VIOLETTA JUAN. VIOLETTA JUAN met with Pt at the exam room, introduced herself, her role, and assist Pt in German. VIOLETTA JUAN inquired Pt to share his current concerns or social needs. Pt stated that he can not work due to his medical condition, also, he is dealing with depression and anxiety symptoms. Provider manages his psychotropic medications at this time. VIOLETTA JUAN inquired Pt if he has OP MH treatment. Pt stated that he did not think about MH treatment, however, he would like the MH resources. VIOLETTA JUAN informed Pt that [facility] will provide him with the resources mentioned above.     Also, Pt expressed that he has been struggling with food, he applied for SNAP but was denied, Pt would like to apply again. VIOLETTA JUAN explained Pt that VIOLETTA  will place a referral to CM to assist him in applying for the service above. In addition, VIOLETTA JUAN and Pt discussed in regard food bank. VIOLETTA JUAN provided Pt with OP MH and food bank resources. Pt expressed that his wife works and she helps him financially. Pt stated that he applied for SSI and is waiting to confirm whether or not will be approved.     Also, VIOLETTA JUAN and Pt discussed about housing issues paying his rent. Pt expressed that he went to [employer] to inquire for help but he did not follow-up with them. VIOLETTA JUAN encouraged Pt to go or call the agency to assure if they can assist him with the rent deposit. Also, VIOLETTA JUAN encouraged Pt to go  [employer] to inquire if they have rent assistance at this time. In regard, transportation Pt expressed that he drives self.     VIOLETTA JUAN inquired Pt if there is any other social needs that he needs help with. Pt declined any other social needs at this time. VIOLETTA JUAN informed Pt that CM will contact him once receives the referral, and will assist him in German. Also, Pt is aware that he can reach out the [facility] for  further assistance Monday through Friday within office hours. Pt seems understanding and thankful for the [facility] support.     Patient was referred on [facility] to  [facility], [facility].  [employer]  [address]  [place] [address]   [phone]    [employer] de alimentos de la [place] [employer] [place] por: [facility]  [employer] alimentos de serrano  [address]  [place] [address]   [phone]    [employer] [place]  [address]  [place] [address]   [phone]    [facility]  [address]  [place] [address]   [phone]    [facility]  [address]  [place] [address]   [phone]    [facility] de [facility]  [address] [address] [phone]    [place] [facility] [place]  [address]  [place] [address]   [phone]    [facility] is remain available for further assistance as needed.  [facility] will continue to follow-up.

## 2024-05-06 NOTE — PROGRESS NOTES
Name: Cristian Quijano      : 1970      MRN: [id number]  Encounter Provider: Robert Fraga MD  Encounter Date: 2024   Encounter department: [facility]    Assessment & Plan     1. Cellulitis of right lower extremity  Assessment & Plan:  Picture uploaded to media. Will begin therapy with cefuroxime 500mg BID for 7 days.    Orders:  -     cefuroxime (CEFTIN) 500 mg tablet; Take 1 tablet (500 mg total) by mouth every 12 (twelve) hours for 7 days    2. Current moderate episode of major depressive disorder, unspecified whether recurrent (HCC)  Assessment & Plan:  PHQ9 6. No SI/HI. Stressor continues to be financial insecurity second to failing health. Continue with fluoxetine 10mg daily. Will be speaking with SW after visit for further assistance.      3. Chronic combined systolic and diastolic congestive heart failure (HCC)  Assessment & Plan:  Wt Readings from Last 3 Encounters:   24 102 kg (224 lb)   24 99.1 kg (218 lb 6.4 oz)   24 98.4 kg (217 lb)     - Continue current medication: Carvedilol 12.5 mg BID, Jardiance 10 mg daily, Lasix 60 mg BID, Hydralazine 25 mg TID, Imdur 30 mg daily, Spironolactone 25 mg daily and Entresto 24-26 mg BID.    Consider changing lasix dosage given continued leg swelling however need to get laboratory workup completed prior to adjusting medication  - have contacted referral team to assist pt in scheduling follow up with cardiology for further management.             Subjective      53yo male presenting to clinic for follow up of depression. Pt continues to have financial difficulties and is facing eviction.     Does note right leg pustule that began 1 week ago, no trauma to area or other exposure to irritant, lesion occasionally gets itchy but avoids scratching. No notable discharge or bleeding.      Review of Systems   Constitutional:  Negative for chills and fever.   Respiratory:  Negative for cough and  shortness of breath.    Cardiovascular:  Positive for leg swelling. Negative for chest pain.   Gastrointestinal:  Negative for abdominal pain, blood in stool, constipation, diarrhea, nausea and vomiting.   Genitourinary:  Negative for dysuria and hematuria.       Current Outpatient Medications on File Prior to Visit   Medication Sig    acetaminophen (TYLENOL) 325 mg tablet Take 2 tablets (650 mg total) by mouth every 6 (six) hours as needed for mild pain    atorvastatin (LIPITOR) 40 mg tablet Take 1 tablet (40 mg total) by mouth daily with dinner    carvedilol (COREG) 12.5 mg tablet Take 1 tablet (12.5 mg total) by mouth 2 (two) times a day with meals    Empagliflozin (JARDIANCE) 10 MG TABS tablet Take 1 tablet (10 mg total) by mouth daily    FLUoxetine (PROzac) 10 mg capsule Take 1 capsule (10 mg total) by mouth daily    furosemide (LASIX) 20 mg tablet Take 3 tablets (60 mg total) by mouth 2 (two) times a day    hydrALAZINE (APRESOLINE) 25 mg tablet Take 1 tablet (25 mg total) by mouth 3 (three) times a day    isosorbide mononitrate (IMDUR) 30 mg 24 hr tablet Take 1 tablet (30 mg total) by mouth daily    pantoprazole (PROTONIX) 40 mg tablet Take 1 tablet (40 mg total) by mouth daily in the early morning    polyethylene glycol (Golytely) 4000 mL solution Take 4,000 mL by mouth once for 1 dose Take 4000 mL by mouth once for 1 dose. Use as directed    sacubitril-valsartan (ENTRESTO) 24-26 MG TABS Take 1 tablet by mouth 2 (two) times a day    spironolactone (ALDACTONE) 25 mg tablet Take 1 tablet (25 mg total) by mouth daily    sucralfate (CARAFATE) 1 g tablet Take 1 tablet (1 g total) by mouth 4 (four) times a day (before meals and at bedtime)       Objective     /85 (BP Location: Left arm, Patient Position: Sitting, Cuff Size: Large)   Pulse 86   Temp 98.3 °F (36.8 °C) (Temporal)   Resp 18   Ht 6' (1.829 m)   Wt 102 kg (224 lb)   SpO2 98%   BMI 30.38 kg/m²     Physical Exam  Vitals and nursing note  reviewed.   Constitutional:       General: He is not in acute distress.     Appearance: Normal appearance. He is not ill-appearing, toxic-appearing or diaphoretic.   Eyes:      General: No scleral icterus.        Right eye: No discharge.         Left eye: No discharge.      Conjunctiva/sclera: Conjunctivae normal.   Cardiovascular:      Rate and Rhythm: Normal rate and regular rhythm.      Pulses: Normal pulses.      Heart sounds: Normal heart sounds. No murmur heard.     No friction rub. No gallop.   Pulmonary:      Effort: Pulmonary effort is normal. No respiratory distress.      Breath sounds: Normal breath sounds. No stridor. No wheezing, rhonchi or rales.   Chest:      Chest wall: No tenderness.   Musculoskeletal:         General: Normal range of motion.      Right lower le+ Edema present.      Left lower le+ Edema present.   Skin:     General: Skin is warm and dry.      Coloration: Skin is not jaundiced or pale.      Findings: Erythema and rash present. No abrasion, abscess, bruising or lesion. Rash is vesicular. Rash is not macular, nodular, papular, purpuric, pustular, scaling or urticarial.          Neurological:      General: No focal deficit present.      Mental Status: He is alert.   Psychiatric:         Mood and Affect: Mood normal.         Behavior: Behavior normal.       Robert Fraga MD

## 2024-05-06 NOTE — ASSESSMENT & PLAN NOTE
Wt Readings from Last 3 Encounters:   05/06/24 102 kg (224 lb)   04/08/24 99.1 kg (218 lb 6.4 oz)   03/18/24 98.4 kg (217 lb)     - Continue current medication: Carvedilol 12.5 mg BID, Jardiance 10 mg daily, Lasix 60 mg BID, Hydralazine 25 mg TID, Imdur 30 mg daily, Spironolactone 25 mg daily and Entresto 24-26 mg BID.    Consider changing lasix dosage given continued leg swelling however need to get laboratory workup completed prior to adjusting medication  - have contacted referral team to assist pt in scheduling follow up with cardiology for further management.

## 2024-05-06 NOTE — ASSESSMENT & PLAN NOTE
PHQ9 6. No SI/HI. Stressor continues to be financial insecurity second to failing health. Continue with fluoxetine 10mg daily. Will be speaking with SW after visit for further assistance.

## 2024-05-07 NOTE — ADDENDUM NOTE
Addended by: REYES, RAFAELINA on: 5/7/2024 10:05 AM     Modules accepted: Orders     Statement Selected

## 2024-05-08 NOTE — PROGRESS NOTES
Cardiology Follow Up    Cristian Quijano  1970  [id number]  [facility]  [address] [facility]  [place] [id number] [phone]    1. Acute on chronic combined systolic and diastolic HF (heart failure) (HCC)  furosemide (LASIX) injection 60 mg    torsemide (DEMADEX) 20 mg tablet    Basic metabolic panel    Magnesium    Basic metabolic panel    Magnesium      2. Nonischemic cardiomyopathy (HCC)  spironolactone (ALDACTONE) 25 mg tablet      3. Primary hypertension  spironolactone (ALDACTONE) 25 mg tablet      4. Mixed hyperlipidemia        5. Mitral valve insufficiency, unspecified etiology            Interval History:   Mr Cristian Quijano presents to our office with symptoms of HF.  Cristian called our office yesterday with weight 224 pounds up 5 pounds, shortness of breath, abdominal distention and cough.  Cristian admits to one week hx of worsening symptoms of Shortness of breath, abdominal bloating extremity edema and cough.  His weight is up to 223 pounds at home 220 pounds in the office.  He is not compliant with diet or fluid restriction.  Cristian is taking all his medication as prescribed.      Medical History   Primary Cardiologist ?   Hypertension  Hyperlipidemia 3/31/22 , , HDL 44, LDL 47   Chronic HFrEF dry weight around 198 pounds.    NICM LVEF 30-35% LVIDd 5.2cm improved to 40% , 2/08/24 LVEF 50%,LVIDd 5.3cm   Mod  MR  ETOH abuse  Cocaine abuse       [facility] 4/04/22 Normal coronaries  Cardiac MRI on 4/04/22 no evidence of myocardial scarring, inflammation or infiltrative disease.    Patient Active Problem List   Diagnosis    Chronic combined systolic and diastolic congestive heart failure (HCC)    Pleural effusion, left    Hypertension    Left leg pain    Elevated serum creatinine    Abnormal CT scan    Annual physical exam    Nonischemic cardiomyopathy (HCC)    Chest pain    Hyperlipidemia    Cocaine abuse  ([facility])    Elevated bilirubin    Right leg pain    Current moderate episode of major depressive disorder ([facility])    Social discord    Cellulitis of right lower extremity     Past Medical History:   Diagnosis Date    HEIKE (acute kidney injury) ([facility]) 08/05/2023    Chronic HFrEF (heart failure with reduced ejection fraction) ([facility]) 04/01/2022    Does not have health insurance 08/14/2023    Pain in both testicles      Social History     Socioeconomic History    Marital status: Single     Spouse name: Not on file    Number of children: Not on file    Years of education: Not on file    Highest education level: Not on file   Occupational History    Not on file   Tobacco Use    Smoking status: Some Days     Types: Cigarettes     Passive exposure: Current    Smokeless tobacco: Never   Vaping Use    Vaping status: Never Used   Substance and Sexual Activity    Alcohol use: Never    Drug use: Never    Sexual activity: Not on file   Other Topics Concern    Not on file   Social History Narrative    Not on file     Social Determinants of Health     Financial Resource Strain: High Risk (2/19/2024)    Overall Financial Resource Strain (CARDIA)     Difficulty of Paying Living Expenses: Very hard   Food Insecurity: No Food Insecurity (2/7/2024)    Hunger Vital Sign     Worried About Running Out of Food in the Last Year: Never true     Ran Out of Food in the Last Year: Never true   Transportation Needs: No Transportation Needs (2/7/2024)    PRAPARE - Transportation     Lack of Transportation (Medical): No     Lack of Transportation (Non-Medical): No   Physical Activity: Not on file   Stress: Not on file   Social Connections: Not on file   Intimate Partner Violence: Not on file   Housing Stability: Low Risk  (2/7/2024)    Housing Stability Vital Sign     Unable to Pay for Housing in the Last Year: No     Number of Places Lived in the Last Year: 1     Unstable Housing in the Last Year: No      Family History   Problem Relation Age of Onset     Bone cancer Mother      Past Surgical History:   Procedure Laterality Date    CARDIAC CATHETERIZATION N/A 4/4/2022    Procedure: CARDIAC CORONARY ANGIOGRAM;  Surgeon: Flex Hunt MD;  Location: [facility];  Service: Cardiology    CARDIAC CATHETERIZATION Left 4/4/2022    Procedure: Cardiac Left Heart Cath;  Surgeon: Flex Hunt MD;  Location: [facility];  Service: Cardiology       Current Outpatient Medications:     acetaminophen (TYLENOL) 325 mg tablet, Take 2 tablets (650 mg total) by mouth every 6 (six) hours as needed for mild pain, Disp: 90 tablet, Rfl: 0    atorvastatin (LIPITOR) 40 mg tablet, Take 1 tablet (40 mg total) by mouth daily with dinner, Disp: 30 tablet, Rfl: 0    carvedilol (COREG) 12.5 mg tablet, Take 1 tablet (12.5 mg total) by mouth 2 (two) times a day with meals, Disp: 60 tablet, Rfl: 3    cefuroxime (CEFTIN) 500 mg tablet, Take 1 tablet (500 mg total) by mouth every 12 (twelve) hours for 7 days, Disp: 14 tablet, Rfl: 0    Empagliflozin (JARDIANCE) 10 MG TABS tablet, Take 1 tablet (10 mg total) by mouth daily, Disp: 30 tablet, Rfl: 3    FLUoxetine (PROzac) 10 mg capsule, Take 1 capsule (10 mg total) by mouth daily, Disp: 60 capsule, Rfl: 0    furosemide (LASIX) 20 mg tablet, Take 3 tablets (60 mg total) by mouth 2 (two) times a day, Disp: 60 tablet, Rfl: 3    hydrALAZINE (APRESOLINE) 25 mg tablet, Take 1 tablet (25 mg total) by mouth 3 (three) times a day, Disp: 90 tablet, Rfl: 3    isosorbide mononitrate (IMDUR) 30 mg 24 hr tablet, Take 1 tablet (30 mg total) by mouth daily, Disp: 30 tablet, Rfl: 3    pantoprazole (PROTONIX) 40 mg tablet, Take 1 tablet (40 mg total) by mouth daily in the early morning, Disp: 30 tablet, Rfl: 0    polyethylene glycol (Golytely) 4000 mL solution, Take 4,000 mL by mouth once for 1 dose Take 4000 mL by mouth once for 1 dose. Use as directed, Disp: 4000 mL, Rfl: 0    sacubitril-valsartan (ENTRESTO) 24-26 MG TABS, Take 1 tablet by mouth 2  (two) times a day, Disp: 60 tablet, Rfl: 3    spironolactone (ALDACTONE) 25 mg tablet, Take 1 tablet (25 mg total) by mouth daily, Disp: 30 tablet, Rfl: 2    sucralfate (CARAFATE) 1 g tablet, Take 1 tablet (1 g total) by mouth 4 (four) times a day (before meals and at bedtime), Disp: 120 tablet, Rfl: 0  No Known Allergies    Labs:  Orders Only on 04/18/2024   Component Date Value    Iron, Serum 04/18/2024 77     Total Iron Binding [place]* 04/18/2024 381     Iron Saturation 04/18/2024 20     CHACHA Screen, IFA 04/18/2024 NEGATIVE     Hep A Ab, Total 04/18/2024 REACTIVE (A)     HEP C AB 04/18/2024 NON-REACTIVE     Hepatitis B Surface Anti* 04/18/2024 <5 (L)    Office Visit on 04/08/2024   Component Date Value    Protein, Total 04/18/2024 6.4     Albumin 04/18/2024 3.9     Globulin 04/18/2024 2.5     Albumin/Globulin Ratio 04/18/2024 1.6     TOTAL BILIRUBIN 04/18/2024 0.4     Bilirubin, Direct 04/18/2024 0.1     Bilirubin, Indirect 04/18/2024 0.3     Alkaline Phosphatase 04/18/2024 101     AST 04/18/2024 17     ALT 04/18/2024 15     Mitochondrial Ab Screen 04/18/2024 NEGATIVE     Smooth Muscle Ab Screen 04/18/2024 NEGATIVE     TISSUE TRANSGLUTAMINASE * 04/18/2024 <1.0     IgA 04/18/2024 227     A-1 Antitrypsin 04/18/2024 131     Ceruloplasmin 04/18/2024 21      Imaging: US abdomen complete    Result Date: 4/12/2024  Narrative: ABDOMEN ULTRASOUND, COMPLETE INDICATION: R79.89: Other specified abnormal findings of blood chemistry. COMPARISON: None TECHNIQUE: Real-time ultrasound of the abdomen was performed with a curvilinear transducer with both volumetric sweeps and still imaging techniques. FINDINGS: PANCREAS: Visualized portions of the pancreas are within normal limits. AORTA AND IVC: Visualized portions are normal for patient age. LIVER: Size: Mildly enlarged. The liver measures 18.1 cm in the midclavicular line. Contour: Surface contour is smooth. Parenchyma: There is mild diffuse increased echogenicity with smooth  echotexture, without significant beam attenuation or loss of periportal echogenicity. Most consistent with mild hepatic steatosis. No liver mass identified. Limited imaging of the main portal vein shows it to be patent and hepatopetal. BILIARY: No gallbladder findings. No intrahepatic biliary dilatation. CBD measures 6.0 mm. No choledocholithiasis. KIDNEY: Right kidney measures 10.0 x 5.0 x 5.1 cm. Volume 134.0 mL Kidney within normal limits. Left kidney measures 10.4 x 5.0 x 5.1 cm. Volume 140.1 mL Kidney within normal limits. SPLEEN: Measures 10.4 x 9.5 x 4.5 cm. Volume 234.4 mL Within normal limits. ASCITES: None.     Impression: Mild hepatic steatosis with hepatomegaly. Workstation performed: [id number]       Review of Systems:  Review of Systems   Constitutional:  Positive for fatigue.   Respiratory:  Positive for cough and shortness of breath.    Cardiovascular:  Positive for leg swelling.   Gastrointestinal:  Positive for abdominal distention.   Skin:         Right tibia with blister        Physical Exam:  Physical Exam  Constitutional:       Appearance: He is well-developed.   Neck:      Vascular: JVD present.   Cardiovascular:      Rate and Rhythm: Normal rate and regular rhythm.      Heart sounds: Murmur heard.   Pulmonary:      Effort: Pulmonary effort is normal.      Breath sounds: Normal breath sounds.      Comments: Diminished breath sounds throughout   Musculoskeletal:         General: Normal range of motion.      Cervical back: Normal range of motion.      Right lower leg: Edema present.      Left lower leg: Edema present.   Skin:     General: Skin is warm and dry.      Capillary Refill: Capillary refill takes less than 2 seconds.      Comments: Right tibia water blister   Daniel LE with slight erythema, cool to touch   Neurological:      General: No focal deficit present.      Mental Status: He is alert and oriented to person, place, and time.   Psychiatric:         Mood and Affect: Mood normal.          Behavior: Behavior normal.         Discussion/Summary:  # Acute on Chronic HFrEF dry weight around 198 pounds.  Weight at home 223 pounds weight in the office 220 pounds.  Physical exam decompensated positive JVD abdominal bloating and 2+ bilateral lower extremity edema.  According to Cristian he is taking all his medications as prescribed.  He is admits to dietary indiscretion and over drinking fluids.  IV Lasix 60 mg given during this office visit.  Will stop Lasix and place him on torsemide 40 mg twice daily, start Aldactone 25 mg daily, BMP in 1 week.  Continue on Entresto 24-26 mg twice daily uptitrate with next office visit.  Continue on Imdur 30 mg daily, hydralazine 25 mg 3 times daily, Jardiance 10mg daily, Coreg 12.5 mg twice daily, and booklet in Chadian provided on heart failure and self-care management, Verbal education  provided.  Follow up in our office in one week.  # NICM LVEF 30-35% LVIDd 5.2cm improved to 40% , 2/08/24 LVEF 50%,LVIDd 5.3cm continue on GDMT  # Hypertension Elevated, continue current regimen.  Uptitrate Entresto with follow-up visit blood pressure continues elevated.  # Hyperlipidemia 3/31/22 , , HDL 44, LDL 47 continue on Lipitor 40 mg daily heart healthy diet  # Mod  MR by TTE on 2/08/24 continue to monitor     Last Hospitalized at [facility] on 2/07 - 2/09/24 with chronic combined HF .

## 2024-05-08 NOTE — TELEPHONE ENCOUNTER
Called pt with soniya from language line .  [phone].  Pt did not answer. Message was left for pt to calll back [phone] to make an appt ASAP.    Called SO, [place], she answered and placed pt on phone.    Wt today is 224 lbs. He has gained 5 lbs in the last week. Has SOB, abd distention, cough, and CP 3 days ago.     He is taking his medications  He will be in tomorrow for visit with Naomy PACHECO.

## 2024-05-09 ENCOUNTER — OFFICE VISIT (OUTPATIENT)
Dept: CARDIOLOGY CLINIC | Facility: CLINIC | Age: 54
End: 2024-05-09
Payer: COMMERCIAL

## 2024-05-09 ENCOUNTER — DOCUMENTATION (OUTPATIENT)
Dept: CARDIOLOGY CLINIC | Facility: CLINIC | Age: 54
End: 2024-05-09

## 2024-05-09 VITALS
SYSTOLIC BLOOD PRESSURE: 138 MMHG | BODY MASS INDEX: 29.91 KG/M2 | OXYGEN SATURATION: 97 % | DIASTOLIC BLOOD PRESSURE: 90 MMHG | HEIGHT: 72 IN | WEIGHT: 220.8 LBS | HEART RATE: 81 BPM

## 2024-05-09 DIAGNOSIS — I42.8 NONISCHEMIC CARDIOMYOPATHY (HCC): ICD-10-CM

## 2024-05-09 DIAGNOSIS — I10 PRIMARY HYPERTENSION: ICD-10-CM

## 2024-05-09 DIAGNOSIS — I34.0 MITRAL VALVE INSUFFICIENCY, UNSPECIFIED ETIOLOGY: ICD-10-CM

## 2024-05-09 DIAGNOSIS — E78.2 MIXED HYPERLIPIDEMIA: ICD-10-CM

## 2024-05-09 DIAGNOSIS — I50.43 ACUTE ON CHRONIC COMBINED SYSTOLIC AND DIASTOLIC HF (HEART FAILURE) (HCC): Primary | ICD-10-CM

## 2024-05-09 PROCEDURE — 96374 THER/PROPH/DIAG INJ IV PUSH: CPT

## 2024-05-09 PROCEDURE — 99215 OFFICE O/P EST HI 40 MIN: CPT | Performed by: NURSE PRACTITIONER

## 2024-05-09 RX ORDER — SPIRONOLACTONE 25 MG/1
25 TABLET ORAL DAILY
Qty: 30 TABLET | Refills: 2 | Status: SHIPPED | OUTPATIENT
Start: 2024-05-09

## 2024-05-09 RX ORDER — TORSEMIDE 20 MG/1
TABLET ORAL
Qty: 120 TABLET | Refills: 3 | Status: SHIPPED | OUTPATIENT
Start: 2024-05-09

## 2024-05-09 RX ORDER — FUROSEMIDE 10 MG/ML
60 INJECTION INTRAMUSCULAR; INTRAVENOUS ONCE
Status: COMPLETED | OUTPATIENT
Start: 2024-05-09 | End: 2024-05-09

## 2024-05-09 RX ADMIN — FUROSEMIDE 60 MG: 10 INJECTION INTRAMUSCULAR; INTRAVENOUS at 08:35

## 2024-05-09 NOTE — PROGRESS NOTES
Gave 60 mg IV lasix in Right hand without difficulty.  IV d/c'd cath intact.    UO- 400 ml dark yellow urine    Post BP- 150/92  pt will check BP daily at home and call if consistently above 140/90.      Talked about restricting fluids to 64 oz and low sodium diet.  Pt is drinking to much and uses a lot of salt on his food.    He is aware I will call him tomorrow for update.  Will go over food choices and fluid restriction again tomorrow.

## 2024-05-10 ENCOUNTER — TELEPHONE (OUTPATIENT)
Dept: GASTROENTEROLOGY | Facility: HOSPITAL | Age: 54
End: 2024-05-10

## 2024-05-10 ENCOUNTER — PATIENT OUTREACH (OUTPATIENT)
Dept: FAMILY MEDICINE CLINIC | Facility: CLINIC | Age: 54
End: 2024-05-10

## 2024-05-10 ENCOUNTER — TELEPHONE (OUTPATIENT)
Dept: CARDIOLOGY CLINIC | Facility: CLINIC | Age: 54
End: 2024-05-10

## 2024-05-10 LAB
BASOPHILS # BLD AUTO: 61 CELLS/UL (ref 0–200)
BASOPHILS NFR BLD AUTO: 1 %
EOSINOPHIL # BLD AUTO: 232 CELLS/UL (ref 15–500)
EOSINOPHIL NFR BLD AUTO: 3.8 %
ERYTHROCYTE [DISTWIDTH] IN BLOOD BY AUTOMATED COUNT: 21.3 % (ref 11–15)
HCT VFR BLD AUTO: 47 % (ref 38.5–50)
HGB BLD-MCNC: 15.8 G/DL (ref 13.2–17.1)
LYMPHOCYTES # BLD AUTO: 1080 CELLS/UL (ref 850–3900)
LYMPHOCYTES NFR BLD AUTO: 17.7 %
MCH RBC QN AUTO: 28.9 PG (ref 27–33)
MCHC RBC AUTO-ENTMCNC: 33.6 G/DL (ref 32–36)
MCV RBC AUTO: 85.9 FL (ref 80–100)
MONOCYTES # BLD AUTO: 671 CELLS/UL (ref 200–950)
MONOCYTES NFR BLD AUTO: 11 %
NEUTROPHILS # BLD AUTO: 4057 CELLS/UL (ref 1500–7800)
NEUTROPHILS NFR BLD AUTO: 66.5 %
PLATELET # BLD AUTO: 200 THOUSAND/UL (ref 140–400)
PMV BLD REES-ECKER: 11.7 FL (ref 7.5–12.5)
PSA SERPL-MCNC: 1.09 NG/ML
RBC # BLD AUTO: 5.47 MILLION/UL (ref 4.2–5.8)
WBC # BLD AUTO: 6.1 THOUSAND/UL (ref 3.8–10.8)

## 2024-05-10 NOTE — LETTER
05/10/24    Estimado/a Zeeshan Arellano trabajador comunitario de la grisel de [facility] HE  [facility]  [address] [address]  [place] [address]    Intenté comunicarme con usted por teléfono varias veces. Es importante que me llame al [phone] para que pueda ofrecerle ayuda con stepan necesidades de atención médica.     Atentamente.       ANY Pizarro

## 2024-05-10 NOTE — PROGRESS NOTES
Outgoing Call:  5/10/2024    [facility] called Pt to discuss referral received from Rand SHIPLEY, informing Pt needs assistance in applying for SNAP benefits. VM left and UTR letter sent via [employer].     Next outreach is scheduled for 5/14/2024.

## 2024-05-13 RX ORDER — SODIUM CHLORIDE, SODIUM LACTATE, POTASSIUM CHLORIDE, CALCIUM CHLORIDE 600; 310; 30; 20 MG/100ML; MG/100ML; MG/100ML; MG/100ML
50 INJECTION, SOLUTION INTRAVENOUS CONTINUOUS
OUTPATIENT
Start: 2024-05-13

## 2024-05-17 ENCOUNTER — PATIENT OUTREACH (OUTPATIENT)
Dept: FAMILY MEDICINE CLINIC | Facility: CLINIC | Age: 54
End: 2024-05-17

## 2024-05-17 NOTE — PROGRESS NOTES
Outgoing Call / Email:  5/17/2024    [facility] called Pt to discuss referral received requesting assistance in applying for SNAP benefits. This is second call. VM left and UTR letter sent.     Final outreach is scheduled for 5/24/2024.

## 2024-05-17 NOTE — LETTER
05/17/24    Estimado/a Zeeshan Arellano trabajador comunitario de la grisel de [facility]  [facility]  [address] [address]  [place] [address]    Intenté comunicarme con usted por teléfono varias veces. Es importante que me llame al [phone] para que pueda ofrecerle ayuda con stepan necesidades de atención médica.     Atentamente.       ANY Pizarro

## 2024-05-20 ENCOUNTER — TELEPHONE (OUTPATIENT)
Dept: GASTROENTEROLOGY | Facility: CLINIC | Age: 54
End: 2024-05-20

## 2024-05-24 ENCOUNTER — PATIENT OUTREACH (OUTPATIENT)
Dept: FAMILY MEDICINE CLINIC | Facility: CLINIC | Age: 54
End: 2024-05-24

## 2024-05-24 NOTE — LETTER
05/24/24    Estimado/a Zeeshan Arellano trabajador comunitario de la grisel de [facility] HE  [facility]  [address] [address]  [place] [address]    Intenté comunicarme con usted por teléfono varias veces. Es importante que me llame al [phone] para que pueda ofrecerle ayuda con stepan necesidades de atención médica.     Atentamente.       ANY Pizarro

## 2024-05-24 NOTE — PROGRESS NOTES
Outgoing Call / Email:  5/24/2024    [facility] made third attempt to reach out to Pt and assist in applying for SNAP benefits. VM left and UTR letter sent via [employer]. Pt has not answered and has not returned calls. Referral will be closed today.     No further outreach is scheduled.

## 2024-06-12 ENCOUNTER — PATIENT OUTREACH (OUTPATIENT)
Dept: FAMILY MEDICINE CLINIC | Facility: CLINIC | Age: 54
End: 2024-06-12

## 2024-06-12 NOTE — PROGRESS NOTES
Per chart review this seems that [facility] Denisecelljoe Bonner attempted to reach out Pt multiple times to assist him in applying for SNAP but Pt did not  the phone. [facility]OC closed the case due to Pt did not answer. After chart review VIOLETTA JUAN did place a call to Pt but he did not  the phone, a detailed VM left requesting a return call.    VIOLETTA JUAN is remain available for further assistance as needed.  VIOLETTA JUAN will continue to follow-up.

## 2024-06-21 ENCOUNTER — PATIENT OUTREACH (OUTPATIENT)
Dept: FAMILY MEDICINE CLINIC | Facility: CLINIC | Age: 54
End: 2024-06-21

## 2024-06-21 NOTE — LETTER
06/21/24    Estimado/a Zeeshan Arellano un coordinador de la atención médica en [facility] HE  [facility]  [address] [address]  [place] [address] [phone].     Intentamos comunicarnos con usted por teléfono varias veces. Es importante que se comunique con nosotros al [phone] para que podamos ofrecerle ayuda con stepan necesidades de atención médica.     Atentamente.     Rafaelina Reyes  ([employer])

## 2024-06-21 NOTE — PROGRESS NOTES
VIOLETTA JUAN did place a second call to Pt but he did not  the phone, unable to leave . VIOLETTA JUAN will send Unable to Reach Letter through [employer].    VIOLETTA JUAN is closing case today due to unable to contact Pt.  VIOLETTA JUAN is remain available for further assistance as needed.

## 2024-06-24 ENCOUNTER — TELEPHONE (OUTPATIENT)
Dept: FAMILY MEDICINE CLINIC | Facility: CLINIC | Age: 54
End: 2024-06-24

## 2024-06-24 NOTE — TELEPHONE ENCOUNTER
Received [employer] request from  [facility] Disability Determination received on 6/24/24. Request was scanned into chart and faxed to [employer].

## 2024-07-10 ENCOUNTER — OFFICE VISIT (OUTPATIENT)
Dept: FAMILY MEDICINE CLINIC | Facility: CLINIC | Age: 54
End: 2024-07-10

## 2024-07-10 VITALS
RESPIRATION RATE: 18 BRPM | TEMPERATURE: 97.8 F | HEIGHT: 72 IN | BODY MASS INDEX: 29.8 KG/M2 | SYSTOLIC BLOOD PRESSURE: 140 MMHG | DIASTOLIC BLOOD PRESSURE: 70 MMHG | WEIGHT: 220 LBS | OXYGEN SATURATION: 97 % | HEART RATE: 76 BPM

## 2024-07-10 DIAGNOSIS — I10 PRIMARY HYPERTENSION: Primary | ICD-10-CM

## 2024-07-10 DIAGNOSIS — I50.22 CHRONIC SYSTOLIC CONGESTIVE HEART FAILURE (HCC): ICD-10-CM

## 2024-07-10 DIAGNOSIS — I10 PRIMARY HYPERTENSION: ICD-10-CM

## 2024-07-10 DIAGNOSIS — I50.9 CHF (CONGESTIVE HEART FAILURE) (HCC): ICD-10-CM

## 2024-07-10 DIAGNOSIS — E78.5 HYPERLIPIDEMIA, UNSPECIFIED HYPERLIPIDEMIA TYPE: ICD-10-CM

## 2024-07-10 DIAGNOSIS — I50.9 ACUTE CHF (CONGESTIVE HEART FAILURE) (HCC): ICD-10-CM

## 2024-07-10 DIAGNOSIS — I42.8 NONISCHEMIC CARDIOMYOPATHY (HCC): ICD-10-CM

## 2024-07-10 DIAGNOSIS — I50.20 SYSTOLIC CHF (HCC): ICD-10-CM

## 2024-07-10 PROCEDURE — 99213 OFFICE O/P EST LOW 20 MIN: CPT | Performed by: FAMILY MEDICINE

## 2024-07-10 RX ORDER — HYDRALAZINE HYDROCHLORIDE 25 MG/1
25 TABLET, FILM COATED ORAL 3 TIMES DAILY
Qty: 90 TABLET | Refills: 3 | Status: SHIPPED | OUTPATIENT
Start: 2024-07-10

## 2024-07-10 RX ORDER — ISOSORBIDE MONONITRATE 30 MG/1
30 TABLET, EXTENDED RELEASE ORAL DAILY
Qty: 30 TABLET | Refills: 3 | Status: SHIPPED | OUTPATIENT
Start: 2024-07-10

## 2024-07-10 RX ORDER — ATORVASTATIN CALCIUM 40 MG/1
40 TABLET, FILM COATED ORAL
Qty: 30 TABLET | Refills: 0 | Status: SHIPPED | OUTPATIENT
Start: 2024-07-10

## 2024-07-10 RX ORDER — CARVEDILOL 12.5 MG/1
12.5 TABLET ORAL 2 TIMES DAILY WITH MEALS
Qty: 60 TABLET | Refills: 3 | Status: SHIPPED | OUTPATIENT
Start: 2024-07-10

## 2024-07-10 RX ORDER — SPIRONOLACTONE 25 MG/1
25 TABLET ORAL DAILY
Qty: 30 TABLET | Refills: 2 | Status: SHIPPED | OUTPATIENT
Start: 2024-07-10

## 2024-07-10 NOTE — PROGRESS NOTES
Ambulatory Visit  Name: Cristian Quijano      : 1970      MRN: [id number]  Encounter Provider: Dk Zelaya MD  Encounter Date: 7/10/2024   Encounter department: [facility]    Assessment & Plan   1. Primary hypertension  Assessment & Plan:  BP Readings from Last 3 Encounters:   07/10/24 140/70   24 138/90   24 163/85   BP at goal  Current regimen: Hydralazine 25 mg 3 times daily and spironolactone 25 mg daily  Reports medication compliance.    Plan:  Continue current regimen  Low-sodium intake strongly advised  DASH diet discussed    Orders:  -     hydrALAZINE (APRESOLINE) 25 mg tablet; Take 1 tablet (25 mg total) by mouth 3 (three) times a day  -     spironolactone (ALDACTONE) 25 mg tablet; Take 1 tablet (25 mg total) by mouth daily  2. Chronic systolic congestive heart failure (HCC)  Assessment & Plan:  Wt Readings from Last 3 Encounters:   07/10/24 99.8 kg (220 lb)   24 100 kg (220 lb 12.8 oz)   24 102 kg (224 lb)   Weight stable as above with no significant changes.  Current regimen: Carvedilol 12.5 mg twice daily, Jardiance 10 mg daily, Imdur 30 mg daily, spironolactone 25 mg daily.  Reports medication compliance.    Plan:  Refer to hypertension  Follow-up TSH  Orders:  -     carvedilol (COREG) 12.5 mg tablet; Take 1 tablet (12.5 mg total) by mouth 2 (two) times a day with meals  -     Empagliflozin (JARDIANCE) 10 MG TABS tablet; Take 1 tablet (10 mg total) by mouth daily  -     isosorbide mononitrate (IMDUR) 30 mg 24 hr tablet; Take 1 tablet (30 mg total) by mouth daily  -     spironolactone (ALDACTONE) 25 mg tablet; Take 1 tablet (25 mg total) by mouth daily  -     TSH + Free T4; Future  3. Hyperlipidemia, unspecified hyperlipidemia type  -     atorvastatin (LIPITOR) 40 mg tablet; Take 1 tablet (40 mg total) by mouth daily with dinner       History of Present Illness     Patient is a 54-year-old male with PMH of hypertension, chronic  systolic CHF presenting today for follow-up for his chronic medical conditions along with medication refills.  Denies chest pain, shortness of breath, palpitations or fatigue.  Denies recent illnesses since last visit.  Reports lower extremity intermittent pain exacerbated with ambulation and relieved with rest.        [employer]  used during visit.  [employer] ID #[id number] ([place])          Review of Systems   Constitutional:  Negative for chills and fatigue.   Eyes:  Negative for visual disturbance.   Respiratory:  Negative for chest tightness, shortness of breath and wheezing.    Gastrointestinal:  Negative for abdominal pain, diarrhea, nausea and vomiting.   Musculoskeletal:  Negative for neck pain and neck stiffness.   Neurological:  Negative for dizziness, weakness and numbness.       Objective     /70 (BP Location: Left arm, Patient Position: Sitting, Cuff Size: Large)   Pulse 76   Temp 97.8 °F (36.6 °C) (Temporal)   Resp 18   Ht 6' (1.829 m)   Wt 99.8 kg (220 lb)   SpO2 97%   BMI 29.84 kg/m²     Physical Exam  Constitutional:       General: He is not in acute distress.  Cardiovascular:      Rate and Rhythm: Normal rate and regular rhythm.      Pulses: Normal pulses.      Heart sounds: Normal heart sounds.   Pulmonary:      Effort: Pulmonary effort is normal.      Breath sounds: Normal breath sounds. No rales.   Abdominal:      Palpations: Abdomen is soft.   Skin:     Capillary Refill: Capillary refill takes less than 2 seconds.   Neurological:      General: No focal deficit present.      Mental Status: He is alert.       Administrative Statements

## 2024-07-10 NOTE — ASSESSMENT & PLAN NOTE
BP Readings from Last 3 Encounters:   07/10/24 140/70   05/09/24 138/90   05/06/24 163/85   BP at goal  Current regimen: Hydralazine 25 mg 3 times daily and spironolactone 25 mg daily  Reports medication compliance.    Plan:  Continue current regimen  Low-sodium intake strongly advised  DASH diet discussed

## 2024-07-11 NOTE — ASSESSMENT & PLAN NOTE
Wt Readings from Last 3 Encounters:   07/10/24 99.8 kg (220 lb)   05/09/24 100 kg (220 lb 12.8 oz)   05/06/24 102 kg (224 lb)   Weight stable as above with no significant changes.  Current regimen: Carvedilol 12.5 mg twice daily, Jardiance 10 mg daily, Imdur 30 mg daily, spironolactone 25 mg daily.  Reports medication compliance.    Plan:  Refer to hypertension  Follow-up TSH

## 2024-09-04 ENCOUNTER — OFFICE VISIT (OUTPATIENT)
Dept: GASTROENTEROLOGY | Facility: MEDICAL CENTER | Age: 54
End: 2024-09-04
Payer: COMMERCIAL

## 2024-09-04 ENCOUNTER — TELEPHONE (OUTPATIENT)
Dept: FAMILY MEDICINE CLINIC | Facility: CLINIC | Age: 54
End: 2024-09-04

## 2024-09-04 VITALS
HEIGHT: 72 IN | DIASTOLIC BLOOD PRESSURE: 80 MMHG | OXYGEN SATURATION: 95 % | WEIGHT: 220 LBS | BODY MASS INDEX: 29.8 KG/M2 | SYSTOLIC BLOOD PRESSURE: 132 MMHG | TEMPERATURE: 97.8 F | HEART RATE: 93 BPM

## 2024-09-04 DIAGNOSIS — Z12.11 SCREENING FOR COLON CANCER: ICD-10-CM

## 2024-09-04 DIAGNOSIS — K76.0 FATTY LIVER: ICD-10-CM

## 2024-09-04 DIAGNOSIS — K21.9 GASTROESOPHAGEAL REFLUX DISEASE WITHOUT ESOPHAGITIS: Primary | ICD-10-CM

## 2024-09-04 DIAGNOSIS — D64.9 ANEMIA, UNSPECIFIED TYPE: ICD-10-CM

## 2024-09-04 PROCEDURE — 99214 OFFICE O/P EST MOD 30 MIN: CPT | Performed by: NURSE PRACTITIONER

## 2024-09-04 NOTE — PROGRESS NOTES
[facility] - Outpatient Follow-up Note  Cristian Quijano 54 y.o. male MRN: [id number]  Encounter: [id number]          ASSESSMENT AND PLAN:      GERD  History of GERD for many years.  Never had an EGD.  Currently taking pantoprazole 40 mg daily.  Occasionally gets reflux breakthrough.  Rare caffeine use.  No NSAID use.  No alcohol use.  Occasional spicy food use.  Denies any nausea, vomiting or dysphagia.  Will rule out PUD, gastritis/esophagitis and Alicea's esophagus.    -Antireflux diet  -Continue pantoprazole 40 mg daily  -EGD      2. Colon cancer screening    Never had colonoscopy.  BMs are brown and formed daily to every other day.  Occasionally has to strain to have a BM.  Denies any melena hematochezia.  History of any colon cancers or polyps.    -Colonoscopy with GoLytely/Dulcolax prep  -Start fiber supplement daily  -Increase water intake to 64 ounces per day      3. Fatty liver    History of an elevated alkaline phosphatase and total bilirubin-total bili 1.06 and alkaline phosphatase 195.  Repeat LFTs were normal.  Liver serology workup was negative.  Recent ultrasound of the abdomen noted hepatomegaly and fatty liver.  Patient does not have a history of HLD.  We do long discussion about fatty liver disease process, problems with complications and treatment.    -Low-fat/low-cholesterol diet  -Weight reduction of 10%-approximately 20 pound weight loss may help redistribute fat in the liver  -LFTs every 6 months    4. Anemia    Decreased hemoglobin noted since 2/24. Hemoglobin 11.6. MCH 25.3. Denies any melena or hematochezia. Does have some nausea at times but cannot pinpoint triggers. No vomiting. Does have a history of GERD. Never had any EGD or colonoscopy.  Most recent iron studies and CBC are normal.  Will proceed with a EGD and colonoscopy for history of GERD as well as colon cancer screening.    -EGD and colonoscopy GoLytely/Dulcolax prep in hospital  setting    I reviewed with patient/family potential risks of endoscopic evaluation including possible infection, bleeding or perforation.  Patient/family verbalized understanding of potential risks and agreed to procedure(s).  ______________________________________________________________________    SUBJECTIVE: 54-year-old male here for follow-up.  He was last seen by myself 4/8/2024 for colon cancer screening, anemia and elevated LFTs.  [employer] required today for translation as patient only speaks Citizen of [place].    History of an elevated alkaline phosphatase and total bilirubin-total bili 1.06 and alkaline phosphatase 195.  Repeat LFTs were normal.  Liver serology workup was negative.  Recent ultrasound of the abdomen noted hepatomegaly and fatty liver.  Patient does not have a history of HLD.    History of GERD for many years.  Never had an EGD.  Currently taking pantoprazole 40 mg daily.  Occasionally gets reflux breakthrough.  Rare caffeine use.  No NSAID use.  No alcohol use.  Occasional spicy food use.  Denies any nausea, vomiting or dysphagia.    Decreased hemoglobin noted since 2/24. Hemoglobin 11.6. MCH 25.3. Denies any melena or hematochezia. Does have some nausea at times but cannot pinpoint triggers. No vomiting. Does have a history of GERD. Never had any EGD or colonoscopy.  Most recent iron studies and CBC are normal.      Labs 4/24-LFTs normal, alpha-1 antitrypsin normal, iron studies normal, ceruloplasmin normal, CBC normal, RDW 21.3, AMA negative, CHACHA negative, hepatitis B and C serologies negative, hepatitis A total antibody reactive.    Ultrasound of the abdomen noted mild hepatic steatosis with hepatomegaly.    Prior EGD/colonoscopy     Never had any EGD or colonoscopy    REVIEW OF SYSTEMS IS OTHERWISE NEGATIVE.  10 point review of systems negative other than per HPI      Historical Information   Past Medical History:   Diagnosis Date    HEIKE (acute kidney injury) (HCC) 08/05/2023    Chronic HFrEF  (heart failure with reduced ejection fraction) ([facility]) 04/01/2022    Does not have health insurance 08/14/2023    Pain in both testicles      Past Surgical History:   Procedure Laterality Date    CARDIAC CATHETERIZATION N/A 4/4/2022    Procedure: CARDIAC CORONARY ANGIOGRAM;  Surgeon: Flex Hunt MD;  Location: [facility] CARDIAC [facility]ATH [facility];  Service: Cardiology    CARDIAC CATHETERIZATION Left 4/4/2022    Procedure: Cardiac Left Heart Cath;  Surgeon: Flex Hunt MD;  Location: [facility];  Service: Cardiology     Social History   Social History     Substance and Sexual Activity   Alcohol Use Never     Social History     Substance and Sexual Activity   Drug Use Never     Social History     Tobacco Use   Smoking Status Some Days    Types: Cigarettes    Passive exposure: Current   Smokeless Tobacco Never     Family History   Problem Relation Age of Onset    Bone cancer Mother        Meds/Allergies       Current Outpatient Medications:     acetaminophen (TYLENOL) 325 mg tablet    atorvastatin (LIPITOR) 40 mg tablet    carvedilol (COREG) 12.5 mg tablet    Empagliflozin (JARDIANCE) 10 MG TABS tablet    hydrALAZINE (APRESOLINE) 25 mg tablet    isosorbide mononitrate (IMDUR) 30 mg 24 hr tablet    pantoprazole (PROTONIX) 40 mg tablet    sacubitril-valsartan (ENTRESTO) 24-26 MG TABS    spironolactone (ALDACTONE) 25 mg tablet    torsemide (DEMADEX) 20 mg tablet    FLUoxetine (PROzac) 10 mg capsule    polyethylene glycol (Golytely) 4000 mL solution    sucralfate (CARAFATE) 1 g tablet    No Known Allergies        Objective     Blood pressure 132/80, pulse 93, temperature 97.8 °F (36.6 °C), height 6' (1.829 m), weight 99.8 kg (220 lb), SpO2 95%. Body mass index is 29.84 kg/m².      PHYSICAL EXAM:      General Appearance:   Alert, cooperative, no distress   HEENT:   Normocephalic, atraumatic, anicteric.     Neck:  Supple, symmetrical, trachea midline   Lungs:   Clear to auscultation bilaterally; no rales, rhonchi or  wheezing; respirations unlabored    Heart::   Regular rate and rhythm; no murmur, rub, or gallop.   Abdomen:   Soft, non-tender, non-distended; normal bowel sounds; no masses, no organomegaly    Genitalia:   Deferred    Rectal:   Deferred    Extremities:  No cyanosis, clubbing or edema    Pulses:  2+ and symmetric    Skin:  No jaundice, rashes, or lesions    Lymph nodes:  No palpable cervical lymphadenopathy        Lab Results:   No visits with results within 1 Day(s) from this visit.   Latest known visit with results is:   Orders Only on 04/18/2024   Component Date Value    Iron, Serum 04/18/2024 77     Total Iron Binding Chloride* 04/18/2024 381     Iron Saturation 04/18/2024 20     CHACHA Screen, IFA 04/18/2024 NEGATIVE     Hep A Ab, Total 04/18/2024 REACTIVE (A)     HEP C AB 04/18/2024 NON-REACTIVE     Hepatitis B Surface Anti* 04/18/2024 <5 (L)          Radiology Results:   No results found.

## 2024-09-04 NOTE — TELEPHONE ENCOUNTER
Patient came into office today stating that on last visit he requested an order for a cane.     Pt would like to be notify when order placed. Please advise. Thank you!

## 2024-09-05 DIAGNOSIS — R26.2 AMBULATORY DYSFUNCTION: Primary | ICD-10-CM

## 2024-09-09 NOTE — PROGRESS NOTES
Order for cane completed via parachute. Will be delivered to patient's address on file ([address] [place] [address]) by [employer].

## 2024-09-13 ENCOUNTER — HOSPITAL ENCOUNTER (EMERGENCY)
Facility: HOSPITAL | Age: 54
Discharge: HOME | End: 2024-09-13
Attending: EMERGENCY MEDICINE
Payer: COMMERCIAL

## 2024-09-13 ENCOUNTER — TELEPHONE (OUTPATIENT)
Dept: GASTROENTEROLOGY | Facility: MEDICAL CENTER | Age: 54
End: 2024-09-13

## 2024-09-13 ENCOUNTER — APPOINTMENT (EMERGENCY)
Dept: RADIOLOGY | Facility: HOSPITAL | Age: 54
End: 2024-09-13
Payer: COMMERCIAL

## 2024-09-13 VITALS
OXYGEN SATURATION: 96 % | BODY MASS INDEX: 29.84 KG/M2 | RESPIRATION RATE: 20 BRPM | DIASTOLIC BLOOD PRESSURE: 101 MMHG | SYSTOLIC BLOOD PRESSURE: 166 MMHG | HEART RATE: 85 BPM | TEMPERATURE: 98.1 F | HEIGHT: 72 IN

## 2024-09-13 DIAGNOSIS — I10 HYPERTENSION: ICD-10-CM

## 2024-09-13 DIAGNOSIS — R06.02 SOB (SHORTNESS OF BREATH): Primary | ICD-10-CM

## 2024-09-13 DIAGNOSIS — I50.9 CHRONIC CHF (CONGESTIVE HEART FAILURE) (HCC): ICD-10-CM

## 2024-09-13 DIAGNOSIS — R79.89 ELEVATED TROPONIN LEVEL NOT DUE TO ACUTE CORONARY SYNDROME: ICD-10-CM

## 2024-09-13 LAB
2HR DELTA HS TROPONIN: 11 NG/L
4HR DELTA HS TROPONIN: 4 NG/L
ALBUMIN SERPL BCG-MCNC: 4.1 G/DL (ref 3.5–5)
ALP SERPL-CCNC: 109 U/L (ref 34–104)
ALT SERPL W P-5'-P-CCNC: 13 U/L (ref 7–52)
ANION GAP SERPL CALCULATED.3IONS-SCNC: 9 MMOL/L (ref 4–13)
AST SERPL W P-5'-P-CCNC: 15 U/L (ref 13–39)
ATRIAL RATE: 85 BPM
ATRIAL RATE: 89 BPM
BASOPHILS # BLD AUTO: 0.04 THOUSANDS/ΜL (ref 0–0.1)
BASOPHILS NFR BLD AUTO: 1 % (ref 0–1)
BILIRUB SERPL-MCNC: 0.43 MG/DL (ref 0.2–1)
BNP SERPL-MCNC: 281 PG/ML (ref 0–100)
BUN SERPL-MCNC: 13 MG/DL (ref 5–25)
CALCIUM SERPL-MCNC: 9.2 MG/DL (ref 8.4–10.2)
CARDIAC TROPONIN I PNL SERPL HS: 26 NG/L
CARDIAC TROPONIN I PNL SERPL HS: 30 NG/L
CARDIAC TROPONIN I PNL SERPL HS: 37 NG/L
CHLORIDE SERPL-SCNC: 107 MMOL/L (ref 96–108)
CO2 SERPL-SCNC: 30 MMOL/L (ref 21–32)
CREAT SERPL-MCNC: 1.36 MG/DL (ref 0.6–1.3)
DME PARACHUTE DELIVERY DATE EXPECTED: NORMAL
DME PARACHUTE DELIVERY DATE REQUESTED: NORMAL
DME PARACHUTE ITEM DESCRIPTION: NORMAL
DME PARACHUTE ORDER STATUS: NORMAL
DME PARACHUTE SUPPLIER NAME: NORMAL
DME PARACHUTE SUPPLIER PHONE: NORMAL
EOSINOPHIL # BLD AUTO: 0.21 THOUSAND/ΜL (ref 0–0.61)
EOSINOPHIL NFR BLD AUTO: 3 % (ref 0–6)
ERYTHROCYTE [DISTWIDTH] IN BLOOD BY AUTOMATED COUNT: 13.9 % (ref 11.6–15.1)
GFR SERPL CREATININE-BSD FRML MDRD: 59 ML/MIN/1.73SQ M
GLUCOSE SERPL-MCNC: 119 MG/DL (ref 65–140)
HCT VFR BLD AUTO: 48.4 % (ref 36.5–49.3)
HGB BLD-MCNC: 16.1 G/DL (ref 12–17)
IMM GRANULOCYTES # BLD AUTO: 0.05 THOUSAND/UL (ref 0–0.2)
IMM GRANULOCYTES NFR BLD AUTO: 1 % (ref 0–2)
LYMPHOCYTES # BLD AUTO: 1.45 THOUSANDS/ΜL (ref 0.6–4.47)
LYMPHOCYTES NFR BLD AUTO: 21 % (ref 14–44)
MAGNESIUM SERPL-MCNC: 1.9 MG/DL (ref 1.9–2.7)
MCH RBC QN AUTO: 31.1 PG (ref 26.8–34.3)
MCHC RBC AUTO-ENTMCNC: 33.3 G/DL (ref 31.4–37.4)
MCV RBC AUTO: 94 FL (ref 82–98)
MONOCYTES # BLD AUTO: 0.48 THOUSAND/ΜL (ref 0.17–1.22)
MONOCYTES NFR BLD AUTO: 7 % (ref 4–12)
NEUTROPHILS # BLD AUTO: 4.72 THOUSANDS/ΜL (ref 1.85–7.62)
NEUTS SEG NFR BLD AUTO: 67 % (ref 43–75)
NRBC BLD AUTO-RTO: 0 /100 WBCS
P AXIS: 76 DEGREES
P AXIS: 84 DEGREES
PLATELET # BLD AUTO: 203 THOUSANDS/UL (ref 149–390)
PMV BLD AUTO: 11.2 FL (ref 8.9–12.7)
POTASSIUM SERPL-SCNC: 3.8 MMOL/L (ref 3.5–5.3)
PR INTERVAL: 156 MS
PR INTERVAL: 158 MS
PROT SERPL-MCNC: 7 G/DL (ref 6.4–8.4)
QRS AXIS: 17 DEGREES
QRS AXIS: 20 DEGREES
QRSD INTERVAL: 78 MS
QRSD INTERVAL: 80 MS
QT INTERVAL: 364 MS
QT INTERVAL: 378 MS
QTC INTERVAL: 433 MS
QTC INTERVAL: 459 MS
RBC # BLD AUTO: 5.17 MILLION/UL (ref 3.88–5.62)
SODIUM SERPL-SCNC: 146 MMOL/L (ref 135–147)
T WAVE AXIS: 52 DEGREES
T WAVE AXIS: 69 DEGREES
VENTRICULAR RATE: 85 BPM
VENTRICULAR RATE: 89 BPM
WBC # BLD AUTO: 6.95 THOUSAND/UL (ref 4.31–10.16)

## 2024-09-13 PROCEDURE — 83735 ASSAY OF MAGNESIUM: CPT | Performed by: EMERGENCY MEDICINE

## 2024-09-13 PROCEDURE — 71045 X-RAY EXAM CHEST 1 VIEW: CPT

## 2024-09-13 PROCEDURE — 83880 ASSAY OF NATRIURETIC PEPTIDE: CPT | Performed by: EMERGENCY MEDICINE

## 2024-09-13 PROCEDURE — 93005 ELECTROCARDIOGRAM TRACING: CPT

## 2024-09-13 PROCEDURE — 84484 ASSAY OF TROPONIN QUANT: CPT | Performed by: EMERGENCY MEDICINE

## 2024-09-13 PROCEDURE — 36415 COLL VENOUS BLD VENIPUNCTURE: CPT | Performed by: EMERGENCY MEDICINE

## 2024-09-13 PROCEDURE — 85025 COMPLETE CBC W/AUTO DIFF WBC: CPT | Performed by: EMERGENCY MEDICINE

## 2024-09-13 PROCEDURE — 93010 ELECTROCARDIOGRAM REPORT: CPT | Performed by: INTERNAL MEDICINE

## 2024-09-13 PROCEDURE — 99285 EMERGENCY DEPT VISIT HI MDM: CPT | Performed by: EMERGENCY MEDICINE

## 2024-09-13 PROCEDURE — 99285 EMERGENCY DEPT VISIT HI MDM: CPT

## 2024-09-13 PROCEDURE — 80053 COMPREHEN METABOLIC PANEL: CPT | Performed by: EMERGENCY MEDICINE

## 2024-09-13 NOTE — ED PROVIDER NOTES
1. SOB (shortness of breath)    2. Chronic CHF (congestive heart failure) (HCC)    3. Elevated troponin level not due to acute coronary syndrome    4. Hypertension      ED Disposition       ED Disposition   Discharge    Condition   Stable    Date/Time   Fri Sep 13, 2024  1:45 PM    Comment   Cristian Quijano discharge to home/self care.                   Assessment & Plan       Medical Decision Making  Amount and/or Complexity of Data Reviewed  External Data Reviewed: notes.     Details: Last care office note reviewed noting history of hypertension and chronic systolic heart failure  Labs: ordered. Decision-making details documented in ED Course.  Radiology: ordered and independent interpretation performed.      Differential diagnosis: Acute congestive heart failure, pneumonia, arrhythmia, reactive airway disease, hypertensive emergency    Plan for EKG, laboratories, chest x-ray.    Remained asymptomatic throughout his entire emergency department stay.  Patient had nonspecific rise in troponin while remaining asymptomatic.  Patient subsequently discharged home with the understanding that should follow-up with his primary care team for further evaluation.  Strict return precautions discussed.    The patient (and any family present) verbalized understanding of the discharge instructions and warnings that would necessitate return to the Emergency Department.    All questions were answered prior to discharge.            ED Course as of 09/13/24 1401   Fri Sep 13, 2024   0952 Patient asymptomatic upon reevaluation.  Plan for delta troponin.  Patient without any chest pain or shortness of breath.  Patient feels in his usual state of health at this time   1209 Delta 2hr hsTnI: 11  Will obtain 4-hour troponin due to delta of 11.  Patient remains completely asymptomatic at this time.       Medications - No data to display    History of Present Illness         Shortness of Breath      54-year-old male presents via EMS  from outlying urgent care for the evaluation of sudden onset of shortness of breath.  The patient was reportedly severely short of breath and diaphoretic in the urgent care.  Was given a DuoNeb and aspirin and markedly improved.    Review of Systems   Respiratory:  Positive for shortness of breath.            Objective     ED Triage Vitals   Temperature Pulse Blood Pressure Respirations SpO2 Patient Position - Orthostatic VS   09/13/24 0836 09/13/24 0835 09/13/24 0835 09/13/24 0835 09/13/24 0835 09/13/24 0835   98.1 °F (36.7 °C) 91 (!) 198/103 20 97 % Lying      Temp Source Heart Rate Source BP Location FiO2 (%) Pain Score    09/13/24 0836 09/13/24 0835 09/13/24 0835 -- 09/13/24 0854    Oral Monitor Left arm  No Pain        Physical Exam  Vitals and nursing note reviewed.   Constitutional:       General: He is not in acute distress.     Appearance: He is well-developed.   HENT:      Head: Normocephalic and atraumatic.      Right Ear: External ear normal.      Left Ear: External ear normal.      Mouth/Throat:      Pharynx: No oropharyngeal exudate.   Eyes:      General: No scleral icterus.     Pupils: Pupils are equal, round, and reactive to light.   Cardiovascular:      Rate and Rhythm: Normal rate and regular rhythm.      Heart sounds: Normal heart sounds.   Pulmonary:      Effort: Pulmonary effort is normal. No respiratory distress.      Breath sounds: Normal breath sounds.   Abdominal:      General: Bowel sounds are normal.      Palpations: Abdomen is soft.      Tenderness: There is no abdominal tenderness. There is no guarding or rebound.   Musculoskeletal:         General: Normal range of motion.      Cervical back: Normal range of motion and neck supple.      Right lower leg: Edema present.      Left lower leg: Edema present.   Skin:     General: Skin is warm and dry.      Findings: No rash.   Neurological:      Mental Status: He is alert and oriented to person, place, and time.         Labs Reviewed    COMPREHENSIVE METABOLIC PANEL - Abnormal       Result Value    Sodium 146      Potassium 3.8      Chloride 107      CO2 30      ANION GAP 9      BUN 13      Creatinine 1.36 (*)     Glucose 119      Calcium 9.2      AST 15      ALT 13      Alkaline Phosphatase 109 (*)     Total Protein 7.0      Albumin 4.1      Total Bilirubin 0.43      eGFR 59      Narrative:     National Kidney Disease Foundation guidelines for Chronic Kidney Disease (CKD):     Stage 1 with normal or high GFR (GFR > 90 mL/min/1.73 square meters)    Stage 2 Mild CKD (GFR = 60-89 mL/min/1.73 square meters)    Stage 3A Moderate CKD (GFR = 45-59 mL/min/1.73 square meters)    Stage 3B Moderate CKD (GFR = 30-44 mL/min/1.73 square meters)    Stage 4 Severe CKD (GFR = 15-29 mL/min/1.73 square meters)    Stage 5 End Stage CKD (GFR <15 mL/min/1.73 square meters)  Note: GFR calculation is accurate only with a steady state creatinine   B-TYPE NATRIURETIC PEPTIDE (BNP) - Abnormal     (*)    MAGNESIUM - Normal    Magnesium 1.9     HS TROPONIN I 0HR - Normal    hs TnI 0hr 26     HS TROPONIN I 2HR - Normal    hs TnI 2hr 37      Delta 2hr hsTnI 11     HS TROPONIN I 4HR - Normal    hs TnI 4hr 30      Delta 4hr hsTnI 4     CBC AND DIFFERENTIAL    WBC 6.95      RBC 5.17      Hemoglobin 16.1      Hematocrit 48.4      MCV 94      MCH 31.1      MCHC 33.3      RDW 13.9      MPV 11.2      Platelets 203      nRBC 0      Segmented % 67      Immature Grans % 1      Lymphocytes % 21      Monocytes % 7      Eosinophils Relative 3      Basophils Relative 1      Absolute Neutrophils 4.72      Absolute Immature Grans 0.05      Absolute Lymphocytes 1.45      Absolute Monocytes 0.48      Eosinophils Absolute 0.21      Basophils Absolute 0.04       XR chest 1 view portable   ED Interpretation by Dago Dunaway DO (09/13 0905)   Abnormal   Pulmonary vascular congestion, appears unchanged when compared to previous      Final Interpretation by Bina Dutta MD  (09/13 1336)      Mild pulmonary venous congestion.            Workstation performed: [id number]             ECG 12 Lead Documentation Only    Date/Time: 9/13/2024 8:50 AM    Performed by: Dago Dunaway DO  Authorized by: Dago Dunaway DO    Indications / Diagnosis:  SOB  ECG reviewed by me, the ED Provider: yes    Patient location:  ED  Previous ECG:     Previous ECG:  Compared to current    Comparison ECG info:  Nonspecific ST depressions compared to previous EKG from February 7, 2024    Similarity:  Changes noted  Interpretation:     Interpretation: abnormal    Rate:     ECG rate:  89    ECG rate assessment: normal    Rhythm:     Rhythm: sinus rhythm    Ectopy:     Ectopy: none    QRS:     QRS axis:  Normal    QRS intervals:  Normal  Conduction:     Conduction: normal    ST segments:     ST segments:  Non-specific    Depression:  II, III, aVF, V4, V5 and V6  T waves:     T waves: normal    ECG 12 Lead Documentation Only    Date/Time: 9/13/2024 11:27 AM    Performed by: Dago Dunaway DO  Authorized by: Dago Dunaway DO    Indications / Diagnosis:  SOB  ECG reviewed by me, the ED Provider: yes    Patient location:  ED  Previous ECG:     Previous ECG:  Compared to current    Comparison ECG info:  Nonspecific inferior ST changes resolved ealier today    Similarity:  Changes noted  Interpretation:     Interpretation: abnormal    Rate:     ECG rate:  85    ECG rate assessment: normal    Rhythm:     Rhythm: sinus rhythm    Ectopy:     Ectopy: none    QRS:     QRS axis:  Normal    QRS intervals:  Normal  Conduction:     Conduction: normal    ST segments:     ST segments:  Non-specific    Depression:  V5, V6 and V4  T waves:     T waves: normal           Dago Dunaway DO  09/13/24 1401

## 2024-09-18 LAB
DME PARACHUTE DELIVERY DATE EXPECTED: NORMAL
DME PARACHUTE DELIVERY DATE REQUESTED: NORMAL
DME PARACHUTE ITEM DESCRIPTION: NORMAL
DME PARACHUTE ORDER STATUS: NORMAL
DME PARACHUTE SUPPLIER NAME: NORMAL
DME PARACHUTE SUPPLIER PHONE: NORMAL

## 2024-09-18 RX ORDER — SODIUM CHLORIDE, SODIUM LACTATE, POTASSIUM CHLORIDE, CALCIUM CHLORIDE 600; 310; 30; 20 MG/100ML; MG/100ML; MG/100ML; MG/100ML
100 INJECTION, SOLUTION INTRAVENOUS CONTINUOUS
OUTPATIENT
Start: 2024-09-18

## 2024-09-30 ENCOUNTER — OFFICE VISIT (OUTPATIENT)
Dept: FAMILY MEDICINE CLINIC | Facility: CLINIC | Age: 54
End: 2024-09-30

## 2024-09-30 VITALS
BODY MASS INDEX: 29.2 KG/M2 | HEIGHT: 72 IN | OXYGEN SATURATION: 97 % | HEART RATE: 91 BPM | RESPIRATION RATE: 19 BRPM | SYSTOLIC BLOOD PRESSURE: 148 MMHG | WEIGHT: 215.6 LBS | TEMPERATURE: 97.6 F | DIASTOLIC BLOOD PRESSURE: 90 MMHG

## 2024-09-30 DIAGNOSIS — R06.02 SHORTNESS OF BREATH: Primary | ICD-10-CM

## 2024-09-30 DIAGNOSIS — I10 PRIMARY HYPERTENSION: ICD-10-CM

## 2024-09-30 DIAGNOSIS — E78.2 MIXED HYPERLIPIDEMIA: ICD-10-CM

## 2024-09-30 DIAGNOSIS — I50.22 CHRONIC SYSTOLIC CONGESTIVE HEART FAILURE (HCC): ICD-10-CM

## 2024-09-30 PROCEDURE — 99214 OFFICE O/P EST MOD 30 MIN: CPT | Performed by: FAMILY MEDICINE

## 2024-09-30 RX ORDER — ALBUTEROL SULFATE 90 UG/1
2 INHALANT RESPIRATORY (INHALATION) EVERY 6 HOURS PRN
Qty: 8.5 G | Refills: 0 | Status: SHIPPED | OUTPATIENT
Start: 2024-09-30

## 2024-09-30 NOTE — ASSESSMENT & PLAN NOTE
BP Readings from Last 3 Encounters:   09/30/24 148/90   09/13/24 (!) 166/101   09/04/24 132/80     BP elevated today  Current regimen: Hydralazine 25 mg 3 times daily and spironolactone 25 mg daily  Reports medication compliance    Plan:  Continue current regimen  Monitor BP at home  Low-sodium intake strongly advised

## 2024-09-30 NOTE — ASSESSMENT & PLAN NOTE
Wt Readings from Last 3 Encounters:   09/30/24 97.8 kg (215 lb 9.6 oz)   09/04/24 99.8 kg (220 lb)   07/10/24 99.8 kg (220 lb)     Decreased Weight - 5 lbs    Current regimen: Carvedilol 12.5 mg twice daily, Jardiance 10 mg daily, Imdur 30 mg daily, spironolactone 25 mg daily.  Reports medication compliance.     Plan:  Medication reconciliation next visit, bringing all medicines  Follow up with Cardiology

## 2024-09-30 NOTE — ASSESSMENT & PLAN NOTE
2 weeks of increased shortness of breath  Unclear if due to a URI/Allergies, is using his friend's Albuterol neb with relief or due to mild CHF exacerbation    Plan:  Double the dose of Torsemide for next 3 days  Advised fluid restriction and low salt diet  Will prescribe Albuterol inhaler.  Monitor sx  Follow up in 1-2 weeks if needed for continued shortness of breath  Follow up Cardiology        Orders:    albuterol (ProAir HFA) 90 mcg/act inhaler; Inhale 2 puffs every 6 (six) hours as needed for shortness of breath

## 2024-09-30 NOTE — PATIENT INSTRUCTIONS
Please double the dose of Torsemide for 3 days  Follow up with Cardiology  ER precautions is increased shortness of breath or chest pain

## 2024-09-30 NOTE — ASSESSMENT & PLAN NOTE
Lab Results   Component Value Date/Time    CHOLESTEROL 132 03/31/2022 11:43 PM    TRIG 205 (H) 03/31/2022 11:43 PM    HDL 44 03/31/2022 11:43 PM    LDLCALC 47 03/31/2022 11:43 PM       Continue Atorvastatin 40 mg daily.  Low Fat Diet, regular Exercise

## 2024-09-30 NOTE — PROGRESS NOTES
Ambulatory Visit  Name: Cristian Aguilar JosiahCowesleykeysha      : 1970      MRN: [id number]  Encounter Provider: Lisa Harman MD  Encounter Date: 2024   Encounter department: [facility]    Assessment & Plan  Shortness of breath  2 weeks of increased shortness of breath  Unclear if due to a URI/Allergies, is using his friend's Albuterol neb with relief or due to mild CHF exacerbation    Plan:  Double the dose of Torsemide for next 3 days  Advised fluid restriction and low salt diet  Will prescribe Albuterol inhaler.  Monitor sx  Follow up in 1-2 weeks if needed for continued shortness of breath  Follow up Cardiology        Orders:    albuterol (ProAir HFA) 90 mcg/act inhaler; Inhale 2 puffs every 6 (six) hours as needed for shortness of breath    Chronic systolic congestive heart failure (HCC)  Wt Readings from Last 3 Encounters:   24 97.8 kg (215 lb 9.6 oz)   24 99.8 kg (220 lb)   07/10/24 99.8 kg (220 lb)     Decreased Weight - 5 lbs    Current regimen: Carvedilol 12.5 mg twice daily, Jardiance 10 mg daily, Imdur 30 mg daily, spironolactone 25 mg daily.  Reports medication compliance.     Plan:  Medication reconciliation next visit, bringing all medicines  Follow up with Cardiology         Primary hypertension  BP Readings from Last 3 Encounters:   24 148/90   24 (!) 166/101   24 132/80     BP elevated today  Current regimen: Hydralazine 25 mg 3 times daily and spironolactone 25 mg daily  Reports medication compliance    Plan:  Continue current regimen  Monitor BP at home  Low-sodium intake strongly advised         Mixed hyperlipidemia  Lab Results   Component Value Date/Time    CHOLESTEROL 132 2022 11:43 PM    TRIG 205 (H) 2022 11:43 PM    HDL 44 2022 11:43 PM    LDLCALC 47 2022 11:43 PM       Continue Atorvastatin 40 mg daily.  Low Fat Diet, regular Exercise          History of Present Illness     HPI  Cristian Aguilar  Samm is a 54 y.o. male  with h/o CHF, Htn, and HLD, who presented to the office today to follow up after ER visit on 9/13/2024 due to shortness of breath.  He states was driving home from dropping of his partner at work, then started to feel shortness of breath, and mild chest tightness, he parked his care outside of an Urgent care and the Ambulance was called.  Er workup reviewed, negative for ACS.  Cxr showed mild venous congestion.    Today he states that since discharge from the Er he continues to have shortness of breath on a daily basis.  He is using his friend's albuterol nebulizer machine, and completes about 2 doses daily.  This has been helping.   He states does not have associated chest pain, dizziness.  He legs are not a swollen as they used to be.  He states his partner helps him with all medication and put them in the pill boxes, so he doesn't know the names of the medications.      The following portions of the patient's history were reviewed and updated as appropriate: allergies, current medications, past family history, past medical history, past social history, past surgical history and problem list.    History obtained from : patient  Review of Systems   Constitutional:  Negative for chills and fever.   HENT:  Negative for congestion, rhinorrhea and sore throat.    Respiratory:  Negative for cough and shortness of breath.    Cardiovascular:  Negative for chest pain.   Gastrointestinal:  Negative for diarrhea, nausea and vomiting.   Skin:  Negative for rash.   Neurological:  Negative for dizziness and headaches.           Objective     /90 (BP Location: Right arm)   Pulse 91   Temp 97.6 °F (36.4 °C) (Temporal)   Resp 19   Ht 6' (1.829 m)   Wt 97.8 kg (215 lb 9.6 oz)   SpO2 97%   BMI 29.24 kg/m²     Physical Exam  Vitals and nursing note reviewed.   Constitutional:       General: He is not in acute distress.     Appearance: He is well-developed.   HENT:      Head: Normocephalic  and atraumatic.   Eyes:      Conjunctiva/sclera: Conjunctivae normal.   Cardiovascular:      Rate and Rhythm: Normal rate and regular rhythm.      Heart sounds: No murmur heard.  Pulmonary:      Effort: Pulmonary effort is normal. No respiratory distress.      Breath sounds: Examination of the left-middle field reveals decreased breath sounds. Examination of the right-lower field reveals decreased breath sounds. Examination of the left-lower field reveals decreased breath sounds. Decreased breath sounds present.   Abdominal:      Palpations: Abdomen is soft.      Tenderness: There is no abdominal tenderness.   Musculoskeletal:         General: No swelling.      Cervical back: Neck supple.      Right lower leg: Edema present.      Left lower leg: Edema present.      Comments: RLE 1 + edema  LLE trace edema   Skin:     General: Skin is warm and dry.      Capillary Refill: Capillary refill takes less than 2 seconds.   Neurological:      Mental Status: He is alert.   Psychiatric:         Mood and Affect: Mood normal.

## 2024-10-30 ENCOUNTER — TELEPHONE (OUTPATIENT)
Dept: GASTROENTEROLOGY | Facility: MEDICAL CENTER | Age: 54
End: 2024-10-30

## 2024-11-13 RX ORDER — ISOSORBIDE MONONITRATE 30 MG/1
30 TABLET, EXTENDED RELEASE ORAL DAILY
Qty: 30 TABLET | Refills: 3 | Status: SHIPPED | OUTPATIENT
Start: 2024-11-13

## 2024-11-13 RX ORDER — HYDRALAZINE HYDROCHLORIDE 25 MG/1
25 TABLET, FILM COATED ORAL 3 TIMES DAILY
Qty: 90 TABLET | Refills: 3 | Status: SHIPPED | OUTPATIENT
Start: 2024-11-13

## 2024-11-13 RX ORDER — SPIRONOLACTONE 25 MG/1
25 TABLET ORAL DAILY
Qty: 30 TABLET | Refills: 2 | Status: SHIPPED | OUTPATIENT
Start: 2024-11-13

## 2024-11-13 RX ORDER — EMPAGLIFLOZIN 10 MG/1
10 TABLET, FILM COATED ORAL DAILY
Qty: 30 TABLET | Refills: 3 | Status: SHIPPED | OUTPATIENT
Start: 2024-11-13

## 2024-11-13 RX ORDER — ATORVASTATIN CALCIUM 40 MG/1
40 TABLET, FILM COATED ORAL
Qty: 30 TABLET | Refills: 0 | Status: SHIPPED | OUTPATIENT
Start: 2024-11-13

## 2024-11-13 RX ORDER — CARVEDILOL 12.5 MG/1
12.5 TABLET ORAL 2 TIMES DAILY WITH MEALS
Qty: 60 TABLET | Refills: 3 | Status: SHIPPED | OUTPATIENT
Start: 2024-11-13

## 2025-01-16 NOTE — ASSESSMENT & PLAN NOTE
BP Readings from Last 3 Encounters:   01/17/25 138/86   09/30/24 148/90   09/13/24 (!) 166/101   BP at goal.  Goal < 140/90 mmHg  Current regimen: Hydralazine 25 mg 3 times daily and spironolactone 25 mg daily  Reports medication compliance     Plan:  Continue current regimen  Monitor BP at home  Low-sodium intake strongly advised

## 2025-01-16 NOTE — ASSESSMENT & PLAN NOTE
Wt Readings from Last 3 Encounters:   01/17/25 98 kg (216 lb)   09/30/24 97.8 kg (215 lb 9.6 oz)   09/04/24 99.8 kg (220 lb)   Appears compensated. 1 lb weight gain since last visit  Current regimen: Carvedilol 12.5 mg twice daily, torsemide 40 mg twice daily, Jardiance 10 mg daily, Imdur 30 mg daily, spironolactone 25 mg daily.   Reports medication compliance.   Torsemide refill provided during visit.       Plan:  Continue with current regimen  Low-sodium, low-carb, fluid restriction, increase whole-grain, vegetables and fruits strongly advised.  Moderate exercise 150 minutes/week as tolerated  Monitor symptoms closely  Missed appointments with cardiology.  Advised to follow-up.   Recheck CBC, CMP next visit    Orders:    torsemide (DEMADEX) 20 mg tablet; Take 2 tablets (40 mg total) by mouth 2 (two) times a day 2 tablets twice daily

## 2025-01-16 NOTE — PROGRESS NOTES
Name: Cristian Quijano      : 1970      MRN: [id number]  Encounter Provider: Dk Zelaya MD  Encounter Date: 2025   Encounter department: [facility]  :  Assessment & Plan  Primary hypertension  BP Readings from Last 3 Encounters:   25 138/86   24 148/90   24 (!) 166/101   BP at goal.  Goal < 140/90 mmHg  Current regimen: Hydralazine 25 mg 3 times daily and spironolactone 25 mg daily  Reports medication compliance     Plan:  Continue current regimen  Monitor BP at home  Low-sodium intake strongly advised       Chronic systolic congestive heart failure (HCC)  Wt Readings from Last 3 Encounters:   25 98 kg (216 lb)   24 97.8 kg (215 lb 9.6 oz)   24 99.8 kg (220 lb)   Appears compensated. 1 lb weight gain since last visit  Current regimen: Carvedilol 12.5 mg twice daily, torsemide 40 mg twice daily, Jardiance 10 mg daily, Imdur 30 mg daily, spironolactone 25 mg daily.   Reports medication compliance.   Torsemide refill provided during visit.       Plan:  Continue with current regimen  Low-sodium, low-carb, fluid restriction, increase whole-grain, vegetables and fruits strongly advised.  Moderate exercise 150 minutes/week as tolerated  Monitor symptoms closely  Missed appointments with cardiology.  Advised to follow-up.   Recheck CBC, CMP next visit    Orders:    torsemide (DEMADEX) 20 mg tablet; Take 2 tablets (40 mg total) by mouth 2 (two) times a day 2 tablets twice daily    Mixed hyperlipidemia  Continue Atorvastatin 40 mg daily.  Low Fat Diet, regular Exercise advised    Plan:  Follow up lipid panel    Orders:    Lipid Panel with Direct LDL reflex; Future    Shortness of breath  Medication refill provided  Orders:    albuterol (ProAir HFA) 90 mcg/act inhaler; Inhale 2 puffs every 6 (six) hours as needed for shortness of breath    Colon cancer screening    Orders:    Ambulatory Referral to Gastroenterology;  Future        BMI Counseling: Body mass index is 29.29 kg/m². The BMI is above normal. Nutrition recommendations include moderation in carbohydrate intake. Exercise recommendations include moderate physical activity 150 minutes/week. Rationale for BMI follow-up plan is due to patient being overweight or obese.     Tobacco Cessation Counseling: Tobacco cessation counseling was provided.       History of Present Illness     SOB/MELENDREZ  Has been using oxygen at night which she borrowed from a friend.  Reports medication compliance  Ran out of furosemide a week ago.    Endorses orthopnea.  Sleeps while sitting up.    [facility]      Review of Systems   Constitutional:  Negative for fatigue.   Eyes:  Negative for visual disturbance.   Respiratory:  Negative for chest tightness and shortness of breath.    Cardiovascular:  Negative for chest pain and palpitations.   Gastrointestinal:  Negative for abdominal pain, diarrhea, nausea and vomiting.   Musculoskeletal:  Negative for myalgias.   Neurological:  Negative for dizziness, weakness, light-headedness and headaches.   Psychiatric/Behavioral:  Negative for dysphoric mood.        Objective   /86 (BP Location: Right arm, Patient Position: Sitting, Cuff Size: Large)   Pulse 88   Temp 98 °F (36.7 °C) (Temporal)   Resp 18   Ht 6' (1.829 m)   Wt 98 kg (216 lb)   SpO2 94%   BMI 29.29 kg/m²      Physical Exam  Constitutional:       General: He is not in acute distress.     Appearance: Normal appearance.   HENT:      Head: Normocephalic and atraumatic.      Right Ear: Tympanic membrane normal.      Left Ear: Tympanic membrane normal.      Mouth/Throat:      Mouth: Mucous membranes are moist.      Pharynx: Oropharynx is clear.   Eyes:      Extraocular Movements: Extraocular movements intact.      Pupils: Pupils are equal, round, and reactive to light.   Neck:      Vascular: No JVD.   Cardiovascular:      Rate and Rhythm: Normal rate and regular rhythm.      Pulses: Normal  pulses.      Heart sounds: Normal heart sounds.   Pulmonary:      Effort: Pulmonary effort is normal.      Breath sounds: Normal breath sounds. No wheezing or rales.   Abdominal:      Palpations: Abdomen is soft.      Tenderness: There is no abdominal tenderness.   Musculoskeletal:         General: Normal range of motion.      Cervical back: Normal range of motion and neck supple.      Right lower le+ Edema present.      Left lower le+ Edema present.      Right ankle: Normal range of motion. Normal pulse.      Left ankle: Normal range of motion. Normal pulse.      Right foot: Normal range of motion and normal capillary refill. Normal pulse.      Left foot: Normal range of motion and normal capillary refill. Normal pulse.   Skin:     General: Skin is warm and dry.   Neurological:      Mental Status: He is alert.      Motor: No weakness.   Psychiatric:         Mood and Affect: Mood normal.

## 2025-01-17 ENCOUNTER — OFFICE VISIT (OUTPATIENT)
Dept: FAMILY MEDICINE CLINIC | Facility: CLINIC | Age: 55
End: 2025-01-17

## 2025-01-17 VITALS
HEART RATE: 88 BPM | SYSTOLIC BLOOD PRESSURE: 138 MMHG | TEMPERATURE: 98 F | OXYGEN SATURATION: 94 % | WEIGHT: 216 LBS | DIASTOLIC BLOOD PRESSURE: 86 MMHG | BODY MASS INDEX: 29.26 KG/M2 | HEIGHT: 72 IN | RESPIRATION RATE: 18 BRPM

## 2025-01-17 DIAGNOSIS — I50.22 CHRONIC SYSTOLIC CONGESTIVE HEART FAILURE (HCC): ICD-10-CM

## 2025-01-17 DIAGNOSIS — R06.02 SHORTNESS OF BREATH: ICD-10-CM

## 2025-01-17 DIAGNOSIS — Z12.11 COLON CANCER SCREENING: ICD-10-CM

## 2025-01-17 DIAGNOSIS — I10 PRIMARY HYPERTENSION: Primary | ICD-10-CM

## 2025-01-17 DIAGNOSIS — E78.2 MIXED HYPERLIPIDEMIA: ICD-10-CM

## 2025-01-17 PROBLEM — N18.31 CKD STAGE 3A, GFR 45-59 ML/MIN (HCC): Status: ACTIVE | Noted: 2025-01-17

## 2025-01-17 RX ORDER — ALBUTEROL SULFATE 90 UG/1
2 INHALANT RESPIRATORY (INHALATION) EVERY 6 HOURS PRN
Qty: 8.5 G | Refills: 0 | Status: SHIPPED | OUTPATIENT
Start: 2025-01-17 | End: 2025-01-21 | Stop reason: SDUPTHER

## 2025-01-17 RX ORDER — TORSEMIDE 20 MG/1
20 TABLET ORAL DAILY
Qty: 30 TABLET | Refills: 3 | Status: SHIPPED | OUTPATIENT
Start: 2025-01-17 | End: 2025-01-18

## 2025-01-17 RX ORDER — TORSEMIDE 20 MG/1
TABLET ORAL
Qty: 120 TABLET | Refills: 3 | Status: SHIPPED | OUTPATIENT
Start: 2025-01-17 | End: 2025-01-17

## 2025-01-17 NOTE — ASSESSMENT & PLAN NOTE
Lab Results   Component Value Date    EGFR 59 09/13/2024    EGFR 56 02/09/2024    EGFR 64 02/08/2024    CREATININE 1.36 (H) 09/13/2024    CREATININE 1.41 (H) 02/09/2024    CREATININE 1.27 02/08/2024   Symptoms: fatigue, edema, polyuria, foamy urination???  Low sodium (< 2 g/day), BP management, regular exercise, NSAIDs    Plan:  Recheck BMP next visit

## 2025-01-17 NOTE — ASSESSMENT & PLAN NOTE
Continue Atorvastatin 40 mg daily.  Low Fat Diet, regular Exercise advised    Plan:  Follow up lipid panel    Orders:    Lipid Panel with Direct LDL reflex; Future

## 2025-01-17 NOTE — LETTER
1/17/2025    To Whom It May Concern:    I am writing to inform you about the medical condition of Cristian Quijano, who is currently under my care.  After thorough evaluation, it has been determined that Cristian Quijano is experiencing significant health challenges that prevents him from being able to perform work-related duties at this time.    Due to severe symptoms of his Chronic Heart Failure, Cristian Quijano is unable to engage in regular work activities. Hence, Cristian Quijano requires medical treatment and is unable to work at the moment.    If necessary, I am happy to provide any additional medical documentation to support this request or further clarify his treatment plan.  Please feel free to contact me if you require any further information.    Thank you for your understanding and consideration of Cristian Quijano's medical needs.  We are working towards ensuring his recovery and we will keep you updated as needed.    Sincerely,      Dk Zelaya MD

## 2025-01-17 NOTE — ASSESSMENT & PLAN NOTE
Medication refill provided  Orders:    albuterol (ProAir HFA) 90 mcg/act inhaler; Inhale 2 puffs every 6 (six) hours as needed for shortness of breath

## 2025-01-17 NOTE — ASSESSMENT & PLAN NOTE
PHQ-9 score of 6. No SI/HI. Stressor continues to be financial insecurity second to failing health.   Currently managing with fluoxetine 10mg daily.

## 2025-01-18 RX ORDER — TORSEMIDE 20 MG/1
40 TABLET ORAL 2 TIMES DAILY
Qty: 120 TABLET | Refills: 3 | Status: SHIPPED | OUTPATIENT
Start: 2025-01-18

## 2025-01-18 RX ORDER — TORSEMIDE 20 MG/1
40 TABLET ORAL DAILY
Qty: 60 TABLET | Refills: 3 | Status: SHIPPED | OUTPATIENT
Start: 2025-01-18 | End: 2025-01-18

## 2025-01-18 RX ORDER — TORSEMIDE 20 MG/1
40 TABLET ORAL 2 TIMES DAILY
Qty: 120 TABLET | Refills: 3 | Status: SHIPPED | OUTPATIENT
Start: 2025-01-18 | End: 2025-01-18

## 2025-01-21 ENCOUNTER — TELEPHONE (OUTPATIENT)
Dept: FAMILY MEDICINE CLINIC | Facility: CLINIC | Age: 55
End: 2025-01-21

## 2025-01-21 DIAGNOSIS — R06.02 SHORTNESS OF BREATH: ICD-10-CM

## 2025-01-21 RX ORDER — ALBUTEROL SULFATE 90 UG/1
2 INHALANT RESPIRATORY (INHALATION) EVERY 6 HOURS PRN
Qty: 8.5 G | Refills: 0 | Status: SHIPPED | OUTPATIENT
Start: 2025-01-21

## 2025-01-21 NOTE — TELEPHONE ENCOUNTER
Form picked up by patient      Letter for work printed and picked up       Pt also is inquired about a letter being done for his living situation due to the apartment having fungus and no heat and they don't want to change his room regarding it. He stated he showed you pictures of his room. Stated that you advised that you would write a letter for him

## 2025-01-21 NOTE — TELEPHONE ENCOUNTER
Pt came into office requesting medication refill for     albuterol (ProAir HFA) 90 mcg/act inhaler [     Please send to pharmacy on file      The pharmacy was updated

## 2025-01-29 NOTE — ASSESSMENT & PLAN NOTE
Called and spoke to patient and let her know that I got another prior auth for her 2ndary ins, but they want the patient to get it filled at [facility]. Primary Ins wants [facility] speciality ins. So, I advised the patient to use her primary ins and the copay card I helped her get signed up for. She should be good for at least 7 months of medication with a 0 copay.   Cr 1 36  Unclear baseline  Suspect in the setting of poor intravascular volume 2/2 volume overload vs chronic pathology  Patient received contrast for CTA and NSAID for pain in the ED    Creatinine improved to 1 48   · IV Lasix as above, hold this morning  Trend creatinine  Avoid nephrotoxins  Trend I/Os, daily weights  Avoid hypotension

## 2025-04-09 ENCOUNTER — TELEPHONE (OUTPATIENT)
Dept: FAMILY MEDICINE CLINIC | Facility: CLINIC | Age: 55
End: 2025-04-09

## 2025-04-24 ENCOUNTER — TELEPHONE (OUTPATIENT)
Dept: FAMILY MEDICINE CLINIC | Facility: CLINIC | Age: 55
End: 2025-04-24

## 2025-04-25 ENCOUNTER — TELEPHONE (OUTPATIENT)
Dept: FAMILY MEDICINE CLINIC | Facility: CLINIC | Age: 55
End: 2025-04-25

## 2025-04-29 ENCOUNTER — TELEPHONE (OUTPATIENT)
Dept: FAMILY MEDICINE CLINIC | Facility: CLINIC | Age: 55
End: 2025-04-29

## 2025-04-29 NOTE — TELEPHONE ENCOUNTER
Patient requesting medication refill:    albuterol (ProAir HFA) 90 mcg/act inhaler   Please advise. Thank you!

## 2025-04-30 ENCOUNTER — OFFICE VISIT (OUTPATIENT)
Dept: FAMILY MEDICINE CLINIC | Facility: CLINIC | Age: 55
End: 2025-04-30

## 2025-04-30 ENCOUNTER — HOSPITAL ENCOUNTER (OUTPATIENT)
Dept: RADIOLOGY | Facility: HOSPITAL | Age: 55
Discharge: HOME | End: 2025-04-30
Payer: COMMERCIAL

## 2025-04-30 VITALS
WEIGHT: 213.6 LBS | DIASTOLIC BLOOD PRESSURE: 60 MMHG | HEART RATE: 98 BPM | OXYGEN SATURATION: 97 % | RESPIRATION RATE: 18 BRPM | TEMPERATURE: 98 F | SYSTOLIC BLOOD PRESSURE: 126 MMHG | HEIGHT: 72 IN | BODY MASS INDEX: 28.93 KG/M2

## 2025-04-30 DIAGNOSIS — M79.605 LEFT LEG PAIN: Primary | ICD-10-CM

## 2025-04-30 DIAGNOSIS — Z87.09 HISTORY OF ASTHMA: ICD-10-CM

## 2025-04-30 DIAGNOSIS — M79.605 LEFT LEG PAIN: ICD-10-CM

## 2025-04-30 PROCEDURE — 73590 X-RAY EXAM OF LOWER LEG: CPT

## 2025-04-30 PROCEDURE — 99213 OFFICE O/P EST LOW 20 MIN: CPT | Performed by: FAMILY MEDICINE

## 2025-04-30 PROCEDURE — 73552 X-RAY EXAM OF FEMUR 2/>: CPT

## 2025-04-30 RX ORDER — ACETAMINOPHEN 500 MG
1000 TABLET ORAL EVERY 8 HOURS PRN
Qty: 90 TABLET | Refills: 0 | Status: SHIPPED | OUTPATIENT
Start: 2025-04-30

## 2025-04-30 RX ORDER — ALBUTEROL SULFATE 90 UG/1
2 INHALANT RESPIRATORY (INHALATION) EVERY 6 HOURS PRN
Qty: 8.5 G | Refills: 0 | Status: SHIPPED | OUTPATIENT
Start: 2025-04-30

## 2025-04-30 NOTE — ASSESSMENT & PLAN NOTE
Chronic more than 2 years  Denies falls, injury or recent trauma on the limb  NSAIDS CI due to history of CKD stage 3a    DDX: overuse vs. Arthritis vs. PAD vs. Venous insufficiency  Less likely fracture as no history of trauma or compartment syndrome as no evidence of severe pain or tightness on physical examination     -Tylenol 1000 mg q8h prn  -xray of left limb   F/U w/ pcp   Orders:    acetaminophen (TYLENOL) 500 mg tablet; Take 2 tablets (1,000 mg total) by mouth every 8 (eight) hours as needed for mild pain    XR femur 2 vw left; Future    XR tibia fibula 2 vw left; Future

## 2025-04-30 NOTE — ASSESSMENT & PLAN NOTE
Not in acute exacerbation  Patient request refill of his albuterol inhaler   Orders:    albuterol (ProAir HFA) 90 mcg/act inhaler; Inhale 2 puffs every 6 (six) hours as needed for shortness of breath

## 2025-04-30 NOTE — PROGRESS NOTES
Name: Cristian Quijano      : 1970      MRN: [id number]  Encounter Provider: Kenzie Markham MD  Encounter Date: 2025   Encounter department: [facility]  :  Assessment & Plan  Left leg pain  Chronic more than 2 years  Denies falls, injury or recent trauma on the limb  NSAIDS CI due to history of CKD stage 3a    DDX: overuse vs. Arthritis vs. PAD vs. Venous insufficiency  Less likely fracture as no history of trauma or compartment syndrome as no evidence of severe pain or tightness on physical examination     -Tylenol 1000 mg q8h prn  -xray of left limb   F/U w/ pcp   Orders:    acetaminophen (TYLENOL) 500 mg tablet; Take 2 tablets (1,000 mg total) by mouth every 8 (eight) hours as needed for mild pain    XR femur 2 vw left; Future    XR tibia fibula 2 vw left; Future    History of asthma  Not in acute exacerbation  Patient request refill of his albuterol inhaler   Orders:    albuterol (ProAir HFA) 90 mcg/act inhaler; Inhale 2 puffs every 6 (six) hours as needed for shortness of breath           History of Present Illness   56 yo male presents for same day visit to discuss chronic pain of left leg. He describes pain as a burning sensation which he has had for years and comes and goes but sometimes he gets a pain where he feels as if its due to bone. He denies falls, fractures or recent injury of left limb. He denies excruciating pain on the left leg or calf pain.        Headache    Review of Systems   Constitutional:  Negative for chills and fever.   HENT:  Negative for ear pain and sore throat.    Eyes:  Negative for pain and visual disturbance.   Respiratory:  Negative for cough and shortness of breath.    Cardiovascular:  Negative for chest pain and palpitations.   Gastrointestinal:  Negative for abdominal pain and vomiting.   Genitourinary:  Negative for dysuria and hematuria.   Musculoskeletal:  Negative for arthralgias and back pain.        Left  leg pain    Skin:  Negative for color change and rash.   Neurological:  Negative for seizures and syncope.   All other systems reviewed and are negative.      Objective   /60 (BP Location: Right arm, Patient Position: Sitting, Cuff Size: Large)   Pulse 98   Temp 98 °F (36.7 °C) (Temporal)   Resp 18   Ht 6' (1.829 m)   Wt 96.9 kg (213 lb 9.6 oz)   SpO2 97%   BMI 28.97 kg/m²      Physical Exam  Vitals and nursing note reviewed.   Constitutional:       General: He is not in acute distress.     Appearance: He is well-developed.   HENT:      Head: Normocephalic and atraumatic.   Eyes:      Conjunctiva/sclera: Conjunctivae normal.   Cardiovascular:      Rate and Rhythm: Normal rate and regular rhythm.      Heart sounds: No murmur heard.  Pulmonary:      Effort: Pulmonary effort is normal. No respiratory distress.      Breath sounds: Normal breath sounds.   Abdominal:      Palpations: Abdomen is soft.      Tenderness: There is no abdominal tenderness.   Musculoskeletal:         General: No swelling.      Cervical back: Neck supple.      Right lower leg: No deformity, lacerations, tenderness or bony tenderness. 1+ Edema present.      Left lower leg: No swelling, deformity, lacerations, tenderness or bony tenderness. 1+ Edema present.   Skin:     General: Skin is warm and dry.      Capillary Refill: Capillary refill takes less than 2 seconds.   Neurological:      Mental Status: He is alert.   Psychiatric:         Mood and Affect: Mood normal.

## 2025-04-30 NOTE — ASSESSMENT & PLAN NOTE
Orders:    albuterol (ProAir HFA) 90 mcg/act inhaler; Inhale 2 puffs every 6 (six) hours as needed for shortness of breath

## 2025-05-05 ENCOUNTER — OFFICE VISIT (OUTPATIENT)
Dept: FAMILY MEDICINE CLINIC | Facility: CLINIC | Age: 55
End: 2025-05-05

## 2025-05-05 VITALS
TEMPERATURE: 98.1 F | RESPIRATION RATE: 19 BRPM | OXYGEN SATURATION: 96 % | WEIGHT: 217.6 LBS | HEART RATE: 85 BPM | HEIGHT: 72 IN | BODY MASS INDEX: 29.47 KG/M2 | DIASTOLIC BLOOD PRESSURE: 80 MMHG | SYSTOLIC BLOOD PRESSURE: 134 MMHG

## 2025-05-05 DIAGNOSIS — M79.605 LEFT LEG PAIN: Primary | ICD-10-CM

## 2025-05-05 DIAGNOSIS — I10 PRIMARY HYPERTENSION: ICD-10-CM

## 2025-05-05 DIAGNOSIS — I50.22 CHRONIC SYSTOLIC CONGESTIVE HEART FAILURE (HCC): ICD-10-CM

## 2025-05-05 PROBLEM — J90 PLEURAL EFFUSION, LEFT: Status: RESOLVED | Noted: 2022-04-01 | Resolved: 2025-05-05

## 2025-05-05 PROBLEM — R07.9 CHEST PAIN: Status: RESOLVED | Noted: 2023-08-05 | Resolved: 2025-05-05

## 2025-05-05 PROCEDURE — 99213 OFFICE O/P EST LOW 20 MIN: CPT

## 2025-05-05 RX ORDER — METHOCARBAMOL 750 MG/1
750 TABLET, FILM COATED ORAL EVERY 6 HOURS PRN
Qty: 60 TABLET | Refills: 0 | Status: SHIPPED | OUTPATIENT
Start: 2025-05-05

## 2025-05-05 NOTE — ASSESSMENT & PLAN NOTE
Ongoing left leg pain  No recent trauma or red flags noted in HPI  Positive left SLR on physical exam  Likely left-sided lumbar radiculopathy in etiology.  Patient amenable to lumbar spine x-ray to assess for degenerative changes, spondylolisthesis or vertebral alignment issues.    Plan:  Follow-up x-ray spine lumbar  Robaxin as needed  Activity as tolerated  Ambulatory referral to PT  Follow-up in 4 weeks    Orders:    XR spine lumbar minimum 4 views non injury; Future    methocarbamol (Robaxin-750) 750 mg tablet; Take 1 tablet (750 mg total) by mouth every 6 (six) hours as needed for muscle spasms    Ambulatory Referral to Physical Therapy; Future

## 2025-05-05 NOTE — ASSESSMENT & PLAN NOTE
BP Readings from Last 3 Encounters:   05/05/25 134/80   04/30/25 126/60   01/17/25 138/86   BP at goal.  Goal < 140/90 mmHg  Current regimen: Hydralazine 25 mg 3 times daily and spironolactone 25 mg daily  Reports medication compliance

## 2025-05-05 NOTE — ASSESSMENT & PLAN NOTE
Wt Readings from Last 3 Encounters:   04/30/25 96.9 kg (213 lb 9.6 oz)   01/17/25 98 kg (216 lb)   09/30/24 97.8 kg (215 lb 9.6 oz)     Current regimen: Carvedilol 12.5 mg twice daily, torsemide 40 mg twice daily, Jardiance 10 mg daily, Imdur 30 mg daily, spironolactone 25 mg daily.   Reports medication compliance.    Plan:  Continue with current regimen  Low-sodium, low-carb, fluid restriction, increase whole-grain, vegetables and fruits strongly advised.  Moderate exercise 150 minutes/week as tolerated  Monitor symptoms closely  Missed appointments with cardiology.  Advised to follow-up.   F/u CBC and BMP

## 2025-05-05 NOTE — PROGRESS NOTES
Name: Cristian Quijano      : 1970      MRN: [id number]  Encounter Provider: Dk Zelaya MD  Encounter Date: 2025   Encounter department: [facility]  :  Assessment & Plan  Left leg pain  Ongoing left leg pain  No recent trauma or red flags noted in HPI  Positive left SLR on physical exam  Likely left-sided lumbar radiculopathy in etiology.  Patient amenable to lumbar spine x-ray to assess for degenerative changes, spondylolisthesis or vertebral alignment issues.    Plan:  Follow-up x-ray spine lumbar  Robaxin as needed  Activity as tolerated  Ambulatory referral to PT  Follow-up in 4 weeks    Orders:    XR spine lumbar minimum 4 views non injury; Future    methocarbamol (Robaxin-750) 750 mg tablet; Take 1 tablet (750 mg total) by mouth every 6 (six) hours as needed for muscle spasms    Ambulatory Referral to Physical Therapy; Future       History of Present Illness   Patient is a 55-year-old male presenting today for follow-up after being evaluated at the clinic on  for left leg pain.  Continues to endorse left leg pain during today's encounter.  Describes pain as shooting pain to the left lower leg when going from sitting to standing along with ambulation.  Denies fever, chills, nausea, vomiting or diaphoresis.  Denies changes in appetite or unintentional weight loss.  Tenderness to palpation on left lower back.  Reports no dysuria, hematuria, incontinence or retention.  X-ray of left leg results discussed with patient during today's visit.    Patient advised to get lab work completed as well prior to next visit in 1 month to address CHF and hypertension.    [employer] [id number] (Luiz)        Review of Systems   Constitutional:  Negative for activity change, appetite change, chills, diaphoresis, fatigue, fever and unexpected weight change.   Gastrointestinal:  Negative for diarrhea, nausea and vomiting.   Genitourinary:  Negative for decreased urine volume,  dysuria and hematuria.   Musculoskeletal:  Positive for arthralgias and back pain. Negative for gait problem, myalgias and neck pain.   Skin:  Negative for color change and rash.   Neurological:  Negative for weakness and numbness.       Objective   /80 (BP Location: Left arm, Patient Position: Sitting, Cuff Size: Standard)   Pulse 85   Temp 98.1 °F (36.7 °C) (Temporal)   Resp 19   Ht 6' (1.829 m)   Wt 98.7 kg (217 lb 9.6 oz)   SpO2 96%   BMI 29.51 kg/m²      Physical Exam  Constitutional:       General: He is not in acute distress.     Appearance: Normal appearance.   HENT:      Head: Normocephalic and atraumatic.   Eyes:      Extraocular Movements: Extraocular movements intact.   Cardiovascular:      Rate and Rhythm: Normal rate and regular rhythm.      Pulses: Normal pulses.      Heart sounds: Normal heart sounds.      No gallop.   Pulmonary:      Effort: Pulmonary effort is normal.      Breath sounds: Normal breath sounds. No rales.   Musculoskeletal:      Thoracic back: No signs of trauma, spasms, tenderness or bony tenderness.      Lumbar back: Tenderness present. No bony tenderness. Positive left straight leg raise test. Negative right straight leg raise test.      Right lower leg: No edema.      Left lower leg: No edema.   Skin:     Findings: No bruising, erythema or rash.   Neurological:      Mental Status: He is alert.      Motor: No weakness.      Deep Tendon Reflexes:      Reflex Scores:       Patellar reflexes are 2+ on the right side and 2+ on the left side.  Psychiatric:         Mood and Affect: Mood normal.

## 2025-05-09 ENCOUNTER — HOSPITAL ENCOUNTER (OUTPATIENT)
Dept: RADIOLOGY | Facility: HOSPITAL | Age: 55
End: 2025-05-09
Payer: COMMERCIAL

## 2025-05-09 DIAGNOSIS — M79.605 LEFT LEG PAIN: ICD-10-CM

## 2025-05-09 PROCEDURE — 72110 X-RAY EXAM L-2 SPINE 4/>VWS: CPT

## 2025-05-20 ENCOUNTER — TELEPHONE (OUTPATIENT)
Dept: CARDIOLOGY CLINIC | Facility: CLINIC | Age: 55
End: 2025-05-20

## 2025-06-04 PROBLEM — M54.32 BACK PAIN WITH LEFT-SIDED SCIATICA: Status: ACTIVE | Noted: 2025-06-04

## 2025-07-11 ENCOUNTER — OFFICE VISIT (OUTPATIENT)
Dept: FAMILY MEDICINE CLINIC | Facility: CLINIC | Age: 55
End: 2025-07-11

## 2025-07-11 VITALS
OXYGEN SATURATION: 96 % | BODY MASS INDEX: 29.68 KG/M2 | TEMPERATURE: 96.6 F | SYSTOLIC BLOOD PRESSURE: 128 MMHG | HEIGHT: 72 IN | DIASTOLIC BLOOD PRESSURE: 82 MMHG | WEIGHT: 219.1 LBS | HEART RATE: 79 BPM | RESPIRATION RATE: 16 BRPM

## 2025-07-11 DIAGNOSIS — Z13.1 SCREENING FOR DIABETES MELLITUS: ICD-10-CM

## 2025-07-11 DIAGNOSIS — I50.9 CHF (CONGESTIVE HEART FAILURE) (HCC): ICD-10-CM

## 2025-07-11 DIAGNOSIS — I10 PRIMARY HYPERTENSION: ICD-10-CM

## 2025-07-11 DIAGNOSIS — I50.20 SYSTOLIC CHF (HCC): ICD-10-CM

## 2025-07-11 DIAGNOSIS — I50.22 CHRONIC SYSTOLIC CONGESTIVE HEART FAILURE (HCC): ICD-10-CM

## 2025-07-11 DIAGNOSIS — I42.8 NONISCHEMIC CARDIOMYOPATHY (HCC): ICD-10-CM

## 2025-07-11 DIAGNOSIS — Z87.09 HISTORY OF ASTHMA: ICD-10-CM

## 2025-07-11 DIAGNOSIS — I50.9 ACUTE CHF (CONGESTIVE HEART FAILURE) (HCC): ICD-10-CM

## 2025-07-11 DIAGNOSIS — M79.605 LEFT LEG PAIN: Primary | ICD-10-CM

## 2025-07-11 DIAGNOSIS — M54.32 BACK PAIN WITH LEFT-SIDED SCIATICA: ICD-10-CM

## 2025-07-11 PROCEDURE — 99213 OFFICE O/P EST LOW 20 MIN: CPT | Performed by: FAMILY MEDICINE

## 2025-07-11 RX ORDER — ACETAMINOPHEN 500 MG
1000 TABLET ORAL EVERY 8 HOURS PRN
Qty: 90 TABLET | Refills: 0 | Status: SHIPPED | OUTPATIENT
Start: 2025-07-11

## 2025-07-11 RX ORDER — METHOCARBAMOL 750 MG/1
750 TABLET, FILM COATED ORAL EVERY 6 HOURS PRN
Qty: 60 TABLET | Refills: 0 | Status: SHIPPED | OUTPATIENT
Start: 2025-07-11

## 2025-07-11 RX ORDER — HYDRALAZINE HYDROCHLORIDE 25 MG/1
25 TABLET, FILM COATED ORAL 3 TIMES DAILY
Qty: 90 TABLET | Refills: 3 | Status: SHIPPED | OUTPATIENT
Start: 2025-07-11 | End: 2025-07-31 | Stop reason: SDUPTHER

## 2025-07-11 RX ORDER — ATORVASTATIN CALCIUM 40 MG/1
40 TABLET, FILM COATED ORAL
Qty: 30 TABLET | Refills: 0 | Status: SHIPPED | OUTPATIENT
Start: 2025-07-11 | End: 2025-07-31 | Stop reason: SDUPTHER

## 2025-07-11 RX ORDER — TORSEMIDE 20 MG/1
40 TABLET ORAL 2 TIMES DAILY
Qty: 120 TABLET | Refills: 3 | Status: SHIPPED | OUTPATIENT
Start: 2025-07-11

## 2025-07-11 RX ORDER — SPIRONOLACTONE 25 MG/1
25 TABLET ORAL DAILY
Qty: 30 TABLET | Refills: 2 | Status: SHIPPED | OUTPATIENT
Start: 2025-07-11

## 2025-07-11 RX ORDER — ISOSORBIDE MONONITRATE 30 MG/1
30 TABLET, EXTENDED RELEASE ORAL DAILY
Qty: 30 TABLET | Refills: 3 | Status: SHIPPED | OUTPATIENT
Start: 2025-07-11 | End: 2025-07-31 | Stop reason: SDUPTHER

## 2025-07-11 RX ORDER — SACUBITRIL AND VALSARTAN 24; 26 MG/1; MG/1
1 TABLET, FILM COATED ORAL 2 TIMES DAILY
Qty: 60 TABLET | Refills: 3 | Status: SHIPPED | OUTPATIENT
Start: 2025-07-11 | End: 2025-07-31 | Stop reason: SDUPTHER

## 2025-07-11 RX ORDER — ALBUTEROL SULFATE 90 UG/1
2 INHALANT RESPIRATORY (INHALATION) EVERY 6 HOURS PRN
Qty: 8.5 G | Refills: 0 | Status: SHIPPED | OUTPATIENT
Start: 2025-07-11 | End: 2025-07-31 | Stop reason: SDUPTHER

## 2025-07-11 RX ORDER — CARVEDILOL 12.5 MG/1
12.5 TABLET ORAL 2 TIMES DAILY WITH MEALS
Qty: 60 TABLET | Refills: 3 | Status: SHIPPED | OUTPATIENT
Start: 2025-07-11 | End: 2025-07-31 | Stop reason: SDUPTHER

## 2025-07-11 NOTE — ASSESSMENT & PLAN NOTE
Ongoing left leg pain 4/10 on presentation  Xray lumbar (5/09/25) notable for intervertebral disc space narrowing at L5-S1 with small endplate osteophytes at L4 and L5.  Findings likely contributing to patient's symptoms  Will refill Robaxin and Tylenol prn for management    Plan:  Robaxin and Tylenol prn  Activity modifications  Discuss PT next visit    Orders:  •  acetaminophen (TYLENOL) 500 mg tablet; Take 2 tablets (1,000 mg total) by mouth every 8 (eight) hours as needed for mild pain

## 2025-07-11 NOTE — ASSESSMENT & PLAN NOTE
Blood Pressure: 128/82    Well-controlled   Medication refilled  Patient to follow-up to address next visit    Orders:  •  spironolactone (ALDACTONE) 25 mg tablet; Take 1 tablet (25 mg total) by mouth daily  •  hydrALAZINE (APRESOLINE) 25 mg tablet; Take 1 tablet (25 mg total) by mouth 3 (three) times a day

## 2025-07-11 NOTE — ASSESSMENT & PLAN NOTE
Medication refilled    Orders:  •  albuterol (ProAir HFA) 90 mcg/act inhaler; Inhale 2 puffs every 6 (six) hours as needed for shortness of breath

## 2025-07-11 NOTE — ASSESSMENT & PLAN NOTE
Medication refilled  Patient to follow-up to address next visit    Orders:  •  spironolactone (ALDACTONE) 25 mg tablet; Take 1 tablet (25 mg total) by mouth daily  •  isosorbide mononitrate (IMDUR) 30 mg 24 hr tablet; Take 1 tablet (30 mg total) by mouth daily

## 2025-07-11 NOTE — ASSESSMENT & PLAN NOTE
Wt Readings from Last 3 Encounters:   07/11/25 99.4 kg (219 lb 1.6 oz)   05/05/25 98.7 kg (217 lb 9.6 oz)   04/30/25 96.9 kg (213 lb 9.6 oz)   Medication refilled  Patient to follow-up to address next visit    Orders:  •  sacubitril-valsartan (ENTRESTO) 24-26 MG TABS; Take 1 tablet by mouth 2 (two) times a day  •  torsemide (DEMADEX) 20 mg tablet; Take 2 tablets (40 mg total) by mouth in the morning and 2 tablets (40 mg total) before bedtime. 2 tablets twice daily.  •  Empagliflozin (Jardiance) 10 MG TABS tablet; Take 1 tablet (10 mg total) by mouth daily  •  carvedilol (COREG) 12.5 mg tablet; Take 1 tablet (12.5 mg total) by mouth 2 (two) times a day with meals

## 2025-07-11 NOTE — ASSESSMENT & PLAN NOTE
Refer to A/P for left leg pain    Orders:  •  methocarbamol (Robaxin-750) 750 mg tablet; Take 1 tablet (750 mg total) by mouth every 6 (six) hours as needed for muscle spasms

## 2025-07-15 ENCOUNTER — TELEPHONE (OUTPATIENT)
Dept: FAMILY MEDICINE CLINIC | Facility: CLINIC | Age: 55
End: 2025-07-15

## 2025-07-23 LAB — BNP SERPL-MCNC: 128 PG/ML

## 2025-07-31 ENCOUNTER — OFFICE VISIT (OUTPATIENT)
Dept: FAMILY MEDICINE CLINIC | Facility: CLINIC | Age: 55
End: 2025-07-31

## 2025-07-31 VITALS
SYSTOLIC BLOOD PRESSURE: 124 MMHG | TEMPERATURE: 97.5 F | WEIGHT: 219.9 LBS | HEIGHT: 72 IN | BODY MASS INDEX: 29.78 KG/M2 | DIASTOLIC BLOOD PRESSURE: 78 MMHG | RESPIRATION RATE: 20 BRPM | HEART RATE: 85 BPM | OXYGEN SATURATION: 93 %

## 2025-07-31 DIAGNOSIS — I10 PRIMARY HYPERTENSION: ICD-10-CM

## 2025-07-31 DIAGNOSIS — M54.42 CHRONIC MIDLINE LOW BACK PAIN WITH BILATERAL SCIATICA: ICD-10-CM

## 2025-07-31 DIAGNOSIS — G89.29 CHRONIC MIDLINE LOW BACK PAIN WITH BILATERAL SCIATICA: ICD-10-CM

## 2025-07-31 DIAGNOSIS — I50.22 CHRONIC SYSTOLIC CONGESTIVE HEART FAILURE (HCC): Primary | ICD-10-CM

## 2025-07-31 DIAGNOSIS — J45.20 MILD INTERMITTENT ASTHMA, UNSPECIFIED WHETHER COMPLICATED: ICD-10-CM

## 2025-07-31 DIAGNOSIS — M54.41 CHRONIC MIDLINE LOW BACK PAIN WITH BILATERAL SCIATICA: ICD-10-CM

## 2025-07-31 PROCEDURE — 99213 OFFICE O/P EST LOW 20 MIN: CPT | Performed by: STUDENT IN AN ORGANIZED HEALTH CARE EDUCATION/TRAINING PROGRAM

## 2025-07-31 RX ORDER — HYDRALAZINE HYDROCHLORIDE 25 MG/1
25 TABLET, FILM COATED ORAL 3 TIMES DAILY
Qty: 90 TABLET | Refills: 3 | Status: SHIPPED | OUTPATIENT
Start: 2025-07-31

## 2025-07-31 RX ORDER — SACUBITRIL AND VALSARTAN 24; 26 MG/1; MG/1
1 TABLET, FILM COATED ORAL 2 TIMES DAILY
Qty: 60 TABLET | Refills: 3 | Status: SHIPPED | OUTPATIENT
Start: 2025-07-31

## 2025-07-31 RX ORDER — ATORVASTATIN CALCIUM 40 MG/1
40 TABLET, FILM COATED ORAL
Qty: 30 TABLET | Refills: 0 | Status: SHIPPED | OUTPATIENT
Start: 2025-07-31

## 2025-07-31 RX ORDER — ALBUTEROL SULFATE 90 UG/1
2 INHALANT RESPIRATORY (INHALATION) EVERY 6 HOURS PRN
Qty: 8.5 G | Refills: 0 | Status: SHIPPED | OUTPATIENT
Start: 2025-07-31

## 2025-07-31 RX ORDER — CARVEDILOL 12.5 MG/1
12.5 TABLET ORAL 2 TIMES DAILY WITH MEALS
Qty: 60 TABLET | Refills: 3 | Status: SHIPPED | OUTPATIENT
Start: 2025-07-31

## 2025-07-31 RX ORDER — LIDOCAINE 50 MG/G
1 PATCH TOPICAL DAILY
Qty: 30 PATCH | Refills: 0 | Status: SHIPPED | OUTPATIENT
Start: 2025-07-31

## 2025-07-31 RX ORDER — ISOSORBIDE MONONITRATE 30 MG/1
30 TABLET, EXTENDED RELEASE ORAL DAILY
Qty: 30 TABLET | Refills: 3 | Status: SHIPPED | OUTPATIENT
Start: 2025-07-31

## 2025-08-05 LAB
ALBUMIN SERPL-MCNC: 4.5 G/DL (ref 3.6–5.1)
ALBUMIN/GLOB SERPL: 1.7 (CALC) (ref 1–2.5)
ALP SERPL-CCNC: 122 U/L (ref 35–144)
ALT SERPL-CCNC: 13 U/L (ref 9–46)
AST SERPL-CCNC: 17 U/L (ref 10–35)
BASOPHILS # BLD AUTO: 51 CELLS/UL (ref 0–200)
BASOPHILS NFR BLD AUTO: 0.8 %
BILIRUB SERPL-MCNC: 0.4 MG/DL (ref 0.2–1.2)
BUN SERPL-MCNC: 15 MG/DL (ref 7–25)
BUN/CREAT SERPL: NORMAL (CALC) (ref 6–22)
CALCIUM SERPL-MCNC: 9.3 MG/DL (ref 8.6–10.3)
CHLORIDE SERPL-SCNC: 104 MMOL/L (ref 98–110)
CO2 SERPL-SCNC: 28 MMOL/L (ref 20–32)
CREAT SERPL-MCNC: 1.07 MG/DL (ref 0.7–1.3)
EOSINOPHIL # BLD AUTO: 301 CELLS/UL (ref 15–500)
EOSINOPHIL NFR BLD AUTO: 4.7 %
ERYTHROCYTE [DISTWIDTH] IN BLOOD BY AUTOMATED COUNT: 15.4 % (ref 11–15)
GFR/BSA.PRED SERPLBLD CYS-BASED-ARV: 83 ML/MIN/1.73M2
GLOBULIN SER CALC-MCNC: 2.7 G/DL (CALC) (ref 1.9–3.7)
GLUCOSE SERPL-MCNC: 84 MG/DL (ref 65–99)
HBA1C MFR BLD: 5.9 %
HCT VFR BLD AUTO: 52.4 % (ref 38.5–50)
HGB BLD-MCNC: 17.3 G/DL (ref 13.2–17.1)
LYMPHOCYTES # BLD AUTO: 1274 CELLS/UL (ref 850–3900)
LYMPHOCYTES NFR BLD AUTO: 19.9 %
MAGNESIUM SERPL-MCNC: 2.3 MG/DL (ref 1.5–2.5)
MCH RBC QN AUTO: 30.7 PG (ref 27–33)
MCHC RBC AUTO-ENTMCNC: 33 G/DL (ref 32–36)
MCV RBC AUTO: 93.1 FL (ref 80–100)
MONOCYTES # BLD AUTO: 518 CELLS/UL (ref 200–950)
MONOCYTES NFR BLD AUTO: 8.1 %
NEUTROPHILS # BLD AUTO: 4256 CELLS/UL (ref 1500–7800)
NEUTROPHILS NFR BLD AUTO: 66.5 %
PHOSPHATE SERPL-MCNC: 3.3 MG/DL (ref 2.5–4.5)
PLATELET # BLD AUTO: 205 THOUSAND/UL (ref 140–400)
PMV BLD REES-ECKER: 11.4 FL (ref 7.5–12.5)
POTASSIUM SERPL-SCNC: 4.8 MMOL/L (ref 3.5–5.3)
PROT SERPL-MCNC: 7.2 G/DL (ref 6.1–8.1)
RBC # BLD AUTO: 5.63 MILLION/UL (ref 4.2–5.8)
SODIUM SERPL-SCNC: 142 MMOL/L (ref 135–146)
TSH SERPL-ACNC: 1.44 MIU/L (ref 0.4–4.5)
WBC # BLD AUTO: 6.4 THOUSAND/UL (ref 3.8–10.8)

## 2025-08-19 ENCOUNTER — TELEPHONE (OUTPATIENT)
Dept: FAMILY MEDICINE CLINIC | Facility: CLINIC | Age: 55
End: 2025-08-19

## (undated) DEVICE — GUIDEWIRE WHOLEY .035 145 CM FLOP STR TIP

## (undated) DEVICE — RADIFOCUS OPTITORQUE ANGIOGRAPHIC CATHETER: Brand: OPTITORQUE

## (undated) DEVICE — GUIDEWIRE .035 260CM 3MM J TIP

## (undated) DEVICE — GLIDESHEATH SLENDER STAINLESS STEEL KIT: Brand: GLIDESHEATH SLENDER

## (undated) DEVICE — CATH DIAG 5FR IMPULSE 100CM WR